# Patient Record
Sex: MALE | Race: BLACK OR AFRICAN AMERICAN | NOT HISPANIC OR LATINO | Employment: OTHER | URBAN - METROPOLITAN AREA
[De-identification: names, ages, dates, MRNs, and addresses within clinical notes are randomized per-mention and may not be internally consistent; named-entity substitution may affect disease eponyms.]

---

## 2017-01-01 ENCOUNTER — COMMUNICATION - HEALTHEAST (OUTPATIENT)
Dept: PULMONOLOGY | Facility: OTHER | Age: 67
End: 2017-01-01

## 2017-01-09 ENCOUNTER — OFFICE VISIT - HEALTHEAST (OUTPATIENT)
Dept: INTERNAL MEDICINE | Facility: CLINIC | Age: 67
End: 2017-01-09

## 2017-01-09 DIAGNOSIS — I10 ESSENTIAL HYPERTENSION, BENIGN: ICD-10-CM

## 2017-01-09 DIAGNOSIS — F33.0 MAJOR DEPRESSIVE DISORDER, RECURRENT EPISODE, MILD (H): ICD-10-CM

## 2017-01-09 DIAGNOSIS — F41.9 ANXIETY: ICD-10-CM

## 2017-01-09 DIAGNOSIS — D86.0 SARCOIDOSIS OF LUNG (H): ICD-10-CM

## 2017-01-09 ASSESSMENT — MIFFLIN-ST. JEOR: SCORE: 1377.68

## 2017-01-23 ENCOUNTER — COMMUNICATION - HEALTHEAST (OUTPATIENT)
Dept: INTERNAL MEDICINE | Facility: CLINIC | Age: 67
End: 2017-01-23

## 2017-01-23 DIAGNOSIS — F41.9 ANXIETY: ICD-10-CM

## 2017-01-23 DIAGNOSIS — G47.00 INSOMNIA, UNSPECIFIED: ICD-10-CM

## 2017-01-25 ENCOUNTER — OFFICE VISIT - HEALTHEAST (OUTPATIENT)
Dept: INTERNAL MEDICINE | Facility: CLINIC | Age: 67
End: 2017-01-25

## 2017-01-25 DIAGNOSIS — D86.0 SARCOIDOSIS OF LUNG (H): ICD-10-CM

## 2017-01-25 DIAGNOSIS — R05.9 COUGH: ICD-10-CM

## 2017-01-25 DIAGNOSIS — J06.9 URI (UPPER RESPIRATORY INFECTION): ICD-10-CM

## 2017-01-25 DIAGNOSIS — F41.9 ANXIETY: ICD-10-CM

## 2017-01-25 ASSESSMENT — MIFFLIN-ST. JEOR: SCORE: 1336.86

## 2017-01-30 ENCOUNTER — COMMUNICATION - HEALTHEAST (OUTPATIENT)
Dept: INTERNAL MEDICINE | Facility: CLINIC | Age: 67
End: 2017-01-30

## 2017-02-03 ENCOUNTER — COMMUNICATION - HEALTHEAST (OUTPATIENT)
Dept: INTERNAL MEDICINE | Facility: CLINIC | Age: 67
End: 2017-02-03

## 2017-02-03 DIAGNOSIS — F41.9 ANXIETY: ICD-10-CM

## 2017-02-06 ENCOUNTER — OFFICE VISIT - HEALTHEAST (OUTPATIENT)
Dept: INTERNAL MEDICINE | Facility: CLINIC | Age: 67
End: 2017-02-06

## 2017-02-06 DIAGNOSIS — D86.0 SARCOIDOSIS OF LUNG (H): ICD-10-CM

## 2017-02-06 DIAGNOSIS — I10 ESSENTIAL HYPERTENSION, BENIGN: ICD-10-CM

## 2017-02-06 DIAGNOSIS — R05.9 COUGH: ICD-10-CM

## 2017-02-06 DIAGNOSIS — F41.9 ANXIETY: ICD-10-CM

## 2017-02-06 ASSESSMENT — MIFFLIN-ST. JEOR: SCORE: 1345.93

## 2017-02-15 ENCOUNTER — COMMUNICATION - HEALTHEAST (OUTPATIENT)
Dept: INTERNAL MEDICINE | Facility: CLINIC | Age: 67
End: 2017-02-15

## 2017-02-15 DIAGNOSIS — F41.9 ANXIETY: ICD-10-CM

## 2017-02-21 ENCOUNTER — COMMUNICATION - HEALTHEAST (OUTPATIENT)
Dept: INTERNAL MEDICINE | Facility: CLINIC | Age: 67
End: 2017-02-21

## 2017-02-21 DIAGNOSIS — G47.00 INSOMNIA, UNSPECIFIED: ICD-10-CM

## 2017-02-23 ENCOUNTER — COMMUNICATION - HEALTHEAST (OUTPATIENT)
Dept: INTERNAL MEDICINE | Facility: CLINIC | Age: 67
End: 2017-02-23

## 2017-02-23 DIAGNOSIS — R05.9 COUGH: ICD-10-CM

## 2017-03-13 ENCOUNTER — COMMUNICATION - HEALTHEAST (OUTPATIENT)
Dept: INTERNAL MEDICINE | Facility: CLINIC | Age: 67
End: 2017-03-13

## 2017-03-13 DIAGNOSIS — G47.00 INSOMNIA, UNSPECIFIED: ICD-10-CM

## 2017-03-13 DIAGNOSIS — F41.9 ANXIETY: ICD-10-CM

## 2017-03-14 ENCOUNTER — COMMUNICATION - HEALTHEAST (OUTPATIENT)
Dept: SCHEDULING | Facility: CLINIC | Age: 67
End: 2017-03-14

## 2017-03-15 ENCOUNTER — OFFICE VISIT - HEALTHEAST (OUTPATIENT)
Dept: INTERNAL MEDICINE | Facility: CLINIC | Age: 67
End: 2017-03-15

## 2017-03-15 DIAGNOSIS — J06.9 URI (UPPER RESPIRATORY INFECTION): ICD-10-CM

## 2017-03-15 DIAGNOSIS — D86.0 SARCOIDOSIS OF LUNG (H): ICD-10-CM

## 2017-03-15 RX ORDER — GABAPENTIN 300 MG/1
300 CAPSULE ORAL DAILY
Refills: 5 | Status: SHIPPED | COMMUNITY
Start: 2017-03-05 | End: 2022-03-15

## 2017-03-15 ASSESSMENT — MIFFLIN-ST. JEOR: SCORE: 1373.15

## 2017-03-22 ENCOUNTER — COMMUNICATION - HEALTHEAST (OUTPATIENT)
Dept: SCHEDULING | Facility: CLINIC | Age: 67
End: 2017-03-22

## 2017-03-22 ENCOUNTER — COMMUNICATION - HEALTHEAST (OUTPATIENT)
Dept: INTERNAL MEDICINE | Facility: CLINIC | Age: 67
End: 2017-03-22

## 2017-04-01 ENCOUNTER — COMMUNICATION - HEALTHEAST (OUTPATIENT)
Dept: INTERNAL MEDICINE | Facility: CLINIC | Age: 67
End: 2017-04-01

## 2017-04-01 DIAGNOSIS — I10 ESSENTIAL HYPERTENSION, BENIGN: ICD-10-CM

## 2017-04-06 ENCOUNTER — COMMUNICATION - HEALTHEAST (OUTPATIENT)
Dept: INTERNAL MEDICINE | Facility: CLINIC | Age: 67
End: 2017-04-06

## 2017-04-10 ENCOUNTER — COMMUNICATION - HEALTHEAST (OUTPATIENT)
Dept: INTERNAL MEDICINE | Facility: CLINIC | Age: 67
End: 2017-04-10

## 2017-04-10 DIAGNOSIS — G43.909 MIGRAINE HEADACHE: ICD-10-CM

## 2017-04-20 ENCOUNTER — COMMUNICATION - HEALTHEAST (OUTPATIENT)
Dept: INTERNAL MEDICINE | Facility: CLINIC | Age: 67
End: 2017-04-20

## 2017-04-20 DIAGNOSIS — F41.9 ANXIETY: ICD-10-CM

## 2017-04-20 DIAGNOSIS — G47.00 INSOMNIA, UNSPECIFIED: ICD-10-CM

## 2017-04-21 ENCOUNTER — COMMUNICATION - HEALTHEAST (OUTPATIENT)
Dept: INTERNAL MEDICINE | Facility: CLINIC | Age: 67
End: 2017-04-21

## 2017-04-21 DIAGNOSIS — R05.9 COUGH: ICD-10-CM

## 2017-05-23 ENCOUNTER — COMMUNICATION - HEALTHEAST (OUTPATIENT)
Dept: INTERNAL MEDICINE | Facility: CLINIC | Age: 67
End: 2017-05-23

## 2017-05-23 DIAGNOSIS — F41.9 ANXIETY: ICD-10-CM

## 2017-05-23 DIAGNOSIS — G47.00 INSOMNIA, UNSPECIFIED: ICD-10-CM

## 2017-05-27 ENCOUNTER — COMMUNICATION - HEALTHEAST (OUTPATIENT)
Dept: INTERNAL MEDICINE | Facility: CLINIC | Age: 67
End: 2017-05-27

## 2017-05-27 DIAGNOSIS — I10 HYPERTENSION: ICD-10-CM

## 2017-06-08 ENCOUNTER — COMMUNICATION - HEALTHEAST (OUTPATIENT)
Dept: INTERNAL MEDICINE | Facility: CLINIC | Age: 67
End: 2017-06-08

## 2017-06-08 ENCOUNTER — OFFICE VISIT - HEALTHEAST (OUTPATIENT)
Dept: INTERNAL MEDICINE | Facility: CLINIC | Age: 67
End: 2017-06-08

## 2017-06-08 DIAGNOSIS — E78.5 HYPERLIPIDEMIA: ICD-10-CM

## 2017-06-08 DIAGNOSIS — D86.0 SARCOIDOSIS OF LUNG (H): ICD-10-CM

## 2017-06-08 DIAGNOSIS — I10 ESSENTIAL HYPERTENSION, BENIGN: ICD-10-CM

## 2017-06-08 DIAGNOSIS — H20.9 UVEITIS: ICD-10-CM

## 2017-06-08 DIAGNOSIS — F33.0 MAJOR DEPRESSIVE DISORDER, RECURRENT EPISODE, MILD (H): ICD-10-CM

## 2017-06-08 DIAGNOSIS — F41.9 ANXIETY: ICD-10-CM

## 2017-06-08 DIAGNOSIS — G47.00 INSOMNIA, UNSPECIFIED: ICD-10-CM

## 2017-06-08 DIAGNOSIS — G43.909 MIGRAINE HEADACHE: ICD-10-CM

## 2017-06-19 ENCOUNTER — COMMUNICATION - HEALTHEAST (OUTPATIENT)
Dept: INTERNAL MEDICINE | Facility: CLINIC | Age: 67
End: 2017-06-19

## 2017-06-19 DIAGNOSIS — F41.9 ANXIETY: ICD-10-CM

## 2017-06-19 DIAGNOSIS — G47.00 INSOMNIA, UNSPECIFIED: ICD-10-CM

## 2017-07-20 ENCOUNTER — COMMUNICATION - HEALTHEAST (OUTPATIENT)
Dept: INTERNAL MEDICINE | Facility: CLINIC | Age: 67
End: 2017-07-20

## 2017-07-20 DIAGNOSIS — G47.00 INSOMNIA, UNSPECIFIED: ICD-10-CM

## 2017-07-20 DIAGNOSIS — F41.9 ANXIETY: ICD-10-CM

## 2017-07-20 DIAGNOSIS — G43.909 MIGRAINE HEADACHE: ICD-10-CM

## 2017-08-07 ENCOUNTER — COMMUNICATION - HEALTHEAST (OUTPATIENT)
Dept: INTERNAL MEDICINE | Facility: CLINIC | Age: 67
End: 2017-08-07

## 2017-08-07 DIAGNOSIS — F41.9 ANXIETY: ICD-10-CM

## 2017-08-07 DIAGNOSIS — G47.00 INSOMNIA, UNSPECIFIED: ICD-10-CM

## 2017-08-17 ENCOUNTER — COMMUNICATION - HEALTHEAST (OUTPATIENT)
Dept: INTERNAL MEDICINE | Facility: CLINIC | Age: 67
End: 2017-08-17

## 2017-08-17 DIAGNOSIS — I10 ESSENTIAL HYPERTENSION, BENIGN: ICD-10-CM

## 2017-08-18 ENCOUNTER — COMMUNICATION - HEALTHEAST (OUTPATIENT)
Dept: INTERNAL MEDICINE | Facility: CLINIC | Age: 67
End: 2017-08-18

## 2017-08-18 DIAGNOSIS — F41.9 ANXIETY: ICD-10-CM

## 2017-08-23 ENCOUNTER — COMMUNICATION - HEALTHEAST (OUTPATIENT)
Dept: INTERNAL MEDICINE | Facility: CLINIC | Age: 67
End: 2017-08-23

## 2017-08-23 ENCOUNTER — OFFICE VISIT - HEALTHEAST (OUTPATIENT)
Dept: INTERNAL MEDICINE | Facility: CLINIC | Age: 67
End: 2017-08-23

## 2017-08-23 DIAGNOSIS — R51.9 HEADACHE: ICD-10-CM

## 2017-08-23 DIAGNOSIS — R53.83 FATIGUE: ICD-10-CM

## 2017-08-23 DIAGNOSIS — F41.9 ANXIETY: ICD-10-CM

## 2017-08-23 DIAGNOSIS — I10 ESSENTIAL HYPERTENSION, BENIGN: ICD-10-CM

## 2017-08-23 DIAGNOSIS — F33.0 MAJOR DEPRESSIVE DISORDER, RECURRENT EPISODE, MILD (H): ICD-10-CM

## 2017-08-23 ASSESSMENT — MIFFLIN-ST. JEOR: SCORE: 1395.83

## 2017-08-24 ENCOUNTER — COMMUNICATION - HEALTHEAST (OUTPATIENT)
Dept: INTERNAL MEDICINE | Facility: CLINIC | Age: 67
End: 2017-08-24

## 2017-08-29 ENCOUNTER — OFFICE VISIT - HEALTHEAST (OUTPATIENT)
Dept: INTERNAL MEDICINE | Facility: CLINIC | Age: 67
End: 2017-08-29

## 2017-08-29 DIAGNOSIS — F33.0 MAJOR DEPRESSIVE DISORDER, RECURRENT EPISODE, MILD (H): ICD-10-CM

## 2017-08-29 DIAGNOSIS — F41.9 ANXIETY: ICD-10-CM

## 2017-08-29 DIAGNOSIS — G56.03 BILATERAL CARPAL TUNNEL SYNDROME: ICD-10-CM

## 2017-08-29 DIAGNOSIS — M25.512 LEFT SHOULDER PAIN: ICD-10-CM

## 2017-09-10 ENCOUNTER — COMMUNICATION - HEALTHEAST (OUTPATIENT)
Dept: INTERNAL MEDICINE | Facility: CLINIC | Age: 67
End: 2017-09-10

## 2017-09-10 DIAGNOSIS — G43.909 MIGRAINE HEADACHE: ICD-10-CM

## 2017-09-19 ENCOUNTER — COMMUNICATION - HEALTHEAST (OUTPATIENT)
Dept: INTERNAL MEDICINE | Facility: CLINIC | Age: 67
End: 2017-09-19

## 2017-09-19 DIAGNOSIS — F41.9 ANXIETY: ICD-10-CM

## 2017-09-19 DIAGNOSIS — G47.00 INSOMNIA, UNSPECIFIED: ICD-10-CM

## 2017-09-21 ENCOUNTER — RECORDS - HEALTHEAST (OUTPATIENT)
Dept: ADMINISTRATIVE | Facility: OTHER | Age: 67
End: 2017-09-21

## 2017-09-21 ENCOUNTER — AMBULATORY - HEALTHEAST (OUTPATIENT)
Dept: INTERNAL MEDICINE | Facility: CLINIC | Age: 67
End: 2017-09-21

## 2017-09-21 ENCOUNTER — COMMUNICATION - HEALTHEAST (OUTPATIENT)
Dept: INTERNAL MEDICINE | Facility: CLINIC | Age: 67
End: 2017-09-21

## 2017-09-22 ENCOUNTER — HOSPITAL ENCOUNTER (OUTPATIENT)
Dept: PHYSICAL MEDICINE AND REHAB | Facility: CLINIC | Age: 67
Discharge: HOME | End: 2017-09-22
Attending: INTERNAL MEDICINE

## 2017-09-22 DIAGNOSIS — G56.03 BILATERAL CARPAL TUNNEL SYNDROME: ICD-10-CM

## 2017-09-29 ENCOUNTER — RECORDS - HEALTHEAST (OUTPATIENT)
Dept: ADMINISTRATIVE | Facility: OTHER | Age: 67
End: 2017-09-29

## 2017-10-11 ENCOUNTER — OFFICE VISIT - HEALTHEAST (OUTPATIENT)
Dept: INTERNAL MEDICINE | Facility: CLINIC | Age: 67
End: 2017-10-11

## 2017-10-11 DIAGNOSIS — F41.9 ANXIETY: ICD-10-CM

## 2017-10-11 DIAGNOSIS — I10 ESSENTIAL HYPERTENSION, BENIGN: ICD-10-CM

## 2017-10-11 DIAGNOSIS — D86.0 SARCOIDOSIS OF LUNG (H): ICD-10-CM

## 2017-10-11 DIAGNOSIS — F33.0 MAJOR DEPRESSIVE DISORDER, RECURRENT EPISODE, MILD (H): ICD-10-CM

## 2017-10-11 DIAGNOSIS — M25.512 LEFT SHOULDER PAIN: ICD-10-CM

## 2017-10-11 DIAGNOSIS — Z23 NEED FOR VACCINATION FOR STREP PNEUMONIAE: ICD-10-CM

## 2017-10-11 ASSESSMENT — MIFFLIN-ST. JEOR: SCORE: 1377.68

## 2017-10-16 ENCOUNTER — HOSPITAL ENCOUNTER (OUTPATIENT)
Dept: RESPIRATORY THERAPY | Facility: HOSPITAL | Age: 67
Discharge: HOME | End: 2017-10-16

## 2017-10-16 ENCOUNTER — COMMUNICATION - HEALTHEAST (OUTPATIENT)
Dept: INTERNAL MEDICINE | Facility: CLINIC | Age: 67
End: 2017-10-16

## 2017-10-16 DIAGNOSIS — F41.9 ANXIETY: ICD-10-CM

## 2017-10-16 DIAGNOSIS — D86.0 PULMONARY SARCOIDOSIS (H): ICD-10-CM

## 2017-10-16 DIAGNOSIS — G47.00 INSOMNIA: ICD-10-CM

## 2017-10-17 ENCOUNTER — OFFICE VISIT - HEALTHEAST (OUTPATIENT)
Dept: PULMONOLOGY | Facility: OTHER | Age: 67
End: 2017-10-17

## 2017-10-17 DIAGNOSIS — D86.0 PULMONARY SARCOIDOSIS (H): ICD-10-CM

## 2017-10-20 ENCOUNTER — HOSPITAL ENCOUNTER (OUTPATIENT)
Dept: CARDIOLOGY | Facility: CLINIC | Age: 67
Discharge: HOME | End: 2017-10-20
Attending: INTERNAL MEDICINE

## 2017-10-20 ENCOUNTER — HOSPITAL ENCOUNTER (OUTPATIENT)
Dept: CT IMAGING | Facility: CLINIC | Age: 67
Discharge: HOME | End: 2017-10-20
Attending: INTERNAL MEDICINE

## 2017-10-20 DIAGNOSIS — D86.0 PULMONARY SARCOIDOSIS (H): ICD-10-CM

## 2017-10-20 LAB
AORTIC ROOT: 3.5 CM
BSA FOR ECHO PROCEDURE: 1.8 M2
CV BLOOD PRESSURE: NORMAL MMHG
CV ECHO HEIGHT: 64 IN
CV ECHO WEIGHT: 159 LBS
DOP CALC LVOT AREA: 3.14 CM2
DOP CALC LVOT DIAMETER: 2 CM
DOP CALC LVOT PEAK VEL: 69.9 CM/S
DOP CALC LVOT STROKE VOLUME: 34.2 CM3
DOP CALCLVOT PEAK VEL VTI: 10.9 CM
EJECTION FRACTION: 69 % (ref 55–75)
FRACTIONAL SHORTENING: 29.6 % (ref 28–44)
INTERVENTRICULAR SEPTUM IN END DIASTOLE: 0.9 CM (ref 0.6–1)
IVS/PW RATIO: 1
LA AREA 1: 11.1 CM2
LA AREA 2: 12.7 CM2
LEFT ATRIUM LENGTH: 3.65 CM
LEFT ATRIUM SIZE: 4.3 CM
LEFT ATRIUM TO AORTIC ROOT RATIO: 1.23 NO UNITS
LEFT ATRIUM VOLUME INDEX: 18.2 ML/M2
LEFT ATRIUM VOLUME: 32.8 CM3
LEFT VENTRICLE CARDIAC INDEX: 1.5 L/MIN/M2
LEFT VENTRICLE CARDIAC OUTPUT: 2.7 L/MIN
LEFT VENTRICLE DIASTOLIC VOLUME INDEX: 43.3 CM3/M2 (ref 34–74)
LEFT VENTRICLE DIASTOLIC VOLUME: 78 CM3 (ref 62–150)
LEFT VENTRICLE HEART RATE: 80 BPM
LEFT VENTRICLE MASS INDEX: 100.1 G/M2
LEFT VENTRICLE SYSTOLIC VOLUME INDEX: 13.3 CM3/M2 (ref 11–31)
LEFT VENTRICLE SYSTOLIC VOLUME: 24 CM3 (ref 21–61)
LEFT VENTRICULAR INTERNAL DIMENSION IN DIASTOLE: 5.4 CM (ref 4.2–5.8)
LEFT VENTRICULAR INTERNAL DIMENSION IN SYSTOLE: 3.8 CM (ref 2.5–4)
LEFT VENTRICULAR MASS: 180.1 G
LEFT VENTRICULAR OUTFLOW TRACT MEAN GRADIENT: 1 MMHG
LEFT VENTRICULAR OUTFLOW TRACT MEAN VELOCITY: 54.1 CM/S
LEFT VENTRICULAR OUTFLOW TRACT PEAK GRADIENT: 2 MMHG
LEFT VENTRICULAR POSTERIOR WALL IN END DIASTOLE: 0.9 CM (ref 0.6–1)
LV STROKE VOLUME INDEX: 19 ML/M2
MITRAL REGURGITANT VELOCITY TIME INTEGRAL: 153 CM
MITRAL VALVE DECELERATION SLOPE: 3330 MM/S2
MITRAL VALVE E/A RATIO: 1.1
MITRAL VALVE PRESSURE HALF-TIME: 78 MS
MR FLOW: 38 CM3
MR MEAN GRADIENT: 96 MMHG
MR MEAN VELOCITY: 459 CM/S
MR PEAK GRADIENT: 139.2 MMHG
MR PISA EROA: 0.2 CM2
MR PISA RADIUS: 1 CM
MR PISA VN NYQUIST: 23.1 CM/S
MV AVERAGE E/E' RATIO: 9.5 CM/S
MV DECELERATION TIME: 236 MS
MV E'TISSUE VEL-LAT: 8.19 CM/S
MV E'TISSUE VEL-MED: 7.6 CM/S
MV LATERAL E/E' RATIO: 9.2
MV MEDIAL E/E' RATIO: 9.9
MV PEAK A VELOCITY: 69.6 CM/S
MV PEAK E VELOCITY: 75 CM/S
MV REGURGITANT VOLUME: 37.6 CC
MV VALVE AREA PRESSURE 1/2 METHOD: 2.8 CM2
NUC REST DIASTOLIC VOLUME INDEX: 2544 LBS
NUC REST SYSTOLIC VOLUME INDEX: 64 IN
PISA MR PEAK VEL: 590 CM/S
TRICUSPID VALVE ANULAR PLANE SYSTOLIC EXCURSION: 2.2 CM

## 2017-10-20 ASSESSMENT — MIFFLIN-ST. JEOR: SCORE: 1382.22

## 2017-10-21 ENCOUNTER — COMMUNICATION - HEALTHEAST (OUTPATIENT)
Dept: INTERNAL MEDICINE | Facility: CLINIC | Age: 67
End: 2017-10-21

## 2017-10-21 DIAGNOSIS — I10 ESSENTIAL HYPERTENSION, BENIGN: ICD-10-CM

## 2017-10-25 ENCOUNTER — COMMUNICATION - HEALTHEAST (OUTPATIENT)
Dept: PULMONOLOGY | Facility: OTHER | Age: 67
End: 2017-10-25

## 2017-11-05 ENCOUNTER — AMBULATORY - HEALTHEAST (OUTPATIENT)
Dept: PULMONOLOGY | Facility: OTHER | Age: 67
End: 2017-11-05

## 2017-11-05 ENCOUNTER — COMMUNICATION - HEALTHEAST (OUTPATIENT)
Dept: PULMONOLOGY | Facility: OTHER | Age: 67
End: 2017-11-05

## 2017-11-05 DIAGNOSIS — D86.0 PULMONARY SARCOIDOSIS (H): ICD-10-CM

## 2017-11-12 ENCOUNTER — COMMUNICATION - HEALTHEAST (OUTPATIENT)
Dept: INTERNAL MEDICINE | Facility: CLINIC | Age: 67
End: 2017-11-12

## 2017-11-12 DIAGNOSIS — G43.909 MIGRAINE HEADACHE: ICD-10-CM

## 2017-11-15 ENCOUNTER — COMMUNICATION - HEALTHEAST (OUTPATIENT)
Dept: INTERNAL MEDICINE | Facility: CLINIC | Age: 67
End: 2017-11-15

## 2017-11-15 DIAGNOSIS — G47.00 INSOMNIA: ICD-10-CM

## 2017-11-15 DIAGNOSIS — F41.9 ANXIETY: ICD-10-CM

## 2017-12-15 ENCOUNTER — COMMUNICATION - HEALTHEAST (OUTPATIENT)
Dept: INTERNAL MEDICINE | Facility: CLINIC | Age: 67
End: 2017-12-15

## 2017-12-15 DIAGNOSIS — G47.00 INSOMNIA: ICD-10-CM

## 2017-12-15 DIAGNOSIS — F41.9 ANXIETY: ICD-10-CM

## 2017-12-26 ENCOUNTER — COMMUNICATION - HEALTHEAST (OUTPATIENT)
Dept: SCHEDULING | Facility: CLINIC | Age: 67
End: 2017-12-26

## 2017-12-30 ENCOUNTER — COMMUNICATION - HEALTHEAST (OUTPATIENT)
Dept: INTERNAL MEDICINE | Facility: CLINIC | Age: 67
End: 2017-12-30

## 2017-12-30 ENCOUNTER — COMMUNICATION - HEALTHEAST (OUTPATIENT)
Dept: SCHEDULING | Facility: CLINIC | Age: 67
End: 2017-12-30

## 2017-12-30 DIAGNOSIS — R05.9 COUGH: ICD-10-CM

## 2018-01-11 ENCOUNTER — COMMUNICATION - HEALTHEAST (OUTPATIENT)
Dept: INTERNAL MEDICINE | Facility: CLINIC | Age: 68
End: 2018-01-11

## 2018-01-11 DIAGNOSIS — I10 ESSENTIAL HYPERTENSION, BENIGN: ICD-10-CM

## 2018-01-15 ENCOUNTER — OFFICE VISIT - HEALTHEAST (OUTPATIENT)
Dept: INTERNAL MEDICINE | Facility: CLINIC | Age: 68
End: 2018-01-15

## 2018-01-15 DIAGNOSIS — K59.00 CONSTIPATION: ICD-10-CM

## 2018-01-15 DIAGNOSIS — F41.9 ANXIETY: ICD-10-CM

## 2018-01-15 DIAGNOSIS — G47.00 INSOMNIA: ICD-10-CM

## 2018-01-15 DIAGNOSIS — H20.9 UVEITIS: ICD-10-CM

## 2018-01-15 ASSESSMENT — MIFFLIN-ST. JEOR: SCORE: 1359.54

## 2018-01-30 ENCOUNTER — RECORDS - HEALTHEAST (OUTPATIENT)
Dept: ADMINISTRATIVE | Facility: OTHER | Age: 68
End: 2018-01-30

## 2018-02-07 ENCOUNTER — COMMUNICATION - HEALTHEAST (OUTPATIENT)
Dept: INTERNAL MEDICINE | Facility: CLINIC | Age: 68
End: 2018-02-07

## 2018-02-07 DIAGNOSIS — G47.00 INSOMNIA: ICD-10-CM

## 2018-02-07 DIAGNOSIS — F41.9 ANXIETY: ICD-10-CM

## 2018-02-13 ENCOUNTER — COMMUNICATION - HEALTHEAST (OUTPATIENT)
Dept: INTERNAL MEDICINE | Facility: CLINIC | Age: 68
End: 2018-02-13

## 2018-02-13 DIAGNOSIS — F41.9 ANXIETY: ICD-10-CM

## 2018-02-28 ENCOUNTER — COMMUNICATION - HEALTHEAST (OUTPATIENT)
Dept: INTERNAL MEDICINE | Facility: CLINIC | Age: 68
End: 2018-02-28

## 2018-03-14 ENCOUNTER — COMMUNICATION - HEALTHEAST (OUTPATIENT)
Dept: INTERNAL MEDICINE | Facility: CLINIC | Age: 68
End: 2018-03-14

## 2018-03-14 DIAGNOSIS — I10 HYPERTENSION: ICD-10-CM

## 2018-03-15 ENCOUNTER — COMMUNICATION - HEALTHEAST (OUTPATIENT)
Dept: INTERNAL MEDICINE | Facility: CLINIC | Age: 68
End: 2018-03-15

## 2018-03-15 DIAGNOSIS — G47.00 INSOMNIA: ICD-10-CM

## 2018-03-15 DIAGNOSIS — F41.9 ANXIETY: ICD-10-CM

## 2018-03-29 ENCOUNTER — COMMUNICATION - HEALTHEAST (OUTPATIENT)
Dept: INTERNAL MEDICINE | Facility: CLINIC | Age: 68
End: 2018-03-29

## 2018-04-10 ENCOUNTER — RECORDS - HEALTHEAST (OUTPATIENT)
Dept: ADMINISTRATIVE | Facility: OTHER | Age: 68
End: 2018-04-10

## 2018-04-17 ENCOUNTER — COMMUNICATION - HEALTHEAST (OUTPATIENT)
Dept: INTERNAL MEDICINE | Facility: CLINIC | Age: 68
End: 2018-04-17

## 2018-04-17 DIAGNOSIS — F41.9 ANXIETY: ICD-10-CM

## 2018-04-17 DIAGNOSIS — G47.00 INSOMNIA: ICD-10-CM

## 2018-04-19 ENCOUNTER — COMMUNICATION - HEALTHEAST (OUTPATIENT)
Dept: INTERNAL MEDICINE | Facility: CLINIC | Age: 68
End: 2018-04-19

## 2018-04-19 DIAGNOSIS — H20.9 UVEITIS: ICD-10-CM

## 2018-05-16 ENCOUNTER — COMMUNICATION - HEALTHEAST (OUTPATIENT)
Dept: INTERNAL MEDICINE | Facility: CLINIC | Age: 68
End: 2018-05-16

## 2018-05-16 DIAGNOSIS — F41.9 ANXIETY: ICD-10-CM

## 2018-05-16 DIAGNOSIS — G47.00 INSOMNIA: ICD-10-CM

## 2018-06-14 ENCOUNTER — COMMUNICATION - HEALTHEAST (OUTPATIENT)
Dept: INTERNAL MEDICINE | Facility: CLINIC | Age: 68
End: 2018-06-14

## 2018-06-14 DIAGNOSIS — F41.9 ANXIETY: ICD-10-CM

## 2018-06-14 DIAGNOSIS — G47.00 INSOMNIA: ICD-10-CM

## 2018-06-16 ENCOUNTER — COMMUNICATION - HEALTHEAST (OUTPATIENT)
Dept: SCHEDULING | Facility: CLINIC | Age: 68
End: 2018-06-16

## 2018-06-16 DIAGNOSIS — G47.00 INSOMNIA: ICD-10-CM

## 2018-06-16 DIAGNOSIS — F41.9 ANXIETY: ICD-10-CM

## 2018-07-13 ENCOUNTER — COMMUNICATION - HEALTHEAST (OUTPATIENT)
Dept: INTERNAL MEDICINE | Facility: CLINIC | Age: 68
End: 2018-07-13

## 2018-07-13 DIAGNOSIS — G47.00 INSOMNIA: ICD-10-CM

## 2018-07-13 DIAGNOSIS — F41.9 ANXIETY: ICD-10-CM

## 2018-07-21 ENCOUNTER — COMMUNICATION - HEALTHEAST (OUTPATIENT)
Dept: SCHEDULING | Facility: CLINIC | Age: 68
End: 2018-07-21

## 2018-07-21 DIAGNOSIS — R05.9 COUGH: ICD-10-CM

## 2018-07-25 ENCOUNTER — COMMUNICATION - HEALTHEAST (OUTPATIENT)
Dept: INTERNAL MEDICINE | Facility: CLINIC | Age: 68
End: 2018-07-25

## 2018-07-25 DIAGNOSIS — F41.9 ANXIETY: ICD-10-CM

## 2018-07-25 DIAGNOSIS — G47.00 INSOMNIA: ICD-10-CM

## 2018-08-11 ENCOUNTER — COMMUNICATION - HEALTHEAST (OUTPATIENT)
Dept: INTERNAL MEDICINE | Facility: CLINIC | Age: 68
End: 2018-08-11

## 2018-08-11 DIAGNOSIS — F41.9 ANXIETY: ICD-10-CM

## 2018-08-15 ENCOUNTER — OFFICE VISIT - HEALTHEAST (OUTPATIENT)
Dept: INTERNAL MEDICINE | Facility: CLINIC | Age: 68
End: 2018-08-15

## 2018-08-15 DIAGNOSIS — R42 POSTURAL DIZZINESS: ICD-10-CM

## 2018-08-15 ASSESSMENT — MIFFLIN-ST. JEOR: SCORE: 1350.47

## 2018-09-08 ENCOUNTER — COMMUNICATION - HEALTHEAST (OUTPATIENT)
Dept: INTERNAL MEDICINE | Facility: CLINIC | Age: 68
End: 2018-09-08

## 2018-09-08 DIAGNOSIS — I10 HYPERTENSION: ICD-10-CM

## 2018-09-11 ENCOUNTER — COMMUNICATION - HEALTHEAST (OUTPATIENT)
Dept: INTERNAL MEDICINE | Facility: CLINIC | Age: 68
End: 2018-09-11

## 2018-09-13 ENCOUNTER — OFFICE VISIT - HEALTHEAST (OUTPATIENT)
Dept: INTERNAL MEDICINE | Facility: CLINIC | Age: 68
End: 2018-09-13

## 2018-09-13 DIAGNOSIS — Z23 NEED FOR IMMUNIZATION AGAINST INFLUENZA: ICD-10-CM

## 2018-09-13 DIAGNOSIS — D86.0 SARCOIDOSIS OF LUNG (H): ICD-10-CM

## 2018-09-13 DIAGNOSIS — G47.00 INSOMNIA: ICD-10-CM

## 2018-09-13 DIAGNOSIS — F41.9 ANXIETY: ICD-10-CM

## 2018-09-13 DIAGNOSIS — I10 ESSENTIAL HYPERTENSION, BENIGN: ICD-10-CM

## 2018-09-13 ASSESSMENT — MIFFLIN-ST. JEOR: SCORE: 1359.54

## 2018-09-14 ENCOUNTER — COMMUNICATION - HEALTHEAST (OUTPATIENT)
Dept: INTERNAL MEDICINE | Facility: CLINIC | Age: 68
End: 2018-09-14

## 2018-09-14 DIAGNOSIS — G47.00 INSOMNIA: ICD-10-CM

## 2018-09-24 ENCOUNTER — OFFICE VISIT - HEALTHEAST (OUTPATIENT)
Dept: SLEEP MEDICINE | Facility: CLINIC | Age: 68
End: 2018-09-24

## 2018-09-24 DIAGNOSIS — G47.8 OTHER SLEEP DISORDERS: ICD-10-CM

## 2018-09-24 DIAGNOSIS — R06.83 SNORING: ICD-10-CM

## 2018-09-24 DIAGNOSIS — Z91.89 AT RISK FOR SLEEP APNEA: ICD-10-CM

## 2018-09-24 DIAGNOSIS — G47.50 PARASOMNIA: ICD-10-CM

## 2018-09-24 ASSESSMENT — MIFFLIN-ST. JEOR: SCORE: 1355

## 2018-10-01 ENCOUNTER — COMMUNICATION - HEALTHEAST (OUTPATIENT)
Dept: INTERNAL MEDICINE | Facility: CLINIC | Age: 68
End: 2018-10-01

## 2018-10-01 DIAGNOSIS — J34.89 SINUS PRESSURE: ICD-10-CM

## 2018-10-11 ENCOUNTER — RECORDS - HEALTHEAST (OUTPATIENT)
Dept: SLEEP MEDICINE | Facility: CLINIC | Age: 68
End: 2018-10-11

## 2018-10-11 DIAGNOSIS — Z91.89 OTHER SPECIFIED PERSONAL RISK FACTORS, NOT ELSEWHERE CLASSIFIED: ICD-10-CM

## 2018-10-11 DIAGNOSIS — G47.50 PARASOMNIA, UNSPECIFIED: ICD-10-CM

## 2018-10-11 DIAGNOSIS — R06.83 SNORING: ICD-10-CM

## 2018-10-15 ENCOUNTER — COMMUNICATION - HEALTHEAST (OUTPATIENT)
Dept: SLEEP MEDICINE | Facility: CLINIC | Age: 68
End: 2018-10-15

## 2018-10-16 ENCOUNTER — COMMUNICATION - HEALTHEAST (OUTPATIENT)
Dept: INTERNAL MEDICINE | Facility: CLINIC | Age: 68
End: 2018-10-16

## 2018-10-16 DIAGNOSIS — G47.00 INSOMNIA: ICD-10-CM

## 2018-10-23 ENCOUNTER — COMMUNICATION - HEALTHEAST (OUTPATIENT)
Dept: INTERNAL MEDICINE | Facility: CLINIC | Age: 68
End: 2018-10-23

## 2018-10-23 DIAGNOSIS — F41.9 ANXIETY: ICD-10-CM

## 2018-10-24 ENCOUNTER — COMMUNICATION - HEALTHEAST (OUTPATIENT)
Dept: SLEEP MEDICINE | Facility: CLINIC | Age: 68
End: 2018-10-24

## 2018-11-07 ENCOUNTER — COMMUNICATION - HEALTHEAST (OUTPATIENT)
Dept: INTERNAL MEDICINE | Facility: CLINIC | Age: 68
End: 2018-11-07

## 2018-11-09 ENCOUNTER — COMMUNICATION - HEALTHEAST (OUTPATIENT)
Dept: INTERNAL MEDICINE | Facility: CLINIC | Age: 68
End: 2018-11-09

## 2018-11-09 DIAGNOSIS — I10 ESSENTIAL HYPERTENSION, BENIGN: ICD-10-CM

## 2018-11-14 ENCOUNTER — COMMUNICATION - HEALTHEAST (OUTPATIENT)
Dept: SCHEDULING | Facility: CLINIC | Age: 68
End: 2018-11-14

## 2018-11-15 ENCOUNTER — OFFICE VISIT - HEALTHEAST (OUTPATIENT)
Dept: INTERNAL MEDICINE | Facility: CLINIC | Age: 68
End: 2018-11-15

## 2018-11-15 ENCOUNTER — RECORDS - HEALTHEAST (OUTPATIENT)
Dept: GENERAL RADIOLOGY | Facility: CLINIC | Age: 68
End: 2018-11-15

## 2018-11-15 DIAGNOSIS — D86.9 SARCOIDOSIS: ICD-10-CM

## 2018-11-15 DIAGNOSIS — D86.9 SARCOIDOSIS, UNSPECIFIED: ICD-10-CM

## 2018-11-15 LAB
ALBUMIN SERPL-MCNC: 3.2 G/DL (ref 3.5–5)
ALP SERPL-CCNC: 72 U/L (ref 45–120)
ALT SERPL W P-5'-P-CCNC: <9 U/L (ref 0–45)
ANION GAP SERPL CALCULATED.3IONS-SCNC: 13 MMOL/L (ref 5–18)
AST SERPL W P-5'-P-CCNC: 10 U/L (ref 0–40)
BILIRUB SERPL-MCNC: 0.4 MG/DL (ref 0–1)
BUN SERPL-MCNC: 17 MG/DL (ref 8–22)
CALCIUM SERPL-MCNC: 10 MG/DL (ref 8.5–10.5)
CHLORIDE BLD-SCNC: 100 MMOL/L (ref 98–107)
CO2 SERPL-SCNC: 25 MMOL/L (ref 22–31)
CREAT SERPL-MCNC: 1.25 MG/DL (ref 0.7–1.3)
ERYTHROCYTE [DISTWIDTH] IN BLOOD BY AUTOMATED COUNT: 11.9 % (ref 11–14.5)
ERYTHROCYTE [SEDIMENTATION RATE] IN BLOOD BY WESTERGREN METHOD: 54 MM/HR (ref 0–15)
GFR SERPL CREATININE-BSD FRML MDRD: 57 ML/MIN/1.73M2
GLUCOSE BLD-MCNC: 88 MG/DL (ref 70–125)
HCT VFR BLD AUTO: 43.1 % (ref 40–54)
HGB BLD-MCNC: 14.2 G/DL (ref 14–18)
MCH RBC QN AUTO: 29.3 PG (ref 27–34)
MCHC RBC AUTO-ENTMCNC: 32.9 G/DL (ref 32–36)
MCV RBC AUTO: 89 FL (ref 80–100)
PLATELET # BLD AUTO: 487 THOU/UL (ref 140–440)
PMV BLD AUTO: 7.2 FL (ref 7–10)
POTASSIUM BLD-SCNC: 4.6 MMOL/L (ref 3.5–5)
PROT SERPL-MCNC: 7.6 G/DL (ref 6–8)
RBC # BLD AUTO: 4.83 MILL/UL (ref 4.4–6.2)
SODIUM SERPL-SCNC: 138 MMOL/L (ref 136–145)
WBC: 13.1 THOU/UL (ref 4–11)

## 2018-11-15 ASSESSMENT — MIFFLIN-ST. JEOR: SCORE: 1336.86

## 2018-11-17 ENCOUNTER — COMMUNICATION - HEALTHEAST (OUTPATIENT)
Dept: INTERNAL MEDICINE | Facility: CLINIC | Age: 68
End: 2018-11-17

## 2018-11-21 ENCOUNTER — OFFICE VISIT - HEALTHEAST (OUTPATIENT)
Dept: SLEEP MEDICINE | Facility: CLINIC | Age: 68
End: 2018-11-21

## 2018-11-21 ENCOUNTER — COMMUNICATION - HEALTHEAST (OUTPATIENT)
Dept: INTERNAL MEDICINE | Facility: CLINIC | Age: 68
End: 2018-11-21

## 2018-11-21 DIAGNOSIS — G47.00 PERSISTENT INSOMNIA: ICD-10-CM

## 2018-11-21 DIAGNOSIS — G47.33 OSA (OBSTRUCTIVE SLEEP APNEA): ICD-10-CM

## 2018-11-21 DIAGNOSIS — F41.9 ANXIETY: ICD-10-CM

## 2018-11-23 ENCOUNTER — AMBULATORY - HEALTHEAST (OUTPATIENT)
Dept: SLEEP MEDICINE | Facility: CLINIC | Age: 68
End: 2018-11-23

## 2018-11-26 ENCOUNTER — COMMUNICATION - HEALTHEAST (OUTPATIENT)
Dept: INTERNAL MEDICINE | Facility: CLINIC | Age: 68
End: 2018-11-26

## 2018-11-26 DIAGNOSIS — G43.909 MIGRAINE HEADACHE: ICD-10-CM

## 2018-11-27 ENCOUNTER — OFFICE VISIT - HEALTHEAST (OUTPATIENT)
Dept: PULMONOLOGY | Facility: OTHER | Age: 68
End: 2018-11-27

## 2018-11-27 DIAGNOSIS — E83.52 HYPERCALCEMIA: ICD-10-CM

## 2018-11-27 DIAGNOSIS — D86.0 PULMONARY SARCOIDOSIS (H): ICD-10-CM

## 2018-11-30 ENCOUNTER — COMMUNICATION - HEALTHEAST (OUTPATIENT)
Dept: INTERNAL MEDICINE | Facility: CLINIC | Age: 68
End: 2018-11-30

## 2018-12-04 ENCOUNTER — COMMUNICATION - HEALTHEAST (OUTPATIENT)
Dept: INTERNAL MEDICINE | Facility: CLINIC | Age: 68
End: 2018-12-04

## 2018-12-04 ENCOUNTER — AMBULATORY - HEALTHEAST (OUTPATIENT)
Dept: LAB | Facility: CLINIC | Age: 68
End: 2018-12-04

## 2018-12-04 DIAGNOSIS — G47.00 INSOMNIA: ICD-10-CM

## 2018-12-04 DIAGNOSIS — D86.0 PULMONARY SARCOIDOSIS (H): ICD-10-CM

## 2018-12-04 DIAGNOSIS — E83.52 HYPERCALCEMIA: ICD-10-CM

## 2018-12-04 LAB — TSH SERPL DL<=0.005 MIU/L-ACNC: 2.32 UIU/ML (ref 0.3–5)

## 2018-12-05 ENCOUNTER — HOSPITAL ENCOUNTER (OUTPATIENT)
Dept: CT IMAGING | Facility: CLINIC | Age: 68
Discharge: HOME | End: 2018-12-05
Attending: INTERNAL MEDICINE

## 2018-12-05 ENCOUNTER — COMMUNICATION - HEALTHEAST (OUTPATIENT)
Dept: INTERNAL MEDICINE | Facility: CLINIC | Age: 68
End: 2018-12-05

## 2018-12-05 ENCOUNTER — HOSPITAL ENCOUNTER (OUTPATIENT)
Dept: RESPIRATORY THERAPY | Facility: CLINIC | Age: 68
Discharge: HOME | End: 2018-12-05
Attending: INTERNAL MEDICINE

## 2018-12-05 DIAGNOSIS — D86.0 PULMONARY SARCOIDOSIS (H): ICD-10-CM

## 2018-12-05 LAB — 25(OH)D3 SERPL-MCNC: 12 NG/ML (ref 30–80)

## 2018-12-09 ENCOUNTER — COMMUNICATION - HEALTHEAST (OUTPATIENT)
Dept: PULMONOLOGY | Facility: OTHER | Age: 68
End: 2018-12-09

## 2018-12-10 ENCOUNTER — COMMUNICATION - HEALTHEAST (OUTPATIENT)
Dept: PULMONOLOGY | Facility: OTHER | Age: 68
End: 2018-12-10

## 2018-12-12 ENCOUNTER — COMMUNICATION - HEALTHEAST (OUTPATIENT)
Dept: PULMONOLOGY | Facility: OTHER | Age: 68
End: 2018-12-12

## 2018-12-12 DIAGNOSIS — D86.0 PULMONARY SARCOIDOSIS (H): ICD-10-CM

## 2018-12-20 ENCOUNTER — OFFICE VISIT - HEALTHEAST (OUTPATIENT)
Dept: INTERNAL MEDICINE | Facility: CLINIC | Age: 68
End: 2018-12-20

## 2018-12-20 DIAGNOSIS — G47.33 OSA (OBSTRUCTIVE SLEEP APNEA): ICD-10-CM

## 2018-12-20 DIAGNOSIS — F41.9 ANXIETY: ICD-10-CM

## 2018-12-20 DIAGNOSIS — E78.2 MIXED HYPERLIPIDEMIA: ICD-10-CM

## 2018-12-20 DIAGNOSIS — D86.0 PULMONARY SARCOIDOSIS (H): ICD-10-CM

## 2018-12-20 DIAGNOSIS — I10 ESSENTIAL HYPERTENSION, BENIGN: ICD-10-CM

## 2018-12-20 DIAGNOSIS — F33.0 MAJOR DEPRESSIVE DISORDER, RECURRENT EPISODE, MILD (H): ICD-10-CM

## 2018-12-20 ASSESSMENT — MIFFLIN-ST. JEOR: SCORE: 1332.32

## 2018-12-31 ENCOUNTER — COMMUNICATION - HEALTHEAST (OUTPATIENT)
Dept: INTERNAL MEDICINE | Facility: CLINIC | Age: 68
End: 2018-12-31

## 2019-01-02 ENCOUNTER — COMMUNICATION - HEALTHEAST (OUTPATIENT)
Dept: INTERNAL MEDICINE | Facility: CLINIC | Age: 69
End: 2019-01-02

## 2019-01-03 ENCOUNTER — COMMUNICATION - HEALTHEAST (OUTPATIENT)
Dept: INTERNAL MEDICINE | Facility: CLINIC | Age: 69
End: 2019-01-03

## 2019-01-08 ENCOUNTER — COMMUNICATION - HEALTHEAST (OUTPATIENT)
Dept: INTERNAL MEDICINE | Facility: CLINIC | Age: 69
End: 2019-01-08

## 2019-01-08 DIAGNOSIS — I10 ESSENTIAL HYPERTENSION, BENIGN: ICD-10-CM

## 2019-01-12 ENCOUNTER — COMMUNICATION - HEALTHEAST (OUTPATIENT)
Dept: SCHEDULING | Facility: CLINIC | Age: 69
End: 2019-01-12

## 2019-01-12 DIAGNOSIS — R05.9 COUGH: ICD-10-CM

## 2019-01-15 ENCOUNTER — HOSPITAL ENCOUNTER (OUTPATIENT)
Dept: CT IMAGING | Facility: HOSPITAL | Age: 69
Discharge: HOME | End: 2019-01-15
Attending: INTERNAL MEDICINE

## 2019-01-15 DIAGNOSIS — D86.0 PULMONARY SARCOIDOSIS (H): ICD-10-CM

## 2019-01-17 ENCOUNTER — OFFICE VISIT - HEALTHEAST (OUTPATIENT)
Dept: INTERNAL MEDICINE | Facility: CLINIC | Age: 69
End: 2019-01-17

## 2019-01-17 ENCOUNTER — COMMUNICATION - HEALTHEAST (OUTPATIENT)
Dept: PULMONOLOGY | Facility: OTHER | Age: 69
End: 2019-01-17

## 2019-01-17 DIAGNOSIS — H20.9 UVEITIS: ICD-10-CM

## 2019-01-17 DIAGNOSIS — E78.2 MIXED HYPERLIPIDEMIA: ICD-10-CM

## 2019-01-17 DIAGNOSIS — D86.0 SARCOIDOSIS OF LUNG (H): ICD-10-CM

## 2019-01-17 DIAGNOSIS — E55.9 VITAMIN D DEFICIENCY: ICD-10-CM

## 2019-01-17 DIAGNOSIS — I10 ESSENTIAL HYPERTENSION, BENIGN: ICD-10-CM

## 2019-01-17 DIAGNOSIS — F33.0 MAJOR DEPRESSIVE DISORDER, RECURRENT EPISODE, MILD (H): ICD-10-CM

## 2019-01-17 DIAGNOSIS — F51.02 ADJUSTMENT INSOMNIA: ICD-10-CM

## 2019-01-17 ASSESSMENT — MIFFLIN-ST. JEOR: SCORE: 1341.4

## 2019-01-18 ENCOUNTER — RECORDS - HEALTHEAST (OUTPATIENT)
Dept: ADMINISTRATIVE | Facility: OTHER | Age: 69
End: 2019-01-18

## 2019-01-24 ENCOUNTER — COMMUNICATION - HEALTHEAST (OUTPATIENT)
Dept: INTERNAL MEDICINE | Facility: CLINIC | Age: 69
End: 2019-01-24

## 2019-01-24 DIAGNOSIS — F41.9 ANXIETY: ICD-10-CM

## 2019-01-25 ENCOUNTER — OFFICE VISIT - HEALTHEAST (OUTPATIENT)
Dept: PULMONOLOGY | Facility: OTHER | Age: 69
End: 2019-01-25

## 2019-01-25 DIAGNOSIS — D86.0 PULMONARY SARCOIDOSIS (H): ICD-10-CM

## 2019-01-25 ASSESSMENT — MIFFLIN-ST. JEOR: SCORE: 1350.47

## 2019-02-04 ENCOUNTER — COMMUNICATION - HEALTHEAST (OUTPATIENT)
Dept: INTERNAL MEDICINE | Facility: CLINIC | Age: 69
End: 2019-02-04

## 2019-02-04 DIAGNOSIS — E55.9 VITAMIN D DEFICIENCY: ICD-10-CM

## 2019-02-13 ENCOUNTER — HOSPITAL ENCOUNTER (OUTPATIENT)
Dept: RESPIRATORY THERAPY | Facility: HOSPITAL | Age: 69
Discharge: HOME | End: 2019-02-13
Attending: INTERNAL MEDICINE

## 2019-02-13 DIAGNOSIS — D86.0 PULMONARY SARCOIDOSIS (H): ICD-10-CM

## 2019-02-13 LAB — HGB BLD-MCNC: 14.5 G/DL (ref 14–18)

## 2019-02-18 ENCOUNTER — COMMUNICATION - HEALTHEAST (OUTPATIENT)
Dept: PULMONOLOGY | Facility: OTHER | Age: 69
End: 2019-02-18

## 2019-02-19 ENCOUNTER — COMMUNICATION - HEALTHEAST (OUTPATIENT)
Dept: PULMONOLOGY | Facility: OTHER | Age: 69
End: 2019-02-19

## 2019-02-22 ENCOUNTER — COMMUNICATION - HEALTHEAST (OUTPATIENT)
Dept: PULMONOLOGY | Facility: OTHER | Age: 69
End: 2019-02-22

## 2019-02-26 ENCOUNTER — COMMUNICATION - HEALTHEAST (OUTPATIENT)
Dept: PULMONOLOGY | Facility: OTHER | Age: 69
End: 2019-02-26

## 2019-02-26 DIAGNOSIS — D86.0 PULMONARY SARCOIDOSIS (H): ICD-10-CM

## 2019-02-28 ENCOUNTER — COMMUNICATION - HEALTHEAST (OUTPATIENT)
Dept: INTERNAL MEDICINE | Facility: CLINIC | Age: 69
End: 2019-02-28

## 2019-02-28 DIAGNOSIS — F41.9 ANXIETY: ICD-10-CM

## 2019-03-01 ENCOUNTER — COMMUNICATION - HEALTHEAST (OUTPATIENT)
Dept: INTERNAL MEDICINE | Facility: CLINIC | Age: 69
End: 2019-03-01

## 2019-03-01 DIAGNOSIS — F41.9 ANXIETY: ICD-10-CM

## 2019-03-08 ENCOUNTER — COMMUNICATION - HEALTHEAST (OUTPATIENT)
Dept: INTERNAL MEDICINE | Facility: CLINIC | Age: 69
End: 2019-03-08

## 2019-03-08 DIAGNOSIS — F41.9 ANXIETY: ICD-10-CM

## 2019-04-03 ENCOUNTER — COMMUNICATION - HEALTHEAST (OUTPATIENT)
Dept: INTERNAL MEDICINE | Facility: CLINIC | Age: 69
End: 2019-04-03

## 2019-04-03 ENCOUNTER — COMMUNICATION - HEALTHEAST (OUTPATIENT)
Dept: PULMONOLOGY | Facility: OTHER | Age: 69
End: 2019-04-03

## 2019-04-03 DIAGNOSIS — D86.0 PULMONARY SARCOIDOSIS (H): ICD-10-CM

## 2019-04-03 DIAGNOSIS — F41.9 ANXIETY: ICD-10-CM

## 2019-04-08 ENCOUNTER — COMMUNICATION - HEALTHEAST (OUTPATIENT)
Dept: INTERNAL MEDICINE | Facility: CLINIC | Age: 69
End: 2019-04-08

## 2019-04-10 ENCOUNTER — OFFICE VISIT - HEALTHEAST (OUTPATIENT)
Dept: INTERNAL MEDICINE | Facility: CLINIC | Age: 69
End: 2019-04-10

## 2019-04-10 DIAGNOSIS — K21.9 GASTROESOPHAGEAL REFLUX DISEASE WITHOUT ESOPHAGITIS: ICD-10-CM

## 2019-04-10 DIAGNOSIS — D86.0 SARCOIDOSIS OF LUNG (H): ICD-10-CM

## 2019-04-10 DIAGNOSIS — I10 ESSENTIAL HYPERTENSION, BENIGN: ICD-10-CM

## 2019-04-10 ASSESSMENT — MIFFLIN-ST. JEOR: SCORE: 1373.15

## 2019-04-25 ENCOUNTER — OFFICE VISIT - HEALTHEAST (OUTPATIENT)
Dept: PULMONOLOGY | Facility: OTHER | Age: 69
End: 2019-04-25

## 2019-04-25 DIAGNOSIS — D86.0 PULMONARY SARCOIDOSIS (H): ICD-10-CM

## 2019-04-25 ASSESSMENT — MIFFLIN-ST. JEOR: SCORE: 1386.76

## 2019-05-01 ENCOUNTER — HOSPITAL ENCOUNTER (OUTPATIENT)
Dept: RADIOLOGY | Facility: HOSPITAL | Age: 69
Discharge: HOME | End: 2019-05-01
Attending: INTERNAL MEDICINE

## 2019-05-01 ENCOUNTER — AMBULATORY - HEALTHEAST (OUTPATIENT)
Dept: LAB | Facility: CLINIC | Age: 69
End: 2019-05-01

## 2019-05-01 ENCOUNTER — COMMUNICATION - HEALTHEAST (OUTPATIENT)
Dept: PULMONOLOGY | Facility: OTHER | Age: 69
End: 2019-05-01

## 2019-05-01 DIAGNOSIS — D86.0 PULMONARY SARCOIDOSIS (H): ICD-10-CM

## 2019-05-01 LAB
ALBUMIN SERPL-MCNC: 3.9 G/DL (ref 3.5–5)
ALP SERPL-CCNC: 40 U/L (ref 45–120)
ALT SERPL W P-5'-P-CCNC: 16 U/L (ref 0–45)
AST SERPL W P-5'-P-CCNC: 11 U/L (ref 0–40)
BASOPHILS # BLD AUTO: 0.1 THOU/UL (ref 0–0.2)
BASOPHILS NFR BLD AUTO: 0 % (ref 0–2)
BILIRUB DIRECT SERPL-MCNC: 0.2 MG/DL
BILIRUB SERPL-MCNC: 0.7 MG/DL (ref 0–1)
CALCIUM SERPL-MCNC: 9.9 MG/DL (ref 8.5–10.5)
CREAT SERPL-MCNC: 1.48 MG/DL (ref 0.7–1.3)
EOSINOPHIL # BLD AUTO: 0 THOU/UL (ref 0–0.4)
EOSINOPHIL NFR BLD AUTO: 0 % (ref 0–6)
ERYTHROCYTE [DISTWIDTH] IN BLOOD BY AUTOMATED COUNT: 13.8 % (ref 11–14.5)
GFR SERPL CREATININE-BSD FRML MDRD: 47 ML/MIN/1.73M2
HCT VFR BLD AUTO: 49.1 % (ref 40–54)
HGB BLD-MCNC: 15.2 G/DL (ref 14–18)
LYMPHOCYTES # BLD AUTO: 2.2 THOU/UL (ref 0.8–4.4)
LYMPHOCYTES NFR BLD AUTO: 19 % (ref 20–40)
MCH RBC QN AUTO: 29.3 PG (ref 27–34)
MCHC RBC AUTO-ENTMCNC: 31 G/DL (ref 32–36)
MCV RBC AUTO: 95 FL (ref 80–100)
MONOCYTES # BLD AUTO: 0.9 THOU/UL (ref 0–0.9)
MONOCYTES NFR BLD AUTO: 8 % (ref 2–10)
NEUTROPHILS # BLD AUTO: 7.9 THOU/UL (ref 2–7.7)
NEUTROPHILS NFR BLD AUTO: 73 % (ref 50–70)
PLATELET # BLD AUTO: 285 THOU/UL (ref 140–440)
PMV BLD AUTO: 10.4 FL (ref 8.5–12.5)
PROT SERPL-MCNC: 7 G/DL (ref 6–8)
RBC # BLD AUTO: 5.19 MILL/UL (ref 4.4–6.2)
WBC: 11.3 THOU/UL (ref 4–11)

## 2019-05-02 ENCOUNTER — COMMUNICATION - HEALTHEAST (OUTPATIENT)
Dept: PULMONOLOGY | Facility: OTHER | Age: 69
End: 2019-05-02

## 2019-05-02 ENCOUNTER — COMMUNICATION - HEALTHEAST (OUTPATIENT)
Dept: INTERNAL MEDICINE | Facility: CLINIC | Age: 69
End: 2019-05-02

## 2019-05-02 DIAGNOSIS — F41.9 ANXIETY: ICD-10-CM

## 2019-05-03 ENCOUNTER — COMMUNICATION - HEALTHEAST (OUTPATIENT)
Dept: PULMONOLOGY | Facility: OTHER | Age: 69
End: 2019-05-03

## 2019-05-03 DIAGNOSIS — D86.0 PULMONARY SARCOIDOSIS (H): ICD-10-CM

## 2019-05-03 DIAGNOSIS — N17.9 ACUTE KIDNEY INJURY (H): ICD-10-CM

## 2019-05-20 ENCOUNTER — COMMUNICATION - HEALTHEAST (OUTPATIENT)
Dept: SCHEDULING | Facility: CLINIC | Age: 69
End: 2019-05-20

## 2019-05-20 DIAGNOSIS — R05.9 COUGH: ICD-10-CM

## 2019-05-29 ENCOUNTER — COMMUNICATION - HEALTHEAST (OUTPATIENT)
Dept: INTERNAL MEDICINE | Facility: CLINIC | Age: 69
End: 2019-05-29

## 2019-05-29 DIAGNOSIS — F41.9 ANXIETY: ICD-10-CM

## 2019-06-12 ENCOUNTER — HOSPITAL ENCOUNTER (OUTPATIENT)
Dept: ULTRASOUND IMAGING | Facility: CLINIC | Age: 69
Discharge: HOME | End: 2019-06-12
Attending: INTERNAL MEDICINE

## 2019-06-12 ENCOUNTER — OFFICE VISIT - HEALTHEAST (OUTPATIENT)
Dept: INTERNAL MEDICINE | Facility: CLINIC | Age: 69
End: 2019-06-12

## 2019-06-12 DIAGNOSIS — M79.661 PAIN OF RIGHT LOWER LEG: ICD-10-CM

## 2019-06-12 DIAGNOSIS — D86.0 SARCOIDOSIS OF LUNG (H): ICD-10-CM

## 2019-06-12 DIAGNOSIS — I10 ESSENTIAL HYPERTENSION, BENIGN: ICD-10-CM

## 2019-06-12 ASSESSMENT — MIFFLIN-ST. JEOR: SCORE: 1391.29

## 2019-06-19 ENCOUNTER — RECORDS - HEALTHEAST (OUTPATIENT)
Dept: ADMINISTRATIVE | Facility: OTHER | Age: 69
End: 2019-06-19

## 2019-06-19 ENCOUNTER — RECORDS - HEALTHEAST (OUTPATIENT)
Dept: PULMONOLOGY | Facility: OTHER | Age: 69
End: 2019-06-19

## 2019-06-19 DIAGNOSIS — D86.0 SARCOIDOSIS OF LUNG (H): ICD-10-CM

## 2019-06-21 ENCOUNTER — COMMUNICATION - HEALTHEAST (OUTPATIENT)
Dept: PULMONOLOGY | Facility: OTHER | Age: 69
End: 2019-06-21

## 2019-06-23 ENCOUNTER — COMMUNICATION - HEALTHEAST (OUTPATIENT)
Dept: PULMONOLOGY | Facility: OTHER | Age: 69
End: 2019-06-23

## 2019-06-23 DIAGNOSIS — D86.0 PULMONARY SARCOIDOSIS (H): ICD-10-CM

## 2019-07-05 ENCOUNTER — COMMUNICATION - HEALTHEAST (OUTPATIENT)
Dept: INTERNAL MEDICINE | Facility: CLINIC | Age: 69
End: 2019-07-05

## 2019-07-05 DIAGNOSIS — F41.9 ANXIETY: ICD-10-CM

## 2019-07-09 ENCOUNTER — COMMUNICATION - HEALTHEAST (OUTPATIENT)
Dept: INTERNAL MEDICINE | Facility: CLINIC | Age: 69
End: 2019-07-09

## 2019-07-09 DIAGNOSIS — F41.9 ANXIETY: ICD-10-CM

## 2019-07-26 ENCOUNTER — COMMUNICATION - HEALTHEAST (OUTPATIENT)
Dept: PULMONOLOGY | Facility: OTHER | Age: 69
End: 2019-07-26

## 2019-07-26 ENCOUNTER — AMBULATORY - HEALTHEAST (OUTPATIENT)
Dept: LAB | Facility: CLINIC | Age: 69
End: 2019-07-26

## 2019-07-26 DIAGNOSIS — D86.0 PULMONARY SARCOIDOSIS (H): ICD-10-CM

## 2019-07-26 LAB
ANION GAP SERPL CALCULATED.3IONS-SCNC: 8 MMOL/L (ref 5–18)
BUN SERPL-MCNC: 11 MG/DL (ref 8–28)
CALCIUM SERPL-MCNC: 9.8 MG/DL (ref 8.5–10.5)
CHLORIDE BLD-SCNC: 107 MMOL/L (ref 98–107)
CO2 SERPL-SCNC: 25 MMOL/L (ref 22–31)
CREAT SERPL-MCNC: 1.35 MG/DL (ref 0.7–1.3)
GFR SERPL CREATININE-BSD FRML MDRD: 52 ML/MIN/1.73M2
GLUCOSE BLD-MCNC: 105 MG/DL (ref 70–125)
POTASSIUM BLD-SCNC: 4 MMOL/L (ref 3.5–5)
SODIUM SERPL-SCNC: 140 MMOL/L (ref 136–145)

## 2019-07-31 ENCOUNTER — COMMUNICATION - HEALTHEAST (OUTPATIENT)
Dept: PULMONOLOGY | Facility: OTHER | Age: 69
End: 2019-07-31

## 2019-07-31 ENCOUNTER — COMMUNICATION - HEALTHEAST (OUTPATIENT)
Dept: INTERNAL MEDICINE | Facility: CLINIC | Age: 69
End: 2019-07-31

## 2019-07-31 DIAGNOSIS — F41.9 ANXIETY: ICD-10-CM

## 2019-08-03 ENCOUNTER — COMMUNICATION - HEALTHEAST (OUTPATIENT)
Dept: INTERNAL MEDICINE | Facility: CLINIC | Age: 69
End: 2019-08-03

## 2019-08-03 DIAGNOSIS — F41.9 ANXIETY: ICD-10-CM

## 2019-08-07 ENCOUNTER — COMMUNICATION - HEALTHEAST (OUTPATIENT)
Dept: INTERNAL MEDICINE | Facility: CLINIC | Age: 69
End: 2019-08-07

## 2019-08-07 DIAGNOSIS — G47.00 INSOMNIA: ICD-10-CM

## 2019-08-07 DIAGNOSIS — F41.9 ANXIETY: ICD-10-CM

## 2019-08-20 ENCOUNTER — COMMUNICATION - HEALTHEAST (OUTPATIENT)
Dept: INTERNAL MEDICINE | Facility: CLINIC | Age: 69
End: 2019-08-20

## 2019-08-20 DIAGNOSIS — I10 HYPERTENSION: ICD-10-CM

## 2019-09-05 ENCOUNTER — COMMUNICATION - HEALTHEAST (OUTPATIENT)
Dept: INTERNAL MEDICINE | Facility: CLINIC | Age: 69
End: 2019-09-05

## 2019-09-09 ENCOUNTER — COMMUNICATION - HEALTHEAST (OUTPATIENT)
Dept: INTERNAL MEDICINE | Facility: CLINIC | Age: 69
End: 2019-09-09

## 2019-09-09 DIAGNOSIS — F41.9 ANXIETY: ICD-10-CM

## 2019-09-18 ENCOUNTER — COMMUNICATION - HEALTHEAST (OUTPATIENT)
Dept: INTERNAL MEDICINE | Facility: CLINIC | Age: 69
End: 2019-09-18

## 2019-09-18 ENCOUNTER — COMMUNICATION - HEALTHEAST (OUTPATIENT)
Dept: SCHEDULING | Facility: CLINIC | Age: 69
End: 2019-09-18

## 2019-09-18 DIAGNOSIS — J01.90 ACUTE NON-RECURRENT SINUSITIS, UNSPECIFIED LOCATION: ICD-10-CM

## 2019-09-30 ENCOUNTER — OFFICE VISIT - HEALTHEAST (OUTPATIENT)
Dept: INTERNAL MEDICINE | Facility: CLINIC | Age: 69
End: 2019-09-30

## 2019-09-30 DIAGNOSIS — I10 ESSENTIAL HYPERTENSION, BENIGN: ICD-10-CM

## 2019-09-30 DIAGNOSIS — E78.2 MIXED HYPERLIPIDEMIA: ICD-10-CM

## 2019-09-30 DIAGNOSIS — F41.9 ANXIETY: ICD-10-CM

## 2019-09-30 DIAGNOSIS — R05.9 COUGH: ICD-10-CM

## 2019-09-30 DIAGNOSIS — F51.02 ADJUSTMENT INSOMNIA: ICD-10-CM

## 2019-09-30 DIAGNOSIS — H20.9 UVEITIS: ICD-10-CM

## 2019-09-30 DIAGNOSIS — Z23 NEED FOR IMMUNIZATION AGAINST INFLUENZA: ICD-10-CM

## 2019-09-30 DIAGNOSIS — F33.0 MAJOR DEPRESSIVE DISORDER, RECURRENT EPISODE, MILD (H): ICD-10-CM

## 2019-09-30 ASSESSMENT — ANXIETY QUESTIONNAIRES
4. TROUBLE RELAXING: NOT AT ALL
6. BECOMING EASILY ANNOYED OR IRRITABLE: NOT AT ALL
GAD7 TOTAL SCORE: 3
IF YOU CHECKED OFF ANY PROBLEMS ON THIS QUESTIONNAIRE, HOW DIFFICULT HAVE THESE PROBLEMS MADE IT FOR YOU TO DO YOUR WORK, TAKE CARE OF THINGS AT HOME, OR GET ALONG WITH OTHER PEOPLE: NOT DIFFICULT AT ALL
2. NOT BEING ABLE TO STOP OR CONTROL WORRYING: SEVERAL DAYS
7. FEELING AFRAID AS IF SOMETHING AWFUL MIGHT HAPPEN: NOT AT ALL
1. FEELING NERVOUS, ANXIOUS, OR ON EDGE: SEVERAL DAYS
3. WORRYING TOO MUCH ABOUT DIFFERENT THINGS: SEVERAL DAYS
5. BEING SO RESTLESS THAT IT IS HARD TO SIT STILL: NOT AT ALL

## 2019-09-30 ASSESSMENT — MIFFLIN-ST. JEOR: SCORE: 1382.22

## 2019-09-30 ASSESSMENT — PATIENT HEALTH QUESTIONNAIRE - PHQ9: SUM OF ALL RESPONSES TO PHQ QUESTIONS 1-9: 2

## 2019-10-01 ENCOUNTER — COMMUNICATION - HEALTHEAST (OUTPATIENT)
Dept: SCHEDULING | Facility: CLINIC | Age: 69
End: 2019-10-01

## 2019-10-01 ENCOUNTER — COMMUNICATION - HEALTHEAST (OUTPATIENT)
Dept: INTERNAL MEDICINE | Facility: CLINIC | Age: 69
End: 2019-10-01

## 2019-10-01 DIAGNOSIS — R05.9 COUGH: ICD-10-CM

## 2019-10-02 ENCOUNTER — COMMUNICATION - HEALTHEAST (OUTPATIENT)
Dept: INTERNAL MEDICINE | Facility: CLINIC | Age: 69
End: 2019-10-02

## 2019-10-02 DIAGNOSIS — I10 ESSENTIAL HYPERTENSION, BENIGN: ICD-10-CM

## 2019-10-02 RX ORDER — PROPRANOLOL HYDROCHLORIDE 20 MG/1
20 TABLET ORAL 2 TIMES DAILY
Qty: 180 TABLET | Refills: 3 | Status: SHIPPED | OUTPATIENT
Start: 2019-10-02 | End: 2022-07-27

## 2019-10-09 ENCOUNTER — COMMUNICATION - HEALTHEAST (OUTPATIENT)
Dept: SCHEDULING | Facility: CLINIC | Age: 69
End: 2019-10-09

## 2019-10-17 ENCOUNTER — COMMUNICATION - HEALTHEAST (OUTPATIENT)
Dept: INTERNAL MEDICINE | Facility: CLINIC | Age: 69
End: 2019-10-17

## 2019-10-17 DIAGNOSIS — F41.9 ANXIETY: ICD-10-CM

## 2019-11-22 ENCOUNTER — COMMUNICATION - HEALTHEAST (OUTPATIENT)
Dept: INTERNAL MEDICINE | Facility: CLINIC | Age: 69
End: 2019-11-22

## 2019-11-22 DIAGNOSIS — F41.9 ANXIETY: ICD-10-CM

## 2019-12-11 ENCOUNTER — COMMUNICATION - HEALTHEAST (OUTPATIENT)
Dept: SCHEDULING | Facility: CLINIC | Age: 69
End: 2019-12-11

## 2019-12-11 ENCOUNTER — OFFICE VISIT - HEALTHEAST (OUTPATIENT)
Dept: INTERNAL MEDICINE | Facility: CLINIC | Age: 69
End: 2019-12-11

## 2019-12-11 DIAGNOSIS — J00 HEAD COLD: ICD-10-CM

## 2019-12-15 ENCOUNTER — COMMUNICATION - HEALTHEAST (OUTPATIENT)
Dept: INTERNAL MEDICINE | Facility: CLINIC | Age: 69
End: 2019-12-15

## 2019-12-15 DIAGNOSIS — G47.00 INSOMNIA: ICD-10-CM

## 2019-12-16 ENCOUNTER — COMMUNICATION - HEALTHEAST (OUTPATIENT)
Dept: INTERNAL MEDICINE | Facility: CLINIC | Age: 69
End: 2019-12-16

## 2019-12-16 DIAGNOSIS — G43.909 MIGRAINE HEADACHE: ICD-10-CM

## 2019-12-26 ENCOUNTER — COMMUNICATION - HEALTHEAST (OUTPATIENT)
Dept: INTERNAL MEDICINE | Facility: CLINIC | Age: 69
End: 2019-12-26

## 2019-12-26 DIAGNOSIS — F41.9 ANXIETY: ICD-10-CM

## 2019-12-27 ENCOUNTER — COMMUNICATION - HEALTHEAST (OUTPATIENT)
Dept: INTERNAL MEDICINE | Facility: CLINIC | Age: 69
End: 2019-12-27

## 2019-12-27 DIAGNOSIS — I10 ESSENTIAL HYPERTENSION, BENIGN: ICD-10-CM

## 2020-01-04 ENCOUNTER — COMMUNICATION - HEALTHEAST (OUTPATIENT)
Dept: SCHEDULING | Facility: CLINIC | Age: 70
End: 2020-01-04

## 2020-01-27 ENCOUNTER — COMMUNICATION - HEALTHEAST (OUTPATIENT)
Dept: INTERNAL MEDICINE | Facility: CLINIC | Age: 70
End: 2020-01-27

## 2020-01-27 DIAGNOSIS — F41.9 ANXIETY: ICD-10-CM

## 2020-01-29 ENCOUNTER — OFFICE VISIT - HEALTHEAST (OUTPATIENT)
Dept: INTERNAL MEDICINE | Facility: CLINIC | Age: 70
End: 2020-01-29

## 2020-01-29 DIAGNOSIS — I10 ESSENTIAL HYPERTENSION, BENIGN: ICD-10-CM

## 2020-01-29 DIAGNOSIS — R31.9 URINARY TRACT INFECTION WITH HEMATURIA, SITE UNSPECIFIED: ICD-10-CM

## 2020-01-29 DIAGNOSIS — R82.90 BAD ODOR OF URINE: ICD-10-CM
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
## 2020-01-29 DIAGNOSIS — N39.0 URINARY TRACT INFECTION WITH HEMATURIA, SITE UNSPECIFIED: ICD-10-CM

## 2020-01-29 DIAGNOSIS — H20.9 UVEITIS: ICD-10-CM

## 2020-01-29 LAB
ALBUMIN UR-MCNC: ABNORMAL MG/DL
APPEARANCE UR: ABNORMAL
BACTERIA #/AREA URNS HPF: ABNORMAL HPF
BILIRUB UR QL STRIP: ABNORMAL
COLOR UR AUTO: YELLOW
GLUCOSE UR STRIP-MCNC: NEGATIVE MG/DL
HGB UR QL STRIP: ABNORMAL
KETONES UR STRIP-MCNC: NEGATIVE MG/DL
LEUKOCYTE ESTERASE UR QL STRIP: ABNORMAL
NITRATE UR QL: POSITIVE
PH UR STRIP: 5.5 [PH] (ref 5–8)
RBC #/AREA URNS AUTO: ABNORMAL HPF
SP GR UR STRIP: >=1.03 (ref 1–1.03)
SQUAMOUS #/AREA URNS AUTO: ABNORMAL LPF
UROBILINOGEN UR STRIP-ACNC: ABNORMAL
WBC #/AREA URNS AUTO: ABNORMAL HPF

## 2020-01-29 ASSESSMENT — MIFFLIN-ST. JEOR: SCORE: 1359.54

## 2020-02-01 LAB — BACTERIA SPEC CULT: ABNORMAL

## 2020-02-05 ENCOUNTER — COMMUNICATION - HEALTHEAST (OUTPATIENT)
Dept: INTERNAL MEDICINE | Facility: CLINIC | Age: 70
End: 2020-02-05

## 2020-02-05 DIAGNOSIS — N39.0 URINARY TRACT INFECTION IN MALE: ICD-10-CM

## 2020-02-06 ENCOUNTER — AMBULATORY - HEALTHEAST (OUTPATIENT)
Dept: LAB | Facility: CLINIC | Age: 70
End: 2020-02-06

## 2020-02-06 DIAGNOSIS — N39.0 URINARY TRACT INFECTION IN MALE: ICD-10-CM

## 2020-02-06 LAB
ALBUMIN UR-MCNC: NEGATIVE MG/DL
APPEARANCE UR: CLEAR
BACTERIA #/AREA URNS HPF: ABNORMAL HPF
BILIRUB UR QL STRIP: NEGATIVE
COLOR UR AUTO: YELLOW
GLUCOSE UR STRIP-MCNC: NEGATIVE MG/DL
HGB UR QL STRIP: NEGATIVE
KETONES UR STRIP-MCNC: NEGATIVE MG/DL
LEUKOCYTE ESTERASE UR QL STRIP: ABNORMAL
NITRATE UR QL: NEGATIVE
PH UR STRIP: 5.5 [PH] (ref 5–8)
RBC #/AREA URNS AUTO: ABNORMAL HPF
SP GR UR STRIP: 1.02 (ref 1–1.03)
SQUAMOUS #/AREA URNS AUTO: ABNORMAL LPF
UROBILINOGEN UR STRIP-ACNC: ABNORMAL
WBC #/AREA URNS AUTO: ABNORMAL HPF

## 2020-02-07 LAB — BACTERIA SPEC CULT: NO GROWTH

## 2020-02-10 ENCOUNTER — COMMUNICATION - HEALTHEAST (OUTPATIENT)
Dept: INTERNAL MEDICINE | Facility: CLINIC | Age: 70
End: 2020-02-10

## 2020-02-11 ENCOUNTER — OFFICE VISIT - HEALTHEAST (OUTPATIENT)
Dept: INTERNAL MEDICINE | Facility: CLINIC | Age: 70
End: 2020-02-11

## 2020-02-11 DIAGNOSIS — D86.0 SARCOIDOSIS OF LUNG (H): ICD-10-CM

## 2020-02-11 DIAGNOSIS — I10 ESSENTIAL HYPERTENSION, BENIGN: ICD-10-CM

## 2020-02-11 DIAGNOSIS — N39.0 URINARY TRACT INFECTION WITHOUT HEMATURIA, SITE UNSPECIFIED: ICD-10-CM

## 2020-02-11 ASSESSMENT — MIFFLIN-ST. JEOR: SCORE: 1359.54

## 2020-02-14 ENCOUNTER — COMMUNICATION - HEALTHEAST (OUTPATIENT)
Dept: INTERNAL MEDICINE | Facility: CLINIC | Age: 70
End: 2020-02-14

## 2020-02-14 DIAGNOSIS — F41.9 ANXIETY: ICD-10-CM

## 2020-02-14 DIAGNOSIS — I10 HYPERTENSION: ICD-10-CM

## 2020-02-14 DIAGNOSIS — E55.9 VITAMIN D DEFICIENCY: ICD-10-CM

## 2020-02-19 ENCOUNTER — COMMUNICATION - HEALTHEAST (OUTPATIENT)
Dept: INTERNAL MEDICINE | Facility: CLINIC | Age: 70
End: 2020-02-19

## 2020-02-25 ENCOUNTER — COMMUNICATION - HEALTHEAST (OUTPATIENT)
Dept: INTERNAL MEDICINE | Facility: CLINIC | Age: 70
End: 2020-02-25

## 2020-02-25 DIAGNOSIS — F41.9 ANXIETY: ICD-10-CM

## 2020-03-04 ENCOUNTER — COMMUNICATION - HEALTHEAST (OUTPATIENT)
Dept: INTERNAL MEDICINE | Facility: CLINIC | Age: 70
End: 2020-03-04

## 2020-03-04 DIAGNOSIS — F41.9 ANXIETY: ICD-10-CM

## 2020-03-18 ENCOUNTER — COMMUNICATION - HEALTHEAST (OUTPATIENT)
Dept: INTERNAL MEDICINE | Facility: CLINIC | Age: 70
End: 2020-03-18

## 2020-03-18 DIAGNOSIS — F41.9 ANXIETY: ICD-10-CM

## 2020-03-18 DIAGNOSIS — G47.00 INSOMNIA: ICD-10-CM

## 2020-03-20 ENCOUNTER — COMMUNICATION - HEALTHEAST (OUTPATIENT)
Dept: INTERNAL MEDICINE | Facility: CLINIC | Age: 70
End: 2020-03-20

## 2020-03-20 DIAGNOSIS — K21.9 GASTROESOPHAGEAL REFLUX DISEASE WITHOUT ESOPHAGITIS: ICD-10-CM

## 2020-04-06 ENCOUNTER — COMMUNICATION - HEALTHEAST (OUTPATIENT)
Dept: INTERNAL MEDICINE | Facility: CLINIC | Age: 70
End: 2020-04-06

## 2020-04-06 DIAGNOSIS — F41.9 ANXIETY: ICD-10-CM

## 2020-04-20 ENCOUNTER — COMMUNICATION - HEALTHEAST (OUTPATIENT)
Dept: SCHEDULING | Facility: CLINIC | Age: 70
End: 2020-04-20

## 2020-04-20 ENCOUNTER — OFFICE VISIT - HEALTHEAST (OUTPATIENT)
Dept: INTERNAL MEDICINE | Facility: CLINIC | Age: 70
End: 2020-04-20

## 2020-04-20 DIAGNOSIS — J01.10 ACUTE NON-RECURRENT FRONTAL SINUSITIS: ICD-10-CM

## 2020-04-20 DIAGNOSIS — I10 ESSENTIAL HYPERTENSION, BENIGN: ICD-10-CM

## 2020-04-20 ASSESSMENT — PATIENT HEALTH QUESTIONNAIRE - PHQ9: SUM OF ALL RESPONSES TO PHQ QUESTIONS 1-9: 9

## 2020-04-24 ENCOUNTER — COMMUNICATION - HEALTHEAST (OUTPATIENT)
Dept: SCHEDULING | Facility: CLINIC | Age: 70
End: 2020-04-24

## 2020-04-28 ENCOUNTER — COMMUNICATION - HEALTHEAST (OUTPATIENT)
Dept: INTERNAL MEDICINE | Facility: CLINIC | Age: 70
End: 2020-04-28

## 2020-05-04 ENCOUNTER — COMMUNICATION - HEALTHEAST (OUTPATIENT)
Dept: INTERNAL MEDICINE | Facility: CLINIC | Age: 70
End: 2020-05-04

## 2020-05-04 DIAGNOSIS — F41.9 ANXIETY: ICD-10-CM

## 2020-05-04 RX ORDER — HYDROXYZINE HYDROCHLORIDE 10 MG/1
10 TABLET, FILM COATED ORAL 3 TIMES DAILY PRN
Qty: 90 TABLET | Refills: 3 | Status: SHIPPED | OUTPATIENT
Start: 2020-05-04 | End: 2022-03-15

## 2020-05-07 ENCOUNTER — COMMUNICATION - HEALTHEAST (OUTPATIENT)
Dept: SCHEDULING | Facility: CLINIC | Age: 70
End: 2020-05-07

## 2020-05-07 DIAGNOSIS — F41.9 ANXIETY: ICD-10-CM

## 2020-05-08 ENCOUNTER — COMMUNICATION - HEALTHEAST (OUTPATIENT)
Dept: INTERNAL MEDICINE | Facility: CLINIC | Age: 70
End: 2020-05-08

## 2020-05-13 ENCOUNTER — RECORDS - HEALTHEAST (OUTPATIENT)
Dept: ADMINISTRATIVE | Facility: OTHER | Age: 70
End: 2020-05-13

## 2020-06-08 ENCOUNTER — COMMUNICATION - HEALTHEAST (OUTPATIENT)
Dept: INTERNAL MEDICINE | Facility: CLINIC | Age: 70
End: 2020-06-08

## 2020-06-08 DIAGNOSIS — F41.9 ANXIETY: ICD-10-CM

## 2020-06-12 ENCOUNTER — COMMUNICATION - HEALTHEAST (OUTPATIENT)
Dept: INTERNAL MEDICINE | Facility: CLINIC | Age: 70
End: 2020-06-12

## 2020-06-12 DIAGNOSIS — R05.9 COUGH: ICD-10-CM

## 2020-07-07 ENCOUNTER — COMMUNICATION - HEALTHEAST (OUTPATIENT)
Dept: INTERNAL MEDICINE | Facility: CLINIC | Age: 70
End: 2020-07-07

## 2020-07-07 DIAGNOSIS — F41.9 ANXIETY: ICD-10-CM

## 2020-07-13 ENCOUNTER — COMMUNICATION - HEALTHEAST (OUTPATIENT)
Dept: PULMONOLOGY | Facility: OTHER | Age: 70
End: 2020-07-13

## 2020-07-13 DIAGNOSIS — D86.0 PULMONARY SARCOIDOSIS (H): ICD-10-CM

## 2020-07-14 ENCOUNTER — COMMUNICATION - HEALTHEAST (OUTPATIENT)
Dept: PULMONOLOGY | Facility: OTHER | Age: 70
End: 2020-07-14

## 2020-07-20 ENCOUNTER — HOSPITAL ENCOUNTER (OUTPATIENT)
Dept: RADIOLOGY | Facility: CLINIC | Age: 70
Discharge: HOME | End: 2020-07-20
Attending: INTERNAL MEDICINE

## 2020-07-20 DIAGNOSIS — D86.0 PULMONARY SARCOIDOSIS (H): ICD-10-CM

## 2020-07-22 ENCOUNTER — COMMUNICATION - HEALTHEAST (OUTPATIENT)
Dept: PULMONOLOGY | Facility: OTHER | Age: 70
End: 2020-07-22

## 2020-07-22 DIAGNOSIS — D86.9 SARCOIDOSIS: ICD-10-CM

## 2020-07-22 RX ORDER — PREDNISONE 1 MG/1
TABLET ORAL
Qty: 120 TABLET | Refills: 11 | Status: SHIPPED | OUTPATIENT
Start: 2020-07-22 | End: 2021-11-29

## 2020-08-06 ENCOUNTER — COMMUNICATION - HEALTHEAST (OUTPATIENT)
Dept: INTERNAL MEDICINE | Facility: CLINIC | Age: 70
End: 2020-08-06

## 2020-08-06 DIAGNOSIS — F41.9 ANXIETY: ICD-10-CM

## 2020-08-12 ENCOUNTER — COMMUNICATION - HEALTHEAST (OUTPATIENT)
Dept: INTERNAL MEDICINE | Facility: CLINIC | Age: 70
End: 2020-08-12

## 2020-08-12 ENCOUNTER — COMMUNICATION - HEALTHEAST (OUTPATIENT)
Dept: PULMONOLOGY | Facility: OTHER | Age: 70
End: 2020-08-12

## 2020-08-31 ENCOUNTER — COMMUNICATION - HEALTHEAST (OUTPATIENT)
Dept: INTERNAL MEDICINE | Facility: CLINIC | Age: 70
End: 2020-08-31

## 2020-08-31 DIAGNOSIS — I10 ESSENTIAL HYPERTENSION, BENIGN: ICD-10-CM

## 2020-09-02 RX ORDER — LISINOPRIL 20 MG/1
TABLET ORAL
Qty: 90 TABLET | Refills: 1 | Status: SHIPPED | OUTPATIENT
Start: 2020-09-02 | End: 2021-12-13

## 2020-09-08 ENCOUNTER — COMMUNICATION - HEALTHEAST (OUTPATIENT)
Dept: INTERNAL MEDICINE | Facility: CLINIC | Age: 70
End: 2020-09-08

## 2020-09-08 DIAGNOSIS — F41.9 ANXIETY: ICD-10-CM

## 2020-09-11 ENCOUNTER — RECORDS - HEALTHEAST (OUTPATIENT)
Dept: PULMONOLOGY | Facility: OTHER | Age: 70
End: 2020-09-11

## 2020-09-11 ENCOUNTER — RECORDS - HEALTHEAST (OUTPATIENT)
Dept: ADMINISTRATIVE | Facility: OTHER | Age: 70
End: 2020-09-11

## 2020-09-11 DIAGNOSIS — D86.9 SARCOIDOSIS, UNSPECIFIED: ICD-10-CM

## 2020-09-11 LAB — HGB BLD-MCNC: 13 G/DL

## 2020-09-15 ENCOUNTER — OFFICE VISIT - HEALTHEAST (OUTPATIENT)
Dept: PULMONOLOGY | Facility: OTHER | Age: 70
End: 2020-09-15

## 2020-09-15 DIAGNOSIS — D86.0 PULMONARY SARCOIDOSIS (H): ICD-10-CM

## 2020-09-18 ENCOUNTER — AMBULATORY - HEALTHEAST (OUTPATIENT)
Dept: PULMONOLOGY | Facility: OTHER | Age: 70
End: 2020-09-18

## 2020-09-18 ENCOUNTER — COMMUNICATION - HEALTHEAST (OUTPATIENT)
Dept: INTERNAL MEDICINE | Facility: CLINIC | Age: 70
End: 2020-09-18

## 2020-09-18 DIAGNOSIS — R05.9 COUGH: ICD-10-CM

## 2020-09-18 DIAGNOSIS — G43.909 MIGRAINE HEADACHE: ICD-10-CM

## 2020-09-18 RX ORDER — BENZONATATE 100 MG/1
CAPSULE ORAL
Qty: 20 CAPSULE | Refills: 0 | Status: SHIPPED | OUTPATIENT
Start: 2020-09-18 | End: 2022-03-23

## 2020-09-20 RX ORDER — SUMATRIPTAN 50 MG/1
TABLET, FILM COATED ORAL
Qty: 12 TABLET | Refills: 2 | Status: SHIPPED | OUTPATIENT
Start: 2020-09-20 | End: 2021-12-10

## 2020-10-09 ENCOUNTER — COMMUNICATION - HEALTHEAST (OUTPATIENT)
Dept: INTERNAL MEDICINE | Facility: CLINIC | Age: 70
End: 2020-10-09

## 2020-10-09 DIAGNOSIS — F41.9 ANXIETY: ICD-10-CM

## 2020-10-15 ENCOUNTER — COMMUNICATION - HEALTHEAST (OUTPATIENT)
Dept: INTERNAL MEDICINE | Facility: CLINIC | Age: 70
End: 2020-10-15

## 2020-10-15 DIAGNOSIS — K21.9 GASTROESOPHAGEAL REFLUX DISEASE WITHOUT ESOPHAGITIS: ICD-10-CM

## 2020-10-15 DIAGNOSIS — F41.9 ANXIETY: ICD-10-CM

## 2020-10-18 RX ORDER — CITALOPRAM HYDROBROMIDE 40 MG/1
TABLET ORAL
Qty: 90 TABLET | Refills: 2 | Status: SHIPPED | OUTPATIENT
Start: 2020-10-18 | End: 2021-07-26

## 2020-10-29 ENCOUNTER — COMMUNICATION - HEALTHEAST (OUTPATIENT)
Dept: SCHEDULING | Facility: CLINIC | Age: 70
End: 2020-10-29

## 2020-11-02 ENCOUNTER — COMMUNICATION - HEALTHEAST (OUTPATIENT)
Dept: PULMONOLOGY | Facility: OTHER | Age: 70
End: 2020-11-02

## 2020-11-10 ENCOUNTER — COMMUNICATION - HEALTHEAST (OUTPATIENT)
Dept: INTERNAL MEDICINE | Facility: CLINIC | Age: 70
End: 2020-11-10

## 2020-11-10 DIAGNOSIS — G47.00 INSOMNIA: ICD-10-CM

## 2020-11-10 DIAGNOSIS — F41.9 ANXIETY: ICD-10-CM

## 2020-11-19 ENCOUNTER — OFFICE VISIT - HEALTHEAST (OUTPATIENT)
Dept: INTERNAL MEDICINE | Facility: CLINIC | Age: 70
End: 2020-11-19

## 2020-11-19 DIAGNOSIS — E78.2 MIXED HYPERLIPIDEMIA: ICD-10-CM

## 2020-11-19 DIAGNOSIS — F33.0 MAJOR DEPRESSIVE DISORDER, RECURRENT EPISODE, MILD (H): ICD-10-CM

## 2020-11-19 DIAGNOSIS — I10 ESSENTIAL HYPERTENSION, BENIGN: ICD-10-CM

## 2020-11-19 DIAGNOSIS — D86.0 SARCOIDOSIS OF LUNG (H): ICD-10-CM

## 2020-11-19 DIAGNOSIS — M54.50 CHRONIC RIGHT-SIDED LOW BACK PAIN WITHOUT SCIATICA: ICD-10-CM

## 2020-11-19 DIAGNOSIS — F41.9 ANXIETY: ICD-10-CM

## 2020-11-19 DIAGNOSIS — G89.29 CHRONIC RIGHT-SIDED LOW BACK PAIN WITHOUT SCIATICA: ICD-10-CM

## 2020-11-19 ASSESSMENT — PATIENT HEALTH QUESTIONNAIRE - PHQ9: SUM OF ALL RESPONSES TO PHQ QUESTIONS 1-9: 6

## 2020-11-19 ASSESSMENT — MIFFLIN-ST. JEOR: SCORE: 1296.04

## 2020-12-11 ENCOUNTER — COMMUNICATION - HEALTHEAST (OUTPATIENT)
Dept: SCHEDULING | Facility: CLINIC | Age: 70
End: 2020-12-11

## 2020-12-11 ENCOUNTER — COMMUNICATION - HEALTHEAST (OUTPATIENT)
Dept: INTERNAL MEDICINE | Facility: CLINIC | Age: 70
End: 2020-12-11

## 2020-12-11 DIAGNOSIS — F41.9 ANXIETY: ICD-10-CM

## 2020-12-22 ENCOUNTER — COMMUNICATION - HEALTHEAST (OUTPATIENT)
Dept: ADMINISTRATIVE | Facility: CLINIC | Age: 70
End: 2020-12-22

## 2020-12-22 DIAGNOSIS — F41.9 ANXIETY: ICD-10-CM

## 2021-01-05 ENCOUNTER — OFFICE VISIT - HEALTHEAST (OUTPATIENT)
Dept: INTERNAL MEDICINE | Facility: CLINIC | Age: 71
End: 2021-01-05

## 2021-01-05 DIAGNOSIS — E78.2 MIXED HYPERLIPIDEMIA: ICD-10-CM

## 2021-01-05 DIAGNOSIS — I10 ESSENTIAL HYPERTENSION, BENIGN: ICD-10-CM

## 2021-01-05 DIAGNOSIS — F51.02 ADJUSTMENT INSOMNIA: ICD-10-CM

## 2021-01-05 DIAGNOSIS — D86.0 SARCOIDOSIS OF LUNG (H): ICD-10-CM

## 2021-01-05 DIAGNOSIS — M19.012 PRIMARY OSTEOARTHRITIS OF BOTH SHOULDERS: ICD-10-CM

## 2021-01-05 DIAGNOSIS — M19.011 PRIMARY OSTEOARTHRITIS OF BOTH SHOULDERS: ICD-10-CM

## 2021-01-05 ASSESSMENT — MIFFLIN-ST. JEOR: SCORE: 1300.57

## 2021-01-12 ENCOUNTER — COMMUNICATION - HEALTHEAST (OUTPATIENT)
Dept: INTERNAL MEDICINE | Facility: CLINIC | Age: 71
End: 2021-01-12

## 2021-01-12 ENCOUNTER — COMMUNICATION - HEALTHEAST (OUTPATIENT)
Dept: ADMINISTRATIVE | Facility: CLINIC | Age: 71
End: 2021-01-12

## 2021-01-12 DIAGNOSIS — I10 HYPERTENSION: ICD-10-CM

## 2021-01-12 DIAGNOSIS — F41.9 ANXIETY: ICD-10-CM

## 2021-01-12 RX ORDER — AMLODIPINE BESYLATE 10 MG/1
TABLET ORAL
Qty: 90 TABLET | Refills: 3 | Status: SHIPPED | OUTPATIENT
Start: 2021-01-12 | End: 2022-01-19

## 2021-01-13 ENCOUNTER — AMBULATORY - HEALTHEAST (OUTPATIENT)
Dept: LAB | Facility: CLINIC | Age: 71
End: 2021-01-13

## 2021-01-13 DIAGNOSIS — E78.2 MIXED HYPERLIPIDEMIA: ICD-10-CM

## 2021-01-13 DIAGNOSIS — I10 ESSENTIAL HYPERTENSION, BENIGN: ICD-10-CM

## 2021-01-13 LAB
ALBUMIN SERPL-MCNC: 4.2 G/DL (ref 3.5–5)
ALP SERPL-CCNC: 52 U/L (ref 45–120)
ALT SERPL W P-5'-P-CCNC: <9 U/L (ref 0–45)
ANION GAP SERPL CALCULATED.3IONS-SCNC: 8 MMOL/L (ref 5–18)
AST SERPL W P-5'-P-CCNC: 11 U/L (ref 0–40)
BILIRUB DIRECT SERPL-MCNC: 0.3 MG/DL
BILIRUB SERPL-MCNC: 0.9 MG/DL (ref 0–1)
BUN SERPL-MCNC: 16 MG/DL (ref 8–28)
CALCIUM SERPL-MCNC: 9.9 MG/DL (ref 8.5–10.5)
CHLORIDE BLD-SCNC: 104 MMOL/L (ref 98–107)
CHOLEST SERPL-MCNC: 214 MG/DL
CO2 SERPL-SCNC: 28 MMOL/L (ref 22–31)
CREAT SERPL-MCNC: 1.18 MG/DL (ref 0.7–1.3)
ERYTHROCYTE [DISTWIDTH] IN BLOOD BY AUTOMATED COUNT: 12 % (ref 11–14.5)
FASTING STATUS PATIENT QL REPORTED: YES
GFR SERPL CREATININE-BSD FRML MDRD: >60 ML/MIN/1.73M2
GLUCOSE BLD-MCNC: 86 MG/DL (ref 70–125)
HCT VFR BLD AUTO: 44.1 % (ref 40–54)
HDLC SERPL-MCNC: 58 MG/DL
HGB BLD-MCNC: 14.6 G/DL (ref 14–18)
LDLC SERPL CALC-MCNC: 136 MG/DL
MCH RBC QN AUTO: 30.2 PG (ref 27–34)
MCHC RBC AUTO-ENTMCNC: 33.2 G/DL (ref 32–36)
MCV RBC AUTO: 91 FL (ref 80–100)
PLATELET # BLD AUTO: 264 THOU/UL (ref 140–440)
PMV BLD AUTO: 7.8 FL (ref 7–10)
POTASSIUM BLD-SCNC: 5.1 MMOL/L (ref 3.5–5)
PROT SERPL-MCNC: 7.2 G/DL (ref 6–8)
RBC # BLD AUTO: 4.84 MILL/UL (ref 4.4–6.2)
SODIUM SERPL-SCNC: 140 MMOL/L (ref 136–145)
TRIGL SERPL-MCNC: 98 MG/DL
WBC: 7.6 THOU/UL (ref 4–11)

## 2021-01-14 ENCOUNTER — COMMUNICATION - HEALTHEAST (OUTPATIENT)
Dept: INTERNAL MEDICINE | Facility: CLINIC | Age: 71
End: 2021-01-14

## 2021-02-16 ENCOUNTER — OFFICE VISIT - HEALTHEAST (OUTPATIENT)
Dept: INTERNAL MEDICINE | Facility: CLINIC | Age: 71
End: 2021-02-16

## 2021-02-16 DIAGNOSIS — M79.621 PAIN OF RIGHT UPPER ARM: ICD-10-CM

## 2021-02-16 DIAGNOSIS — F33.0 MAJOR DEPRESSIVE DISORDER, RECURRENT EPISODE, MILD (H): ICD-10-CM

## 2021-02-16 DIAGNOSIS — I10 ESSENTIAL HYPERTENSION, BENIGN: ICD-10-CM

## 2021-02-16 DIAGNOSIS — D86.0 SARCOIDOSIS OF LUNG (H): ICD-10-CM

## 2021-02-16 DIAGNOSIS — F52.8 PSYCHOSEXUAL DYSFUNCTION WITH INHIBITED SEXUAL EXCITEMENT: ICD-10-CM

## 2021-02-16 RX ORDER — CELECOXIB 200 MG/1
200 CAPSULE ORAL DAILY
Qty: 30 CAPSULE | Refills: 2 | Status: SHIPPED | OUTPATIENT
Start: 2021-02-16 | End: 2021-09-08

## 2021-02-16 ASSESSMENT — MIFFLIN-ST. JEOR: SCORE: 1300.57

## 2021-02-17 ASSESSMENT — ANXIETY QUESTIONNAIRES
IF YOU CHECKED OFF ANY PROBLEMS ON THIS QUESTIONNAIRE, HOW DIFFICULT HAVE THESE PROBLEMS MADE IT FOR YOU TO DO YOUR WORK, TAKE CARE OF THINGS AT HOME, OR GET ALONG WITH OTHER PEOPLE: SOMEWHAT DIFFICULT
6. BECOMING EASILY ANNOYED OR IRRITABLE: NOT AT ALL
7. FEELING AFRAID AS IF SOMETHING AWFUL MIGHT HAPPEN: NOT AT ALL
3. WORRYING TOO MUCH ABOUT DIFFERENT THINGS: MORE THAN HALF THE DAYS
5. BEING SO RESTLESS THAT IT IS HARD TO SIT STILL: NOT AT ALL
GAD7 TOTAL SCORE: 8
1. FEELING NERVOUS, ANXIOUS, OR ON EDGE: MORE THAN HALF THE DAYS
2. NOT BEING ABLE TO STOP OR CONTROL WORRYING: MORE THAN HALF THE DAYS
4. TROUBLE RELAXING: MORE THAN HALF THE DAYS

## 2021-02-24 ENCOUNTER — COMMUNICATION - HEALTHEAST (OUTPATIENT)
Dept: ADMINISTRATIVE | Facility: CLINIC | Age: 71
End: 2021-02-24

## 2021-02-24 DIAGNOSIS — F41.9 ANXIETY: ICD-10-CM

## 2021-03-05 ENCOUNTER — COMMUNICATION - HEALTHEAST (OUTPATIENT)
Dept: PULMONOLOGY | Facility: OTHER | Age: 71
End: 2021-03-05

## 2021-03-05 ENCOUNTER — AMBULATORY - HEALTHEAST (OUTPATIENT)
Dept: PULMONOLOGY | Facility: OTHER | Age: 71
End: 2021-03-05

## 2021-04-05 ENCOUNTER — COMMUNICATION - HEALTHEAST (OUTPATIENT)
Dept: INTERNAL MEDICINE | Facility: CLINIC | Age: 71
End: 2021-04-05

## 2021-04-05 DIAGNOSIS — F41.9 ANXIETY: ICD-10-CM

## 2021-04-10 ENCOUNTER — COMMUNICATION - HEALTHEAST (OUTPATIENT)
Dept: SCHEDULING | Facility: CLINIC | Age: 71
End: 2021-04-10

## 2021-05-10 ENCOUNTER — COMMUNICATION - HEALTHEAST (OUTPATIENT)
Dept: INTERNAL MEDICINE | Facility: CLINIC | Age: 71
End: 2021-05-10

## 2021-05-10 DIAGNOSIS — G47.00 INSOMNIA: ICD-10-CM

## 2021-05-10 RX ORDER — TRAZODONE HYDROCHLORIDE 50 MG/1
TABLET, FILM COATED ORAL
Qty: 90 TABLET | Refills: 2 | Status: SHIPPED | OUTPATIENT
Start: 2021-05-10 | End: 2022-02-02

## 2021-05-12 ENCOUNTER — COMMUNICATION - HEALTHEAST (OUTPATIENT)
Dept: INTERNAL MEDICINE | Facility: CLINIC | Age: 71
End: 2021-05-12

## 2021-05-12 DIAGNOSIS — E55.9 VITAMIN D DEFICIENCY: ICD-10-CM

## 2021-05-17 ENCOUNTER — COMMUNICATION - HEALTHEAST (OUTPATIENT)
Dept: INTERNAL MEDICINE | Facility: CLINIC | Age: 71
End: 2021-05-17

## 2021-05-17 DIAGNOSIS — F41.9 ANXIETY: ICD-10-CM

## 2021-05-18 RX ORDER — ALPRAZOLAM 1 MG
TABLET ORAL
Qty: 90 TABLET | Refills: 0 | Status: SHIPPED | OUTPATIENT
Start: 2021-05-18 | End: 2021-09-08

## 2021-05-20 ENCOUNTER — RECORDS - HEALTHEAST (OUTPATIENT)
Dept: ADMINISTRATIVE | Facility: OTHER | Age: 71
End: 2021-05-20

## 2021-05-20 ENCOUNTER — OFFICE VISIT - HEALTHEAST (OUTPATIENT)
Dept: INTERNAL MEDICINE | Facility: CLINIC | Age: 71
End: 2021-05-20

## 2021-05-20 DIAGNOSIS — E78.2 MIXED HYPERLIPIDEMIA: ICD-10-CM

## 2021-05-20 DIAGNOSIS — D86.0 SARCOIDOSIS OF LUNG (H): ICD-10-CM

## 2021-05-20 DIAGNOSIS — Z00.00 ROUTINE GENERAL MEDICAL EXAMINATION AT A HEALTH CARE FACILITY: ICD-10-CM

## 2021-05-20 DIAGNOSIS — F33.0 MAJOR DEPRESSIVE DISORDER, RECURRENT EPISODE, MILD (H): ICD-10-CM

## 2021-05-20 DIAGNOSIS — G43.909 MIGRAINE WITHOUT STATUS MIGRAINOSUS, NOT INTRACTABLE, UNSPECIFIED MIGRAINE TYPE: ICD-10-CM

## 2021-05-20 DIAGNOSIS — I10 ESSENTIAL HYPERTENSION, BENIGN: ICD-10-CM

## 2021-05-20 DIAGNOSIS — F41.9 ANXIETY: ICD-10-CM

## 2021-05-20 ASSESSMENT — MIFFLIN-ST. JEOR: SCORE: 1300.57

## 2021-05-20 ASSESSMENT — PATIENT HEALTH QUESTIONNAIRE - PHQ9: SUM OF ALL RESPONSES TO PHQ QUESTIONS 1-9: 2

## 2021-05-21 ENCOUNTER — COMMUNICATION - HEALTHEAST (OUTPATIENT)
Dept: INTERNAL MEDICINE | Facility: CLINIC | Age: 71
End: 2021-05-21

## 2021-05-21 DIAGNOSIS — D86.0 SARCOIDOSIS OF LUNG (H): ICD-10-CM

## 2021-05-26 ASSESSMENT — PATIENT HEALTH QUESTIONNAIRE - PHQ9: SUM OF ALL RESPONSES TO PHQ QUESTIONS 1-9: 2

## 2021-05-27 VITALS — BODY MASS INDEX: 24.2 KG/M2 | HEIGHT: 64 IN | WEIGHT: 141 LBS

## 2021-05-27 VITALS — HEART RATE: 67 BPM | DIASTOLIC BLOOD PRESSURE: 58 MMHG | SYSTOLIC BLOOD PRESSURE: 120 MMHG | OXYGEN SATURATION: 97 %

## 2021-05-27 NOTE — PROGRESS NOTES
Office Visit - Follow Up   Denis Moreno   69 y.o. male    Date of Visit: 4/10/2019    Chief Complaint   Patient presents with     Heartburn     after eating buring sensation in throat down into stomach, then starts coughing, and vomited x1 last night        Assessment and Plan   1. Gastroesophageal reflux disease without esophagitis  Suspect he has GERD precipitated and aggravated by his use of prednisone for his sarcoidosis.  Will prescribe omeprazole 20 mg daily while he is on prednisone.  - omeprazole (PRILOSEC) 20 MG capsule; Take 1 capsule (20 mg total) by mouth daily.  Dispense: 90 capsule; Refill: 3    2. Sarcoidosis of lung (H)  Has sarcoidosis of the lungs.  Followed by pulmonary, Dr. Duron.  On prednisone initially at high dose and now on medium dose which will be continued for the next 3 more months.  Scheduled to see Dr. Duron in 2 weeks.    3. Benign Essential Hypertension  Controlled despite steroid use.  Continue amlodipine, lisinopril and propranolol.      Follow up in 2 months as previously scheduled.     History of Present Illness   This 69 y.o. old male who walked in to our clinic complaining of midepigastric distress accompanied by episode of vomiting last night.  Was resting after eating and felt he needs to regurgitate something.  Vomited twice of his undigested salad.  Did not have any blood in the vomitus.  Had some midepigastric distress prior to his vomiting episodes.  Has been taking prednisone for his lung sarcoidosis.  Was taking hefty dose of prednisone which is currently being tapered by his pulmonary, Dr. Duron.  Has hypertension still controlled despite steroid use.  Overall, stable.    Review of Systems   A 12 point comprehensive review of systems was negative except as noted..     Medications, Allergies and Problem List   Reviewed and updated             Chief Complaint   Heartburn (after eating buring sensation in throat down into stomach, then starts coughing, and vomited x1  last night)       Patient Profile   Social History     Social History Narrative     Not on file        Past Medical History   Patient Active Problem List   Diagnosis     Mixed hyperlipidemia     Mild Recurrent Major Depression     Male Erectile Disorder     Benign Essential Hypertension     Insomnia     Anxiety     Deviated nasal septum     Hilar adenopathy     Uveitis     Sarcoidosis of lung (H)       Past Surgical History  He has a past surgical history that includes Bronchoscopy (12/20/2016).       Medications and Allergies   Current Outpatient Medications   Medication Sig     ALPRAZolam (XANAX) 1 MG tablet TAKE 1-2 TABLET AT BEDTIME AS NEEDED     ALPRAZolam (XANAX) 1 MG tablet TAKE 1-2 TABLET AT BEDTIME AS NEEDED     amLODIPine (NORVASC) 10 MG tablet Take 1 tablet (10 mg total) by mouth daily.     benzonatate (TESSALON) 100 MG capsule TAKE ONE CAPSULE BY MOUTH EVERY 6 HOURS AS NEEDED FOR COUGH     cholecalciferol, vitamin D3, 5,000 unit capsule Take 1 capsule (5,000 Units total) by mouth daily.     citalopram (CELEXA) 40 MG tablet TAKE 1 TABLET DAILY.     gabapentin (NEURONTIN) 300 MG capsule TAKE 2 CAPSULES BY MOUTH 3 TIMES A DAY INCREASE AS INSTRUCTED     hydrOXYzine pamoate (VISTARIL) 25 MG capsule TAKE 1 CAPSULE BY MOUTH 3 TIMES A DAY AS NEEDED FOR ITCHING     lisinopril (PRINIVIL,ZESTRIL) 20 MG tablet TAKE 1 TABLET (20 MG TOTAL) BY MOUTH DAILY.     prednisoLONE acetate (PRED-FORTE) 1 % ophthalmic suspension Administer 1 drop to both eyes 2 (two) times a day.     predniSONE (DELTASONE) 10 mg tablet Starting March 13, take 15 mg (1.5 tablets) daily for 14 days, then 10 mg daily until clinic follow-up..     predniSONE (DELTASONE) 20 MG tablet Take 40 mg by mouth daily.     propranolol (INDERAL) 20 MG tablet Take 1 tablet (20 mg total) by mouth 2 (two) times a day.     sulfamethoxazole-trimethoprim (BACTRIM) 400-80 mg per tablet Take 1 tablet by mouth daily.     SUMAtriptan (IMITREX) 50 MG tablet TAKE 1 TABLET  "(50 MG TOTAL) BY MOUTH ONCE AS NEEDED FOR MIGRAINE.     traZODone (DESYREL) 50 MG tablet TAKE 1 TABLET BY MOUTH EVERYDAY AT BEDTIME     viscous lidocaine HC (LIDOCAINE) 2 % Soln viscous solution Take 5 mL by mouth 3 (three) times a day. Swish and spit 5 mL three to four times daily as needed     omeprazole (PRILOSEC) 20 MG capsule Take 1 capsule (20 mg total) by mouth daily.     No Known Allergies     Family and Social History   No family history on file.     Social History     Tobacco Use     Smoking status: Never Smoker     Smokeless tobacco: Never Used   Substance Use Topics     Alcohol use: No     Drug use: No        Physical Exam       Physical Exam  /70   Pulse 61   Ht 5' 4\" (1.626 m)   Wt 157 lb (71.2 kg)   SpO2 96%   BMI 26.95 kg/m    General appearance: alert, appears stated age, cooperative and no distress  Throat: lips, mucosa, and tongue normal; teeth and gums normal  Neck: no adenopathy, no carotid bruit, no JVD, supple, symmetrical, trachea midline and thyroid not enlarged, symmetric, no tenderness/mass/nodules  Lungs: clear to auscultation bilaterally  Heart: regular rate and rhythm, S1, S2 normal, no murmur, click, rub or gallop  Abdomen: soft, non-tender; bowel sounds normal; no masses,  no organomegaly  Extremities: extremities normal, atraumatic, no cyanosis or edema  Skin: Skin color, texture, turgor normal. No rashes or lesions     Additional Information        Beka Ashley MD  Internal Medicine  Contact me at [phone]     Additional Information   Current Outpatient Medications   Medication Sig     ALPRAZolam (XANAX) 1 MG tablet TAKE 1-2 TABLET AT BEDTIME AS NEEDED     ALPRAZolam (XANAX) 1 MG tablet TAKE 1-2 TABLET AT BEDTIME AS NEEDED     amLODIPine (NORVASC) 10 MG tablet Take 1 tablet (10 mg total) by mouth daily.     benzonatate (TESSALON) 100 MG capsule TAKE ONE CAPSULE BY MOUTH EVERY 6 HOURS AS NEEDED FOR COUGH     cholecalciferol, vitamin D3, 5,000 unit capsule Take 1 " capsule (5,000 Units total) by mouth daily.     citalopram (CELEXA) 40 MG tablet TAKE 1 TABLET DAILY.     gabapentin (NEURONTIN) 300 MG capsule TAKE 2 CAPSULES BY MOUTH 3 TIMES A DAY INCREASE AS INSTRUCTED     hydrOXYzine pamoate (VISTARIL) 25 MG capsule TAKE 1 CAPSULE BY MOUTH 3 TIMES A DAY AS NEEDED FOR ITCHING     lisinopril (PRINIVIL,ZESTRIL) 20 MG tablet TAKE 1 TABLET (20 MG TOTAL) BY MOUTH DAILY.     prednisoLONE acetate (PRED-FORTE) 1 % ophthalmic suspension Administer 1 drop to both eyes 2 (two) times a day.     predniSONE (DELTASONE) 10 mg tablet Starting March 13, take 15 mg (1.5 tablets) daily for 14 days, then 10 mg daily until clinic follow-up..     predniSONE (DELTASONE) 20 MG tablet Take 40 mg by mouth daily.     propranolol (INDERAL) 20 MG tablet Take 1 tablet (20 mg total) by mouth 2 (two) times a day.     sulfamethoxazole-trimethoprim (BACTRIM) 400-80 mg per tablet Take 1 tablet by mouth daily.     SUMAtriptan (IMITREX) 50 MG tablet TAKE 1 TABLET (50 MG TOTAL) BY MOUTH ONCE AS NEEDED FOR MIGRAINE.     traZODone (DESYREL) 50 MG tablet TAKE 1 TABLET BY MOUTH EVERYDAY AT BEDTIME     viscous lidocaine HC (LIDOCAINE) 2 % Soln viscous solution Take 5 mL by mouth 3 (three) times a day. Swish and spit 5 mL three to four times daily as needed     omeprazole (PRILOSEC) 20 MG capsule Take 1 capsule (20 mg total) by mouth daily.     No Known Allergies  Social History     Tobacco Use     Smoking status: Never Smoker     Smokeless tobacco: Never Used   Substance Use Topics     Alcohol use: No     Drug use: No         Time: total time spent with the patient was 25 minutes of which >50% was spent in counseling and coordination of care

## 2021-05-27 NOTE — TELEPHONE ENCOUNTER
Controlled Substance Refill Request  Medication Name:   Requested Prescriptions     Pending Prescriptions Disp Refills     ALPRAZolam (XANAX) 1 MG tablet 60 tablet 0     Sig: TAKE 1-2 TABLET AT BEDTIME AS NEEDED     Date Last Fill: 3/4/19  Pharmacy: [facility] [address]        Submit electronically to pharmacy  Controlled Substance Agreement Date Scanned:   Encounter-Level CSA Scan Date:    There are no encounter-level csa scan date.       Last office visit with prescriber/PCP: 1/17/2019 Beka Ashley MD OR same dept: 1/17/2019 Beka Ashley MD OR same specialty: 1/17/2019 Beka Ashley MD  Last physical: Visit date not found Last MTM visit: Visit date not found

## 2021-05-27 NOTE — TELEPHONE ENCOUNTER
Describe your symptoms:  Burning in chest when eating Possible Heart Burn  Any pain: yes  8/10 pain  New/Ongoing: New  How long have you been having symptoms: 2  week(s)  Have you been seen for this:  No.  Appointment offered? Offered Triage but wait was too long,   Triage offered?: patient declined  Home remedies tried:  Nothing has worked  [facility]  Okay to leave a detailed message? Yes  [phone]

## 2021-05-27 NOTE — TELEPHONE ENCOUNTER
Prescription Monitoring Program activity reviewed with no discrepancies noted.      Last fill per : 3/4/19  Quantity/days supply: #60, 30 day supply    Controlled Substance Agreement on file: No  Date:     Last office visit with provider:  1/17/2019 Beka Ashley MD    Please advise.    Leonor Moreno LPN

## 2021-05-28 ASSESSMENT — ANXIETY QUESTIONNAIRES
GAD7 TOTAL SCORE: 8
GAD7 TOTAL SCORE: 3

## 2021-05-28 NOTE — TELEPHONE ENCOUNTER
Prescription Monitoring Program activity reviewed with no discrepancies noted.      Last fill per : 2/18/2019  Quantity/days supply: 60    Controlled Substance Agreement on file: No  Date:     Last office visit with provider:  4/10/2019 Beka Ashley MD    Please advise.

## 2021-05-28 NOTE — TELEPHONE ENCOUNTER
Controlled Substance Refill Request  Medication Name:   Requested Prescriptions     Pending Prescriptions Disp Refills     ALPRAZolam (XANAX) 1 MG tablet 60 tablet 0     Date Last Fill: 3/4/19  [facility]      Submit electronically to pharmacy  Controlled Substance Agreement Date Scanned:   Encounter-Level CSA Scan Date:    There are no encounter-level csa scan date.       Last office visit with prescriber/PCP: 4/10/2019 Beka Ashley MD OR same dept: 4/10/2019 Beka Ashley MD OR same specialty: 4/10/2019 Beka Ashley MD  Last physical: Visit date not found Last MTM visit: Visit date not found

## 2021-05-28 NOTE — PROGRESS NOTES
Pulmonary Clinic Follow-up Visit    Assessment and Plan:  69 year old male never smoker with a history of dyslipidemia, low-grade chronic bilateral uveitis, mild SAPNA, and pulmonary sarcoidosis, presenting for follow-up.      Sarcoidosis: Pulmonary and ocular involvement, with chronic bilateral uveitis, and mediastinal/hilar lymphadenopathy with pulmonary nodules but previously normal functional status and normal PFTs, with improvement radiographically on chest CT from October 2017 compared to November 2016. Developed worsening fatigue, dyspnea, and cough, with significant peripheral interstitial changes and worsening pulmonary function tests. Normal renal and hepatic function. Had leukocytosis on CBC in November 2018, not anemic. Prior ECG with possible LVH, otherwise unremarkable. Started prednisone 40 mg daily and PJP prophylaxis with SMX-TMP on 12/12/18. Since then, he has noted that his fatigue, dyspnea, and cough completely resolved, and his chest CT from January 15 shows complete resolution of interstitial changes, and he has titrated down to 15 mg prednisone daily. Now with occasional dyspnea on exertion and fatigue but overall much improved. Occasional nighttime cough. Down to 15 mg of prednisone daily. Can likely titrate down further, but will first obtain blood work, spirometry/DLCO, and CXR.    Plan:  - spirometry/DLCO  - PA/lateral CXR  - Ca, Cr, LFTs, CBC with diff  - I will contact him with the results, and may consider decreasing prednisone to 10 mg daily  - will also discuss discontinuing SMX-TMP when I contact him given his lower prednisone dose  - may require long-term low-dose prednisone given the severity of his prior clinical flare, though may be able to titrate off  - if refractory or worsens, could consider steroid-sparing therapy such as MTX  - encouraged him to call any time with questions or concerning symptoms     I appreciate the opportunity to participate in the care of Mr. Moreno.   Please feel free to contact me at any time.     CCx: pulmonary and ocular sarcoidosis    HPI: 69 year old male never smoker with a history of dyslipidemia, low-grade chronic bilateral uveitis, mild SAPNA, and pulmonary sarcoidosis, presenting for follow-up. Pulmonary and ocular involvement, with chronic bilateral uveitis, and mediastinal/hilar lymphadenopathy with pulmonary nodules but previously normal functional status and normal PFTs, with improvement radiographically on chest CT from October 2017 compared to November 2016. Developed worsening fatigue, dyspnea, and cough, with significant peripheral interstitial changes and worsening pulmonary function tests. Normal renal and hepatic function. Had leukocytosis on CBC in November 2018, not anemic. Prior ECG with possible LVH, otherwise unremarkable. Started prednisone 40 mg daily and PJP prophylaxis with SMX-TMP on 12/12/18. Since then, he has noted that his fatigue, dyspnea, and cough completely resolved, and his chest CT from January 15 shows complete resolution of interstitial changes, and he has titrated down to 15 mg prednisone daily. Now with occasional dyspnea on exertion and fatigue but overall much improved. Occasional nighttime cough. Down to 15 mg of prednisone daily. Can likely titrate down further, but will first obtain blood work, spirometry/DLCO, and CXR.    ROS:  A 12-system review was obtained and was negative with the exception of the symptoms endorsed in the history of present illness.    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    Social Hx:  Social History     Socioeconomic History     Marital status: Single     Spouse name:  Not on file     Number of children: Not on file     Years of education: Not on file     Highest education level: Not on file   Occupational History     Not on file   Social Needs     Financial resource strain: Not on file     Food insecurity:     Worry: Not on file     Inability: Not on file     Transportation needs:     Medical: Not on file     Non-medical: Not on file   Tobacco Use     Smoking status: Never Smoker     Smokeless tobacco: Never Used   Substance and Sexual Activity     Alcohol use: No     Drug use: No     Sexual activity: Not on file   Lifestyle     Physical activity:     Days per week: Not on file     Minutes per session: Not on file     Stress: Not on file   Relationships     Social connections:     Talks on phone: Not on file     Gets together: Not on file     Attends Scientologist service: Not on file     Active member of club or organization: Not on file     Attends meetings of clubs or organizations: Not on file     Relationship status: Not on file     Intimate partner violence:     Fear of current or ex partner: Not on file     Emotionally abused: Not on file     Physically abused: Not on file     Forced sexual activity: Not on file   Other Topics Concern     Not on file   Social History Narrative     Not on file       Current Meds:  Current Outpatient Medications   Medication Sig Dispense Refill     ALPRAZolam (XANAX) 1 MG tablet TAKE 1-2 TABLET AT BEDTIME AS NEEDED 60 tablet 0     amLODIPine (NORVASC) 10 MG tablet Take 1 tablet (10 mg total) by mouth daily. 90 tablet 3     benzonatate (TESSALON) 100 MG capsule TAKE ONE CAPSULE BY MOUTH EVERY 6 HOURS AS NEEDED FOR COUGH 20 capsule 0     cholecalciferol, vitamin D3, 5,000 unit capsule Take 1 capsule (5,000 Units total) by mouth daily. 90 capsule 3     citalopram (CELEXA) 40 MG tablet TAKE 1 TABLET DAILY. 90 tablet 1     gabapentin (NEURONTIN) 300 MG capsule TAKE 2 CAPSULES BY MOUTH 3 TIMES A DAY INCREASE AS INSTRUCTED  5     hydrOXYzine pamoate  "(VISTARIL) 25 MG capsule TAKE 1 CAPSULE BY MOUTH 3 TIMES A DAY AS NEEDED FOR ITCHING 270 capsule 1     lisinopril (PRINIVIL,ZESTRIL) 20 MG tablet TAKE 1 TABLET (20 MG TOTAL) BY MOUTH DAILY. 90 tablet 3     omeprazole (PRILOSEC) 20 MG capsule Take 1 capsule (20 mg total) by mouth daily. 90 capsule 3     prednisoLONE acetate (PRED-FORTE) 1 % ophthalmic suspension Administer 1 drop to both eyes 2 (two) times a day. 5 mL 0     predniSONE (DELTASONE) 10 mg tablet Starting March 13, take 15 mg (1.5 tablets) daily for 14 days, then 10 mg daily until clinic follow-up.. 45 tablet 3     predniSONE (DELTASONE) 20 MG tablet Take 40 mg by mouth daily. 60 tablet 3     propranolol (INDERAL) 20 MG tablet Take 1 tablet (20 mg total) by mouth 2 (two) times a day. 180 tablet 3     sulfamethoxazole-trimethoprim (BACTRIM) 400-80 mg per tablet Take 1 tablet by mouth daily. 30 tablet 0     SUMAtriptan (IMITREX) 50 MG tablet TAKE 1 TABLET (50 MG TOTAL) BY MOUTH ONCE AS NEEDED FOR MIGRAINE. 30 tablet 5     traZODone (DESYREL) 50 MG tablet TAKE 1 TABLET BY MOUTH EVERYDAY AT BEDTIME 30 tablet 11     viscous lidocaine HC (LIDOCAINE) 2 % Soln viscous solution Take 5 mL by mouth 3 (three) times a day. Swish and spit 5 mL three to four times daily as needed 120 mL 0     No current facility-administered medications for this visit.        Physical Exam:  /60   Pulse 60   Ht 5' 4\" (1.626 m)   Wt 160 lb (72.6 kg)   SpO2 95%   BMI 27.46 kg/m    Gen: alert, oriented, no distress  HEENT: nasal turbinates are unremarkable, no oropharyngeal lesions, no cervical or supraclavicular lymphadenopathy  CV: RRR, no M/G/R  Resp: CTAB, no focal crackles or wheezes  Abd: soft, nontender, no palpable organomegaly  Skin: no apparent rashes  Ext: no cyanosis, clubbing or edema  Neuro: alert, nonfocal    Labs:  Normal BMP and LFTs  WBC 13.1 with no differential  Normal Hgb  ESR 54  1,25-dihydroxyvitamin D level 83 in November 2016     Imaging studies:  CT " "chest (11/25/16)  - personally reviewed  - mediastinal and bilateral hilar lymphadenopathy with partial calcification  - multiple bilateral fluffy nodules (\"cotton ball\")  - peribronchovascular opacity sue. LLL     CT chest high-res (10/20/17):  - images directly reviewed, formal interpretation follows:  FINDINGS:  LUNGS AND PLEURA: Airways are patent in inspiration and expiration without tracheobronchomalacia or air trapping. There is no significant bronchial wall thickening. No significant bronchiectasis. There is scarring in the right middle lobe. Multiple   pulmonary nodules throughout the lungs are less conspicuous than on the comparison study. Example, a small area of linear scarring adjacent to the pleura in the right upper lobe was previously a 9 mm x 5 mm nodule (series 3 image 34). There a few small   nodules adjacent to the left major fissure and right major fissure. A few groundglass MR solid nodules are noted in the upper lobes. No diffuse subpleural reticular opacities. No honeycombing. No significant diffuse groundglass.     MEDIASTINUM: Normal size heart. There is atherosclerotic disease including coronary artery calcification. There are numerous calcified mediastinal and hilar lymph nodes. The largest discrete right hilar lymph node is 15 mm (previously 17 mm). A 19 mm   subcarinal lymph node previously measured 22 mm. Small hiatal hernia.     LIMITED UPPER ABDOMEN: Negative.     MUSCULOSKELETAL: Negative.     IMPRESSION:   CONCLUSION:  1.  Pulmonary parenchymal disease and mediastinal and hilar lymphadenopathy are consistent with history of sarcoidosis. When compared with the prior study, pulmonary nodules have significantly decreased in size. Mediastinal and hilar lymph nodes have   slightly decreased in size. There is minimal fibrosis in the lungs, which is new.   2.  Coronary atherosclerotic disease.  3.  Small hiatal hernia.     CT chest (12/5/18):  - images directly reviewed, formal " interpretation follows:  FINDINGS:  LUNGS AND PLEURA: There is new significant pulmonary disease seen diffusely mostly characterized by fine linear reticular interstitial prominence in the lung periphery involving upper and lower lobes but with a slight basal predominance. These are not   typical findings of sarcoid and the rapid progression is also atypical. Its possible this relates to a pneumonia superimposed upon the patient's pre-existing sarcoid. Other interstitial fibrotic processes could also be superimposed upon sarcoid but again   the progression appears fairly rapid.     No new zones of consolidation, there remains chronic atelectasis involving medial segment right middle lobe. There is a single 3 mm nodule within right middle lobe which has not changed significantly. No pleural effusion.     MEDIASTINUM: Partially calcified hilar and mediastinal lymphadenopathy is unchanged and again would be consistent with sarcoid. Coronary artery calcifications.     LIMITED UPPER ABDOMEN: Negative.     MUSCULOSKELETAL: Negative.     IMPRESSION:   CONCLUSION:  1.  Fairly pronounced worsening of diffuse interstitial disease could relate to progression of known sarcoid but a superimposed atypical pneumonia or additional interstitial fibrosing process may also be present.     CT chest (1/15/19), 5 weeks after starting prednisone 40 mg daily:  - images directly reviewed, formal interpretation follows:  FINDINGS:   LUNGS AND PLEURA: Previously seen interstitial infiltrates throughout both lungs have resolved. There are a few tiny pulmonary nodules which are unchanged. There is chronic atelectasis in the inferior right middle lobe. The lungs are otherwise clear. No   pleural effusion.     MEDIASTINUM: Partially calcified mediastinal and bilateral hilar lymph nodes are unchanged. Small hiatal hernia. Moderate coronary artery calcification.     LIMITED UPPER ABDOMEN: Negative.     MUSCULOSKELETAL: Negative.     IMPRESSION:    CONCLUSION:   1.  Previously seen interstitial infiltrates have resolved.  2.  Partially calcified mediastinal and bilateral hilar lymph nodes are unchanged and consistent with known sarcoidosis.         Brain MRI (July 2016)  - mild atrophy  - minimal to mild SVID      PFT's  November 2016:  FEV1/FVC is 72% and is normal.  FEV1 is 2.49L or 113% predicted and is normal.  FVC is 3.47L or 124% predicted and normal.  There was no improvement in spirometry after a single inhaled dose of bronchodilator.  TLC is 5.13L or 107% predicted and is normal.  RV is 1.54L or 77% predicted and is reduced.  DLCO is 17.61 ml/min/mmHg or 88% predicted and is normal when it   is corrected for hemoglobin.     10/16/17:  FEV1/FVC is 68 and is normal (less than 0.7 but greater than the demographically adjusted lower limit of normal).  FEV1 is 122% predicted and is normal.  FVC is 140% predicted and normal.     DLCO is 80% predicted and is normal when it   is corrected for hemoglobin.     12/5/18:  FEV1/FVC is 74% and is normal.  FEV1 is 1.72 L or 76% predicted and is reduced.  FVC is 2.33 L or 80% predicted and is normal.  There was no improvement in spirometry after a single inhaled dose of bronchodilator.  DLCO is 9.48 mL/min/mm Hg or 40% predicted and is reduced when it   is corrected for hemoglobin.    2/13/19  FEV1/FVC is 68% and is reduced.  FEV1 is 2.39L (108%) predicted and is normal.  FVC is 3.52L (118%) predicted and normal.  There was no improvement in spirometry after a single inhaled dose of bronchodilator.  DLCO is 13.14ml/min/hg (55%) predicted and is reduced when it is corrected for hemoglobin.  Flow volume loops indicate severe scooping of the expiratory limb.     Impression:  Full Pulmonary Function Test is abnormal.  PFTs are consistent with mild obstructive disease.  Spirometry is not consistent with reversibility.  Diffusion capacity when corrected for hemoglobin is moderately reduced.    Vipin Duron,  MD  Pulmonary and Critical Care Medicine  [facility]  Cell [phone]  Office [phone]  Pager [phone]

## 2021-05-28 NOTE — TELEPHONE ENCOUNTER
Pulmonary Telephone Note    CXR completely clear. LFTs and calcium normal. Cr is up at 1.48; could be effect of SMX-TMP, especially as he is also on ACEI. Will stop the SMX-TMP (no longer indicated with his current prednisone dose anyway) and repeat BMP and Mg in two weeks. Will decrease his prednisone from 15 mg daily to 10 mg daily. Has spirometry/DLCO scheduled for June 5. I will call him with above results. Called Mr. Moreno and discussed the above. He is feeling well and is in agreement. Encouraged him to call any time with questions or concerning symptoms.    Vipin Duron MD  Pulmonary and Critical Care Medicine  [facility]  Cell [phone]  Office [phone]  Pager [phone]

## 2021-05-28 NOTE — TELEPHONE ENCOUNTER
Pulmonary Telephone Note    CXR completely clear. LFTs and calcium normal. Cr is up at 1.48, and should definitely stop the SMX-TMP and repeat BMP and Mg in two weeks. Reached his voicemail and left a brief message with the clinic phone number and a message that I would try him again at another time.    Vipin Duron MD  Pulmonary and Critical Care Medicine  [facility]  Cell [phone]  Office [phone]  Pager [phone]

## 2021-05-28 NOTE — PATIENT INSTRUCTIONS - HE
It was good to see you in clinic today. This is what we discussed:    1. Continue prednisone 10-mg tablet, 1.5 tablets daily.  2. We will get blood tests, basic lung function tests, and a chest X-ray.  3. I will call you with results.  4. We can tentatively plan on 6-month follow-up, and adjust this if needed.  5. Call any time with questions or concerning symptoms.    Vipin Duron MD  Pulmonary and Critical Care Medicine  [facility]  Office [phone]

## 2021-05-29 NOTE — TELEPHONE ENCOUNTER
Pulmonary Telephone Note    Reviewed Mr. Moreno's spirometry and DLCO. Compared to February 2019, much improved. Normal spirometry, with improved FEV1/FVC ratio from 0.68 to 0.75, and his flow-volume loop morphology is much improved. Corrected DLCO up from 55% to 72%. Called him to discuss. He is breathing well. Recent tenderness right achilles tendon up to right posterior shin. US negative for DVT; no mention of Baker cyst. He thought the ankle was swollen, but no pitting and a family member told him it did not look swollen. He had to walk a lot at the airport recently due to a gate change and thinks this may have led to some strain. He is on amlodipine but does not think the ankle is swollen now. Also asking about checking kidney function; creatinine was elevated in early May. Says his breathing feels great.    Plan:  - decrease prednisone from 10 mg daily to 7.5 mg daily for 14 days, then 5 mg daily until follow-up  - follow up in 3 months with spirometry/DLCO  - check BMP; I will call him with the result  - advised heating pad to tender area of right leg two times a day to see if this helps and has ongoing follow-up with Dr. Ashley as needed  - encouraged him to call any time with questions or concerning symptoms    Vipin Duron MD  Pulmonary and Critical Care Medicine  [facility]  Cell [phone]  Office [phone]  Pager [phone]

## 2021-05-29 NOTE — TELEPHONE ENCOUNTER
Controlled Substance Refill Request  Medication:   Requested Prescriptions     Pending Prescriptions Disp Refills     ALPRAZolam (XANAX) 1 MG tablet [Pharmacy Med Name: ALPRAZOLAM 1 MG TABLET] 60 tablet 0     Sig: TAKE 1 TABLET (1 MG TOTAL) BY MOUTH 3 (THREE) TIMES A DAY AS NEEDED FOR SLEEP.     Date Last Fill: 5/2/19  [id number]   Submit electronically to pharmacy  Controlled Substance Agreement on File:   Encounter-Level CSA Scan Date:    There are no encounter-level csa scan date.       Last office visit: Last office visit pertaining to requested medication was 4/10/19.

## 2021-05-29 NOTE — TELEPHONE ENCOUNTER
RN cannot approve Refill Request    RN can NOT refill this medication med is not covered by policy/route to provider     . Last office visit: 4/10/2019 Beka Ashley MD Last Physical: Visit date not found Last MTM visit: Visit date not found Last visit same specialty: Visit date not found.  Next visit within 3 mo: Visit date not found  Next physical within 3 mo: Visit date not found      Marilyn Thompson, [facility] Triage/Med Refill 5/21/2019    Requested Prescriptions   Pending Prescriptions Disp Refills     benzonatate (TESSALON) 100 MG capsule [Pharmacy Med Name: BENZONATATE 100 MG CAPSULE] 20 capsule 0     Sig: TAKE ONE CAPSULE BY MOUTH EVERY 6 HOURS AS NEEDED FOR COUGH       There is no refill protocol information for this order

## 2021-05-29 NOTE — PROGRESS NOTES
Office Visit - Follow Up   Denis Moreno   69 y.o. male    Date of Visit: 6/12/2019    Chief Complaint   Patient presents with     Leg Pain     R calf pain after flying and walking a long distance in airport, swollen and pain radiates down         Assessment and Plan   1. Pain of right lower leg  Complains of right leg pain localized to mid calf.  Started about a week ago.  Had a plane ride from [place] and about the same time right leg pain started.  Attributes his right leg pains from walking a long distance at the airport after flying.  Exam showed mild swelling of the right mid calf compared to the left  mid calf but there is no tenderness and redness.  Because of his plane ride and right leg pains will get a venous ultrasound of the right leg to rule out DVT.   this will be  hold and call for this exam  - US Venous Leg Right; Future    2. Sarcoidosis of lung (H)  Takes prednisone 20 mg daily for his sarcoidosis of the lungs.  Followed by pulmonary.  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  His pulmonary doctor is considering to taper his prednisone to a lower dose because he is doing well.    3. Benign Essential Hypertension  Controlled.  But shows mild increase of his systolic blood pressure today because of water retention  due to prednisone.  Continue amlodipine, lisinopril and propranolol.  Propranolol also controls his headaches.      Follow up as previously scheduled in 2 months.     History of Present Illness   This 69 y.o. old male complains of right leg pains.  Started about a week ago after flying to [place] and walking long distance at the airport.  Right leg is mildly swollen today but not red and tender.  Has sarcoidosis of the lungs.  Takes prednisone 20 mg daily which is controlling his sarcoidosis well.  But no developed side effect of prednisone increased weight, moon facie and mild increase of his blood pressure.  Has hypertension initially controlled by  his usual meds  but showing mild increase of his systolic pressure due to prednisone.  Overall, feels well.  No other complaints.    Review of Systems   A 12 point comprehensive review of systems was negative except as noted..     Medications, Allergies and Problem List   Reviewed and updated             Chief Complaint   Leg Pain (R calf pain after flying and walking a long distance in airport, swollen and pain radiates down )       Patient Profile   Social History     Social History Narrative     Not on file        Past Medical History   Patient Active Problem List   Diagnosis     Mixed hyperlipidemia     Mild Recurrent Major Depression     Male Erectile Disorder     Benign Essential Hypertension     Insomnia     Anxiety     Deviated nasal septum     Hilar adenopathy     Uveitis     Sarcoidosis of lung (H)       Past Surgical History  He has a past surgical history that includes Bronchoscopy (12/20/2016).       Medications and Allergies   Current Outpatient Medications   Medication Sig     ALPRAZolam (XANAX) 1 MG tablet TAKE 1 TABLET (1 MG TOTAL) BY MOUTH 3 (THREE) TIMES A DAY AS NEEDED FOR SLEEP.     amLODIPine (NORVASC) 10 MG tablet Take 1 tablet (10 mg total) by mouth daily.     benzonatate (TESSALON) 100 MG capsule TAKE ONE CAPSULE BY MOUTH EVERY 6 HOURS AS NEEDED FOR COUGH     cholecalciferol, vitamin D3, 5,000 unit capsule Take 1 capsule (5,000 Units total) by mouth daily.     citalopram (CELEXA) 40 MG tablet TAKE 1 TABLET DAILY.     gabapentin (NEURONTIN) 300 MG capsule TAKE 2 CAPSULES BY MOUTH 3 TIMES A DAY INCREASE AS INSTRUCTED     hydrOXYzine pamoate (VISTARIL) 25 MG capsule TAKE 1 CAPSULE BY MOUTH 3 TIMES A DAY AS NEEDED FOR ITCHING     lisinopril (PRINIVIL,ZESTRIL) 20 MG tablet TAKE 1 TABLET (20 MG TOTAL) BY MOUTH DAILY.     omeprazole (PRILOSEC) 20 MG capsule Take 1 capsule (20 mg total) by mouth daily.     prednisoLONE acetate (PRED-FORTE) 1 % ophthalmic suspension Administer 1 drop to both eyes 2 (two) times a  "day.     predniSONE (DELTASONE) 10 mg tablet Take 10 mg by mouth daily.     propranolol (INDERAL) 20 MG tablet Take 1 tablet (20 mg total) by mouth 2 (two) times a day.     SUMAtriptan (IMITREX) 50 MG tablet TAKE 1 TABLET (50 MG TOTAL) BY MOUTH ONCE AS NEEDED FOR MIGRAINE.     traZODone (DESYREL) 50 MG tablet TAKE 1 TABLET BY MOUTH EVERYDAY AT BEDTIME     viscous lidocaine HC (LIDOCAINE) 2 % Soln viscous solution Take 5 mL by mouth 3 (three) times a day. Swish and spit 5 mL three to four times daily as needed     No Known Allergies     Family and Social History   No family history on file.     Social History     Tobacco Use     Smoking status: Never Smoker     Smokeless tobacco: Never Used   Substance Use Topics     Alcohol use: No     Drug use: No        Physical Exam       Physical Exam  /70   Pulse 65   Ht 5' 4\" (1.626 m)   Wt 161 lb (73 kg)   SpO2 95%   BMI 27.64 kg/m    General appearance: alert, appears stated age, cooperative and no distress  Head: Normocephalic, without obvious abnormality, atraumatic  Throat: lips, mucosa, and tongue normal; teeth and gums normal  Neck: no adenopathy, no carotid bruit, no JVD, supple, symmetrical, trachea midline and thyroid not enlarged, symmetric, no tenderness/mass/nodules  Lungs: clear to auscultation bilaterally  Heart: regular rate and rhythm, S1, S2 normal, no murmur, click, rub or gallop  Abdomen: soft, non-tender; bowel sounds normal; no masses,  no organomegaly  Extremities: Right leg mid calf is slightly swollen than the left mid calf but not red or tender.  Skin: Skin color, texture, turgor normal. No rashes or lesions  Neurologic: Grossly normal     Additional Information        Beka Ashley MD  Internal Medicine  Contact me at [phone]     Additional Information   Current Outpatient Medications   Medication Sig     ALPRAZolam (XANAX) 1 MG tablet TAKE 1 TABLET (1 MG TOTAL) BY MOUTH 3 (THREE) TIMES A DAY AS NEEDED FOR SLEEP.     amLODIPine " (NORVASC) 10 MG tablet Take 1 tablet (10 mg total) by mouth daily.     benzonatate (TESSALON) 100 MG capsule TAKE ONE CAPSULE BY MOUTH EVERY 6 HOURS AS NEEDED FOR COUGH     cholecalciferol, vitamin D3, 5,000 unit capsule Take 1 capsule (5,000 Units total) by mouth daily.     citalopram (CELEXA) 40 MG tablet TAKE 1 TABLET DAILY.     gabapentin (NEURONTIN) 300 MG capsule TAKE 2 CAPSULES BY MOUTH 3 TIMES A DAY INCREASE AS INSTRUCTED     hydrOXYzine pamoate (VISTARIL) 25 MG capsule TAKE 1 CAPSULE BY MOUTH 3 TIMES A DAY AS NEEDED FOR ITCHING     lisinopril (PRINIVIL,ZESTRIL) 20 MG tablet TAKE 1 TABLET (20 MG TOTAL) BY MOUTH DAILY.     omeprazole (PRILOSEC) 20 MG capsule Take 1 capsule (20 mg total) by mouth daily.     prednisoLONE acetate (PRED-FORTE) 1 % ophthalmic suspension Administer 1 drop to both eyes 2 (two) times a day.     predniSONE (DELTASONE) 10 mg tablet Take 10 mg by mouth daily.     propranolol (INDERAL) 20 MG tablet Take 1 tablet (20 mg total) by mouth 2 (two) times a day.     SUMAtriptan (IMITREX) 50 MG tablet TAKE 1 TABLET (50 MG TOTAL) BY MOUTH ONCE AS NEEDED FOR MIGRAINE.     traZODone (DESYREL) 50 MG tablet TAKE 1 TABLET BY MOUTH EVERYDAY AT BEDTIME     viscous lidocaine HC (LIDOCAINE) 2 % Soln viscous solution Take 5 mL by mouth 3 (three) times a day. Swish and spit 5 mL three to four times daily as needed     No Known Allergies  Social History     Tobacco Use     Smoking status: Never Smoker     Smokeless tobacco: Never Used   Substance Use Topics     Alcohol use: No     Drug use: No         Time: total time spent with the patient was 25 minutes of which >50% was spent in counseling and coordination of care

## 2021-05-29 NOTE — TELEPHONE ENCOUNTER
Prescription Monitoring Program activity reviewed with no discrepancies noted.      Last fill per : 5/2/19    Quantity/days supply: 60 tablets for 30 days    Controlled Substance Agreement on file: No  Date: N/A    Last office visit with provider:  4/10/2019 Beka Ashley MD    Please advise.

## 2021-05-29 NOTE — TELEPHONE ENCOUNTER
Pulmonary Telephone Note    Reviewed Mr. Moreno's spirometry and DLCO. Compared to February 2019, much improved. Normal spirometry, with improved FEV1/FVC ratio from 0.68 to 0.75, and his flow-volume loop morphology is much improved. Corrected DLCO up from 55% to 72%. Should consider further prednisone taper, from 10 mg daily to 7.5 mg daily x 14 days, then 5 mg daily, then follow up in 3 months with repeat spirometry and DLCO. Called Mr. Moreno and reached voicemail. Left a brief message with the clinic phone number and a message that I would try him again at another time.    Vipin Duron MD  Pulmonary and Critical Care Medicine  [facility]  Cell [phone]  Office [phone]  Pager [phone]

## 2021-05-30 VITALS — WEIGHT: 149 LBS | HEIGHT: 64 IN | BODY MASS INDEX: 25.44 KG/M2

## 2021-05-30 VITALS — HEIGHT: 64 IN | BODY MASS INDEX: 25.78 KG/M2 | WEIGHT: 151 LBS

## 2021-05-30 VITALS — WEIGHT: 157 LBS | BODY MASS INDEX: 26.8 KG/M2 | HEIGHT: 64 IN

## 2021-05-30 VITALS — BODY MASS INDEX: 26.98 KG/M2 | HEIGHT: 64 IN | WEIGHT: 158 LBS

## 2021-05-30 NOTE — TELEPHONE ENCOUNTER
Pulmonary Telephone Note    BMP shows some elevation in Cr but stable from prior, normal GFR. Can likely continue to monitor intermittently with Dr. Ashley. Mr. Moreno also called the clinic earlier today asking to speak with me; I am working in the ICU and was unable to call until now. Called him and reached voicemail. Left a brief message that I would try him again at another time.    Vipin Duron MD  Pulmonary and Critical Care Medicine  [facility]  Cell [phone]  Office [phone]  Pager [phone]

## 2021-05-30 NOTE — TELEPHONE ENCOUNTER
Controlled Substance Refill Request  Medication:   Requested Prescriptions     Pending Prescriptions Disp Refills     ALPRAZolam (XANAX) 1 MG tablet [Pharmacy Med Name: ALPRAZOLAM 1 MG TABLET] 60 tablet 0     Sig: TAKE 1 TABLET BY MOUTH 3 (THREE) TIMES A DAY AS NEEDED FOR SLEEP.OK TO FILL 6/2/19 PER MD     Date Last Fill: 5/30/19  [id number]   Submit electronically to pharmacy  Controlled Substance Agreement on File:   Encounter-Level CSA Scan Date:    There are no encounter-level csa scan date.       Last office visit: Last office visit pertaining to requested medication was 6/12/19.

## 2021-05-31 VITALS — WEIGHT: 159 LBS | BODY MASS INDEX: 27.14 KG/M2 | HEIGHT: 64 IN

## 2021-05-31 VITALS — BODY MASS INDEX: 27.66 KG/M2 | HEIGHT: 64 IN | WEIGHT: 162 LBS

## 2021-05-31 VITALS — BODY MASS INDEX: 27.29 KG/M2 | WEIGHT: 159 LBS

## 2021-05-31 VITALS — WEIGHT: 166.25 LBS | BODY MASS INDEX: 28.54 KG/M2

## 2021-05-31 VITALS — HEIGHT: 64 IN | BODY MASS INDEX: 26.98 KG/M2 | WEIGHT: 158 LBS

## 2021-05-31 VITALS — BODY MASS INDEX: 27.42 KG/M2 | WEIGHT: 159.75 LBS

## 2021-05-31 VITALS — WEIGHT: 154 LBS | HEIGHT: 64 IN | BODY MASS INDEX: 26.29 KG/M2

## 2021-05-31 NOTE — TELEPHONE ENCOUNTER
Refill Approved    Rx renewed per Medication Renewal Policy. Medication was last renewed on 1/15/18, last OV 6/12/19.    Khalida Gann, Care [facility] Triage/Med Refill 8/4/2019     Requested Prescriptions   Pending Prescriptions Disp Refills     hydrOXYzine pamoate (VISTARIL) 25 MG capsule [Pharmacy Med Name: HYDROXYZINE BRENNEN 25 MG CAP] 270 capsule 1     Sig: TAKE 1 CAPSULE BY MOUTH 3 TIMES A DAY AS NEEDED FOR ITCHING       Antihistamine Refill Protocol Passed - 8/3/2019  6:38 PM        Passed - Patient has had office visit/physical in last year     Last office visit with prescriber/PCP: 6/12/2019 Beka Ashley MD OR same dept: 6/12/2019 Beka Ashley MD OR same specialty: 6/12/2019 Beka Ashley MD  Last physical: Visit date not found Last MTM visit: Visit date not found   Next visit within 3 mo: Visit date not found  Next physical within 3 mo: Visit date not found  Prescriber OR PCP: Beka Ashley MD  Last diagnosis associated with med order: 1. Anxiety  - hydrOXYzine pamoate (VISTARIL) 25 MG capsule [Pharmacy Med Name: HYDROXYZINE BRENNEN 25 MG CAP]; TAKE 1 CAPSULE BY MOUTH 3 TIMES A DAY AS NEEDED FOR ITCHING  Dispense: 270 capsule; Refill: 1    If protocol passes may refill for 12 months if within 3 months of last provider visit (or a total of 15 months).

## 2021-05-31 NOTE — TELEPHONE ENCOUNTER
Pulmonary Telephone Note    Spoke with Mr. Moreno about his BMP, with normal GFR, which we can monitor intermittently. He will be dropping off some paperwork for me to complete regarding employment and , which I will complete as expeditiously as possible. Encouraged him to call any time with questions or concerning symptoms.    Vipin Duron MD  Pulmonary and Critical Care Medicine  [facility]  Cell [phone]  Office [phone]  Pager [phone]

## 2021-05-31 NOTE — TELEPHONE ENCOUNTER
Refill Given    Refill given per Policy, patient informed they are overdue for Labs and Physical     Louise Ziegler, Care [facility] Triage/Med Refill 8/21/2019    Requested Prescriptions   Pending Prescriptions Disp Refills     amLODIPine (NORVASC) 10 MG tablet [Pharmacy Med Name: AMLODIPINE BESYLATE 10 MG TAB] 90 tablet 3     Sig: TAKE 1 TABLET BY MOUTH EVERY DAY       Calcium-Channel Blockers Protocol Passed - 8/20/2019  1:07 AM        Passed - PCP or prescribing provider visit in past 12 months or next 3 months     Last office visit with prescriber/PCP: 6/12/2019 Beka Ashley MD OR same dept: 6/12/2019 Beka Ashley MD OR same specialty: 6/12/2019 Beka Ashley MD  Last physical: Visit date not found Last MTM visit: Visit date not found   Next visit within 3 mo: Visit date not found  Next physical within 3 mo: Visit date not found  Prescriber OR PCP: Beka Ashley MD  Last diagnosis associated with med order: 1. Hypertension  - amLODIPine (NORVASC) 10 MG tablet [Pharmacy Med Name: AMLODIPINE BESYLATE 10 MG TAB]; TAKE 1 TABLET BY MOUTH EVERY DAY  Dispense: 90 tablet; Refill: 3    If protocol passes may refill for 12 months if within 3 months of last provider visit (or a total of 15 months).             Passed - Blood pressure filed in past 12 months     BP Readings from Last 1 Encounters:   06/12/19 140/70

## 2021-05-31 NOTE — TELEPHONE ENCOUNTER
Controlled Substance Refill Request  Medication Name:   Requested Prescriptions     Pending Prescriptions Disp Refills     ALPRAZolam (XANAX) 1 MG tablet 60 tablet 0     Date Last Fill: 7/10/19  Pharmacy: [id number]  Submit electronically to pharmacy  Controlled Substance Agreement Date Scanned:   Encounter-Level CSA Scan Date:    There are no encounter-level csa scan date.       Last office visit with prescriber/PCP: 6/12/2019 Beka Ashley MD OR same dept: 6/12/2019 Beka Ashley MD OR same specialty: 6/12/2019 Beka Ashley MD  Last physical: Visit date not found Last MTM visit: Visit date not found

## 2021-06-01 VITALS — WEIGHT: 152 LBS | HEIGHT: 64 IN | BODY MASS INDEX: 25.95 KG/M2

## 2021-06-01 NOTE — TELEPHONE ENCOUNTER
Refill Approved    Rx renewed per Medication Renewal Policy. Medication was last renewed on 11/10/18.    Marilyn Thompson, Care Connection Triage/Med Refill 10/2/2019     Requested Prescriptions   Pending Prescriptions Disp Refills     propranolol (INDERAL) 20 MG tablet [Pharmacy Med Name: PROPRANOLOL 20 MG TABLET] 180 tablet 3     Sig: TAKE 1 TABLET (20 MG TOTAL) BY MOUTH 2 (TWO) TIMES A DAY.       Beta-Blockers Refill Protocol Passed - 10/2/2019  1:21 AM        Passed - PCP or prescribing provider visit in past 12 months or next 3 months     Last office visit with prescriber/PCP: 9/30/2019 Beka Ashley MD OR same dept: 9/30/2019 Beka Ashley MD OR same specialty: 9/30/2019 Beka Ashley MD  Last physical: Visit date not found Last MTM visit: Visit date not found   Next visit within 3 mo: Visit date not found  Next physical within 3 mo: Visit date not found  Prescriber OR PCP: Beka Ashley MD  Last diagnosis associated with med order: 1. Benign Essential Hypertension  - propranolol (INDERAL) 20 MG tablet [Pharmacy Med Name: PROPRANOLOL 20 MG TABLET]; Take 1 tablet (20 mg total) by mouth 2 (two) times a day.  Dispense: 180 tablet; Refill: 3    If protocol passes may refill for 12 months if within 3 months of last provider visit (or a total of 15 months).             Passed - Blood pressure filed in past 12 months     BP Readings from Last 1 Encounters:   09/30/19 108/78

## 2021-06-01 NOTE — TELEPHONE ENCOUNTER
Unable to leave message for the patient relaying message below from Dr. Ashley.  Please relay message below if the patient calls back.    Prescription has been set up in a separate encounter for Dr. Ashley to review.  Roxana MENDIETA, COCO/CMT....................2:34 PM

## 2021-06-01 NOTE — TELEPHONE ENCOUNTER
Attempted to reach patient and leave a voicemail, stating providers message below and voicemail is full.     Leonor Moreno LPN

## 2021-06-01 NOTE — TELEPHONE ENCOUNTER
Triage note:    69 year old male, with sarcoidosis, called with a question about a medication for his cold symptoms.     He saw PCP yesterday and they discussed his cold symptoms (stuffy/runny nose and occasional cough) and he thought provider indicated he would send something to the pharmacy specifically to help fight the cold symptoms.  He did receive the Tessalon Perles but thought there would be something else, but he was unsure what that would be.  Patient states he may have misunderstood and he is open to whatever PCP suggests.  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      Please advise!  Was there something else patient should have on hand for his cold symptoms?.     Pharmacy;   [facility]      *Ok to leave a detailed message upon call back      Reason for Disposition    Nursing judgment    Protocols used: NO PROTOCOL AVAILABLE - SICK ADULT-A-[place]

## 2021-06-01 NOTE — TELEPHONE ENCOUNTER
Controlled Substance Refill Request  Medication Name:   Requested Prescriptions     Pending Prescriptions Disp Refills     ALPRAZolam (XANAX) 1 MG tablet 60 tablet 0     Sig: TAKE 1 TABLET BY MOUTH 3 (THREE) TIMES A DAY AS NEEDED FOR SLEEP.     Date Last Fill: 8/7/19  Pharmacy: [id number] - [place], [place] - [address] AT [facility]      Submit electronically to pharmacy  Controlled Substance Agreement Date Scanned:   Encounter-Level CSA Scan Date:    There are no encounter-level csa scan date.       Last office visit with prescriber/PCP: 6/12/2019 Beka Ashley MD OR same dept: 6/12/2019 Beka Ashley MD OR same specialty: 6/12/2019 Beka Ashley MD  Last physical: Visit date not found Last MTM visit: Visit date not found

## 2021-06-01 NOTE — TELEPHONE ENCOUNTER
Triage call:     Patient is calling with about one week of Sinus infection symptoms.     Symptoms- headache and light sensitivity, eye pain. Nasal drainage- not producing a lot of mucous- clear mucous.  Patient reports that he initially thought it was a migraine. Pain rating 6- 7/10 , no fever. Sore throat intermittently but not currently. Denies additional symptoms.      Patient is requesting a zpack to be sent to his pharmacy- PCP please advise.     *Ok to leave a detailed message upon call back    Pharmacy and allergies verified.     Lacy Velazquez RN [facility] Care Connection Triage/Med Refill 9/18/2019 12:50 PM    Reason for Disposition    Sinus congestion as part of a cold, present < 10 days    Protocols used: SINUS PAIN AND CONGESTION-A-[place]

## 2021-06-01 NOTE — TELEPHONE ENCOUNTER
Who is calling:  Patient   Reason for Call: Insurance company requires a letter of necessity for a 2nd pair of eye glasses, patient needs new glasses ( he has trouble seeing at night), they will pay for glasses when they receive letter.  Please mail to patien home an advise when complete.   Date of last appointment with primary care:  6/12/19   Has the patient been recently seen:  Yes  Okay to leave a detailed message: Yes

## 2021-06-01 NOTE — TELEPHONE ENCOUNTER
Left detailed message for the patient relaying message below from Dr. Ashley.  Asked the patient to call back with any further questions.    Prescription has been set up for Dr. Ashley to review per message below.  Roxana MENDIETA CMA/CMT....................4:14 PM

## 2021-06-01 NOTE — TELEPHONE ENCOUNTER
Left detailed message on voicemail, stating providers message below and voicemail is full.   Anuja Law CSS     No

## 2021-06-01 NOTE — TELEPHONE ENCOUNTER
Dr. Ashley,   Spoke with the patient and relayed the message below.  Patient states that he just started a new job and has to be to work today at 3 pm and is unable to come to clinic today.  He states that he might be able to come and see another provider tomorrow if he is able to get the day off tomorrow, but he is unsure.    Please advise.  Thank you.  Roxana MENDIETA, COCO/CMT....................1:45 PM

## 2021-06-01 NOTE — PROGRESS NOTES
Office Visit - Follow Up   Denis Moreno   69 y.o. male    Date of Visit: 9/30/2019    Chief Complaint   Patient presents with     Referral     insurance requires referral for eye md, and dentist -tooth fell out      Flu Vaccine        Assessment and Plan   1. Mixed hyperlipidemia  Controlled without need for medication last lipids were good.  Due for fasting lipids.  Will do it next visit since not fasting today.    2. Benign Essential Hypertension  Controlled.  Continue amlodipine, lisinopril and propranolol.  Propranolol also helps control his recurring headaches.  Does not experience headaches anymore since starting propranolol.    3. 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  Due for recheck of his uveitis.  Will refer to ophthalmology.  His lung sarcoidosis is stable.  Followed by pulmonary. Does not need treatment like oral prednisone.    - Ambulatory referral to Ophthalmology    4. Mild Recurrent Major Depression  In remission.  Continue citalopram.  PHQ 9 score is only 2.    5. Anxiety  Controlled.  Continue gabapentin and hydroxyzine.  CINDI 7 score is only 3.    6. Cough  Has intermittent cough.  Response to benzonatate.  Wants to get a standby prescription for this.  - benzonatate (TESSALON) 100 MG capsule; TAKE ONE CAPSULE BY MOUTH EVERY 6 HOURS AS NEEDED FOR COUGH  Dispense: 20 capsule; Refill: 0    7. Adjustment insomnia  Now  responding well with trazodone at bedtime.    8. Need for immunization against influenza  Flu shot was given.  - Influenza High Dose, Seasonal 65+ yrs      Follow up in 6 months.     History of Present Illness   This 69 y.o. old male is here for follow-up.  Has hypertension controlled by combination of amlodipine, lisinopril and propranolol. Propranolol also helps control his headaches. Does not have recurring headaches anymore.  Has lung sarcoidosis.  Followed by pulmonary.  Does not need oral steroid for this.  Has uveitis from his sarcoidosis.  Uses steroid  eyedrops.  Followed by ophthalmology.  Due for eye exam.  Has major depression and anxiety now controlled by his medication.  Has hyperlipidemia controlled by diet alone.  Last lipids were good.  Does not need statin.  Has intermittent cough.  Wants to get benzonatate to control his cough.  Overall, doing and feeling well.    Review of Systems   A 12 point comprehensive review of systems was negative except as noted..     Medications, Allergies and Problem List   Reviewed and updated             Chief Complaint   Referral (insurance requires referral for eye md, and dentist -tooth fell out ) and Flu Vaccine       Patient Profile   Social History     Patient does not qualify to have social determinant information on file (likely too young).   Social History Narrative     Not on file        Past Medical History   Patient Active Problem List   Diagnosis     Mixed hyperlipidemia     Mild Recurrent Major Depression     Male Erectile Disorder     Benign Essential Hypertension     Insomnia     Anxiety     Deviated nasal septum     Hilar adenopathy     Uveitis     Sarcoidosis of lung (H)       Past Surgical History  He has a past surgical history that includes Bronchoscopy (12/20/2016).       Medications and Allergies   Current Outpatient Medications   Medication Sig     ALPRAZolam (XANAX) 1 MG tablet TAKE 1 TABLET BY MOUTH 3 (THREE) TIMES A DAY AS NEEDED FOR SLEEP.     amLODIPine (NORVASC) 10 MG tablet TAKE 1 TABLET BY MOUTH EVERY DAY     benzonatate (TESSALON) 100 MG capsule TAKE ONE CAPSULE BY MOUTH EVERY 6 HOURS AS NEEDED FOR COUGH     cholecalciferol, vitamin D3, 5,000 unit capsule Take 1 capsule (5,000 Units total) by mouth daily.     citalopram (CELEXA) 40 MG tablet TAKE 1 TABLET DAILY.     gabapentin (NEURONTIN) 300 MG capsule TAKE 2 CAPSULES BY MOUTH 3 TIMES A DAY INCREASE AS INSTRUCTED     hydrOXYzine pamoate (VISTARIL) 25 MG capsule TAKE 1 CAPSULE BY MOUTH 3 TIMES A DAY AS NEEDED FOR ITCHING     lisinopril  "(PRINIVIL,ZESTRIL) 20 MG tablet TAKE 1 TABLET (20 MG TOTAL) BY MOUTH DAILY.     omeprazole (PRILOSEC) 20 MG capsule Take 1 capsule (20 mg total) by mouth daily.     prednisoLONE acetate (PRED-FORTE) 1 % ophthalmic suspension Administer 1 drop to both eyes 2 (two) times a day.     predniSONE (DELTASONE) 5 MG tablet Take 7.5 mg (1.5 tablets) daily for 14 days, then take 5 mg (1 tablet) daily.     propranolol (INDERAL) 20 MG tablet Take 1 tablet (20 mg total) by mouth 2 (two) times a day.     SUMAtriptan (IMITREX) 50 MG tablet TAKE 1 TABLET (50 MG TOTAL) BY MOUTH ONCE AS NEEDED FOR MIGRAINE.     traZODone (DESYREL) 50 MG tablet TAKE 1 TABLET BY MOUTH EVERYDAY AT BEDTIME     viscous lidocaine HC (LIDOCAINE) 2 % Soln viscous solution Take 5 mL by mouth 3 (three) times a day. Swish and spit 5 mL three to four times daily as needed     No Known Allergies     Family and Social History   No family history on file.     Social History     Tobacco Use     Smoking status: Never Smoker     Smokeless tobacco: Never Used   Substance Use Topics     Alcohol use: No     Drug use: No        Physical Exam       Physical Exam  /78   Pulse 78   Ht 5' 4\" (1.626 m)   Wt 159 lb (72.1 kg)   SpO2 94%   BMI 27.29 kg/m    General appearance: alert, appears stated age, cooperative and no distress  Head: Normocephalic, without obvious abnormality, atraumatic  Throat: lips, mucosa, and tongue normal; teeth and gums normal  Neck: no adenopathy, no carotid bruit, no JVD, supple, symmetrical, trachea midline and thyroid not enlarged, symmetric, no tenderness/mass/nodules  Lungs: clear to auscultation bilaterally  Heart: regular rate and rhythm, S1, S2 normal, no murmur, click, rub or gallop  Abdomen: soft, non-tender; bowel sounds normal; no masses,  no organomegaly  Extremities: extremities normal, atraumatic, no cyanosis or edema  Skin: Skin color, texture, turgor normal. No rashes or lesions     Additional Information        Beka RAM" MD [facility]  Contact me at [phone]     Additional Information   Current Outpatient Medications   Medication Sig     ALPRAZolam (XANAX) 1 MG tablet TAKE 1 TABLET BY MOUTH 3 (THREE) TIMES A DAY AS NEEDED FOR SLEEP.     amLODIPine (NORVASC) 10 MG tablet TAKE 1 TABLET BY MOUTH EVERY DAY     benzonatate (TESSALON) 100 MG capsule TAKE ONE CAPSULE BY MOUTH EVERY 6 HOURS AS NEEDED FOR COUGH     cholecalciferol, vitamin D3, 5,000 unit capsule Take 1 capsule (5,000 Units total) by mouth daily.     citalopram (CELEXA) 40 MG tablet TAKE 1 TABLET DAILY.     gabapentin (NEURONTIN) 300 MG capsule TAKE 2 CAPSULES BY MOUTH 3 TIMES A DAY INCREASE AS INSTRUCTED     hydrOXYzine pamoate (VISTARIL) 25 MG capsule TAKE 1 CAPSULE BY MOUTH 3 TIMES A DAY AS NEEDED FOR ITCHING     lisinopril (PRINIVIL,ZESTRIL) 20 MG tablet TAKE 1 TABLET (20 MG TOTAL) BY MOUTH DAILY.     omeprazole (PRILOSEC) 20 MG capsule Take 1 capsule (20 mg total) by mouth daily.     prednisoLONE acetate (PRED-FORTE) 1 % ophthalmic suspension Administer 1 drop to both eyes 2 (two) times a day.     predniSONE (DELTASONE) 5 MG tablet Take 7.5 mg (1.5 tablets) daily for 14 days, then take 5 mg (1 tablet) daily.     propranolol (INDERAL) 20 MG tablet Take 1 tablet (20 mg total) by mouth 2 (two) times a day.     SUMAtriptan (IMITREX) 50 MG tablet TAKE 1 TABLET (50 MG TOTAL) BY MOUTH ONCE AS NEEDED FOR MIGRAINE.     traZODone (DESYREL) 50 MG tablet TAKE 1 TABLET BY MOUTH EVERYDAY AT BEDTIME     viscous lidocaine HC (LIDOCAINE) 2 % Soln viscous solution Take 5 mL by mouth 3 (three) times a day. Swish and spit 5 mL three to four times daily as needed     No Known Allergies  Social History     Tobacco Use     Smoking status: Never Smoker     Smokeless tobacco: Never Used   Substance Use Topics     Alcohol use: No     Drug use: No         Time: total time spent with the patient was 25 minutes of which >50% was spent in counseling and coordination of care

## 2021-06-02 VITALS — WEIGHT: 148.1 LBS | BODY MASS INDEX: 25.42 KG/M2

## 2021-06-02 VITALS — HEIGHT: 64 IN | WEIGHT: 149 LBS | BODY MASS INDEX: 25.44 KG/M2

## 2021-06-02 VITALS — BODY MASS INDEX: 25.27 KG/M2 | WEIGHT: 148 LBS | HEIGHT: 64 IN

## 2021-06-02 VITALS — HEIGHT: 64 IN | WEIGHT: 153 LBS | BODY MASS INDEX: 26.12 KG/M2

## 2021-06-02 VITALS — HEIGHT: 64 IN | WEIGHT: 154 LBS | BODY MASS INDEX: 26.29 KG/M2

## 2021-06-02 VITALS — HEIGHT: 64 IN | BODY MASS INDEX: 25.95 KG/M2 | WEIGHT: 152 LBS

## 2021-06-02 VITALS — WEIGHT: 150 LBS | HEIGHT: 64 IN | BODY MASS INDEX: 25.61 KG/M2

## 2021-06-02 VITALS — BODY MASS INDEX: 26.8 KG/M2 | HEIGHT: 64 IN | WEIGHT: 157 LBS

## 2021-06-02 VITALS — BODY MASS INDEX: 25.99 KG/M2 | WEIGHT: 151.4 LBS

## 2021-06-02 NOTE — TELEPHONE ENCOUNTER
Prescription Monitoring Program activity reviewed with no discrepancies noted.      Last fill per : 9/9/2019  Quantity/days supply: 60 tablets for 20 days    Controlled Substance Agreement on file: No  Date: NA    Last office visit with provider:  9/30/2019 Beka Ashley MD    Please advise.

## 2021-06-02 NOTE — TELEPHONE ENCOUNTER
Patient is out of medication, please expedite    Controlled Substance Refill Request  Medication Name:   Requested Prescriptions     Pending Prescriptions Disp Refills     ALPRAZolam (XANAX) 1 MG tablet 60 tablet 0     Sig: TAKE 1 TABLET BY MOUTH 3 (THREE) TIMES A DAY AS NEEDED FOR SLEEP.     Date Last Fill: 9.9.2019  Pharmacy: [id number]      Submit electronically to pharmacy  Controlled Substance Agreement Date Scanned:   Encounter-Level CSA Scan Date:    There are no encounter-level csa scan date.       Last office visit with prescriber/PCP: 9/30/2019 Beka Ashley MD OR same dept: 9/30/2019 Beka Ashley MD OR same specialty: 9/30/2019 Beka Ashley MD  Last physical: Visit date not found Last MTM visit: Visit date not found

## 2021-06-03 ENCOUNTER — OFFICE VISIT - HEALTHEAST (OUTPATIENT)
Dept: INTERNAL MEDICINE | Facility: CLINIC | Age: 71
End: 2021-06-03

## 2021-06-03 VITALS — BODY MASS INDEX: 27.31 KG/M2 | WEIGHT: 160 LBS | HEIGHT: 64 IN

## 2021-06-03 VITALS
HEART RATE: 78 BPM | SYSTOLIC BLOOD PRESSURE: 108 MMHG | BODY MASS INDEX: 27.14 KG/M2 | WEIGHT: 159 LBS | DIASTOLIC BLOOD PRESSURE: 78 MMHG | OXYGEN SATURATION: 94 % | HEIGHT: 64 IN

## 2021-06-03 VITALS — HEIGHT: 64 IN | WEIGHT: 161 LBS | BODY MASS INDEX: 27.49 KG/M2

## 2021-06-03 DIAGNOSIS — J01.90 ACUTE SINUSITIS WITH SYMPTOMS > 10 DAYS: ICD-10-CM

## 2021-06-03 NOTE — TELEPHONE ENCOUNTER
Controlled Substance Refill Request  Medication Name:   Requested Prescriptions     Pending Prescriptions Disp Refills     ALPRAZolam (XANAX) 1 MG tablet 60 tablet 0     Sig: TAKE 1 TABLET BY MOUTH 3 (THREE) TIMES A DAY AS NEEDED FOR SLEEP.     Date Last Fill: 10/17/2019  Pharmacy: [facility] [address]      Submit electronically to pharmacy  Controlled Substance Agreement Date Scanned:   Encounter-Level CSA Scan Date:    There are no encounter-level csa scan date.       Last office visit with prescriber/PCP: 9/30/2019 Beka Ashley MD OR same dept: 9/30/2019 Beka Ashley MD OR same specialty: 9/30/2019 Beka Ashley MD  Last physical: Visit date not found Last MTM visit: Visit date not found

## 2021-06-03 NOTE — TELEPHONE ENCOUNTER
Prescription Monitoring Program activity reviewed with no discrepancies noted.  Last fill per : 10/17/19    Controlled Substance Agreement on file: No  Date: NA    Last office visit with provider:  9/30/2019 Beka Ashley MD    Please advise.

## 2021-06-04 VITALS
WEIGHT: 157.12 LBS | OXYGEN SATURATION: 97 % | BODY MASS INDEX: 26.97 KG/M2 | SYSTOLIC BLOOD PRESSURE: 122 MMHG | HEART RATE: 89 BPM | DIASTOLIC BLOOD PRESSURE: 66 MMHG

## 2021-06-04 NOTE — PROGRESS NOTES
[facility] Clinic Note  Denis Moreno   69 y.o. male    Date of Visit: 12/11/2019  Chief Complaint   Patient presents with     Cough     X3 days - productive - no SOB, no chest pain       Assessment/Plan  1. Head cold  Likely viral in nature.  Has received influenza vaccine for the season.  No signs or symptoms suggestive of pneumonia or flare of sarcoidosis.  Provided counseling regarding supportive management, including mucolytic Mucinex 600 mg twice daily or 3 times daily for 3 to 5 days.  We will also consider trial of hot liquids and maintain good hydration. Will let us know if symptoms worsen or if he develops any f/s/c.    Much or all of the text in this note was generated through the use of Dragon Dictate voice-to-text software. Errors in spelling or words which seem out of context are unintentional. Sound alike errors, in particular, may have escaped editing    Brennen Sanabria MD    Return if symptoms worsen or fail to improve.    Subjective  This 69 y.o. old male here with c/o wheezing and cpughing for the last 2 days. States he has exposed himself to the cold over the last couple of days. Niece and nephew lives with him, but they are not sick.  He did travel to [place] roughly a month ago.  Received a flu shot this season. No f/s, but does have chills.  Thinks his symptoms are getting worse. Sometimes dry and sometimes with yellow sputum. Can feel wheezing. Is on prednisone 5 mg for sarcoidosis. Little sore throat.  No chest pain, SOB.  Has not tried anything for his symptoms.  Reports one episode of loose stools this morning.    ROS A comprehensive review of systems was performed and was otherwise negative    Medications, allergies, and problem list were reviewed and updated    Exam  General appearance: Pleasant, nontoxic-appearing, no acute distress, alert and oriented x4  Vitals:    12/11/19 1550   BP: 122/66   Pulse: 89   SpO2: 97%   EYES: Eyelids, conjunctiva, and sclera  were normal.  HEAD, EARS, NOSE, MOUTH, AND THROAT: Head and face were normal. Hearing was normal to voice.  No frontal or maxillary sinus discomfort with percussion.  Nose appearance was normal and there was no discharge. Oropharynx was normal.  NECK: Neck appearance was normal. There were no neck masses   RESPIRATORY: Bilaterally with no crackles, wheezing or rhonchi  CARDIOVASCULAR: Regular S1 and S2.  Radial pulses intact.  No edema.  SKIN: No erythema nodosum in the lower extremities.    Additional Information   Current Outpatient Medications   Medication Sig Dispense Refill     ALPRAZolam (XANAX) 1 MG tablet TAKE 1 TABLET BY MOUTH 3 (THREE) TIMES A DAY AS NEEDED FOR SLEEP. 60 tablet 0     amLODIPine (NORVASC) 10 MG tablet TAKE 1 TABLET BY MOUTH EVERY DAY 90 tablet 1     benzonatate (TESSALON) 100 MG capsule TAKE ONE CAPSULE BY MOUTH EVERY 6 HOURS AS NEEDED FOR COUGH 20 capsule 0     cholecalciferol, vitamin D3, 5,000 unit capsule Take 1 capsule (5,000 Units total) by mouth daily. 90 capsule 3     citalopram (CELEXA) 40 MG tablet TAKE 1 TABLET DAILY. 90 tablet 1     gabapentin (NEURONTIN) 300 MG capsule TAKE 2 CAPSULES BY MOUTH 3 TIMES A DAY INCREASE AS INSTRUCTED  5     hydrOXYzine pamoate (VISTARIL) 25 MG capsule TAKE 1 CAPSULE BY MOUTH 3 TIMES A DAY AS NEEDED FOR ITCHING 270 capsule 1     lisinopril (PRINIVIL,ZESTRIL) 20 MG tablet TAKE 1 TABLET (20 MG TOTAL) BY MOUTH DAILY. 90 tablet 3     omeprazole (PRILOSEC) 20 MG capsule Take 1 capsule (20 mg total) by mouth daily. 90 capsule 3     prednisoLONE acetate (PRED-FORTE) 1 % ophthalmic suspension Administer 1 drop to both eyes 2 (two) times a day. 5 mL 0     predniSONE (DELTASONE) 5 MG tablet Take 7.5 mg (1.5 tablets) daily for 14 days, then take 5 mg (1 tablet) daily. 45 tablet 5     propranolol (INDERAL) 20 MG tablet TAKE 1 TABLET (20 MG TOTAL) BY MOUTH 2 (TWO) TIMES A DAY. 180 tablet 3     SUMAtriptan (IMITREX) 50 MG tablet TAKE 1 TABLET (50 MG TOTAL) BY  MOUTH ONCE AS NEEDED FOR MIGRAINE. 30 tablet 5     traZODone (DESYREL) 50 MG tablet TAKE 1 TABLET BY MOUTH EVERYDAY AT BEDTIME 30 tablet 11     viscous lidocaine HC (LIDOCAINE) 2 % Soln viscous solution Take 5 mL by mouth 3 (three) times a day. Swish and spit 5 mL three to four times daily as needed 120 mL 0     No current facility-administered medications for this visit.      No Known Allergies  Social History     Social History Narrative     Not on file     Social History     Tobacco Use     Smoking status: Never Smoker     Smokeless tobacco: Never Used   Substance Use Topics     Alcohol use: No     Drug use: No     No family history on file.

## 2021-06-04 NOTE — PATIENT INSTRUCTIONS - HE
You do not have a pneumonia  This is most likely a viral head cold and will resolve with time  Mucinex AKA guaifenesin, you can take 600 mg two to three times a day for 3 to 5 days. It is over the counter medication  You can drink tea with honey, hot cider/chocolate, and consider trying lozenges

## 2021-06-04 NOTE — TELEPHONE ENCOUNTER
Refill Approved    Rx renewed per Medication Renewal Policy. Medication was last renewed on 12/6/18.    Marilyn Thompson, [facility] Connection Triage/Med Refill 12/16/2019     Requested Prescriptions   Pending Prescriptions Disp Refills     traZODone (DESYREL) 50 MG tablet [Pharmacy Med Name: TRAZODONE 50 MG TABLET] 30 tablet 11     Sig: TAKE 1 TABLET BY MOUTH EVERYDAY AT BEDTIME       Tricyclics/Misc Antidepressant/Antianxiety Meds Refill Protocol Passed - 12/15/2019  9:46 AM        Passed - PCP or prescribing provider visit in last year     Last office visit with prescriber/PCP: 9/30/2019 Beka Ashley MD OR same dept: 12/11/2019 Brennen Sanabria MD OR same specialty: 12/11/2019 Brennen Sanabria MD  Last physical: Visit date not found Last MTM visit: Visit date not found   Next visit within 3 mo: Visit date not found  Next physical within 3 mo: Visit date not found  Prescriber OR PCP: Beka Ashley MD  Last diagnosis associated with med order: 1. Insomnia  - traZODone (DESYREL) 50 MG tablet [Pharmacy Med Name: TRAZODONE 50 MG TABLET]; TAKE 1 TABLET BY MOUTH EVERYDAY AT BEDTIME  Dispense: 30 tablet; Refill: 11    If protocol passes may refill for 12 months if within 3 months of last provider visit (or a total of 15 months).

## 2021-06-04 NOTE — TELEPHONE ENCOUNTER
Called pt.  He actually doesn't need a refill on the Trazodone but does need one on his Alprazolam.  Will enter and send to Dr Vivas to authorize.  Paty Kapadia CMA

## 2021-06-04 NOTE — TELEPHONE ENCOUNTER
Medication Question or Clarification    Who is calling: Patient     What medication are you calling about? (include dose and sig)   traZODone (DESYREL) 50 MG tablet 90 tablet 3 12/16/2019     Sig: TAKE 1 TABLET BY MOUTH EVERYDAY AT BEDTIME        Who prescribed the medication?: PCP    What is your question/concern?: Requesting refill.  Informed patient Rx was just filled on 12/16/19 #90, he states PCP knows he is taking more then once daily and is requesting Rx change to current dosing of 2 daily.     Pharmacy: [id number] - [place] - [address] AT [facility] to leave a detailed message?: Yes  Site CMT - Please call the pharmacy to obtain any additional needed information.    Please advise patient of dosing instructions and notify pharmacy of direction change.

## 2021-06-04 NOTE — TELEPHONE ENCOUNTER
RN Assessment/Reason for Call:   Okay to leave Detailed Message  Denis calling in, left eye pain; given z pack.  No headache  RN Action/Disposition:  Protocol recommends see Dr in 4 hrs.  Offered WIC.  Call back if worse symptoms  Discussed home care measures.  Agrees to plan.     Meaghan Daniel, RN    Care Connection Triage/med refill  1/4/2020  4:24 PM        Reason for Disposition    [1] Redness or swelling on the cheek, forehead or around the eye AND [2] no fever    Protocols used: SINUS PAIN OR CONGESTION-A-AH      
Spouse

## 2021-06-04 NOTE — TELEPHONE ENCOUNTER
Refill Approved    Rx renewed per Medication Renewal Policy. Medication was last renewed on 11/29*/18.    Marilyn Thompson, Care Connection Triage/Med Refill 12/17/2019     Requested Prescriptions   Pending Prescriptions Disp Refills     SUMAtriptan (IMITREX) 50 MG tablet [Pharmacy Med Name: SUMATRIPTAN SUCC 50 MG TABLET] 12 tablet 14     Sig: TAKE 1 TABLET (50 MG TOTAL) BY MOUTH ONCE AS NEEDED FOR MIGRAINE.       Triptans Refill Protocol Passed - 12/16/2019  1:40 AM        Passed - PCP or prescribing provider visit in past 12 months       Last office visit with prescriber/PCP: 9/30/2019 Beka Ashley MD OR same dept: 12/11/2019 Brennen Sanabria MD OR same specialty: 12/11/2019 Brennen Sanabria MD  Last physical: Visit date not found Last MTM visit: Visit date not found   Next visit within 3 mo: Visit date not found  Next physical within 3 mo: Visit date not found  Prescriber OR PCP: Beka Ashley MD  Last diagnosis associated with med order: 1. Migraine headache  - SUMAtriptan (IMITREX) 50 MG tablet [Pharmacy Med Name: SUMATRIPTAN SUCC 50 MG TABLET]; TAKE 1 TABLET (50 MG TOTAL) BY MOUTH ONCE AS NEEDED FOR MIGRAINE.  Dispense: 12 tablet; Refill: 14    If protocol passes may refill for 12 months if within 3 months of last provider visit (or a total of 15 months).

## 2021-06-04 NOTE — TELEPHONE ENCOUNTER
Refill Approved    Rx renewed per Medication Renewal Policy. Medication was last renewed on 1/9/19.    Marilyn Thompson, [facility] Connection Triage/Med Refill 12/28/2019     Requested Prescriptions   Pending Prescriptions Disp Refills     lisinopril (PRINIVIL,ZESTRIL) 20 MG tablet [Pharmacy Med Name: LISINOPRIL 20 MG TABLET] 90 tablet 3     Sig: TAKE 1 TABLET (20 MG TOTAL) BY MOUTH DAILY.       Ace Inhibitors Refill Protocol Passed - 12/27/2019  1:08 AM        Passed - PCP or prescribing provider visit in past 12 months       Last office visit with prescriber/PCP: 9/30/2019 Beka Ashley MD OR same dept: 12/11/2019 Brennen Sanabria MD OR same specialty: 12/11/2019 Brennen Sanabria MD  Last physical: Visit date not found Last MTM visit: Visit date not found   Next visit within 3 mo: Visit date not found  Next physical within 3 mo: Visit date not found  Prescriber OR PCP: Beka Ashley MD  Last diagnosis associated with med order: 1. Benign Essential Hypertension  - lisinopril (PRINIVIL,ZESTRIL) 20 MG tablet [Pharmacy Med Name: LISINOPRIL 20 MG TABLET]; TAKE 1 TABLET (20 MG TOTAL) BY MOUTH DAILY.  Dispense: 90 tablet; Refill: 3    If protocol passes may refill for 12 months if within 3 months of last provider visit (or a total of 15 months).             Passed - Serum Potassium in past 12 months     Lab Results   Component Value Date    Potassium 4.0 07/26/2019             Passed - Blood pressure filed in past 12 months     BP Readings from Last 1 Encounters:   12/11/19 122/66             Passed - Serum Creatinine in past 12 months     Creatinine   Date Value Ref Range Status   07/26/2019 1.35 (H) 0.70 - 1.30 mg/dL Final

## 2021-06-04 NOTE — TELEPHONE ENCOUNTER
Controlled Substance Refill Request  Medication Name:   Requested Prescriptions     Pending Prescriptions Disp Refills     ALPRAZolam (XANAX) 1 MG tablet 60 tablet 0     Sig: TAKE 1 TABLET BY MOUTH 3 (THREE) TIMES A DAY AS NEEDED FOR SLEEP.     Date Last Fill: 11/22/19  Pharmacy: [id number]      Submit electronically to pharmacy  Controlled Substance Agreement Date Scanned:   Encounter-Level CSA Scan Date:    There are no encounter-level csa scan date.       Last office visit with prescriber/PCP: 9/30/2019 Beka Ashley MD OR same dept: 12/11/2019 Brennen Sanabria MD OR same specialty: 12/11/2019 Brennen Sanabria MD  Last physical: Visit date not found Last MTM visit: Visit date not found      FYI:  Patient stated that he has 3 days worth of medication.

## 2021-06-05 NOTE — TELEPHONE ENCOUNTER
Called and spoke with patient. Advised to repeat UA/UC after completion of antibiotic treatment and see urology. Patient verbalized understanding and agrees with plan.    Leonor Moreno LPN

## 2021-06-05 NOTE — TELEPHONE ENCOUNTER
Who is calling:  Patient  Reason for Call:  Patient called to reschedule their appt with Dr. Ashley. He wanted to let Dr. Ashley know that he won't be able to make it to todays appt because he locked his keys inside of his car. He stated that his appt today was really important and he's wondering what Dr. Ashley wants him to do until he sees him again. Dr. Ashley's next available appt is on 02/11 which is when we schedule the pt.   Date of last appointment with primary care: 01/29  Okay to leave a detailed message: Yes

## 2021-06-05 NOTE — TELEPHONE ENCOUNTER
Ask him to repeat his urinalysis after he finishes his antibiotic because I want to make sure his urinary tract infection is resolved.  I like him to see urology also while waiting to see me in February.  Please refer to urology for UTI in a male.

## 2021-06-05 NOTE — TELEPHONE ENCOUNTER
Controlled Substance Refill Request  Medication Name:   Requested Prescriptions     Pending Prescriptions Disp Refills     ALPRAZolam (XANAX) 1 MG tablet 60 tablet 0     Sig: TAKE 1 TABLET BY MOUTH 3 (THREE) TIMES A DAY AS NEEDED FOR ANXIETY     Date Last Fill: 12/26/2019  Requested Pharmacy: [facility]  Submit electronically to pharmacy  Controlled Substance Agreement on file:   Encounter-Level CSA Scan Date:    There are no encounter-level csa scan date.        Last office visit:  12/11/2019

## 2021-06-05 NOTE — PROGRESS NOTES
Office Visit - Follow Up   Denis Moreno   69 y.o. male    Date of Visit: 1/29/2020    Chief Complaint   Patient presents with     odor in urine     odor in urine, denies other symptoms x2 months      Fall     broke top L tooth, and bruised tailbone         Assessment and Plan   1. Bad odor of urine  Complains of smelly urine for the last 2 weeks or so.  Denies frequency, urgency and dysuria.  Did his urinalysis and showed trace blood positive nitrite small leukocytes and 25-50 white blood cell.  Hence he has urinary tract infection.  - Urinalysis-UC if Indicated  - Culture, Urine    2. Urinary tract infection with hematuria, site unspecified  Advised patient he has urinary infection and will treat this with ciprofloxacin.  Will send urine for culture.  Advised to see me in 1 week for follow-up.  - ciprofloxacin HCl (CIPRO) 500 MG tablet; Take 1 tablet (500 mg total) by mouth 2 (two) times a day for 7 days.  Dispense: 14 tablet; Refill: 0    3. Benign Essential Hypertension  Controlled.  Continue amlodipine, lisinopril and propranolol.  Propanolol also helps  control his benign tremor.    4. Fall, initial encounter  Had a fall 2 weeks ago and hit his front teeth on the surface and broke his 2 teeth.  Also had some tailbone pains but these are now resolve.  He just wants this reported for me to know.    5. 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  Followed by ophthalmology.  Uses steroid eyedrops.  Advised to see his eye doctor since he ran out of of his eyedrops.    Follow up in 1 week.     History of Present Illness   This 69 y.o. old male is here  primarily to complain of bad odor of his urine.  Has been going for the last 2 weeks.  Denies dysuria, frequency and urgency.  Wants me to evaluate his smelly urine.  Reports he fell 2 weeks ago and broke his  2 teeth when  he hit his face on the surface.  Also had some lower back pain on the tailbone but this is now resolved.  Has hypertension controlled by  lisinopril, amlodipine and propranolol.  Propanolol also controls his benign tremors. Has  uveitis due to his sarcoidosis.  Uses steroid eyedrops and followed by ophthalmology.  Overall, feels well.    Review of Systems   A 12 point comprehensive review of systems was negative except as noted..     Medications, Allergies and Problem List   Reviewed and updated             Chief Complaint   odor in urine (odor in urine, denies other symptoms x2 months ) and Fall (broke top L tooth, and bruised tailbone )       Patient Profile   Social History     Social History Narrative     Not on file        Past Medical History   Patient Active Problem List   Diagnosis     Mixed hyperlipidemia     Mild Recurrent Major Depression     Male Erectile Disorder     Benign Essential Hypertension     Insomnia     Anxiety     Deviated nasal septum     Hilar adenopathy     Uveitis     Sarcoidosis of lung (H)       Past Surgical History  He has a past surgical history that includes Bronchoscopy (12/20/2016).       Medications and Allergies   Current Outpatient Medications   Medication Sig     ALPRAZolam (XANAX) 1 MG tablet TAKE 1 TABLET BY MOUTH 3 (THREE) TIMES A DAY AS NEEDED FOR ANXIETY     amLODIPine (NORVASC) 10 MG tablet TAKE 1 TABLET BY MOUTH EVERY DAY     benzonatate (TESSALON) 100 MG capsule TAKE ONE CAPSULE BY MOUTH EVERY 6 HOURS AS NEEDED FOR COUGH     cholecalciferol, vitamin D3, 5,000 unit capsule Take 1 capsule (5,000 Units total) by mouth daily.     citalopram (CELEXA) 40 MG tablet TAKE 1 TABLET DAILY.     gabapentin (NEURONTIN) 300 MG capsule TAKE 2 CAPSULES BY MOUTH 3 TIMES A DAY INCREASE AS INSTRUCTED     hydrOXYzine pamoate (VISTARIL) 25 MG capsule TAKE 1 CAPSULE BY MOUTH 3 TIMES A DAY AS NEEDED FOR ITCHING     lisinopril (PRINIVIL,ZESTRIL) 20 MG tablet TAKE 1 TABLET (20 MG TOTAL) BY MOUTH DAILY.     omeprazole (PRILOSEC) 20 MG capsule Take 1 capsule (20 mg total) by mouth daily.     prednisoLONE acetate (PRED-FORTE) 1 %  "ophthalmic suspension Administer 1 drop to both eyes 2 (two) times a day.     predniSONE (DELTASONE) 5 MG tablet Take 7.5 mg (1.5 tablets) daily for 14 days, then take 5 mg (1 tablet) daily.     propranolol (INDERAL) 20 MG tablet TAKE 1 TABLET (20 MG TOTAL) BY MOUTH 2 (TWO) TIMES A DAY.     SUMAtriptan (IMITREX) 50 MG tablet TAKE 1 TABLET (50 MG TOTAL) BY MOUTH ONCE AS NEEDED FOR MIGRAINE.     traZODone (DESYREL) 50 MG tablet TAKE 1 TABLET BY MOUTH EVERYDAY AT BEDTIME     viscous lidocaine HC (LIDOCAINE) 2 % Soln viscous solution Take 5 mL by mouth 3 (three) times a day. Swish and spit 5 mL three to four times daily as needed     ciprofloxacin HCl (CIPRO) 500 MG tablet Take 1 tablet (500 mg total) by mouth 2 (two) times a day for 7 days.     No Known Allergies     Family and Social History   No family history on file.     Social History     Tobacco Use     Smoking status: Never Smoker     Smokeless tobacco: Never Used   Substance Use Topics     Alcohol use: No     Drug use: No        Physical Exam       Physical Exam  /60   Pulse 68   Temp 97.8  F (36.6  C) (Oral)   Ht 5' 4\" (1.626 m)   Wt 154 lb (69.9 kg)   SpO2 97%   BMI 26.43 kg/m    General appearance: alert, appears stated age, cooperative and no distress  Head: Normocephalic, without obvious abnormality, atraumatic  Throat: lips, mucosa, and tongue normal; teeth and gums normal  Neck: no adenopathy, no carotid bruit, no JVD, supple, symmetrical, trachea midline and thyroid not enlarged, symmetric, no tenderness/mass/nodules  Lungs: clear to auscultation bilaterally  Heart: regular rate and rhythm, S1, S2 normal, no murmur, click, rub or gallop  Abdomen: soft, non-tender; bowel sounds normal; no masses,  no organomegaly  Extremities: extremities normal, atraumatic, no cyanosis or edema  Skin: Skin color, texture, turgor normal. No rashes or lesions     Additional Information   Post Discharge Medication Reconciliation Status: discharge medications " reconciled and changed, per note/orders (see AVS)      Beka Ashley MD  Internal Medicine  Contact me at [phone]     Additional Information   Current Outpatient Medications   Medication Sig     ALPRAZolam (XANAX) 1 MG tablet TAKE 1 TABLET BY MOUTH 3 (THREE) TIMES A DAY AS NEEDED FOR ANXIETY     amLODIPine (NORVASC) 10 MG tablet TAKE 1 TABLET BY MOUTH EVERY DAY     benzonatate (TESSALON) 100 MG capsule TAKE ONE CAPSULE BY MOUTH EVERY 6 HOURS AS NEEDED FOR COUGH     cholecalciferol, vitamin D3, 5,000 unit capsule Take 1 capsule (5,000 Units total) by mouth daily.     citalopram (CELEXA) 40 MG tablet TAKE 1 TABLET DAILY.     gabapentin (NEURONTIN) 300 MG capsule TAKE 2 CAPSULES BY MOUTH 3 TIMES A DAY INCREASE AS INSTRUCTED     hydrOXYzine pamoate (VISTARIL) 25 MG capsule TAKE 1 CAPSULE BY MOUTH 3 TIMES A DAY AS NEEDED FOR ITCHING     lisinopril (PRINIVIL,ZESTRIL) 20 MG tablet TAKE 1 TABLET (20 MG TOTAL) BY MOUTH DAILY.     omeprazole (PRILOSEC) 20 MG capsule Take 1 capsule (20 mg total) by mouth daily.     prednisoLONE acetate (PRED-FORTE) 1 % ophthalmic suspension Administer 1 drop to both eyes 2 (two) times a day.     predniSONE (DELTASONE) 5 MG tablet Take 7.5 mg (1.5 tablets) daily for 14 days, then take 5 mg (1 tablet) daily.     propranolol (INDERAL) 20 MG tablet TAKE 1 TABLET (20 MG TOTAL) BY MOUTH 2 (TWO) TIMES A DAY.     SUMAtriptan (IMITREX) 50 MG tablet TAKE 1 TABLET (50 MG TOTAL) BY MOUTH ONCE AS NEEDED FOR MIGRAINE.     traZODone (DESYREL) 50 MG tablet TAKE 1 TABLET BY MOUTH EVERYDAY AT BEDTIME     viscous lidocaine HC (LIDOCAINE) 2 % Soln viscous solution Take 5 mL by mouth 3 (three) times a day. Swish and spit 5 mL three to four times daily as needed     ciprofloxacin HCl (CIPRO) 500 MG tablet Take 1 tablet (500 mg total) by mouth 2 (two) times a day for 7 days.     No Known Allergies  Social History     Tobacco Use     Smoking status: Never Smoker     Smokeless tobacco: Never Used   Substance  Use Topics     Alcohol use: No     Drug use: No         Time: total time spent with the patient was 25 minutes of which >50% was spent in counseling and coordination of care

## 2021-06-06 NOTE — TELEPHONE ENCOUNTER
Called patient to inform him of information below per covering MD. Patient reports that he still does using the alprazolam as this helps him with his anxiety and sleep. Patient reports that he would rather wean or go off the gabapentin. Patient has his AWV scheduled for 3/30/30 with PCP and will discuss further at that visit on med management.     Patient is running low on supply on alprazolam and is wondering if covering MD will send a Rx until at least when Dr. Ashley returns (3/4/2020)  in clinic.

## 2021-06-06 NOTE — TELEPHONE ENCOUNTER
Controlled Substance Refill Request  Medication Name:   Requested Prescriptions     Pending Prescriptions Disp Refills     ALPRAZolam (XANAX) 1 MG tablet 60 tablet 0     Sig: TAKE 1 TABLET BY MOUTH 3 (THREE) TIMES A DAY AS NEEDED FOR ANXIETY     Date Last Fill: 1/27/20  Is patient out of medication?: No, 3 days left  Patient notified refills processed within 3 business days:  Yes  [facility]ed [facility]: [facility]  Submit electronically to pharmacy  Controlled Substance Agreement on file:   Encounter-Level CSA Scan Date:    There are no encounter-level csa scan date.        Last office visit:  1/29/20

## 2021-06-06 NOTE — TELEPHONE ENCOUNTER
Prescription Monitoring Program activity reviewed with no discrepancies noted.      Last fill per : 1/27  Quantity/days supply: 60/20    Controlled Substance Agreement on file: No  Date: none on file    Last office visit with provider:  2/11/2020 Beka Ashley MD    Please advise.  Madelyn Francis CMA ([facility])

## 2021-06-06 NOTE — TELEPHONE ENCOUNTER
RN cannot approve Refill Request    RN can NOT refill this medication med is not covered by policy/route to provider.       Marilyn Thompson, [facility] Triage/Med Refill 2/19/2020    Requested Prescriptions   Pending Prescriptions Disp Refills     cholecalciferol, vitamin D3, 125 mcg (5,000 unit) capsule 90 capsule 3     Sig: Take 1 capsule (5,000 Units total) by mouth daily.       There is no refill protocol information for this order      Signed Prescriptions Disp Refills    hydrOXYzine pamoate (VISTARIL) 25 MG capsule 270 capsule 3     Sig: TAKE 1 CAPSULE BY MOUTH 3 TIMES A DAY AS NEEDED FOR ITCHING       Antihistamine Refill Protocol Passed - 2/19/2020 10:17 AM        Passed - Patient has had office visit/physical in last year     Last office visit with prescriber/PCP: 2/11/2020 Beka Ashley MD OR same dept: 2/11/2020 Beka Ashley MD OR same specialty: 2/11/2020 Beka Ashley MD  Last physical: Visit date not found Last MTM visit: Visit date not found   Next visit within 3 mo: Visit date not found  Next physical within 3 mo: Visit date not found  Prescriber OR PCP: Beka Ashley MD  Last diagnosis associated with med order: 1. Anxiety  - hydrOXYzine pamoate (VISTARIL) 25 MG capsule; TAKE 1 CAPSULE BY MOUTH 3 TIMES A DAY AS NEEDED FOR ITCHING  Dispense: 270 capsule; Refill: 3    2. Hypertension  - amLODIPine (NORVASC) 10 MG tablet; TAKE 1 TABLET BY MOUTH EVERY DAY  Dispense: 90 tablet; Refill: 3    3. Vitamin D deficiency  - cholecalciferol, vitamin D3, 125 mcg (5,000 unit) capsule; Take 1 capsule (5,000 Units total) by mouth daily.  Dispense: 90 capsule; Refill: 3    If protocol passes may refill for 12 months if within 3 months of last provider visit (or a total of 15 months).            amLODIPine (NORVASC) 10 MG tablet 90 tablet 3     Sig: TAKE 1 TABLET BY MOUTH EVERY DAY       Calcium-Channel Blockers Protocol Passed - 2/19/2020 10:17 AM        Passed - PCP or prescribing provider  visit in past 12 months or next 3 months     Last office visit with prescriber/PCP: 2/11/2020 Beka Ashley MD OR same dept: 2/11/2020 Beka Ashley MD OR same specialty: 2/11/2020 Beka Ashley MD  Last physical: Visit date not found Last MTM visit: Visit date not found   Next visit within 3 mo: Visit date not found  Next physical within 3 mo: Visit date not found  Prescriber OR PCP: Beka Ashley MD  Last diagnosis associated with med order: 1. Anxiety  - hydrOXYzine pamoate (VISTARIL) 25 MG capsule; TAKE 1 CAPSULE BY MOUTH 3 TIMES A DAY AS NEEDED FOR ITCHING  Dispense: 270 capsule; Refill: 3    2. Hypertension  - amLODIPine (NORVASC) 10 MG tablet; TAKE 1 TABLET BY MOUTH EVERY DAY  Dispense: 90 tablet; Refill: 3    3. Vitamin D deficiency  - cholecalciferol, vitamin D3, 125 mcg (5,000 unit) capsule; Take 1 capsule (5,000 Units total) by mouth daily.  Dispense: 90 capsule; Refill: 3    If protocol passes may refill for 12 months if within 3 months of last provider visit (or a total of 15 months).             Passed - Blood pressure filed in past 12 months     BP Readings from Last 1 Encounters:   02/11/20 138/60

## 2021-06-06 NOTE — TELEPHONE ENCOUNTER
According to notes, patient is no longer taking this medication.  He is using citalopram, hydroxyzine, and gabapentin.  If he is still taking this medication then he needs to come in and discuss further with Dr. Ashley and complete a controlled substance agreement.

## 2021-06-06 NOTE — PROGRESS NOTES
Office Visit - Follow Up   Denis Moreno   69 y.o. male    Date of Visit: 2/11/2020    Chief Complaint   Patient presents with     Follow-up     re did urine on 2/6        Assessment and Plan   1. Urinary tract infection without hematuria, site unspecified  Was seen a week ago for UTI.  Urinalysis  shows changes consistent with UTI and urine culture grew Citrobacter sensitive to ciprofloxacin.  Treated with ciprofloxacin for 1 week.  UTI symptoms resolved.  Repeat urinalysis and urine culture are now negative.    2. Sarcoidosis of lung (H)  Has lung sarcoidosis which I think makes him immunocompromised a bit.  But does not have decompensation from this.     3. Benign Essential Hypertension  Controlled.  Continue amlodipine, lisinopril and propranolol.      Follow up in in March for his annual wellness.  Will fast next visit.     History of Present Illness   This 69 y.o. old male is here for follow-up of his UTI.  Was seen a week ago for UTI symptoms.  Urinalysis showed changes consistent with UTI and urine culture grew Citrobacter sensitive to ciprofloxacin.  Was treated with ciprofloxacin and his UTI symptoms are now resolved.  Repeat urinalysis and urine culture are negative.  Has lung sarcoidosis but he is compensated.  Has hypertension controlled by his medications.  He is now doing and feeling well.  No complaints.    Review of Systems   A 12 point comprehensive review of systems was negative except as noted..     Medications, Allergies and Problem List   Reviewed and updated             Chief Complaint   Follow-up (re did urine on 2/6)       Patient Profile   Social History     Social History Narrative     Not on file        Past Medical History   Patient Active Problem List   Diagnosis     Mixed hyperlipidemia     Mild Recurrent Major Depression     Male Erectile Disorder     Benign Essential Hypertension     Insomnia     Anxiety     Deviated nasal septum     Hilar adenopathy     Uveitis     Sarcoidosis of  lung (H)       Past Surgical History  He has a past surgical history that includes Bronchoscopy (12/20/2016).       Medications and Allergies   Current Outpatient Medications   Medication Sig     ALPRAZolam (XANAX) 1 MG tablet TAKE 1 TABLET BY MOUTH 3 (THREE) TIMES A DAY AS NEEDED FOR ANXIETY     amLODIPine (NORVASC) 10 MG tablet TAKE 1 TABLET BY MOUTH EVERY DAY     benzonatate (TESSALON) 100 MG capsule TAKE ONE CAPSULE BY MOUTH EVERY 6 HOURS AS NEEDED FOR COUGH     cholecalciferol, vitamin D3, 5,000 unit capsule Take 1 capsule (5,000 Units total) by mouth daily.     citalopram (CELEXA) 40 MG tablet TAKE 1 TABLET DAILY.     gabapentin (NEURONTIN) 300 MG capsule TAKE 2 CAPSULES BY MOUTH 3 TIMES A DAY INCREASE AS INSTRUCTED     hydrOXYzine pamoate (VISTARIL) 25 MG capsule TAKE 1 CAPSULE BY MOUTH 3 TIMES A DAY AS NEEDED FOR ITCHING     lisinopril (PRINIVIL,ZESTRIL) 20 MG tablet TAKE 1 TABLET (20 MG TOTAL) BY MOUTH DAILY.     omeprazole (PRILOSEC) 20 MG capsule Take 1 capsule (20 mg total) by mouth daily.     prednisoLONE acetate (PRED-FORTE) 1 % ophthalmic suspension Administer 1 drop to both eyes 2 (two) times a day.     predniSONE (DELTASONE) 5 MG tablet Take 7.5 mg (1.5 tablets) daily for 14 days, then take 5 mg (1 tablet) daily.     propranolol (INDERAL) 20 MG tablet TAKE 1 TABLET (20 MG TOTAL) BY MOUTH 2 (TWO) TIMES A DAY.     SUMAtriptan (IMITREX) 50 MG tablet TAKE 1 TABLET (50 MG TOTAL) BY MOUTH ONCE AS NEEDED FOR MIGRAINE.     traZODone (DESYREL) 50 MG tablet TAKE 1 TABLET BY MOUTH EVERYDAY AT BEDTIME     viscous lidocaine HC (LIDOCAINE) 2 % Soln viscous solution Take 5 mL by mouth 3 (three) times a day. Swish and spit 5 mL three to four times daily as needed     No Known Allergies     Family and Social History   No family history on file.     Social History     Tobacco Use     Smoking status: Never Smoker     Smokeless tobacco: Never Used   Substance Use Topics     Alcohol use: No     Drug use: No     "    Physical Exam       Physical Exam  /60 (Patient Site: Left Arm, Patient Position: Sitting, Cuff Size: Adult Large)   Pulse 89   Ht 5' 4\" (1.626 m)   Wt 154 lb (69.9 kg)   SpO2 92%   BMI 26.43 kg/m    General appearance: alert, appears stated age, cooperative and no distress  Head: Normocephalic, without obvious abnormality, atraumatic  Throat: lips, mucosa, and tongue normal; teeth and gums normal  Neck: no adenopathy, no carotid bruit, no JVD, supple, symmetrical, trachea midline and thyroid not enlarged, symmetric, no tenderness/mass/nodules  Lungs: clear to auscultation bilaterally  Heart: regular rate and rhythm, S1, S2 normal, no murmur, click, rub or gallop  Abdomen: soft, non-tender; bowel sounds normal; no masses,  no organomegaly  Extremities: extremities normal, atraumatic, no cyanosis or edema  Skin: Skin color, texture, turgor normal. No rashes or lesions     Additional Information   Post Discharge Medication Reconciliation Status: discharge medications reconciled, continue medications without change      Beka Ashley MD  Internal Medicine  Contact me at [phone]     Additional Information   Current Outpatient Medications   Medication Sig     ALPRAZolam (XANAX) 1 MG tablet TAKE 1 TABLET BY MOUTH 3 (THREE) TIMES A DAY AS NEEDED FOR ANXIETY     amLODIPine (NORVASC) 10 MG tablet TAKE 1 TABLET BY MOUTH EVERY DAY     benzonatate (TESSALON) 100 MG capsule TAKE ONE CAPSULE BY MOUTH EVERY 6 HOURS AS NEEDED FOR COUGH     cholecalciferol, vitamin D3, 5,000 unit capsule Take 1 capsule (5,000 Units total) by mouth daily.     citalopram (CELEXA) 40 MG tablet TAKE 1 TABLET DAILY.     gabapentin (NEURONTIN) 300 MG capsule TAKE 2 CAPSULES BY MOUTH 3 TIMES A DAY INCREASE AS INSTRUCTED     hydrOXYzine pamoate (VISTARIL) 25 MG capsule TAKE 1 CAPSULE BY MOUTH 3 TIMES A DAY AS NEEDED FOR ITCHING     lisinopril (PRINIVIL,ZESTRIL) 20 MG tablet TAKE 1 TABLET (20 MG TOTAL) BY MOUTH DAILY.     omeprazole " (PRILOSEC) 20 MG capsule Take 1 capsule (20 mg total) by mouth daily.     prednisoLONE acetate (PRED-FORTE) 1 % ophthalmic suspension Administer 1 drop to both eyes 2 (two) times a day.     predniSONE (DELTASONE) 5 MG tablet Take 7.5 mg (1.5 tablets) daily for 14 days, then take 5 mg (1 tablet) daily.     propranolol (INDERAL) 20 MG tablet TAKE 1 TABLET (20 MG TOTAL) BY MOUTH 2 (TWO) TIMES A DAY.     SUMAtriptan (IMITREX) 50 MG tablet TAKE 1 TABLET (50 MG TOTAL) BY MOUTH ONCE AS NEEDED FOR MIGRAINE.     traZODone (DESYREL) 50 MG tablet TAKE 1 TABLET BY MOUTH EVERYDAY AT BEDTIME     viscous lidocaine HC (LIDOCAINE) 2 % Soln viscous solution Take 5 mL by mouth 3 (three) times a day. Swish and spit 5 mL three to four times daily as needed     No Known Allergies  Social History     Tobacco Use     Smoking status: Never Smoker     Smokeless tobacco: Never Used   Substance Use Topics     Alcohol use: No     Drug use: No         Time: total time spent with the patient was 25 minutes of which >50% was spent in counseling and coordination of care

## 2021-06-06 NOTE — TELEPHONE ENCOUNTER
Controlled Substance Refill Request  Medication Name:   Requested Prescriptions     Pending Prescriptions Disp Refills     ALPRAZolam (XANAX) 1 MG tablet 60 tablet 0     Sig: TAKE 1 TABLET BY MOUTH TWO TIMES A DAY AS NEEDED FOR ANXIETY     Date Last Fill: 03/04/20  [facility]  Submit electronically to pharmacy  Controlled Substance Agreement on file:   Encounter-Level CSA Scan Date:    There are no encounter-level csa scan date.        Last office visit:

## 2021-06-06 NOTE — TELEPHONE ENCOUNTER
Prescription Monitoring Program activity reviewed with no discrepancies noted.      Last fill per : 3/4  Quantity/days supply: 60/30    Controlled Substance Agreement on file: No  Date:     Last office visit with provider:  2/11/2020 Beka Ashley MD    Please advise.    This is 2 weeks early    Madelyn Francis CMA ([facility])

## 2021-06-06 NOTE — TELEPHONE ENCOUNTER
Refill Approved    Rx renewed per Medication Renewal Policy. Medication was last renewed on 8/21/19.    Marilyn Thompson, [facility] Connection Triage/Med Refill 2/19/2020     Requested Prescriptions   Pending Prescriptions Disp Refills     hydrOXYzine pamoate (VISTARIL) 25 MG capsule [Pharmacy Med Name: HYDROXYZINE BRENNEN 25 MG CAP] 270 capsule 1     Sig: TAKE 1 CAPSULE BY MOUTH 3 TIMES A DAY AS NEEDED FOR ITCHING       Antihistamine Refill Protocol Passed - 2/19/2020 10:17 AM        Passed - Patient has had office visit/physical in last year     Last office visit with prescriber/PCP: 2/11/2020 Beka Ashley MD OR same dept: 2/11/2020 Beka Ashley MD OR same specialty: 2/11/2020 Beka Ashley MD  Last physical: Visit date not found Last MTM visit: Visit date not found   Next visit within 3 mo: Visit date not found  Next physical within 3 mo: Visit date not found  Prescriber OR PCP: Beka Ashley MD  Last diagnosis associated with med order: 1. Anxiety  - hydrOXYzine pamoate (VISTARIL) 25 MG capsule [Pharmacy Med Name: HYDROXYZINE BRENNEN 25 MG CAP]; TAKE 1 CAPSULE BY MOUTH 3 TIMES A DAY AS NEEDED FOR ITCHING  Dispense: 270 capsule; Refill: 1    2. Hypertension  - amLODIPine (NORVASC) 10 MG tablet [Pharmacy Med Name: AMLODIPINE BESYLATE 10 MG TAB]; TAKE 1 TABLET BY MOUTH EVERY DAY  Dispense: 90 tablet; Refill: 1    3. Vitamin D deficiency  - cholecalciferol, vitamin D3, 125 mcg (5,000 unit) capsule; Take 1 capsule (5,000 Units total) by mouth daily.  Dispense: 90 capsule; Refill: 3    If protocol passes may refill for 12 months if within 3 months of last provider visit (or a total of 15 months).             amLODIPine (NORVASC) 10 MG tablet [Pharmacy Med Name: AMLODIPINE BESYLATE 10 MG TAB] 90 tablet 1     Sig: TAKE 1 TABLET BY MOUTH EVERY DAY       Calcium-Channel Blockers Protocol Passed - 2/19/2020 10:17 AM        Passed - PCP or prescribing provider visit in past 12 months or next 3 months     Last  office visit with prescriber/PCP: 2/11/2020 Beka Ashley MD OR same dept: 2/11/2020 Beka Ashley MD OR same specialty: 2/11/2020 Beka Ashley MD  Last physical: Visit date not found Last MTM visit: Visit date not found   Next visit within 3 mo: Visit date not found  Next physical within 3 mo: Visit date not found  Prescriber OR PCP: Beka Ashley MD  Last diagnosis associated with med order: 1. Anxiety  - hydrOXYzine pamoate (VISTARIL) 25 MG capsule [Pharmacy Med Name: HYDROXYZINE BRENNEN 25 MG CAP]; TAKE 1 CAPSULE BY MOUTH 3 TIMES A DAY AS NEEDED FOR ITCHING  Dispense: 270 capsule; Refill: 1    2. Hypertension  - amLODIPine (NORVASC) 10 MG tablet [Pharmacy Med Name: AMLODIPINE BESYLATE 10 MG TAB]; TAKE 1 TABLET BY MOUTH EVERY DAY  Dispense: 90 tablet; Refill: 1    3. Vitamin D deficiency  - cholecalciferol, vitamin D3, 125 mcg (5,000 unit) capsule; Take 1 capsule (5,000 Units total) by mouth daily.  Dispense: 90 capsule; Refill: 3    If protocol passes may refill for 12 months if within 3 months of last provider visit (or a total of 15 months).             Passed - Blood pressure filed in past 12 months     BP Readings from Last 1 Encounters:   02/11/20 138/60             cholecalciferol, vitamin D3, 125 mcg (5,000 unit) capsule 90 capsule 3     Sig: Take 1 capsule (5,000 Units total) by mouth daily.       There is no refill protocol information for this order

## 2021-06-07 NOTE — TELEPHONE ENCOUNTER
Z-pack for sinus infection - Started 4/20/2020    Normally resolution seen within 24 hours of starting Z-pack    Pain Lt Eye, nose    I also have migraines - have been taking migraine medication at this time.     It is possible that it is his migraine     Care advice as noted.     Lissette Barahona RN    Reason for Disposition    [1] Taking antibiotic < 72 hours (3 days) AND [2] sinus pain not improved    Protocols used: SINUS INFECTION ON ANTIBIOTIC FOLLOW-UP CALL-A-

## 2021-06-07 NOTE — PROGRESS NOTES
"Denis Moreno is a 70 y.o. male who is being evaluated via a billable telephone visit.      The patient has been notified of following:     \"This telephone visit will be conducted via a call between you and your physician/provider. We have found that certain health care needs can be provided without the need for a physical exam.  This service lets us provide the care you need with a short phone conversation.  If a prescription is necessary we can send it directly to your pharmacy.  If lab work is needed we can place an order for that and you can then stop by our lab to have the test done at a later time.    Telephone visits are billed at different rates depending on your insurance coverage. During this emergency period, for some insurers they may be billed the same as an in-person visit.  Please reach out to your insurance provider with any questions.    If during the course of the call the physician/provider feels a telephone visit is not appropriate, you will not be charged for this service.\"    Patient has given verbal consent to a Telephone visit? Yes    Additional provider notes: Phone visit with Denis.  Complains of recurrence of his sinusitis.  Has headaches, frontal sinus pain and pressure.  In nasal drainage for almost a week now.  Denies fever.  Does have cough and shortness of breath.  Had same complaints in the past which responded well to Z-Slick.  Has hypertension controlled by amlodipine, lisinopril and propranolol.  Overall, stable.    Assessment/Plan:  1. Acute non-recurrent frontal sinusitis  Had the same complaint in the past which responded well to Z-Slick and wants to get same medication.  Will prescribe Z-Slick.  - azithromycin (ZITHROMAX) 250 MG tablet; Take 500mg (2 tablets) day one, and then 250mg (1 tablet) days 2-5  Dispense: 6 tablet; Refill: 0    2. Benign Essential Hypertension  Controlled.  Continue amlodipine, lisinopril and propranolol.        Phone call duration:  11 minutes    Leonor MOBLEY" ARNAV Moreno

## 2021-06-07 NOTE — TELEPHONE ENCOUNTER
PEGGY Lynch    Patient is scheduled for a phone visit with you this afternoon at 2:30 pm.    Roxana MENDIETA CMA/JUDSON....................9:40 AM

## 2021-06-07 NOTE — TELEPHONE ENCOUNTER
Refill Approved    Rx renewed per Medication Renewal Policy. Medication was last renewed on 4/10/19.    Marilyn Thompson, [facility] Connection Triage/Med Refill 3/20/2020     Requested Prescriptions   Pending Prescriptions Disp Refills     omeprazole (PRILOSEC) 20 MG capsule [Pharmacy Med Name: OMEPRAZOLE DR 20 MG CAPSULE] 90 capsule 3     Sig: TAKE 1 CAPSULE BY MOUTH EVERY DAY       GI Medications Refill Protocol Passed - 3/20/2020 11:35 AM        Passed - PCP or prescribing provider visit in last 12 or next 3 months.     Last office visit with prescriber/PCP: 2/11/2020 Beka Ashley MD OR same dept: 2/11/2020 Beka Ashley MD OR same specialty: 2/11/2020 Beka Ashley MD  Last physical: Visit date not found Last MTM visit: Visit date not found   Next visit within 3 mo: Visit date not found  Next physical within 3 mo: Visit date not found  Prescriber OR PCP: Beka Ashley MD  Last diagnosis associated with med order: 1. Gastroesophageal reflux disease without esophagitis  - omeprazole (PRILOSEC) 20 MG capsule [Pharmacy Med Name: OMEPRAZOLE DR 20 MG CAPSULE]; TAKE 1 CAPSULE BY MOUTH EVERY DAY  Dispense: 90 capsule; Refill: 3    If protocol passes may refill for 12 months if within 3 months of last provider visit (or a total of 15 months).

## 2021-06-07 NOTE — TELEPHONE ENCOUNTER
Fax received from pharmacy stating, Hydroxyzine BRENNEN 25mg Cap no longer covered by insurance.     Cover alternative is Hydroxyzine HCL 10mg tablet.     Ok to change and with what directions?    Leonor Moreno LPN

## 2021-06-07 NOTE — TELEPHONE ENCOUNTER
Controlled Substance Refill Request  Medication Name:   Requested Prescriptions     Pending Prescriptions Disp Refills     ALPRAZolam (XANAX) 1 MG tablet 60 tablet 0     Sig: TAKE 1 TABLET BY MOUTH TWO TIMES A DAY AS NEEDED FOR ANXIETY     Date Last Fill: 3/4/2020  Requested Pharmacy: [id number]  Submit electronically to pharmacy  Controlled Substance Agreement on file:   Encounter-Level CSA Scan Date:    There are no encounter-level csa scan date.        Last office visit:  2/11/2020

## 2021-06-07 NOTE — TELEPHONE ENCOUNTER
Denis feels that he has a sinus infection.  He usually gets three or four a year.  Today Denis has a headache and sinus pressure over left eye.   Denies fever, cough and shortness of breath.  Triaged and disposition is to be seen within 3 days and Denis is requesting a telephone visit.      COVID 19 Nurse Triage Plan/Patient Instructions    Please be aware that novel coronavirus (COVID-19) may be circulating in the community. If you develop symptoms such as fever, cough, or SOB or if you have concerns about the presence of another infection including coronavirus (COVID-19), please contact your health care provider or visit [email or web address].     Disposition/Instructions    Patient to have scheduled Telephone Visit with a provider. Follow System Ambulatory Workflow for COVID 19.     The clinic staff will assist you to schedule an appointment to complete the Telephone Visit with a provider during normal clinic hours.       Call Back If: Your symptoms worsen before you are able to complete your Telephone Visit with a provider.        Thank you for limiting contact with others, wearing a simple mask to cover your cough, practice good hand hygiene habits and accessing our virtual services where possible to limit the spread of this virus.    For more information about COVID19 and options for caring for yourself at home, please visit the CDC website at https://www.cdc.gov/coronavirus/2019-ncov/about/steps-when-sick.html  For more options for care at [facility], please visit our website at [email or web address]    For more information, please use the [facility] ([facility]) COVID-19 Hotlines (Interpreters available):     Health questions: Phone Number: [phone] or [phone] and Hours: 7 a.m. to 7 p.m.    Schools and  questions: Phone Number: [phone] or [phone] and Hours 7 a.m. to 7 p.m.                      Reason for Disposition    [1] Sinus congestion  (pressure, fullness) AND [2] present > 10 days    Protocols used: SINUS PAIN OR CONGESTION-A-AH

## 2021-06-08 NOTE — TELEPHONE ENCOUNTER
Controlled Substance Refill Request  Medication Name:   Requested Prescriptions     Pending Prescriptions Disp Refills     ALPRAZolam (XANAX) 1 MG tablet 60 tablet 0     Sig: TAKE 1 TABLET BY MOUTH TWO TIMES A DAY AS NEEDED FOR ANXIETY     Date Last Fill: 4/7/20  [facility]ed [facility]: [id number]  Submit electronically to pharmacy  Controlled Substance Agreement on file:   Encounter-Level CSA Scan Date:    There are no encounter-level csa scan date.        Last office visit:  4/20/20 virtual visit.

## 2021-06-08 NOTE — TELEPHONE ENCOUNTER
Prior Authorization Request  Who s requesting:  Pharmacy  Pharmacy Name and Location: [id number]  Medication Name: ALPRAZolam (XANAX) 1 MG tablet  Insurance Plan: [employer]  Insurance Member ID Number:  #[id number]  CoverMyMeds Key: [id number]  Informed patient that prior authorizations can take up to 10 business days for response:   NA  Okay to leave a detailed message: Yes

## 2021-06-08 NOTE — TELEPHONE ENCOUNTER
"Controlled Substance Refill Request  Medication Name:   Requested Prescriptions     Pending Prescriptions Disp Refills     ALPRAZolam (XANAX) 1 MG tablet 60 tablet 0     Sig: TAKE 1 TABLET BY MOUTH TWO TIMES A DAY AS NEEDED FOR ANXIETY     Date Last Fill: 5/7/2020  [facility]ed [facility]: [id number]  Submit electronically to pharmacy  Controlled Substance Agreement on file:   Encounter-Level CSA Scan Date:    There are no encounter-level csa scan date.        Last office visit:  4/20/2020    Patient has 2 tablets left of above medication.    Patient says \"Hi and Thank you to Dr Ashley\"         "

## 2021-06-08 NOTE — TELEPHONE ENCOUNTER
Central PA team  [phone]  Pool: HE PA MED ([id number])          PA has been initiated.       PA form completed and faxed insurance via Cover My Meds     Key:  [id number] - PA Case ID: [id number] - Rx #: [id number]     Medication:  ALPRAZolam 1MG tablets    Insurance:  [employer]         Response will be received via fax and may take up to 5-10 business days depending on plan

## 2021-06-08 NOTE — PROGRESS NOTES
Office Visit - Follow Up   Denis Moreno   66 y.o. male    Date of Visit: 1/9/2017    Chief Complaint   Patient presents with     Follow-up     Has had multiple follow ups with specialists and overall feels like he has a good awareness. Does note increased anxiety due to this. Increased aprazolam has not bee helping as he thought        Assessment and Plan   1. Anxiety  Getting more pronounced and happening daily due to his concerns of his medical problems. Newly diagnosed with uveitis and lung sarcoid. Getting steroid eye drops given by his eye doctor. Following with pulmonary for his lung sarcoid and taking tapering dose of prednisone.   Takes alprazolam as needed. Will continue this medication but will give him vistaril 25 mg 3 times a day for his anxiety. Advise to take alprazolam in between vistaril as needed and at night if he cannot sleep.   - hydrOXYzine (VISTARIL) 25 MG capsule; Take 1 capsule (25 mg total) by mouth 3 (three) times a day as needed for itching.  Dispense: 90 capsule; Refill: 3    2. Mild Recurrent Major Depression  Has depression but now controlled by citalopram.     3. Sarcoidosis of lung  Found recently to have lung sarcoid. CT scan confirms his sarcoid but labs were negative. On tapering dose of prednisone and will see pulmonary, Dr. Duron in 2 weeks.     4. Benign Essential Hypertension  Controlled by lisinopril and alprazolam.     Follow up in 1 month.      History of Present Illness   This 66 y.o. old male is here for follow up. Found to have lung sarcoid after he was diagnose with uveitis by ophthalmology. Now getting steroid eye drops and taking tapering dose of prednisone. Does not have shortness of breath and cough. But his anxiety is getting worse. Takes alprazolam as needed. Also has depression but controlled by citalopram. Has hypertension controlled by lisinopril and amlodipine.     Review of Systems   A 12 point comprehensive review of systems was negative except as noted..      Medications, Allergies and Problem List   Reviewed and updated             Chief Complaint   Follow-up (Has had multiple follow ups with specialists and overall feels like he has a good awareness. Does note increased anxiety due to this. Increased aprazolam has not bee helping as he thought)       Patient Profile   Social History     Social History Narrative        Past Medical History   Patient Active Problem List   Diagnosis     Hyperlipidemia     Mild Recurrent Major Depression     Male Erectile Disorder     Benign Essential Hypertension     Insomnia     Anxiety     Deviated nasal septum     Hilar adenopathy     Uveitis     Sarcoidosis of lung       Past Surgical History  He has a past surgical history that includes Bronchoscopy (12/20/2016).       Medications and Allergies   Current Outpatient Prescriptions   Medication Sig     ALPRAZolam (XANAX) 1 MG tablet TAKE 1 TABLET BY MOUTH 3 TIMES DAILY AS NEEDED     amLODIPine (NORVASC) 10 MG tablet TAKE ONE TABLET BY MOUTH EVERY DAY     citalopram (CELEXA) 40 MG tablet TAKE 1 TABLET DAILY.     gabapentin (NEURONTIN) 300 MG capsule Take 2 capsules by mouth 3 (three) times a day.     lisinopril (PRINIVIL,ZESTRIL) 20 MG tablet TAKE 1 TABLET (20 MG TOTAL) BY MOUTH DAILY.     prednisoLONE acetate (PRED-FORTE) 1 % ophthalmic suspension 1 drop 2 (two) times a day.      predniSONE (DELTASONE) 20 MG tablet Take 20 mg by mouth daily.     propranolol (INDERAL) 20 MG tablet TAKE 1 TABLET (20 MG TOTAL) BY MOUTH 2 (TWO) TIMES A DAY.     SUMAtriptan (IMITREX) 50 MG tablet TAKE 1 TABLET (50 MG TOTAL) BY MOUTH ONCE AS NEEDED FOR MIGRAINE.     zolpidem (AMBIEN) 10 mg tablet TAKE 1 TABLET (10 MG TOTAL) BY MOUTH BEDTIME AS NEEDED.     hydrOXYzine (VISTARIL) 25 MG capsule Take 1 capsule (25 mg total) by mouth 3 (three) times a day as needed for itching.     No Known Allergies     Family and Social History   No family history on file.     Social History   Substance Use Topics      "Smoking status: Never Smoker     Smokeless tobacco: Never Used     Alcohol use No        Physical Exam       Physical Exam  Visit Vitals     /60     Pulse 70     Resp 18     Ht 5' 4\" (1.626 m)     Wt 158 lb (71.7 kg)     BMI 27.12 kg/m2     General appearance: alert, appears stated age, cooperative and no distress  Head: Normocephalic, without obvious abnormality, atraumatic  Throat: lips, mucosa, and tongue normal; teeth and gums normal  Neck: no adenopathy, no carotid bruit, no JVD, supple, symmetrical, trachea midline and thyroid not enlarged, symmetric, no tenderness/mass/nodules  Lungs: clear to auscultation bilaterally  Heart: regular rate and rhythm, S1, S2 normal, no murmur, click, rub or gallop  Abdomen: soft, non-tender; bowel sounds normal; no masses,  no organomegaly  Extremities: extremities normal, atraumatic, no cyanosis or edema  Skin: Skin color, texture, turgor normal. No rashes or lesions  Neurologic: Grossly normal  Psychiatric: normal affect and mood     Additional Information        Beka Ashley MD  Internal Medicine  Contact me at [phone]     Additional Information   Current Outpatient Prescriptions   Medication Sig     ALPRAZolam (XANAX) 1 MG tablet TAKE 1 TABLET BY MOUTH 3 TIMES DAILY AS NEEDED     amLODIPine (NORVASC) 10 MG tablet TAKE ONE TABLET BY MOUTH EVERY DAY     citalopram (CELEXA) 40 MG tablet TAKE 1 TABLET DAILY.     gabapentin (NEURONTIN) 300 MG capsule Take 2 capsules by mouth 3 (three) times a day.     lisinopril (PRINIVIL,ZESTRIL) 20 MG tablet TAKE 1 TABLET (20 MG TOTAL) BY MOUTH DAILY.     prednisoLONE acetate (PRED-FORTE) 1 % ophthalmic suspension 1 drop 2 (two) times a day.      predniSONE (DELTASONE) 20 MG tablet Take 20 mg by mouth daily.     propranolol (INDERAL) 20 MG tablet TAKE 1 TABLET (20 MG TOTAL) BY MOUTH 2 (TWO) TIMES A DAY.     SUMAtriptan (IMITREX) 50 MG tablet TAKE 1 TABLET (50 MG TOTAL) BY MOUTH ONCE AS NEEDED FOR MIGRAINE.     zolpidem " (AMBIEN) 10 mg tablet TAKE 1 TABLET (10 MG TOTAL) BY MOUTH BEDTIME AS NEEDED.     hydrOXYzine (VISTARIL) 25 MG capsule Take 1 capsule (25 mg total) by mouth 3 (three) times a day as needed for itching.     No Known Allergies  Social History   Substance Use Topics     Smoking status: Never Smoker     Smokeless tobacco: Never Used     Alcohol use No         Time: total time spent with the patient was 25 minutes of which >50% was spent in counseling and coordination of care

## 2021-06-08 NOTE — TELEPHONE ENCOUNTER
Refill Request  Did you contact pharmacy: No  Medication name:   Requested Prescriptions     Pending Prescriptions Disp Refills     benzonatate (TESSALON) 100 MG capsule 20 capsule 0     Sig: TAKE ONE CAPSULE BY MOUTH EVERY 6 HOURS AS NEEDED FOR COUGH     Who prescribed the medication: Beka Ashley MD   [facility]  Is patient out of medication: No.  1 days left  Patient notified refills processed in 3 business days:  yes  Okay to leave a detailed message: yes

## 2021-06-08 NOTE — PROGRESS NOTES
"  Office Visit - Follow Up   Denis Moreno   66 y.o. male    Date of Visit: 1/25/2017    Chief Complaint   Patient presents with     URI     Cough for the last 5 days or so. States it wasn't a typical cough and actually was able to bring a lot of \"junk\" up from his lungs last night. Is concerned because of his sarcoidosis. Has taken tessalon perles with some relief  but is out of this now        Assessment and Plan   1. Cough  Coughing for a week. Denies fever. But feels fatigue and run down. Does not have shortness of breath. Took his remaining benzonatate and it helps loosen his cough.   - benzonatate (TESSALON PERLES) 100 MG capsule; Take 1 capsule (100 mg total) by mouth every 6 (six) hours as needed for cough.  Dispense: 30 capsule; Refill: 1    2. URI (upper respiratory infection)  Has URI but has underlying lung sarcoidosis. Will treat with levofloxacin for 7 days.   - levoFLOXacin (LEVAQUIN) 500 MG tablet; Take 1 tablet (500 mg total) by mouth daily for 7 days.  Dispense: 7 tablet; Refill: 0    3. Sarcoidosis of lung  Followed by pulmonary. Has hilar and mediastinal lymphadenopathy and pulmonary nodules. But has normal pulmonary function test and normal functional status. Scheduled for follow up in May with Dr. Duron.     4. Anxiety  Comes and goes. Controlled with alprazolam as needed when he gets anxiety attack.     5. Counseled to take it easy, rest and push fluids in the next several days.     Follow up as scheduled. To see me next week if his URI symptoms persist.      History of Present Illness   This 66 y.o. old male complains of cough for one week. Has a 12 year old nephew who he thinks brought the bug in the house. Took his remaining benzonatate and it helps loosen his cough. Denies fever. Does not have sob. Feels rundown and tired. Concerned with his lung sarcoidosis but his lung function and functional status are normal. Has anxiety now controlled by alprazolam.      Review of Systems   A 12 point " "comprehensive review of systems was negative except as noted..     Medications, Allergies and Problem List   Reviewed and updated             Chief Complaint   URI (Cough for the last 5 days or so. States it wasn't a typical cough and actually was able to bring a lot of \"junk\" up from his lungs last night. Is concerned because of his sarcoidosis. Has taken tessalon perles with some relief  but is out of this now)       Patient Profile   Social History     Social History Narrative        Past Medical History   Patient Active Problem List   Diagnosis     Hyperlipidemia     Mild Recurrent Major Depression     Male Erectile Disorder     Benign Essential Hypertension     Insomnia     Anxiety     Deviated nasal septum     Hilar adenopathy     Uveitis     Sarcoidosis of lung       Past Surgical History  He has a past surgical history that includes Bronchoscopy (12/20/2016).       Medications and Allergies   Current Outpatient Prescriptions   Medication Sig     ALPRAZolam (XANAX) 1 MG tablet TAKE 1 TABLET BY MOUTH 3 TIMES DAILY AS NEEDED     amLODIPine (NORVASC) 10 MG tablet TAKE ONE TABLET BY MOUTH EVERY DAY     citalopram (CELEXA) 40 MG tablet TAKE 1 TABLET DAILY.     gabapentin (NEURONTIN) 300 MG capsule Take 2 capsules by mouth 3 (three) times a day.     lisinopril (PRINIVIL,ZESTRIL) 20 MG tablet TAKE 1 TABLET (20 MG TOTAL) BY MOUTH DAILY.     prednisoLONE acetate (PRED-FORTE) 1 % ophthalmic suspension 1 drop 2 (two) times a day.      propranolol (INDERAL) 20 MG tablet TAKE 1 TABLET (20 MG TOTAL) BY MOUTH 2 (TWO) TIMES A DAY.     SUMAtriptan (IMITREX) 50 MG tablet TAKE 1 TABLET (50 MG TOTAL) BY MOUTH ONCE AS NEEDED FOR MIGRAINE.     zolpidem (AMBIEN) 10 mg tablet TAKE 1 TABLET (10 MG TOTAL) BY MOUTH BEDTIME AS NEEDED.     benzonatate (TESSALON PERLES) 100 MG capsule Take 1 capsule (100 mg total) by mouth every 6 (six) hours as needed for cough.     levoFLOXacin (LEVAQUIN) 500 MG tablet Take 1 tablet (500 mg total) by " "mouth daily for 7 days.     No Known Allergies     Family and Social History   No family history on file.     Social History   Substance Use Topics     Smoking status: Never Smoker     Smokeless tobacco: Never Used     Alcohol use No        Physical Exam       Physical Exam  Visit Vitals     /78     Pulse 69     Temp 99  F (37.2  C) (Oral)     Resp 18     Ht 5' 4\" (1.626 m)     Wt 149 lb (67.6 kg)     SpO2 92%     BMI 25.58 kg/m2     General appearance: alert, appears stated age, cooperative and no distress  Head: Normocephalic, without obvious abnormality, atraumatic  Nose: Nares normal. Septum midline. Mucosa normal. No drainage or sinus tenderness.  Throat: lips, mucosa, and tongue normal; teeth and gums normal  Neck: no adenopathy, no carotid bruit, no JVD, supple, symmetrical, trachea midline and thyroid not enlarged, symmetric, no tenderness/mass/nodules  Lungs: clear to auscultation bilaterally  Heart: regular rate and rhythm, S1, S2 normal, no murmur, click, rub or gallop  Abdomen: soft, non-tender; bowel sounds normal; no masses,  no organomegaly  Extremities: extremities normal, atraumatic, no cyanosis or edema  Skin: Skin color, texture, turgor normal. No rashes or lesions     Additional Information        Beka Ashley MD  Internal Medicine  Contact me at [phone]     Additional Information   Current Outpatient Prescriptions   Medication Sig     ALPRAZolam (XANAX) 1 MG tablet TAKE 1 TABLET BY MOUTH 3 TIMES DAILY AS NEEDED     amLODIPine (NORVASC) 10 MG tablet TAKE ONE TABLET BY MOUTH EVERY DAY     citalopram (CELEXA) 40 MG tablet TAKE 1 TABLET DAILY.     gabapentin (NEURONTIN) 300 MG capsule Take 2 capsules by mouth 3 (three) times a day.     lisinopril (PRINIVIL,ZESTRIL) 20 MG tablet TAKE 1 TABLET (20 MG TOTAL) BY MOUTH DAILY.     prednisoLONE acetate (PRED-FORTE) 1 % ophthalmic suspension 1 drop 2 (two) times a day.      propranolol (INDERAL) 20 MG tablet TAKE 1 TABLET (20 MG TOTAL) BY " MOUTH 2 (TWO) TIMES A DAY.     SUMAtriptan (IMITREX) 50 MG tablet TAKE 1 TABLET (50 MG TOTAL) BY MOUTH ONCE AS NEEDED FOR MIGRAINE.     zolpidem (AMBIEN) 10 mg tablet TAKE 1 TABLET (10 MG TOTAL) BY MOUTH BEDTIME AS NEEDED.     benzonatate (TESSALON PERLES) 100 MG capsule Take 1 capsule (100 mg total) by mouth every 6 (six) hours as needed for cough.     levoFLOXacin (LEVAQUIN) 500 MG tablet Take 1 tablet (500 mg total) by mouth daily for 7 days.     No Known Allergies  Social History   Substance Use Topics     Smoking status: Never Smoker     Smokeless tobacco: Never Used     Alcohol use No         Time: total time spent with the patient was 25 minutes of which >50% was spent in counseling and coordination of care

## 2021-06-08 NOTE — PROGRESS NOTES
Office Visit - Follow Up   Denis Moreno   66 y.o. male    Date of Visit: 2/6/2017    Chief Complaint   Patient presents with     Follow-up     Non fasting. Did not tolerate the hydroxizine        Assessment and Plan   1. Cough  Resolved. Treated with zpak and tessalon.     2. Anxiety  Still anxious. Unable to tolerate hydroxyzine for anxiety  because of side effects described as dizziness, dry mouth. Continue alprazolam ad citalopram.   - citalopram (CELEXA) 40 MG tablet; TAKE 1 TABLET DAILY.  Dispense: 90 tablet; Refill: 1    3. Benign Essential Hypertension  Controlled. Continue amlodipine and lisinopril.     4. Sarcoidosis of lung  Stable. Followed by pulmonary. Does not need treatment.     Follow up in 4 months. Return fasting.      History of Present Illness   This 66 y.o. old male is here for his cough. Found to have URI, treated with zpak and tessalon. Responded to these meds. Cough is gone. Has significant anxiety. Tried on vistaril but cannot tolerate. Had dizziness and dry mouth. Takes citalopram daily and as needed alprazolam. Has hypertension controlled by lisinopril and amlodipine. Doing and feeling well.      Review of Systems   A 12 point comprehensive review of systems was negative except as noted..     Medications, Allergies and Problem List   Reviewed and updated             Chief Complaint   Follow-up (Non fasting. Did not tolerate the hydroxizine)       Patient Profile   Social History     Social History Narrative        Past Medical History   Patient Active Problem List   Diagnosis     Hyperlipidemia     Mild Recurrent Major Depression     Male Erectile Disorder     Benign Essential Hypertension     Insomnia     Anxiety     Deviated nasal septum     Hilar adenopathy     Uveitis     Sarcoidosis of lung       Past Surgical History  He has a past surgical history that includes Bronchoscopy (12/20/2016).       Medications and Allergies   Current Outpatient Prescriptions   Medication Sig      "ALPRAZolam (XANAX) 1 MG tablet TAKE 1 TABLET BY MOUTH 3 TIMES DAILY AS NEEDED     amLODIPine (NORVASC) 10 MG tablet TAKE ONE TABLET BY MOUTH EVERY DAY     benzonatate (TESSALON PERLES) 100 MG capsule Take 1 capsule (100 mg total) by mouth every 6 (six) hours as needed for cough.     citalopram (CELEXA) 40 MG tablet TAKE 1 TABLET DAILY.     lisinopril (PRINIVIL,ZESTRIL) 20 MG tablet TAKE 1 TABLET (20 MG TOTAL) BY MOUTH DAILY.     prednisoLONE acetate (PRED-FORTE) 1 % ophthalmic suspension 1 drop 2 (two) times a day.      propranolol (INDERAL) 20 MG tablet TAKE 1 TABLET (20 MG TOTAL) BY MOUTH 2 (TWO) TIMES A DAY.     SUMAtriptan (IMITREX) 50 MG tablet TAKE 1 TABLET (50 MG TOTAL) BY MOUTH ONCE AS NEEDED FOR MIGRAINE.     zolpidem (AMBIEN) 10 mg tablet TAKE 1 TABLET (10 MG TOTAL) BY MOUTH BEDTIME AS NEEDED.     No Known Allergies     Family and Social History   No family history on file.     Social History   Substance Use Topics     Smoking status: Never Smoker     Smokeless tobacco: Never Used     Alcohol use No        Physical Exam       Physical Exam  Visit Vitals     /68     Pulse 70     Resp 18     Ht 5' 4\" (1.626 m)     Wt 151 lb (68.5 kg)     BMI 25.92 kg/m2     General appearance: alert, appears stated age, cooperative and no distress  Head: Normocephalic, without obvious abnormality, atraumatic  Throat: lips, mucosa, and tongue normal; teeth and gums normal  Neck: no adenopathy, no carotid bruit, no JVD, supple, symmetrical, trachea midline and thyroid not enlarged, symmetric, no tenderness/mass/nodules  Lungs: clear to auscultation bilaterally  Heart: regular rate and rhythm, S1, S2 normal, no murmur, click, rub or gallop  Abdomen: soft, non-tender; bowel sounds normal; no masses,  no organomegaly  Extremities: extremities normal, atraumatic, no cyanosis or edema  Skin: Skin color, texture, turgor normal. No rashes or lesions     Additional Information      Beka Ashley MD  Internal " Medicine  Contact me at [phone]     Additional Information   Current Outpatient Prescriptions   Medication Sig     ALPRAZolam (XANAX) 1 MG tablet TAKE 1 TABLET BY MOUTH 3 TIMES DAILY AS NEEDED     amLODIPine (NORVASC) 10 MG tablet TAKE ONE TABLET BY MOUTH EVERY DAY     benzonatate (TESSALON PERLES) 100 MG capsule Take 1 capsule (100 mg total) by mouth every 6 (six) hours as needed for cough.     citalopram (CELEXA) 40 MG tablet TAKE 1 TABLET DAILY.     lisinopril (PRINIVIL,ZESTRIL) 20 MG tablet TAKE 1 TABLET (20 MG TOTAL) BY MOUTH DAILY.     prednisoLONE acetate (PRED-FORTE) 1 % ophthalmic suspension 1 drop 2 (two) times a day.      propranolol (INDERAL) 20 MG tablet TAKE 1 TABLET (20 MG TOTAL) BY MOUTH 2 (TWO) TIMES A DAY.     SUMAtriptan (IMITREX) 50 MG tablet TAKE 1 TABLET (50 MG TOTAL) BY MOUTH ONCE AS NEEDED FOR MIGRAINE.     zolpidem (AMBIEN) 10 mg tablet TAKE 1 TABLET (10 MG TOTAL) BY MOUTH BEDTIME AS NEEDED.     No Known Allergies  Social History   Substance Use Topics     Smoking status: Never Smoker     Smokeless tobacco: Never Used     Alcohol use No         Time: total time spent with the patient was 25 minutes of which >50% was spent in counseling and coordination of care

## 2021-06-08 NOTE — TELEPHONE ENCOUNTER
RN cannot approve Refill Request    RN can NOT refill this medication med is not covered by policy/route to provider       . Last office visit: 2/11/2020 Beka Ashley MD Last Physical: Visit date not found Last MTM visit: Visit date not found Last visit same specialty: 2/11/2020 Beka Ashley MD.  Next visit within 3 mo: Visit date not found  Next physical within 3 mo: Visit date not found      Marilyn Thompson, [facility] Triage/Med Refill 6/12/2020    Requested Prescriptions   Pending Prescriptions Disp Refills     benzonatate (TESSALON) 100 MG capsule 20 capsule 0     Sig: TAKE ONE CAPSULE BY MOUTH EVERY 6 HOURS AS NEEDED FOR COUGH       There is no refill protocol information for this order

## 2021-06-09 NOTE — TELEPHONE ENCOUNTER
Central PA team  [phone]  Pool: HE PA MED ([id number])          PA has been initiated.       PA form completed and faxed insurance via Cover My Meds     Key:  [id number]     Medication:  PREDNISONE    Insurance:  [employer]        Response will be received via fax and may take up to 5-10 business days depending on plan

## 2021-06-09 NOTE — TELEPHONE ENCOUNTER
Controlled Substance Refill Request  Medication Name:   Requested Prescriptions     Pending Prescriptions Disp Refills     ALPRAZolam (XANAX) 1 MG tablet 60 tablet 0     Sig: TAKE 1 TABLET BY MOUTH TWO TIMES A DAY AS NEEDED FOR ANXIETY     Date Last Fill: 06/08/20  Requested Pharmacy: [facility]  Submit electronically to pharmacy  Controlled Substance Agreement on file:   Encounter-Level CSA Scan Date:    There are no encounter-level csa scan date.        Last office visit:

## 2021-06-09 NOTE — PROGRESS NOTES
[facility] Follow Up Note    Denis Moreno   67 y.o. male    Date of Visit: 3/15/2017    Chief Complaint   Patient presents with     Cough     dx with sarcordosis in feb, white phlegm coughing up     Subjective  This is a very pleasant 67-year-old man who is a patient of Dr. Beka Ashley.  He was relatively recently diagnosed with sarcoid.  He has been dealing with recurrent respiratory infections over the past couple of months.  He was treated approximately 1 month ago with an antibiotic by Dr. Ashley and symptoms improved but now they have recurred.  He has a productive cough but no fever or chills.  Increased shortness of breath.  He is concerned about the persistence of the symptoms.  He and his family have been on the Internet reading about sarcoid and this is causing him some concern.  But he is not having any new symptoms relative to the sarcoid.  He is otherwise in his usual state of health.    ROS A comprehensive review of systems was performed and was otherwise negative    Medications, allergies, and problem list were reviewed and updated    Exam  General Appearance:   On examination his blood pressure is 122/62.  Weight is 157 pounds and height is 64 inches.  BMI is 26.95.    Heart is in a sinus rhythm with a rate of 58 and no ectopy.    Lungs are clear.    The patient is alert and oriented ×3.      Assessment/Plan  1. URI (upper respiratory infection)     2. Sarcoidosis of lung       Recurrent respiratory infection which is not unusual for what we have seen this winter.  I did discuss this with him and we also touched briefly on the sarcoid and that he is doing well with this.  Given the diagnosis, however, I think we will get him back on an antibiotic for now and have him follow-up with Dr. Ashley or myself if Dr. Ashley is out of town if there is any persistence of the respiratory symptoms.  Body Mass Index was not assessed due to The patient was in with an acute medical  issue..    Lance Gleason MD      Current Outpatient Prescriptions on File Prior to Visit   Medication Sig     ALPRAZolam (XANAX) 1 MG tablet TAKE 1 TABLET BY MOUTH 3 TIMES DAILY AS NEEDED     amLODIPine (NORVASC) 10 MG tablet TAKE ONE TABLET BY MOUTH EVERY DAY     benzonatate (TESSALON PERLES) 100 MG capsule Take 1 capsule (100 mg total) by mouth every 6 (six) hours as needed for cough.     citalopram (CELEXA) 40 MG tablet TAKE 1 TABLET DAILY.     lisinopril (PRINIVIL,ZESTRIL) 20 MG tablet TAKE 1 TABLET (20 MG TOTAL) BY MOUTH DAILY.     prednisoLONE acetate (PRED-FORTE) 1 % ophthalmic suspension 1 drop 2 (two) times a day.      propranolol (INDERAL) 20 MG tablet TAKE 1 TABLET (20 MG TOTAL) BY MOUTH 2 (TWO) TIMES A DAY.     [START ON 3/20/2017] zolpidem (AMBIEN) 10 mg tablet TAKE 1 TABLET (10 MG TOTAL) BY MOUTH BEDTIME AS NEEDED.     SUMAtriptan (IMITREX) 50 MG tablet TAKE 1 TABLET (50 MG TOTAL) BY MOUTH ONCE AS NEEDED FOR MIGRAINE.     No current facility-administered medications on file prior to visit.      No Known Allergies  Social History   Substance Use Topics     Smoking status: Never Smoker     Smokeless tobacco: Never Used     Alcohol use No

## 2021-06-09 NOTE — TELEPHONE ENCOUNTER
Prior Authorization Request  Who s requesting:  Pharmacy  Pharmacy Name and Location: [facility] pharmacy  Medication Name: prednisone 1mg tabs  Insurance Plan: [employer] medicare  Insurance Member ID Number:  [id number]  CoverMyMeds Key: N/A  Informed patient that prior authorizations can take up to 10 business days for response:   No  Okay to leave a detailed message: No        Patient currently on prednisone 5 mg daily.  Trying to wean patient off. Ordered 4mg x 10days, 3mg x 10days, 2mg x 10 days, 1mg x 10 days.

## 2021-06-09 NOTE — TELEPHONE ENCOUNTER
Pulmonary Telephone Note    Denis called me. He is gaining weight. Taking prednisone 5 mg daily. Now 160 lbs; usually around 140 lbs by his report, though when I last saw him in April 2019 was 160 lbs and January 2019 was 152 lbs. No leg swelling. No dyspnea or cough. I last saw him in April 2019 and last talked to him in July 2019. Had planned follow-up in late 2019 with spirometry/DLCO, but somehow that was not scheduled. He reports that he has regular ophthalmology follow-up, and that the steroid eye drops were discontinued; he reports that his ophthalmologist is aware of his sarcoidosis and uveitis diagnoses and is monitoring that.    Will obtain PA/lateral CXR and PFT (the latter at [facility] due to current pandemic restrictions at our PFT labs) and I will call him with results. Should be able to slowly titrate off prednisone. Encouraged him to call any time with questions or concerning symptoms.    Vipin Duron MD  Pulmonary and Critical Care Medicine  [facility]  Cell [phone]  Office [phone]  Pager [phone]

## 2021-06-09 NOTE — TELEPHONE ENCOUNTER
Pulmonary Telephone Note    Called Denis. His CXR looks quite clear; maybe small amount of atelectasis at the right base. He has no dyspnea. Has ongoing prednisone-induced weight gain that he is so far unable to control with diet modification. Will plan to taper him off prednisone (currently on 5 mg daily) as follows: 4 mg daily x 10 days, then 3 mg daily x 10 days, then 2 mg daily x 10 days, then 1 mg daily x 10 days. If his disease flares, can go back up on the prednisone dose and start methotrexate prior to any subsequent attempt at steroid taper. Will also refer him to an outpatient dietician to help address his prednisone-induced weight gain. I will see him in 6 weeks with complete PFTs completed beforehand at the [facility] (our PFT labs are still closed due to pandemic restrictions). He is in agreement. Encouraged him to call any time with questions or concerning symptoms.    Vipin Duron MD  Pulmonary and Critical Care Medicine  [facility]  Cell [phone]  Office [phone]  Pager [phone]

## 2021-06-10 NOTE — TELEPHONE ENCOUNTER
Left detailed message for the patient relaying message below from Dr. Ashley.  Asked that he call with any further questions.  Roxana MENDIETA CMA/JUDSON....................12:11 PM

## 2021-06-10 NOTE — TELEPHONE ENCOUNTER
----- Message from Vipin Duron MD sent at 7/22/2020 11:49 AM CDT -----  I have a few requests for this gentleman:    1. Would like to refer him to an outpatient dietician, if possible, for steroid-induced weight gain.  2. Would like to have him get PFTs at the [facility] in about 6 weeks.  3. Would like to have him see me in follow-up (virtual visit is fine) in about 6 weeks.    Thanks so much,  Peter

## 2021-06-10 NOTE — TELEPHONE ENCOUNTER
Controlled Substance Refill Request  Medication Name:   Requested Prescriptions     Pending Prescriptions Disp Refills     ALPRAZolam (XANAX) 1 MG tablet 60 tablet 0     Sig: TAKE 1 TABLET BY MOUTH TWO TIMES A DAY AS NEEDED FOR ANXIETY     Date Last Fill: 7/8/20  Is patient out of medication?: No, 4 days left  Patient notified refills processed within 3 business days:  Yes  [facility]  Submit electronically to pharmacy  Controlled Substance Agreement on file:   Encounter-Level CSA Scan Date:    There are no encounter-level csa scan date.        Last office visit:  4/20/20

## 2021-06-10 NOTE — TELEPHONE ENCOUNTER
Who is calling:  Patient  Reason for Call:    Patient is questioning if CBD oil would be helpful for sleep and anxiety.  Please reach out to patient and advise.  Date of last appointment with primary care: 4/20/2020  Okay to leave a detailed message: Yes

## 2021-06-10 NOTE — TELEPHONE ENCOUNTER
Prescription Monitoring Program activity reviewed with no discrepancies noted.      Last fill per : 7/8  Quantity/days supply: 60/30    Controlled Substance Agreement on file: Yes  Date: 3/4    Last office visit with provider:  2/11/2020 Beka Ashley MD    Please advise.  Madelyn Francis CMA ([facility])

## 2021-06-11 NOTE — PROGRESS NOTES
"Denis Moreno is a 70 y.o. male who is being evaluated via a billable video visit.      The patient has been notified of following:     \"This video visit will be conducted via a call between you and your physician/provider. We have found that certain health care needs can be provided without the need for an in-person physical exam.  This service lets us provide the care you need with a video conversation.  If a prescription is necessary we can send it directly to your pharmacy.  If lab work is needed we can place an order for that and you can then stop by our lab to have the test done at a later time.    Video visits are billed at different rates depending on your insurance coverage. Please reach out to your insurance provider with any questions.    If during the course of the call the physician/provider feels a video visit is not appropriate, you will not be charged for this service.\"    Patient has given verbal consent to a Video visit? Yes  How would you like to obtain your AVS? AVS Preference: MyChart.  If dropped by the video visit, the video invitation should be sent to: Text to cell phone: [phone]  Will anyone else be joining your video visit? No        Video Start Time: 1040    Video-Visit Details    Type of service:  Video Visit    Video End Time (time video stopped): 1058  Originating Location (pt. Location): Home    Distant Location (provider location):  [facility] [place]     Platform used for Video Visit: [facility]    Pulmonary Clinic Follow-up Visit    Assessment and Plan:  70 year old male never smoker with a history of dyslipidemia, low-grade chronic bilateral uveitis, mild SAPNA, and pulmonary sarcoidosis, presenting for follow-up.      Sarcoidosis: Notes good energy, breathing well. CXR from July continues to show radiographic improvement; current spirometry/DLCO look good. Has regular ophthalmology follow-up. Eyes were watering a couple of weeks ago; no eye pain. Weight is improving with " weaning off the prednisone. No new rash. No numbness or tingling. No leg swelling. Recall that Mr. Moreno has a history of pulmonary and ocular involvement, with chronic bilateral uveitis, and mediastinal/hilar lymphadenopathy with pulmonary nodules but previously normal functional status and normal PFTs. He developed worsening fatigue, dyspnea, and cough, with significant peripheral interstitial changes and worsening pulmonary function tests requiring initiation of prednisone. This led to improvement in symptoms, pulmonary function, and radiographic findings, and we have been able to slowly titrate off the prednisone. Normal renal and hepatic function. Had leukocytosis on CBC in November 2018, not anemic. Prior ECG with possible LVH, otherwise unremarkable.     Plan:  - weight gain on prednisone is now improving after tapering off by his report  - has regular ophthalmology follow-up  - annual high-dose influenza vaccination  - up to date on pneumococcal vaccination  - follow up in one year with spirometry/DLCO, or sooner if needed  - encouraged him to call any time with questions or concerning symptoms    Vipin Duron MD  Pulmonary and Critical Care Medicine  [facility]  Cell [phone]  Office [phone]  Pager [phone]    CCx: sarcoidosis    HPI: 70 year old male never smoker with a history of dyslipidemia, low-grade chronic bilateral uveitis, mild SAPNA, and pulmonary sarcoidosis, presenting for follow-up. Notes good energy, breathing well. CXR from July continues to show radiographic improvement; current spirometry/DLCO look good. Has regular ophthalmology follow-up. Eyes were watering a couple of weeks ago; no eye pain. Weight is improving with weaning off the prednisone. No new rash. No numbness or tingling. No leg swelling. Recall that Mr. Moreno has a history of pulmonary and ocular involvement, with chronic bilateral uveitis, and mediastinal/hilar lymphadenopathy with pulmonary  nodules but previously normal functional status and normal PFTs. He developed worsening fatigue, dyspnea, and cough, with significant peripheral interstitial changes and worsening pulmonary function tests requiring initiation of prednisone. This led to improvement in symptoms, pulmonary function, and radiographic findings, and we have been able to slowly titrate off the prednisone. Normal renal and hepatic function. Had leukocytosis on CBC in November 2018, not anemic. Prior ECG with possible LVH, otherwise unremarkable.    ROS:  A 12-system review was obtained and was negative with the exception of the symptoms endorsed in the history of present illness.    PMH:  Pulmonary and ocular sarcoidosis  Deviated septum  HLD  Depression  HTN  Insomnia  Chronic headaches  Mild SAPNA with AHI 10.4    PSH:  Past Surgical History:   Procedure Laterality Date     BRONCHOSCOPY  12/20/2016    ebus       Allergies:  No Known Allergies    Family HX:  No family history on file.    Social Hx:  Social History     Socioeconomic History     Marital status: Single     Spouse name: Not on file     Number of children: Not on file     Years of education: Not on file     Highest education level: Not on file   Occupational History     Not on file   Social Needs     Financial resource strain: Not on file     Food insecurity     Worry: Not on file     Inability: Not on file     Transportation needs     Medical: Not on file     Non-medical: Not on file   Tobacco Use     Smoking status: Never Smoker     Smokeless tobacco: Never Used   Substance and Sexual Activity     Alcohol use: No     Drug use: No     Sexual activity: Not on file   Lifestyle     Physical activity     Days per week: Not on file     Minutes per session: Not on file     Stress: Not on file   Relationships     Social connections     Talks on phone: Not on file     Gets together: Not on file     Attends [employer] service: Not on file     Active member of club or organization: Not on  file     Attends meetings of clubs or organizations: Not on file     Relationship status: Not on file     Intimate partner violence     Fear of current or ex partner: Not on file     Emotionally abused: Not on file     Physically abused: Not on file     Forced sexual activity: Not on file   Other Topics Concern     Not on file   Social History Narrative     Not on file       Current Meds:  Current Outpatient Medications   Medication Sig Dispense Refill     ALPRAZolam (XANAX) 1 MG tablet TAKE 1 TABLET BY MOUTH TWO TIMES A DAY AS NEEDED FOR ANXIETY 60 tablet 0     amLODIPine (NORVASC) 10 MG tablet TAKE 1 TABLET BY MOUTH EVERY DAY 90 tablet 3     benzonatate (TESSALON) 100 MG capsule TAKE ONE CAPSULE BY MOUTH EVERY 6 HOURS AS NEEDED FOR COUGH 20 capsule 0     cholecalciferol, vitamin D3, 125 mcg (5,000 unit) capsule Take 1 capsule (5,000 Units total) by mouth daily. 90 capsule 3     citalopram (CELEXA) 40 MG tablet TAKE 1 TABLET DAILY. 90 tablet 1     gabapentin (NEURONTIN) 300 MG capsule TAKE 2 CAPSULES BY MOUTH 3 TIMES A DAY INCREASE AS INSTRUCTED  5     hydrOXYzine HCL (ATARAX) 10 MG tablet Take 1 tablet (10 mg total) by mouth 3 (three) times a day as needed for itching. 90 tablet 3     lisinopriL (PRINIVIL,ZESTRIL) 20 MG tablet TAKE 1 TABLET BY MOUTH EVERY DAY 90 tablet 1     omeprazole (PRILOSEC) 20 MG capsule TAKE 1 CAPSULE BY MOUTH EVERY DAY 90 capsule 3     prednisoLONE acetate (PRED-FORTE) 1 % ophthalmic suspension Administer 1 drop to both eyes 2 (two) times a day. 5 mL 0     predniSONE (DELTASONE) 1 MG tablet Take 4 tabs (4 mg) daily x 10 days, then 3 tabs (3 mg) daily x 10 days, then 2 tabs (2 mg) daily x 10 days, then 1 tab (1 mg) daily x 10 days.. 120 tablet 11     propranolol (INDERAL) 20 MG tablet TAKE 1 TABLET (20 MG TOTAL) BY MOUTH 2 (TWO) TIMES A DAY. 180 tablet 3     SUMAtriptan (IMITREX) 50 MG tablet TAKE 1 TABLET (50 MG TOTAL) BY MOUTH ONCE AS NEEDED FOR MIGRAINE. 12 tablet 14     traZODone  "(DESYREL) 50 MG tablet Take 1 tablet (50 mg total) by mouth at bedtime. 90 tablet 3     azithromycin (ZITHROMAX) 250 MG tablet Take 500mg (2 tablets) day one, and then 250mg (1 tablet) days 2-5 (Patient taking differently: Take 500mg (2 tablets) day one, and then 250mg (1 tablet) days 2-5  AS NEEDED) 6 tablet 0     No current facility-administered medications for this visit.        Physical Exam:  no exam due to virtual visit    Labs:  Normal BMP and LFTs  WBC 13.1 with no differential  Normal Hgb  ESR 54  1,25-dihydroxyvitamin D level 83 in November 2016     Imaging studies:  CT chest (November 2016)  - personally reviewed  - mediastinal and bilateral hilar lymphadenopathy with partial calcification  - multiple bilateral fluffy nodules (\"cotton ball\")  - peribronchovascular opacity sue. LLL     CT chest high-res (October 2017):  - images directly reviewed, formal interpretation follows:  FINDINGS:  LUNGS AND PLEURA: Airways are patent in inspiration and expiration without tracheobronchomalacia or air trapping. There is no significant bronchial wall thickening. No significant bronchiectasis. There is scarring in the right middle lobe. Multiple   pulmonary nodules throughout the lungs are less conspicuous than on the comparison study. Example, a small area of linear scarring adjacent to the pleura in the right upper lobe was previously a 9 mm x 5 mm nodule (series 3 image 34). There a few small   nodules adjacent to the left major fissure and right major fissure. A few groundglass MR solid nodules are noted in the upper lobes. No diffuse subpleural reticular opacities. No honeycombing. No significant diffuse groundglass.     MEDIASTINUM: Normal size heart. There is atherosclerotic disease including coronary artery calcification. There are numerous calcified mediastinal and hilar lymph nodes. The largest discrete right hilar lymph node is 15 mm (previously 17 mm). A 19 mm   subcarinal lymph node previously measured 22 " mm. Small hiatal hernia.     LIMITED UPPER ABDOMEN: Negative.     MUSCULOSKELETAL: Negative.     IMPRESSION:   CONCLUSION:  1.  Pulmonary parenchymal disease and mediastinal and hilar lymphadenopathy are consistent with history of sarcoidosis. When compared with the prior study, pulmonary nodules have significantly decreased in size. Mediastinal and hilar lymph nodes have   slightly decreased in size. There is minimal fibrosis in the lungs, which is new.   2.  Coronary atherosclerotic disease.  3.  Small hiatal hernia.     CT chest (December 2018):  - images directly reviewed, formal interpretation follows:  FINDINGS:  LUNGS AND PLEURA: There is new significant pulmonary disease seen diffusely mostly characterized by fine linear reticular interstitial prominence in the lung periphery involving upper and lower lobes but with a slight basal predominance. These are not   typical findings of sarcoid and the rapid progression is also atypical. Its possible this relates to a pneumonia superimposed upon the patient's pre-existing sarcoid. Other interstitial fibrotic processes could also be superimposed upon sarcoid but again   the progression appears fairly rapid.     No new zones of consolidation, there remains chronic atelectasis involving medial segment right middle lobe. There is a single 3 mm nodule within right middle lobe which has not changed significantly. No pleural effusion.     MEDIASTINUM: Partially calcified hilar and mediastinal lymphadenopathy is unchanged and again would be consistent with sarcoid. Coronary artery calcifications.     LIMITED UPPER ABDOMEN: Negative.     MUSCULOSKELETAL: Negative.     IMPRESSION:   CONCLUSION:  1.  Fairly pronounced worsening of diffuse interstitial disease could relate to progression of known sarcoid but a superimposed atypical pneumonia or additional interstitial fibrosing process may also be present.     CT chest (January 2019), 5 weeks after starting prednisone 40 mg  daily:  - images directly reviewed, formal interpretation follows:  FINDINGS:   LUNGS AND PLEURA: Previously seen interstitial infiltrates throughout both lungs have resolved. There are a few tiny pulmonary nodules which are unchanged. There is chronic atelectasis in the inferior right middle lobe. The lungs are otherwise clear. No   pleural effusion.     MEDIASTINUM: Partially calcified mediastinal and bilateral hilar lymph nodes are unchanged. Small hiatal hernia. Moderate coronary artery calcification.     LIMITED UPPER ABDOMEN: Negative.     MUSCULOSKELETAL: Negative.     IMPRESSION:   CONCLUSION:   1.  Previously seen interstitial infiltrates have resolved.  2.  Partially calcified mediastinal and bilateral hilar lymph nodes are unchanged and consistent with known sarcoidosis.       CXR (July 2020):  - images directly reviewed, formal interpretation follows:  IMPRESSION:      Cardiac silhouette is normal in size. Unchanged bilateral hilar and subcarinal fullness reflecting enlarged lymph nodes some of which have internal dystrophic calcification consistent with reported diagnosis of sarcoidosis. Mild atheromatous aortic   calcification. No new mediastinal border abnormality.     The lungs are symmetrically inflated. There is a small linear band of atelectasis in the right middle lobe. There are no new nodular or airspace opacities. No new lung parenchymal volume loss.     Diaphragm curvature is preserved. Mild smooth elevation of the anterior one half of the right hemidiaphragm likely represents focal eventration, unchanged. No pleural fluid or thickening.     Minimal unchanged thoracic spine degenerative disc disease.    Brain MRI (July 2016)  - mild atrophy  - minimal to mild SVID      PFT's  November 2016:  FEV1/FVC is 72% and is normal.  FEV1 is 2.49L or 113% predicted and is normal.  FVC is 3.47L or 124% predicted and normal.  There was no improvement in spirometry after a single inhaled dose of  bronchodilator.  TLC is 5.13L or 107% predicted and is normal.  RV is 1.54L or 77% predicted and is reduced.  DLCO is 17.61 ml/min/mmHg or 88% predicted and is normal when it   is corrected for hemoglobin.     October 2017:  FEV1/FVC is 68 and is normal (less than 0.7 but greater than the demographically adjusted lower limit of normal).  FEV1 is 122% predicted and is normal.  FVC is 140% predicted and normal.     DLCO is 80% predicted and is normal when it   is corrected for hemoglobin.     December 2018:  FEV1/FVC is 74% and is normal.  FEV1 is 1.72 L or 76% predicted and is reduced.  FVC is 2.33 L or 80% predicted and is normal.  There was no improvement in spirometry after a single inhaled dose of bronchodilator.  DLCO is 9.48 mL/min/mm Hg or 40% predicted and is reduced when it   is corrected for hemoglobin.     February 2019:  FEV1/FVC is 68% and is reduced.  FEV1 is 2.39L (108%) predicted and is normal.  FVC is 3.52L (118%) predicted and normal.  There was no improvement in spirometry after a single inhaled dose of bronchodilator.  DLCO is 13.14ml/min/hg (55%) predicted and is reduced when it is corrected for hemoglobin.  Flow volume loops indicate severe scooping of the expiratory limb.     Impression:  Full Pulmonary Function Test is abnormal.  PFTs are consistent with mild obstructive disease.  Spirometry is not consistent with reversibility.  Diffusion capacity when corrected for hemoglobin is moderately reduced.    September 2020:  FEV1/FVC is 69 and is normal.  FEV1 is 106% predicted and is normal.  FVC is 119% predicted and is normal.  There was no improvement in spirometry after a single inhaled dose of bronchodilator.  DLCO is 78% predicted and is normal when it   is corrected for hemoglobin.  The flow volume loop is normal

## 2021-06-11 NOTE — PROGRESS NOTES
Office Visit - Follow Up   Denis Moreno   67 y.o. male    Date of Visit: 6/8/2017    Chief Complaint   Patient presents with     Follow-up        Assessment and Plan   1. Benign Essential Hypertension  Controlled.  Continue amlodipine, lisinopril and propranolol.  Propranolol is also given for his migraine headache and has good control of his migraines prophylactically with propranolol.    2. Hyperlipidemia  Controlled without medication.  Lipids on 5/26/2016 were normal.  Suspect he does not have hyperlipidemia anymore.    3. Sarcoidosis of lung  Has lung sarcoidosis.  Followed by pulmonary.  Does not have any respiratory complaints.    4. Uveitis  Has uvulitis secondary to his sarcoidosis.  [facility]  Uses prednisolone eyedrops.    5. Mild Recurrent Major Depression  Has mild recurring major depression.  But since he took citalopram his depression is controlled. Did his PHQ 9 and scored 0.    6. Anxiety  Has anxiety.  But this is also controlled now with good control of his depression.  Takes alprazolam as needed and hydroxyzine daily.    7. Insomnia  Continues to take zolpidem.  If he cannot take zolpidem he cannot sleep.      Follow up in 4 months.       History of Present Illness   This 67 y.o. old male is here for follow-up.  Has hypertension well controlled by combination of amlodipine and lisinopril propranolol.  Propranolol also works for his migraine prophylactically controlling it.  Has hyperlipemia but his last lipids in May 2016 which were good.  Does not need medication for this.  Has pulmonary sarcoid, followed by pulmonary.  Does not have respiratory complaints or symptoms.  Has uveitis from sarcoid and followed by ophthalmology.  Uses a steroid eyedrop.  Has depression anxiety which are controlled by his medications.  Has insomnia and has to take zolpidem nightly.  Overall doing and feeling well.  No complaints.    Review of Systems   A 12 point comprehensive  review of systems was negative except as noted..     Medications, Allergies and Problem List   Reviewed and updated             Chief Complaint   Follow-up       Patient Profile   Social History     Social History Narrative        Past Medical History   Patient Active Problem List   Diagnosis     Hyperlipidemia     Mild Recurrent Major Depression     Male Erectile Disorder     Benign Essential Hypertension     Insomnia     Anxiety     Deviated nasal septum     Hilar adenopathy     Uveitis     Sarcoidosis of lung       Past Surgical History  He has a past surgical history that includes Bronchoscopy (12/20/2016).       Medications and Allergies   Current Outpatient Prescriptions   Medication Sig     ALPRAZolam (XANAX) 1 MG tablet TAKE 1 TABLET BY MOUTH 3 TIMES DAILY AS NEEDED     amLODIPine (NORVASC) 10 MG tablet TAKE ONE TABLET BY MOUTH EVERY DAY     citalopram (CELEXA) 40 MG tablet TAKE 1 TABLET DAILY.     gabapentin (NEURONTIN) 300 MG capsule TAKE 2 CAPSULES BY MOUTH 3 TIMES A DAY INCREASE AS INSTRUCTED     hydrOXYzine (VISTARIL) 25 MG capsule TAKE 1 CAPSULE BY MOUTH 3 TIMES A DAY AS NEEDED FOR ITCHING     lisinopril (PRINIVIL,ZESTRIL) 20 MG tablet TAKE 1 TABLET (20 MG TOTAL) BY MOUTH DAILY.     prednisoLONE acetate (PRED-FORTE) 1 % ophthalmic suspension 1 drop 2 (two) times a day.      propranolol (INDERAL) 20 MG tablet TAKE 1 TABLET (20 MG TOTAL) BY MOUTH 2 (TWO) TIMES A DAY.     SUMAtriptan (IMITREX) 50 MG tablet TAKE 1 TABLET (50 MG TOTAL) BY MOUTH ONCE AS NEEDED FOR MIGRAINE.     zolpidem (AMBIEN) 10 mg tablet TAKE 1 TABLET (10 MG TOTAL) BY MOUTH BEDTIME AS NEEDED.     No Known Allergies     Family and Social History   No family history on file.     Social History   Substance Use Topics     Smoking status: Never Smoker     Smokeless tobacco: Never Used     Alcohol use No        Physical Exam       Physical Exam  /72  Pulse 68  Wt 159 lb 12 oz (72.5 kg)  BMI 27.42 kg/m2  General appearance: alert,  appears stated age, cooperative and no distress  Head: Normocephalic, without obvious abnormality, atraumatic  Throat: lips, mucosa, and tongue normal; teeth and gums normal  Neck: no adenopathy, no carotid bruit, no JVD, supple, symmetrical, trachea midline and thyroid not enlarged, symmetric, no tenderness/mass/nodules  Lungs: clear to auscultation bilaterally  Heart: regular rate and rhythm, S1, S2 normal, no murmur, click, rub or gallop  Abdomen: soft, non-tender; bowel sounds normal; no masses,  no organomegaly  Extremities: extremities normal, atraumatic, no cyanosis or edema  Skin: Skin color, texture, turgor normal. No rashes or lesions     Additional Information        Beka Ashley MD  Internal Medicine  Contact me at [phone]     Additional Information   Current Outpatient Prescriptions   Medication Sig     ALPRAZolam (XANAX) 1 MG tablet TAKE 1 TABLET BY MOUTH 3 TIMES DAILY AS NEEDED     amLODIPine (NORVASC) 10 MG tablet TAKE ONE TABLET BY MOUTH EVERY DAY     citalopram (CELEXA) 40 MG tablet TAKE 1 TABLET DAILY.     gabapentin (NEURONTIN) 300 MG capsule TAKE 2 CAPSULES BY MOUTH 3 TIMES A DAY INCREASE AS INSTRUCTED     hydrOXYzine (VISTARIL) 25 MG capsule TAKE 1 CAPSULE BY MOUTH 3 TIMES A DAY AS NEEDED FOR ITCHING     lisinopril (PRINIVIL,ZESTRIL) 20 MG tablet TAKE 1 TABLET (20 MG TOTAL) BY MOUTH DAILY.     prednisoLONE acetate (PRED-FORTE) 1 % ophthalmic suspension 1 drop 2 (two) times a day.      propranolol (INDERAL) 20 MG tablet TAKE 1 TABLET (20 MG TOTAL) BY MOUTH 2 (TWO) TIMES A DAY.     SUMAtriptan (IMITREX) 50 MG tablet TAKE 1 TABLET (50 MG TOTAL) BY MOUTH ONCE AS NEEDED FOR MIGRAINE.     zolpidem (AMBIEN) 10 mg tablet TAKE 1 TABLET (10 MG TOTAL) BY MOUTH BEDTIME AS NEEDED.     No Known Allergies  Social History   Substance Use Topics     Smoking status: Never Smoker     Smokeless tobacco: Never Used     Alcohol use No         Time: total time spent with the patient was 25 minutes of which  >50% was spent in counseling and coordination of care

## 2021-06-11 NOTE — TELEPHONE ENCOUNTER
Patient states he is completely  out of medication requesting to process as soon as possible .  Controlled Substance Refill Request  Medication Name:   Requested Prescriptions     Pending Prescriptions Disp Refills     ALPRAZolam (XANAX) 1 MG tablet 60 tablet 0     Sig: TAKE 1 TABLET BY MOUTH TWO TIMES A DAY AS NEEDED FOR ANXIETY   Date Last Fill: 08/07/20  Is patient out of medication?:  Yes  Patient notified refills processed within 3 business days:  Yes  Requested Pharmacy: [id number]  Submit electronically to pharmacy  Controlled Substance Agreement on file:   Encounter-Level CSA Scan Date:    There are no encounter-level csa scan date.        Last office visit:  04/20/20

## 2021-06-11 NOTE — TELEPHONE ENCOUNTER
RN cannot approve Refill Request    RN can NOT refill this medication PCP messaged that patient is overdue for Labs. Last office visit: 2/11/2020 Beka Ashley MD Last Physical: Visit date not found Last MTM visit: Visit date not found Last visit same specialty: 2/11/2020 Beka Ashley MD.  Next visit within 3 mo: Visit date not found  Next physical within 3 mo: Visit date not found      Neli Krishnan, [facility] Triage/Med Refill 9/1/2020    Requested Prescriptions   Pending Prescriptions Disp Refills     lisinopriL (PRINIVIL,ZESTRIL) 20 MG tablet [Pharmacy Med Name: LISINOPRIL 20 MG TABLET] 90 tablet 1     Sig: TAKE 1 TABLET BY MOUTH EVERY DAY       Ace Inhibitors Refill Protocol Failed - 8/31/2020 12:12 AM        Failed - Serum Potassium in past 12 months     No results found for: LN-POTASSIUM          Failed - Serum Creatinine in past 12 months     Creatinine   Date Value Ref Range Status   07/26/2019 1.35 (H) 0.70 - 1.30 mg/dL Final             Passed - PCP or prescribing provider visit in past 12 months       Last office visit with prescriber/PCP: 2/11/2020 Beka Ashley MD OR same dept: 2/11/2020 Beka Ashley MD OR same specialty: 2/11/2020 Beka Ashley MD  Last physical: Visit date not found Last MTM visit: Visit date not found   Next visit within 3 mo: Visit date not found  Next physical within 3 mo: Visit date not found  Prescriber OR PCP: Beka Ashley MD  Last diagnosis associated with med order: 1. Benign Essential Hypertension  - lisinopriL (PRINIVIL,ZESTRIL) 20 MG tablet [Pharmacy Med Name: LISINOPRIL 20 MG TABLET]; TAKE 1 TABLET BY MOUTH EVERY DAY  Dispense: 90 tablet; Refill: 1    If protocol passes may refill for 12 months if within 3 months of last provider visit (or a total of 15 months).             Passed - Blood pressure filed in past 12 months     BP Readings from Last 1 Encounters:   02/11/20 138/60

## 2021-06-11 NOTE — TELEPHONE ENCOUNTER
Refill Approved    Rx renewed per Medication Renewal Policy. Medication was last renewed on 12/17/19, last OV 4/20/20.    Khalida Gann, [facility] Triage/Med Refill 9/20/2020     Requested Prescriptions   Pending Prescriptions Disp Refills     SUMAtriptan (IMITREX) 50 MG tablet 12 tablet 14       Triptans Refill Protocol Passed - 9/18/2020 10:34 AM        Passed - PCP or prescribing provider visit in past 12 months       Last office visit with prescriber/PCP: 2/11/2020 Beka Ashley MD OR same dept: 2/11/2020 Beka Ashley MD OR same specialty: 2/11/2020 Beka Ashley MD  Last physical: Visit date not found Last MTM visit: Visit date not found   Next visit within 3 mo: Visit date not found  Next physical within 3 mo: Visit date not found  Prescriber OR PCP: Beka Ashley MD  Last diagnosis associated with med order: 1. Migraine headache  - SUMAtriptan (IMITREX) 50 MG tablet  Dispense: 12 tablet; Refill: 14    If protocol passes may refill for 12 months if within 3 months of last provider visit (or a total of 15 months).

## 2021-06-12 NOTE — TELEPHONE ENCOUNTER
I was not able to reach Denis in person by phone.  Left detailed VM message at the phone number he left for call back.     Per Dr. Coughlin: There is nothing in particular about the job that seems to be harmful, but if he wants to discuss further should schedule follow-up visit.     I left my phone number to call back if any questions as well as our scheduling phone number if he wishes to schedule a visit with Dr coughlin.

## 2021-06-12 NOTE — TELEPHONE ENCOUNTER
"Patient calling stating symptoms started 1 month ago with right ear and jaw pain. Reporting constant \"dull pain.\" Pain wakes from sleep. Denies any increase in pain with eating or speaking. Afebrile.     Per guidelines advised to be seen today in clinic.    Transferred to Central Scheduling.     Minerva Zamora RN  [facility]        COVID 19 Nurse Triage Plan/Patient Instructions    Please be aware that novel coronavirus (COVID-19) may be circulating in the community. If you develop symptoms such as fever, cough, or SOB or if you have concerns about the presence of another infection including coronavirus (COVID-19), please contact your health care provider or visit [email or web address].     Disposition/Instructions    In-Person Visit with provider recommended. Reference Visit Selection Guide.    Thank you for taking steps to prevent the spread of this virus.  o Limit your contact with others.  o Wear a simple mask to cover your cough.  o Wash your hands well and often.    Resources    [facility]: About COVID-19: [email or web address]    CDC: What to Do If You're Sick: www.cdc.gov/coronavirus/2019-ncov/about/steps-when-sick.html    CDC: Ending Home Isolation: www.cdc.gov/coronavirus/2019-ncov/hcp/disposition-in-home-patients.html     CDC: Caring for Someone: www.cdc.gov/coronavirus/2019-ncov/if-you-are-sick/care-for-someone.html     [facility]: Interim Guidance for Hospital Discharge to Home: www.health.[facility].mn.us/diseases/coronavirus/hcp/hospdischarge.pdf    [facility] clinical trials (COVID-19 research studies): [email or web address]     Below are the COVID-19 hotlines at the [facility] of Health ([facility]). Interpreters are available.   o For health questions: Call 121-667-1237 or 1-129.531.1847 (7 a.m. to 7 p.m.)  o For questions about schools and childcare: Call 220-300-8501 or 1-207.398.3237 (7 a.m. to 7 p.m.)     Reason for Disposition    " Diabetes mellitus or a weak immune system (e.g., HIV positive, cancer chemotherapy, transplant patient)    Additional Information    Negative: Sounds like a life-threatening emergency to the triager    Negative: Moving the earlobe or touching the ear clearly increases the pain    Negative: Pink or red swelling behind the ear    Negative: Stiff neck (can't touch chin to chest)    Negative: Patient sounds very sick or weak to the triager    Negative: Severe earache pain    Negative: Fever > 103 F (39.4 C)    Negative: Pointed object was inserted into the ear canal (e.g., a pencil, stick, or wire)    Negative: White, yellow, or green discharge    Protocols used: EARACHE-A-[place]

## 2021-06-12 NOTE — TELEPHONE ENCOUNTER
Refill Approved    Rx renewed per Medication Renewal Policy. Medication was last renewed on 2020, 2019.  Last office visit was 2020 with PCP.    Hien Myrick, [facility] Triage/Med Refill 10/18/2020     Requested Prescriptions   Pending Prescriptions Disp Refills     citalopram (CELEXA) 40 MG tablet 90 tablet 1     Si tablet (40 mg total) daily.       SSRI Refill Protocol  Passed - 10/15/2020 10:04 AM        Passed - PCP or prescribing provider visit in last year     Last office visit with prescriber/PCP: 2020 Beka Ashley MD OR same dept: 2020 Beka Ashley MD OR same specialty: 2020 Beka Ashley MD  Last physical: Visit date not found Last MTM visit: Visit date not found   Next visit within 3 mo: Visit date not found  Next physical within 3 mo: Visit date not found  Prescriber OR PCP: Beka Ashley MD  Last diagnosis associated with med order: 1. Anxiety  - citalopram (CELEXA) 40 MG tablet; 1 tablet (40 mg total) daily.  Dispense: 90 tablet; Refill: 1    2. Gastroesophageal reflux disease without esophagitis  - omeprazole (PRILOSEC) 20 MG capsule; Take by mouth.; Refill: 0    If protocol passes may refill for 12 months if within 3 months of last provider visit (or a total of 15 months).                omeprazole (PRILOSEC) 20 MG capsule  0     Sig: Take by mouth.       GI Medications Refill Protocol Passed - 10/15/2020 10:04 AM        Passed - PCP or prescribing provider visit in last 12 or next 3 months.     Last office visit with prescriber/PCP: 2020 Beka Ashley MD OR same dept: 2020 Beka Ashley MD OR same specialty: 2020 Beka Ashley MD  Last physical: Visit date not found Last MTM visit: Visit date not found   Next visit within 3 mo: Visit date not found  Next physical within 3 mo: Visit date not found  Prescriber OR PCP: Beka Ashley MD  Last diagnosis associated with med order: 1. Anxiety  -  citalopram (CELEXA) 40 MG tablet; 1 tablet (40 mg total) daily.  Dispense: 90 tablet; Refill: 1    2. Gastroesophageal reflux disease without esophagitis  - omeprazole (PRILOSEC) 20 MG capsule; Take by mouth.; Refill: 0    If protocol passes may refill for 12 months if within 3 months of last provider visit (or a total of 15 months).

## 2021-06-12 NOTE — PROGRESS NOTES
Office Visit - Follow Up   Denis Moreno   67 y.o. male    Date of Visit: 8/29/2017    Chief Complaint   Patient presents with     Pain     Left shoulder off and on for a few monthsand bilatera tingling        Assessment and Plan   1. Bilateral carpal tunnel syndrome  Complains of tingling of fingertips of both hands with some pains.  Suspects he has carpal tunnel syndrome.  Will do EMG and will refer him to Dr. Stout of orthopedics.  - EMG; Future  - Ambulatory referral to Orthopedic Surgery    2. Anxiety  Has anxiety.  Takes alprazolam which helps.  Needs extra alprazolam because she travels to [place] to visit his daughter.  Will give him 15 pills.  - ALPRAZolam (XANAX) 1 MG tablet; TAKE 1 TABLET BY MOUTH 3 TIMES DAILY AS NEEDED  Dispense: 15 tablet; Refill: 0    3. Left shoulder pain  Complains of left shoulder pains.  No injury or trauma to the left shoulder.  Left shoulder pain pains come and go.  No weakness of the left upper extremity.  Has normal range of motion of the left shoulder.  - Ambulatory referral to Orthopedic Surgery    4. Mild Recurrent Major Depression  In remission.  Takes citalopram.    Follow up in 3 months.     History of Present Illness   This 67 y.o. old male is here for follow-up.  Complains of tingling sensation in his fingertips of both hands for the last 2 months.  Started as tingling sensation of the left hand followed month later of tingling sensation of the right hand.  Also complains of left shoulder pains which started about 3 months ago.  Has normal range of motion.  But pains come and go.  Does not have any weakness.  Able to do all range of motion for the left shoulder.  Has anxiety.  Wants extra alprazolam because she will travel to [place] to visit his daughter.  Has major depression but tahir remission.  Overall feels well.    Review of Systems   A 12 point comprehensive review of systems was negative except as noted..     Medications, Allergies and Problem List   Reviewed  and updated             Chief Complaint   Pain (Left shoulder off and on for a few monthsand bilatera tingling)       Patient Profile   Social History     Social History Narrative        Past Medical History   Patient Active Problem List   Diagnosis     Hyperlipidemia     Mild Recurrent Major Depression     Male Erectile Disorder     Benign Essential Hypertension     Insomnia     Anxiety     Deviated nasal septum     Hilar adenopathy     Uveitis     Sarcoidosis of lung       Past Surgical History  He has a past surgical history that includes Bronchoscopy (12/20/2016).       Medications and Allergies   Current Outpatient Prescriptions   Medication Sig     ALPRAZolam (XANAX) 1 MG tablet TAKE 1 TABLET BY MOUTH 3 TIMES DAILY AS NEEDED     amLODIPine (NORVASC) 10 MG tablet TAKE ONE TABLET BY MOUTH EVERY DAY     citalopram (CELEXA) 40 MG tablet TAKE 1 TABLET DAILY.     gabapentin (NEURONTIN) 300 MG capsule TAKE 2 CAPSULES BY MOUTH 3 TIMES A DAY INCREASE AS INSTRUCTED     hydrOXYzine (VISTARIL) 25 MG capsule TAKE 1 CAPSULE BY MOUTH 3 TIMES A DAY AS NEEDED FOR ITCHING     lisinopril (PRINIVIL,ZESTRIL) 20 MG tablet TAKE 1 TABLET (20 MG TOTAL) BY MOUTH DAILY.     prednisoLONE acetate (PRED-FORTE) 1 % ophthalmic suspension 1 drop 2 (two) times a day.      propranolol (INDERAL) 20 MG tablet TAKE 1 TABLET (20 MG TOTAL) BY MOUTH 2 (TWO) TIMES A DAY.     SUMAtriptan (IMITREX) 50 MG tablet TAKE 1 TABLET (50 MG TOTAL) BY MOUTH ONCE AS NEEDED FOR MIGRAINE.     zolpidem (AMBIEN) 10 mg tablet TAKE 1 TABLET (10 MG TOTAL) BY MOUTH BEDTIME AS NEEDED.     citalopram (CELEXA) 40 MG tablet TAKE 1 TABLET DAILY.     No Known Allergies     Family and Social History   No family history on file.     Social History   Substance Use Topics     Smoking status: Never Smoker     Smokeless tobacco: Never Used     Alcohol use No        Physical Exam       Physical Exam  /64  Pulse 68  Wt 166 lb 4 oz (75.4 kg)  BMI 28.54 kg/m2  General  appearance: alert, appears stated age, cooperative and no distress  Head: Normocephalic, without obvious abnormality, atraumatic  Throat: lips, mucosa, and tongue normal; teeth and gums normal  Neck: no adenopathy, no carotid bruit, no JVD, supple, symmetrical, trachea midline and thyroid not enlarged, symmetric, no tenderness/mass/nodules  Lungs: clear to auscultation bilaterally  Heart: regular rate and rhythm, S1, S2 normal, no murmur, click, rub or gallop  Abdomen: soft, non-tender; bowel sounds normal; no masses,  no organomegaly  Extremities: extremities normal, atraumatic, no cyanosis or edema  Skin: Skin color, texture, turgor normal. No rashes or lesions  Neurologic: Grossly normal  Musculoskeletal:Tender left shoulder, normal bilateral exam of both hands     Additional Information        Beka Ashley MD  Internal Medicine  Contact me at [phone]     Additional Information   Current Outpatient Prescriptions   Medication Sig     ALPRAZolam (XANAX) 1 MG tablet TAKE 1 TABLET BY MOUTH 3 TIMES DAILY AS NEEDED     amLODIPine (NORVASC) 10 MG tablet TAKE ONE TABLET BY MOUTH EVERY DAY     citalopram (CELEXA) 40 MG tablet TAKE 1 TABLET DAILY.     gabapentin (NEURONTIN) 300 MG capsule TAKE 2 CAPSULES BY MOUTH 3 TIMES A DAY INCREASE AS INSTRUCTED     hydrOXYzine (VISTARIL) 25 MG capsule TAKE 1 CAPSULE BY MOUTH 3 TIMES A DAY AS NEEDED FOR ITCHING     lisinopril (PRINIVIL,ZESTRIL) 20 MG tablet TAKE 1 TABLET (20 MG TOTAL) BY MOUTH DAILY.     prednisoLONE acetate (PRED-FORTE) 1 % ophthalmic suspension 1 drop 2 (two) times a day.      propranolol (INDERAL) 20 MG tablet TAKE 1 TABLET (20 MG TOTAL) BY MOUTH 2 (TWO) TIMES A DAY.     SUMAtriptan (IMITREX) 50 MG tablet TAKE 1 TABLET (50 MG TOTAL) BY MOUTH ONCE AS NEEDED FOR MIGRAINE.     zolpidem (AMBIEN) 10 mg tablet TAKE 1 TABLET (10 MG TOTAL) BY MOUTH BEDTIME AS NEEDED.     citalopram (CELEXA) 40 MG tablet TAKE 1 TABLET DAILY.     No Known Allergies  Social History    Substance Use Topics     Smoking status: Never Smoker     Smokeless tobacco: Never Used     Alcohol use No         Time: total time spent with the patient was 25 minutes of which >50% was spent in counseling and coordination of care

## 2021-06-12 NOTE — TELEPHONE ENCOUNTER
Phone sanjay from Denis. 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  However, his family is thinking this job may not be a good idea for him with is sarcoidosis diagnosis?    Asking if Dr. Duron has any thoughts on if this may be good or bad?    If he should be looking for some other options??          Do you just want us to schedule an office visit/virtual to discuss??

## 2021-06-12 NOTE — PROGRESS NOTES
Office Visit - Follow Up   Denis Moreno   67 y.o. male    Date of Visit: 8/23/2017    Chief Complaint   Patient presents with     Follow-up     no energy, feeling ran down, chagend plans on jazmyne got [place] to see kids        Assessment and Plan   1. Fatigue  Complains of fatigue for about 2 weeks.  Does not have any other complaints.  Weight stable at 162.  Gained only about 3 pounds since last visit.  Will check the following.  - HM2(CBC w/o Differential)  - Basic Metabolic Panel  - Hepatic Profile  - Thyroid Stimulating Hormone (TSH)  - Urinalysis-UC if Indicated    2. Mild Recurrent Major Depression  Suspect fatigue may be due to his depression.  Takes citalopram already together with hydroxyzine.  Did his PHQ 9 and scored 11 from as low as 2 in June 2017.  Continue citalopram at the current dose.    3. Anxiety  Has anxiety.  Responds to combination alprazolam and hydroxyzine.    4. Benign Essential Hypertension  Has hypertension controlled by amlodipine and lisinopril lisinopril.     5. Headache  Has recurring headaches.  But now are relieved by gabapentin.  Was seen by neurology for his headaches.    Provided a letter of excuse for missing his flight to [place] today.    Follow up in 2 weeks.     History of Present Illness   This 67 y.o. old male is here complaining of fatigue and just not feeling well.  Apparently this started 1-1/2 weeks ago.  Unable to fly out to [place] to visit his daughter because of his fatigue and not feeling well.  Denies any other complaints with his fatigue.  Does not have fever.  Does not have URI symptoms.  Does not have urinary or bowel symptoms.  Has hypertension controlled by amlodipine and lisinopril combination.  Has major depression.  Already takes citalopram.  Seems his depression is flaring up.  Will check his PHQ 9.  Also has anxiety.  Controlled by alprazolam together with hydroxyzine.    Review of Systems   A 12 point comprehensive review of systems was negative except as  noted..     Medications, Allergies and Problem List   Reviewed and updated             Chief Complaint   Follow-up (no energy, feeling ran down, chagend plans on jazmy[place] got [place] to [place])       Patient Profile   Social History     Social History Narrative        Past Medical History   Patient Active Problem List   Diagnosis     Hyperlipidemia     Mild Recurrent Major Depression     Male Erectile Disorder     Benign Essential Hypertension     Insomnia     Anxiety     Deviated nasal septum     Hilar adenopathy     Uveitis     Sarcoidosis of lung       Past Surgical History  He has a past surgical history that includes Bronchoscopy (12/20/2016).       Medications and Allergies   Current Outpatient Prescriptions   Medication Sig     ALPRAZolam (XANAX) 1 MG tablet TAKE 1 TABLET BY MOUTH 3 TIMES DAILY AS NEEDED     amLODIPine (NORVASC) 10 MG tablet TAKE ONE TABLET BY MOUTH EVERY DAY     citalopram (CELEXA) 40 MG tablet TAKE 1 TABLET DAILY.     citalopram (CELEXA) 40 MG tablet TAKE 1 TABLET DAILY.     gabapentin (NEURONTIN) 300 MG capsule TAKE 2 CAPSULES BY MOUTH 3 TIMES A DAY INCREASE AS INSTRUCTED     hydrOXYzine (VISTARIL) 25 MG capsule TAKE 1 CAPSULE BY MOUTH 3 TIMES A DAY AS NEEDED FOR ITCHING     lisinopril (PRINIVIL,ZESTRIL) 20 MG tablet TAKE 1 TABLET (20 MG TOTAL) BY MOUTH DAILY.     prednisoLONE acetate (PRED-FORTE) 1 % ophthalmic suspension 1 drop 2 (two) times a day.      propranolol (INDERAL) 20 MG tablet TAKE 1 TABLET (20 MG TOTAL) BY MOUTH 2 (TWO) TIMES A DAY.     SUMAtriptan (IMITREX) 50 MG tablet TAKE 1 TABLET (50 MG TOTAL) BY MOUTH ONCE AS NEEDED FOR MIGRAINE.     zolpidem (AMBIEN) 10 mg tablet TAKE 1 TABLET (10 MG TOTAL) BY MOUTH BEDTIME AS NEEDED.     No Known Allergies     Family and Social History   No family history on file.     Social History   Substance Use Topics     Smoking status: Never Smoker     Smokeless tobacco: Never Used     Alcohol use No        Physical Exam       Physical Exam  /70  " Pulse 60  Ht 5' 4\" (1.626 m)  Wt 162 lb (73.5 kg)  SpO2 97%  BMI 27.81 kg/m2  General appearance: alert, appears stated age, cooperative and no distress  Throat: lips, mucosa, and tongue normal; teeth and gums normal  Neck: no adenopathy, no carotid bruit, no JVD, supple, symmetrical, trachea midline and thyroid not enlarged, symmetric, no tenderness/mass/nodules  Lungs: clear to auscultation bilaterally  Heart: regular rate and rhythm, S1, S2 normal, no murmur, click, rub or gallop  Abdomen: soft, non-tender; bowel sounds normal; no masses,  no organomegaly  Extremities: extremities normal, atraumatic, no cyanosis or edema  Skin: Skin color, texture, turgor normal. No rashes or lesions  Neurologic: Grossly normal     Additional Information        Beka Ashley MD  Internal Medicine  Contact me at [phone]     Additional Information   Current Outpatient Prescriptions   Medication Sig     ALPRAZolam (XANAX) 1 MG tablet TAKE 1 TABLET BY MOUTH 3 TIMES DAILY AS NEEDED     amLODIPine (NORVASC) 10 MG tablet TAKE ONE TABLET BY MOUTH EVERY DAY     citalopram (CELEXA) 40 MG tablet TAKE 1 TABLET DAILY.     citalopram (CELEXA) 40 MG tablet TAKE 1 TABLET DAILY.     gabapentin (NEURONTIN) 300 MG capsule TAKE 2 CAPSULES BY MOUTH 3 TIMES A DAY INCREASE AS INSTRUCTED     hydrOXYzine (VISTARIL) 25 MG capsule TAKE 1 CAPSULE BY MOUTH 3 TIMES A DAY AS NEEDED FOR ITCHING     lisinopril (PRINIVIL,ZESTRIL) 20 MG tablet TAKE 1 TABLET (20 MG TOTAL) BY MOUTH DAILY.     prednisoLONE acetate (PRED-FORTE) 1 % ophthalmic suspension 1 drop 2 (two) times a day.      propranolol (INDERAL) 20 MG tablet TAKE 1 TABLET (20 MG TOTAL) BY MOUTH 2 (TWO) TIMES A DAY.     SUMAtriptan (IMITREX) 50 MG tablet TAKE 1 TABLET (50 MG TOTAL) BY MOUTH ONCE AS NEEDED FOR MIGRAINE.     zolpidem (AMBIEN) 10 mg tablet TAKE 1 TABLET (10 MG TOTAL) BY MOUTH BEDTIME AS NEEDED.     No Known Allergies  Social History   Substance Use Topics     Smoking status: " Never Smoker     Smokeless tobacco: Never Used     Alcohol use No         Time: total time spent with the patient was 25 minutes of which >50% was spent in counseling and coordination of care

## 2021-06-12 NOTE — TELEPHONE ENCOUNTER
Controlled Substance Refill Request  Medication Name:   Requested Prescriptions     Pending Prescriptions Disp Refills     ALPRAZolam (XANAX) 1 MG tablet 60 tablet 0     Sig: TAKE 1 TABLET BY MOUTH TWO TIMES A DAY AS NEEDED FOR ANXIETY     Date Last Fill: 09/08/20  Requested Pharmacy: [facility]  Submit electronically to pharmacy  Controlled Substance Agreement on file:   Encounter-Level CSA Scan Date:    There are no encounter-level csa scan date.        Last office visit:  04/20/20

## 2021-06-13 NOTE — PROGRESS NOTES
Patient presents at the request of Dr. Ashley for bilateral upper extremity EMG.  His bilateral hand numbness and tingling all fingers.  Worse at night for at least the past 6 months.  Left is much worse than right and he is right-handed.    EMG/NCS  results: Please see scanned full report    Comment NCS: Normal study  1.  Normal nerve conduction studies bilateral upper extremities.    Comment EMG: Normal study  1.  Normal needle EMG bilateral upper extremities.    Interpretation: Normal study    1. There is no electrodiagnostic evidence of cervical radiculopathy, brachial plexopathy, or focal neuropathy in the bilateral upper extremities.      Note: Does have decreased range of motion left shoulder in external rotation with a difficult time positioning for ulnar nerve induction study testing secondary to shoulder pain.  Shoulder pain may contribute to his left arm symptoms.   Please correlate clinically.    The testing was completed in its entirety by the physician.       It was our pleasure caring for your patient today, if there any questions or concerns please do not hesitate to contact us.

## 2021-06-13 NOTE — TELEPHONE ENCOUNTER
Controlled Substance Refill Request  Medication Name:   Requested Prescriptions     Pending Prescriptions Disp Refills     ALPRAZolam (XANAX) 1 MG tablet 60 tablet 0     Sig: TAKE 1 TABLET BY MOUTH TWO TIMES A DAY AS NEEDED FOR ANXIETY     Date Last Fill: 10/09/2020  Is patient out of medication?: No, 2 days left  Patient notified refills processed within 3 business days:  Yes  Requested [facility]: [facility]  Submit electronically to pharmacy  Controlled Substance Agreement on file:   Encounter-Level CSA Scan Date:    There are no encounter-level csa scan date.        Last office visit:  04/20/2020

## 2021-06-13 NOTE — TELEPHONE ENCOUNTER
Refill Request  Did you contact pharmacy: Yes  Medication name:   Requested Prescriptions     Pending Prescriptions Disp Refills     traZODone (DESYREL) 50 MG tablet 90 tablet 3     Sig: Take 1 tablet (50 mg total) by mouth at bedtime.     Who prescribed the medication: Beka Ashley MD  Requested [facility]acy[facility]  Is patient out of medication: No.  5 days left  Patient notified refills processed in 3 business days:  yes  Okay to leave a detailed message: no

## 2021-06-13 NOTE — TELEPHONE ENCOUNTER
Controlled Substance Refill Request  Medication Name:   Requested Prescriptions     Pending Prescriptions Disp Refills     ALPRAZolam (XANAX) 1 MG tablet 60 tablet 0     Sig: TAKE 1 TABLET BY MOUTH TWO TIMES A DAY AS NEEDED FOR ANXIETY     Date Last Fill: 11/19/2020  Requested Pharmacy: [facility]  Submit electronically to pharmacy  Controlled Substance Agreement on file:   Encounter-Level CSA Scan Date:    There are no encounter-level csa scan date.        Last office visit:  11/19/2020

## 2021-06-13 NOTE — PROGRESS NOTES
Office Visit - Follow Up   Denis Moreno   67 y.o. male    Date of Visit: 10/11/2017    Chief Complaint   Patient presents with     Follow-up     See EMG results, and report from Ortho MD under [facility] recommended PT         Assessment and Plan   1. Sarcoidosis of lung  Has lung sarcoidosis.  Followed by pulmonary.  Was seen by Dr. Duron in December 2016.  Was advised to see him again in 5 months which was in May or June.  Has not return to see him.  Complains of being tired all the time.  But does not have any respiratory symptoms.  Will refer him back to pulmonary.  - Ambulatory referral to Pulmonology    2. Benign Essential Hypertension  Controlled.  Continue amlodipine, lisinopril and propranolol.  Propranolol is also given for his migraine headaches as prophylaxis.    3. Mild Recurrent Major Depression  Has mild recurrent depression.  Feels down because his neighbor friend committed suicide yesterday.  Apparently has continue metastatic colon cancer and was despondent.  Was 56 years old only.  Citalopram.    4. Anxiety  Next anxiety.  Bothered now by suicide of his neighbor friend.  Continue alprazolam as needed and hydroxyzine scheduled.  Has left shoulder pains which turned out to be    5. Left shoulder pain  Low-grade rotator cuff partial-thickness tear.  Seen and followed by orthopedics.  Was sent for physical therapy after getting subacromial cortisone injection.    6. Need for vaccination for Strep pneumoniae  Due for his pneumovac immunization.  Was given today.  - Pneumococcal polysaccharide vaccine 23-valent 3 yo or older, subq/IM    Follow up in 4 months.     History of Present Illness   This 67 y.o. old male is here for follow-up.  Complains of being fatigued.  Attributes this to his lung sarcoidosis.  Does not have any other pulmonary symptoms.  Followed by pulmonary.  Was supposed to see him in May or June but failed his appointment.  Has hypertension controlled by combination of amlodipine,  lisinopril and propranolol.  Propranolol is old given for his migraine headaches as prophylaxis.  Takes Imitrex as needed when he gets migraines.  Has major depression.  Now aggravated by suicide of his neighbor friend who got despondent due to his metastatic colon cancer.  Only happened yesterday.  Takes citalopram.  Also takes hydroxyzine daily and alprazolam as needed for his anxiety.  Unable to sleep due to insomnia now aggravated by 3 side of his friend.  Takes Ambien.  Has recurring left shoulder pains.  Was referred to orthopedics.  Found out he has low-grade partial-thickness tear of the rotator cuff.  Got subacromial steroid injection was sent to physical therapy.  Also had intermittent numbness of his hands.  EMG however was negative.  Overall feels fine except for his sad mood.    Review of Systems   A 12 point comprehensive review of systems was negative except as noted..     Medications, Allergies and Problem List   Reviewed and updated             Chief Complaint   Follow-up (See EMG results, and report from Ortho MD under [facility] recommended PT )       Patient Profile   Social History     Social History Narrative        Past Medical History   Patient Active Problem List   Diagnosis     Hyperlipidemia     Mild Recurrent Major Depression     Male Erectile Disorder     Benign Essential Hypertension     Insomnia     Anxiety     Deviated nasal septum     Hilar adenopathy     Uveitis     Sarcoidosis of lung       Past Surgical History  He has a past surgical history that includes Bronchoscopy (12/20/2016).       Medications and Allergies   Current Outpatient Prescriptions   Medication Sig     ALPRAZolam (XANAX) 1 MG tablet TAKE 1 TABLET BY MOUTH 3 TIMES DAILY AS NEEDED     amLODIPine (NORVASC) 10 MG tablet TAKE ONE TABLET BY MOUTH EVERY DAY     citalopram (CELEXA) 40 MG tablet TAKE 1 TABLET DAILY.     diazePAM (VALIUM) 5 mg/5 mL (1 mg/mL) solution      gabapentin (NEURONTIN) 300 MG capsule TAKE 2  "CAPSULES BY MOUTH 3 TIMES A DAY INCREASE AS INSTRUCTED     hydrOXYzine (VISTARIL) 25 MG capsule TAKE 1 CAPSULE BY MOUTH 3 TIMES A DAY AS NEEDED FOR ITCHING     lisinopril (PRINIVIL,ZESTRIL) 20 MG tablet TAKE 1 TABLET (20 MG TOTAL) BY MOUTH DAILY.     prednisoLONE acetate (PRED-FORTE) 1 % ophthalmic suspension 1 drop 2 (two) times a day.      propranolol (INDERAL) 20 MG tablet TAKE 1 TABLET (20 MG TOTAL) BY MOUTH 2 (TWO) TIMES A DAY.     SUMAtriptan (IMITREX) 50 MG tablet TAKE 1 TABLET (50 MG TOTAL) BY MOUTH ONCE AS NEEDED FOR MIGRAINE.     zolpidem (AMBIEN) 10 mg tablet TAKE 1 TABLET (10 MG TOTAL) BY MOUTH BEDTIME AS NEEDED.     No Known Allergies     Family and Social History   No family history on file.     Social History   Substance Use Topics     Smoking status: Never Smoker     Smokeless tobacco: Never Used     Alcohol use No        Physical Exam       Physical Exam  /70 (Patient Site: Left Arm, Patient Position: Sitting, Cuff Size: Adult Regular)  Pulse 66  Ht 5' 4\" (1.626 m)  Wt 158 lb (71.7 kg)  SpO2 97%  BMI 27.12 kg/m2  General appearance: alert, appears stated age, cooperative and no distress  Head: Normocephalic, without obvious abnormality, atraumatic  Throat: lips, mucosa, and tongue normal; teeth and gums normal  Neck: no adenopathy, no carotid bruit, no JVD, supple, symmetrical, trachea midline and thyroid not enlarged, symmetric, no tenderness/mass/nodules  Lungs: clear to auscultation bilaterally  Heart: regular rate and rhythm, S1, S2 normal, no murmur, click, rub or gallop  Abdomen: soft, non-tender; bowel sounds normal; no masses,  no organomegaly  Extremities: extremities normal, atraumatic, no cyanosis or edema  Skin: Skin color, texture, turgor normal. No rashes or lesions  Neurologic: Grossly normal  Psychiatric: Sad affect, mood     Additional Information        Beka Ashley MD  Internal Medicine  Contact me at [phone]     Additional Information   Current Outpatient " Prescriptions   Medication Sig     ALPRAZolam (XANAX) 1 MG tablet TAKE 1 TABLET BY MOUTH 3 TIMES DAILY AS NEEDED     amLODIPine (NORVASC) 10 MG tablet TAKE ONE TABLET BY MOUTH EVERY DAY     citalopram (CELEXA) 40 MG tablet TAKE 1 TABLET DAILY.     diazePAM (VALIUM) 5 mg/5 mL (1 mg/mL) solution      gabapentin (NEURONTIN) 300 MG capsule TAKE 2 CAPSULES BY MOUTH 3 TIMES A DAY INCREASE AS INSTRUCTED     hydrOXYzine (VISTARIL) 25 MG capsule TAKE 1 CAPSULE BY MOUTH 3 TIMES A DAY AS NEEDED FOR ITCHING     lisinopril (PRINIVIL,ZESTRIL) 20 MG tablet TAKE 1 TABLET (20 MG TOTAL) BY MOUTH DAILY.     prednisoLONE acetate (PRED-FORTE) 1 % ophthalmic suspension 1 drop 2 (two) times a day.      propranolol (INDERAL) 20 MG tablet TAKE 1 TABLET (20 MG TOTAL) BY MOUTH 2 (TWO) TIMES A DAY.     SUMAtriptan (IMITREX) 50 MG tablet TAKE 1 TABLET (50 MG TOTAL) BY MOUTH ONCE AS NEEDED FOR MIGRAINE.     zolpidem (AMBIEN) 10 mg tablet TAKE 1 TABLET (10 MG TOTAL) BY MOUTH BEDTIME AS NEEDED.     No Known Allergies  Social History   Substance Use Topics     Smoking status: Never Smoker     Smokeless tobacco: Never Used     Alcohol use No         Time: total time spent with the patient was 25 minutes of which >50% was spent in counseling and coordination of care

## 2021-06-13 NOTE — PROGRESS NOTES
Pulmonary Clinic Follow-up Visit    Assessment and Plan:  Denis Moreno is a 67 y.o. male never smoker with a history of HLD, chronic headaches, and recently diagnosed low-grade chronic bilateral uveitis, presenting for follow-up of mediastinal and hilar lymphadenopathy and bilateral pulmonary nodules/infiltrates after EBUS-FNA confirmed a diagnosis of pulmonary sarcoidosis.      Sarcoidosis: Pulmonary and ocular involvement, with chronic bilateral uveitis, and mediastinal/hilar lymphadenopathy with pulmonary nodules but normal functional status and normal PFTs.  Lab tests for screening have shown no concerning extrapulmonary findings aside from his chronic bilateral uveitis and 12-lead ECG unremarkable aside from possible mild LVH.  He remains quite fatigued, though PFTs are unremarkable.  I am reluctant to start him on systemic corticosteroids for fatigue alone given its lack of specificity, especially with preserved lung function, though it certainly could be due to his sarcoidosis.  DDx includes SAPNA, medication effect (eg gabapentin), other cause; Hgb is normal and 1,25-dihydroxyvitamin D is elevated.  Will start with repeat chest CT, TTE, and encourage regular exercise.  I will call him with the results.  Could also consider polysomnography, though he states that his nephew who stays with him has not noted snoring, though the patient does nap a couple of times per week.     Plan:  - repeat chest CT  - TTE  - I will contact him with the results  - could consider trial of prednisone for fatigue depending on results and refractoriness of his fatigue  - also consider polysomnography to rule out SAPNA  - encouraged him to call any time with questions or concerning symptoms     I appreciate the opportunity to participate in the care of Mr. Moreno.  Please feel free to contact me at any time.     CCx: pulmonary and ocular sarcoidosis    HPI: Denis Moreno is a 67 y.o. male never smoker with a history of HLD, chronic  headaches, and recently diagnosed low-grade chronic bilateral uveitis, presenting for follow-up of mediastinal and hilar lymphadenopathy and bilateral pulmonary nodules/infiltrates after EBUS-FNA confirmed a diagnosis of pulmonary sarcoidosis.  He has persistent daytime fatigue making it difficult to work at times.  Nephew who stays with him does not note snoring by his report.  Naps a couple of times per week.  With cold air ha mild wheezing and walking long distances leads to some dyspnea.  He is on gabapentin.    ROS:  A 12-system review was obtained and was negative with the exception of the symptoms endorsed in the history of present illness.    PMH:  Sarcoidosis - pulmonary and ocular as above  Deviated septum  HLD  Depression/anxiety  HTN  Insomnia  Chronic headaches    PSH:  Past Surgical History:   Procedure Laterality Date     BRONCHOSCOPY  12/20/2016    ebus       Allergies:  No Known Allergies    Family HX:  No family history on file.    Social Hx:  Social History     Social History     Marital status: Single     Spouse name: N/A     Number of children: N/A     Years of education: N/A     Occupational History     Not on file.     Social History Main Topics     Smoking status: Never Smoker     Smokeless tobacco: Never Used     Alcohol use No     Drug use: No     Sexual activity: Not on file     Other Topics Concern     Not on file     Social History Narrative       Current Meds:  Current Outpatient Prescriptions   Medication Sig Dispense Refill     ALPRAZolam (XANAX) 1 MG tablet TAKE 1 TABLET BY MOUTH 3 TIMES DAILY AS NEEDED 30 tablet 0     amLODIPine (NORVASC) 10 MG tablet TAKE ONE TABLET BY MOUTH EVERY DAY 90 tablet 2     citalopram (CELEXA) 40 MG tablet TAKE 1 TABLET DAILY. 90 tablet 1     diazePAM (VALIUM) 5 mg/5 mL (1 mg/mL) solution        gabapentin (NEURONTIN) 300 MG capsule TAKE 2 CAPSULES BY MOUTH 3 TIMES A DAY INCREASE AS INSTRUCTED  5     hydrOXYzine (VISTARIL) 25 MG capsule TAKE 1 CAPSULE BY  "MOUTH 3 TIMES A DAY AS NEEDED FOR ITCHING 270 capsule 1     lisinopril (PRINIVIL,ZESTRIL) 20 MG tablet TAKE 1 TABLET (20 MG TOTAL) BY MOUTH DAILY. 90 tablet 3     prednisoLONE acetate (PRED-FORTE) 1 % ophthalmic suspension 1 drop 2 (two) times a day.        propranolol (INDERAL) 20 MG tablet TAKE 1 TABLET (20 MG TOTAL) BY MOUTH 2 (TWO) TIMES A DAY. 60 tablet 3     SUMAtriptan (IMITREX) 50 MG tablet TAKE 1 TABLET (50 MG TOTAL) BY MOUTH ONCE AS NEEDED FOR MIGRAINE. 30 tablet 0     zolpidem (AMBIEN) 10 mg tablet TAKE 1 TABLET (10 MG TOTAL) BY MOUTH BEDTIME AS NEEDED. 30 tablet 0     No current facility-administered medications for this visit.        Physical Exam:  /74  Pulse 80  Resp 17  Wt 159 lb (72.1 kg)  SpO2 99% Comment: RA  BMI 27.29 kg/m2  Gen: alert, oriented, no distress  HEENT: nasal turbinates are unremarkable, no oropharyngeal lesions, no cervical or supraclavicular lymphadenopathy  CV: RRR, no M/G/R  Resp: CTAB, no focal crackles or wheezes  Abd: soft, nontender, no palpable organomegaly  Skin: no apparent rashes  Ext: no cyanosis, clubbing or edema  Neuro: alert, nonfocal    Imaging studies:  CT chest (11/25/16)  - personally reviewed  - mediastinal and bilateral hilar lymphadenopathy with partial calcification  - multiple bilateral fluffy nodules (\"cotton ball\")  - peribronchovascular opacity sue. LLL      Brain MRI (July 2016)  - mild atrophy  - minimal to mild SVID      PFT's  November 2016:  FEV1/FVC is 72% and is normal.  FEV1 is 2.49L or 113% predicted and is normal.  FVC is 3.47L or 124% predicted and normal.  There was no improvement in spirometry after a single inhaled dose of bronchodilator.  TLC is 5.13L or 107% predicted and is normal.  RV is 1.54L or 77% predicted and is reduced.  DLCO is 17.61 ml/min/mmHg or 88% predicted and is normal when it   is corrected for hemoglobin.    10/16/17:  FEV1/FVC is 68 and is normal (less than 0.7 but greater than the demographically adjusted " lower limit of normal).  FEV1 is 122% predicted and is normal.  FVC is 140% predicted and normal.     DLCO is 80% predicted and is normal when it   is corrected for hemoglobin.    Vipin Duron MD  Pulmonary and Critical Care Medicine  [facility]  Cell [phone]  Office [phone]  Pager [phone]

## 2021-06-13 NOTE — TELEPHONE ENCOUNTER
Refill Approved    Rx renewed per Medication Renewal Policy. Medication was last renewed on 3/19/20, last OV 4/2020 .    Khalida Gann, [facility] Triage/Med Refill 11/15/2020     Requested Prescriptions   Pending Prescriptions Disp Refills     traZODone (DESYREL) 50 MG tablet 90 tablet 3     Sig: Take 1 tablet (50 mg total) by mouth at bedtime.       Tricyclics/Misc Antidepressant/Antianxiety Meds Refill Protocol Passed - 11/10/2020  4:32 PM        Passed - PCP or prescribing provider visit in last year     Last office visit with prescriber/PCP: 2/11/2020 Beka Ashley MD OR same dept: 2/11/2020 Beka Ashley MD OR same specialty: 2/11/2020 Beka Ashley MD  Last physical: Visit date not found Last MTM visit: Visit date not found   Next visit within 3 mo: Visit date not found  Next physical within 3 mo: Visit date not found  Prescriber OR PCP: Beka Ashley MD  Last diagnosis associated with med order: 1. Insomnia  - traZODone (DESYREL) 50 MG tablet; Take 1 tablet (50 mg total) by mouth at bedtime.  Dispense: 90 tablet; Refill: 3    If protocol passes may refill for 12 months if within 3 months of last provider visit (or a total of 15 months).

## 2021-06-13 NOTE — PROGRESS NOTES
Office Visit - Follow Up   Denis Moreno   70 y.o. male    Date of Visit: 11/19/2020    Chief Complaint   Patient presents with     Follow-up     blood pressure, sciatica right side        Assessment and Plan   1. Chronic right-sided low back pain without sciatica  Complains of right sided low back pains without radiations to his right leg. Pains come and go. No weakness and numbness of his right leg. Responded to physical therapy for his right shoulder pains in the past. Will refer him to physical therapy.   - Ambulatory referral to Adult PT- Internal    2. Mixed hyperlipidemia  Suspect his lipids will be increased since he started keto diet for weight loss. Lipids have not been checked for awhile. Advised will check his fasting lipids next visit.     3. Sarcoidosis of lung (H)  Now in remission. Followed by pulmonary, Dr. Duron. Saw him in September. Was advised to continue with his low dose prednisone and to follow up in a year. Has yearly eye exam. Last eye exam was normal.     4. Benign Essential Hypertension  Controlled. Blood pressure is much better after he lost weight with keto diet. Continue alprazolam, lisinopril and propranolol (this med is also a prophylaxis for his migraines).     5. Mild Recurrent Major Depression  Now in remission. Today's PHQ 9 is only 6. Continue citalopram.     6. Anxiety  Gets anxious readily. Anxiety is coming more often due to taking care of his family and the pandemic. Takes alprazolam. Okay to get refill.   - ALPRAZolam (XANAX) 1 MG tablet; TAKE 1 TABLET BY MOUTH TWO TIMES A DAY AS NEEDED FOR ANXIETY  Dispense: 60 tablet; Refill: 0      Follow up in 6 weeks for his physical exam.      History of Present Illness   This 70 y.o. old male is here for follow up. Complains of recurring right sided low back pains which started 3 months ago. Pains are localized on the right lower back without radiation to his right leg. Denies leg weakness and numbness. Had some right shoulder  pains in the past which responded to physical therapy. Wants to try physical therapy for his back pains. Has lost weight from keto diet. Lost 14 lbs on our scale and 20 lbs on his scale at home. Has hyperlipidemia and he needs recheck of his lipids because of his keto diet. Has hypertension controlled by his meds and much better controlled with his weight loss. Has depression in remission with citalopram. Has recurring anxiety brought about by family matters and the pandemic. Takes alprazolam. Has lung sarcoidosis without flare with low dose prednisone. Followed by pulmonary. Was just seen in September and was told he was doing well.      Review of Systems   A 12 point comprehensive review of systems was negative except as noted..     Medications, Allergies and Problem List   Reviewed and updated             Chief Complaint   Follow-up (blood pressure, sciatica right side)       Patient Profile   Social History     Social History Narrative     Not on file        Past Medical History   Patient Active Problem List   Diagnosis     Mixed hyperlipidemia     Mild Recurrent Major Depression     Male Erectile Disorder     Benign Essential Hypertension     Insomnia     Anxiety     Deviated nasal septum     Hilar adenopathy     Uveitis     Sarcoidosis of lung (H)       Past Surgical History  He has a past surgical history that includes Bronchoscopy (12/20/2016).       Medications and Allergies   Current Outpatient Medications   Medication Sig     ALPRAZolam (XANAX) 1 MG tablet TAKE 1 TABLET BY MOUTH TWO TIMES A DAY AS NEEDED FOR ANXIETY     amLODIPine (NORVASC) 10 MG tablet TAKE 1 TABLET BY MOUTH EVERY DAY     benzonatate (TESSALON) 100 MG capsule TAKE ONE CAPSULE BY MOUTH EVERY 6 HOURS AS NEEDED FOR COUGH     cholecalciferol, vitamin D3, 125 mcg (5,000 unit) capsule Take 1 capsule (5,000 Units total) by mouth daily.     citalopram (CELEXA) 40 MG tablet TAKE 1 TABLET DAILY.     gabapentin (NEURONTIN) 300 MG capsule TAKE 2  "CAPSULES BY MOUTH 3 TIMES A DAY INCREASE AS INSTRUCTED     hydrOXYzine HCL (ATARAX) 10 MG tablet Take 1 tablet (10 mg total) by mouth 3 (three) times a day as needed for itching.     lisinopriL (PRINIVIL,ZESTRIL) 20 MG tablet TAKE 1 TABLET BY MOUTH EVERY DAY     omeprazole (PRILOSEC) 20 MG capsule Take 1 capsule (20 mg total) by mouth daily before breakfast.     predniSONE (DELTASONE) 1 MG tablet Take 4 tabs (4 mg) daily x 10 days, then 3 tabs (3 mg) daily x 10 days, then 2 tabs (2 mg) daily x 10 days, then 1 tab (1 mg) daily x 10 days.. (Patient taking differently: Take 0.5 mg by mouth daily Take 4 tabs (4 mg) daily x 10 days, then 3 tabs (3 mg) daily x 10 days, then 2 tabs (2 mg) daily x 10 days, then 1 tab (1 mg) daily x 10 days..)     propranolol (INDERAL) 20 MG tablet TAKE 1 TABLET (20 MG TOTAL) BY MOUTH 2 (TWO) TIMES A DAY.     SUMAtriptan (IMITREX) 50 MG tablet TAKE 1 TABLET (50 MG TOTAL) BY MOUTH ONCE AS NEEDED FOR MIGRAINE.     traZODone (DESYREL) 50 MG tablet Take 1 tablet (50 mg total) by mouth at bedtime.     prednisoLONE acetate (PRED-FORTE) 1 % ophthalmic suspension Administer 1 drop to both eyes 2 (two) times a day.     No Known Allergies     Family and Social History   No family history on file.     Social History     Tobacco Use     Smoking status: Never Smoker     Smokeless tobacco: Never Used   Substance Use Topics     Alcohol use: No     Drug use: No        Physical Exam       Physical Exam  /60 (Patient Site: Right Arm, Patient Position: Sitting, Cuff Size: Adult Regular)   Pulse 97   Ht 5' 4\" (1.626 m)   Wt 140 lb (63.5 kg)   SpO2 98%   BMI 24.03 kg/m    General appearance: alert, appears stated age, cooperative and no distress  Head: Normocephalic, without obvious abnormality, atraumatic  Eyes: conjunctivae/corneas clear. PERRL, EOM's intact. Fundi benign.  Throat: lips, mucosa, and tongue normal; teeth and gums normal  Neck: no adenopathy, no carotid bruit, no JVD, supple, " symmetrical, trachea midline and thyroid not enlarged, symmetric, no tenderness/mass/nodules  Lungs: clear to auscultation bilaterally  Heart: regular rate and rhythm, S1, S2 normal, no murmur, click, rub or gallop  Abdomen: soft, non-tender; bowel sounds normal; no masses,  no organomegaly  Extremities: extremities normal, atraumatic, no cyanosis or edema  Skin: Skin color, texture, turgor normal. No rashes or lesions     Additional Information   Post Discharge Medication Reconciliation Status: discharge medications reconciled, continue medications without change      Beka Ashley MD  Internal Medicine  Contact me at [phone]     Additional Information   Current Outpatient Medications   Medication Sig     ALPRAZolam (XANAX) 1 MG tablet TAKE 1 TABLET BY MOUTH TWO TIMES A DAY AS NEEDED FOR ANXIETY     amLODIPine (NORVASC) 10 MG tablet TAKE 1 TABLET BY MOUTH EVERY DAY     benzonatate (TESSALON) 100 MG capsule TAKE ONE CAPSULE BY MOUTH EVERY 6 HOURS AS NEEDED FOR COUGH     cholecalciferol, vitamin D3, 125 mcg (5,000 unit) capsule Take 1 capsule (5,000 Units total) by mouth daily.     citalopram (CELEXA) 40 MG tablet TAKE 1 TABLET DAILY.     gabapentin (NEURONTIN) 300 MG capsule TAKE 2 CAPSULES BY MOUTH 3 TIMES A DAY INCREASE AS INSTRUCTED     hydrOXYzine HCL (ATARAX) 10 MG tablet Take 1 tablet (10 mg total) by mouth 3 (three) times a day as needed for itching.     lisinopriL (PRINIVIL,ZESTRIL) 20 MG tablet TAKE 1 TABLET BY MOUTH EVERY DAY     omeprazole (PRILOSEC) 20 MG capsule Take 1 capsule (20 mg total) by mouth daily before breakfast.     predniSONE (DELTASONE) 1 MG tablet Take 4 tabs (4 mg) daily x 10 days, then 3 tabs (3 mg) daily x 10 days, then 2 tabs (2 mg) daily x 10 days, then 1 tab (1 mg) daily x 10 days.. (Patient taking differently: Take 0.5 mg by mouth daily Take 4 tabs (4 mg) daily x 10 days, then 3 tabs (3 mg) daily x 10 days, then 2 tabs (2 mg) daily x 10 days, then 1 tab (1 mg) daily x 10  days..)     propranolol (INDERAL) 20 MG tablet TAKE 1 TABLET (20 MG TOTAL) BY MOUTH 2 (TWO) TIMES A DAY.     SUMAtriptan (IMITREX) 50 MG tablet TAKE 1 TABLET (50 MG TOTAL) BY MOUTH ONCE AS NEEDED FOR MIGRAINE.     traZODone (DESYREL) 50 MG tablet Take 1 tablet (50 mg total) by mouth at bedtime.     prednisoLONE acetate (PRED-FORTE) 1 % ophthalmic suspension Administer 1 drop to both eyes 2 (two) times a day.     No Known Allergies  Social History     Tobacco Use     Smoking status: Never Smoker     Smokeless tobacco: Never Used   Substance Use Topics     Alcohol use: No     Drug use: No         Time: total time spent with the patient was 25 minutes of which >50% was spent in counseling and coordination of care

## 2021-06-13 NOTE — TELEPHONE ENCOUNTER
Refill Request  Did you contact pharmacy: No  Medication name:   Alprazolam (xanax) 1 mg tablet  Sig: take 1 tab by mouth two times a day as needed for anxiety    Who prescribed the medication: Dr. Ashley  [facility]ed [facility]  Is patient out of medication: no   Patient notified refills processed in 3 business days:  yes  Okay to leave a detailed message: yes

## 2021-06-13 NOTE — PROGRESS NOTES
Pt here for a pre spirometry and DLCO only.  HGB drawn and was 14.6.  Best efforts saved and test does meet ATS criteria.

## 2021-06-14 NOTE — TELEPHONE ENCOUNTER
Refill Approved    Rx renewed per Medication Renewal Policy. Medication was last renewed on 2/19/2020 for 90 tablets with 3 refills.     Last Office Visit: 1/5/2021 with PCP.     Macrina Stringer, [facility] [place] Connection Triage/Med Refill 1/12/2021     Requested Prescriptions   Pending Prescriptions Disp Refills     amLODIPine (NORVASC) 10 MG tablet [Pharmacy Med Name: AMLODIPINE BESYLATE 10 MG TAB] 90 tablet 2     Sig: TAKE 1 TABLET BY MOUTH EVERY DAY       Calcium-Channel Blockers Protocol Passed - 1/12/2021  2:57 PM        Passed - PCP or prescribing provider visit in past 12 months or next 3 months     Last office visit with prescriber/PCP: 1/5/2021 Beka Ashley MD OR same dept: 1/5/2021 Beka Ashley MD OR same specialty: 1/5/2021 Beka Ashley MD  Last physical: Visit date not found Last MTM visit: Visit date not found   Next visit within 3 mo: Visit date not found  Next physical within 3 mo: Visit date not found  Prescriber OR PCP: Beka Ashley MD  Last diagnosis associated with med order: 1. Hypertension  - amLODIPine (NORVASC) 10 MG tablet [Pharmacy Med Name: AMLODIPINE BESYLATE 10 MG TAB]; TAKE 1 TABLET BY MOUTH EVERY DAY  Dispense: 90 tablet; Refill: 2    If protocol passes may refill for 12 months if within 3 months of last provider visit (or a total of 15 months).             Passed - Blood pressure filed in past 12 months     BP Readings from Last 1 Encounters:   01/05/21 112/60

## 2021-06-14 NOTE — TELEPHONE ENCOUNTER
Reason for Call:  Medication    Do you use a [facility]?  Name of the pharmacy and phone number for the current request: [facility]/pharmacy #[id number] - [place], [place] - [address] AT [facility]  [phone]    Name of the medication requested: ALPRAZolam (XANAX) 1 MG tablet    Other request: Pt states Dr. Ashley increase his Alprazolam from 2 pills a day to 3 pills a day. However, he did change the quantity on the prescribe - it's still at 60 tablet. Pt is requesting Dr. Ashley to increase the quantity so he does not run out early.     Can we leave a detailed message on this number? Yes    Phone number patient can be reached at:   Cell number on file:    Telephone Information:   Mobile [phone]       Best Time: anytime    Call taken on 12/22/2020 at 1:25 PM by Ivett Nicolas

## 2021-06-14 NOTE — TELEPHONE ENCOUNTER
Reason for Call:  Medication or medication refill:    Do you use a [facility]?  Name of the pharmacy and phone number for the current request[facility] on [facility]    Name of the medication requested: Alprazolam 1 mg    Other request: Patient calling this mornign to report that pharmacy only filled 60 tablets at last .  Patient has now run out of meds and will need new Rx sent to pharmacy as he has run out completely at this time.    Can we leave a detailed message on this number? Yes    Phone number patient can be reached at: Home number on file [phone] (home)    Best Time: Any time    Call taken on 1/12/2021 at 8:33 AM by Francisco Duke

## 2021-06-14 NOTE — TELEPHONE ENCOUNTER
Prescription Monitoring Program activity reviewed with no discrepancies noted.      Last fill per : 12/9/21  Quantity/days supply: 60     Last office visit with provider:  1/5/2021 Beka Ashley MD    Please advise.

## 2021-06-14 NOTE — PROGRESS NOTES
Office Visit - Follow Up   Denis Moreno   70 y.o. male    Date of Visit: 1/5/2021    Chief Complaint   Patient presents with     Follow Up     patient states that Dr. Ashley wanted him to come in.         Assessment and Plan   1. Mixed hyperlipidemia  Not taking med yet. Last lipids were still okay. Due for fasting lipids. Will come back next week for labs only,   - Lipid Cascade; Future  - Hepatic Profile; Future    2. Benign Essential Hypertension  Controlled. Continue amlodipine, propranolol and lisinopril.   - HM2(CBC w/o Differential); Future  - Basic Metabolic Panel; Future    3. Primary osteoarthritis of both shoulders  Has aches and pains due to osteoarthritis. Has chronic back pains. Was supposed to get physical therapy but was not able to go.     4. Sarcoidosis of lung (H)  Stable. Followed by pulmonary. Had virtual visit with pulmonary in September. Takes low dose prednisone 5 mg daily. Advised follow up with Dr. Vipin Duron in a year.     5. Adjustment insomnia  Sleeping well now with trazodone.     Has some anxiety and takes alprazolam as needed. Has depression now in remission by citalopram.     Will come back for fasting labs next week.     Follow up in 4 months for his physical exam.      History of Present Illness   This 70 y.o. old male is here for followup. Has hypertension controlled by amlodipine, propranolol and lisinopril. Propranolol also helps control his headaches. Has anxiety and takes alprazolam as needed. Has depression now controlled by citalopram. Has hyperlipidemia but not taking a lipid lowering medication yet. Last lipids were good. Due for his fasting lipids. Has lung sarcoidosis which is in remission. Takes low dose prednisone. Followed by pulmonary, Dr. Vipin Duron yearly. Had back and shoulder pains due to osteoarthritis. Was referred to his physical therapy for his back and shoulder pains. But was not able to go. Overall, reports he feels well.      Review of Systems   A  12 point comprehensive review of systems was negative except as noted..     Medications, Allergies and Problem List   Reviewed and updated             Chief Complaint   Follow Up (patient states that Dr. Ashley wanted him to come in. )       Patient Profile   Social History     Social History Narrative     Not on file        Past Medical History   Patient Active Problem List   Diagnosis     Mixed hyperlipidemia     Mild Recurrent Major Depression     Male Erectile Disorder     Benign Essential Hypertension     Insomnia     Anxiety     Deviated nasal septum     Hilar adenopathy     Uveitis     Sarcoidosis of lung (H)       Past Surgical History  He has a past surgical history that includes Bronchoscopy (12/20/2016).       Medications and Allergies   Current Outpatient Medications   Medication Sig     ALPRAZolam (XANAX) 1 MG tablet TAKE 1 TABLET BY MOUTH THREE TIMES A DAY AS NEEDED FOR ANXIETY     amLODIPine (NORVASC) 10 MG tablet TAKE 1 TABLET BY MOUTH EVERY DAY     benzonatate (TESSALON) 100 MG capsule TAKE ONE CAPSULE BY MOUTH EVERY 6 HOURS AS NEEDED FOR COUGH     cholecalciferol, vitamin D3, 125 mcg (5,000 unit) capsule Take 1 capsule (5,000 Units total) by mouth daily.     citalopram (CELEXA) 40 MG tablet TAKE 1 TABLET DAILY.     gabapentin (NEURONTIN) 300 MG capsule TAKE 2 CAPSULES BY MOUTH 3 TIMES A DAY INCREASE AS INSTRUCTED     hydrOXYzine HCL (ATARAX) 10 MG tablet Take 1 tablet (10 mg total) by mouth 3 (three) times a day as needed for itching.     lisinopriL (PRINIVIL,ZESTRIL) 20 MG tablet TAKE 1 TABLET BY MOUTH EVERY DAY     omeprazole (PRILOSEC) 20 MG capsule Take 1 capsule (20 mg total) by mouth daily before breakfast.     predniSONE (DELTASONE) 1 MG tablet Take 4 tabs (4 mg) daily x 10 days, then 3 tabs (3 mg) daily x 10 days, then 2 tabs (2 mg) daily x 10 days, then 1 tab (1 mg) daily x 10 days.. (Patient taking differently: Take 0.5 mg by mouth daily Take 4 tabs (4 mg) daily x 10 days, then 3 tabs (3  "mg) daily x 10 days, then 2 tabs (2 mg) daily x 10 days, then 1 tab (1 mg) daily x 10 days..)     propranolol (INDERAL) 20 MG tablet TAKE 1 TABLET (20 MG TOTAL) BY MOUTH 2 (TWO) TIMES A DAY.     SUMAtriptan (IMITREX) 50 MG tablet TAKE 1 TABLET (50 MG TOTAL) BY MOUTH ONCE AS NEEDED FOR MIGRAINE.     traZODone (DESYREL) 50 MG tablet Take 1 tablet (50 mg total) by mouth at bedtime.     prednisoLONE acetate (PRED-FORTE) 1 % ophthalmic suspension Administer 1 drop to both eyes 2 (two) times a day.     No Known Allergies     Family and Social History   No family history on file.     Social History     Tobacco Use     Smoking status: Never Smoker     Smokeless tobacco: Never Used   Substance Use Topics     Alcohol use: No     Drug use: No        Physical Exam       Physical Exam  /60   Pulse 92   Ht 5' 4\" (1.626 m)   Wt 141 lb (64 kg)   SpO2 98%   BMI 24.20 kg/m    General appearance: alert, appears stated age, cooperative and no distress  Head: Normocephalic, without obvious abnormality, atraumatic  Neck: no adenopathy, no carotid bruit, no JVD, supple, symmetrical, trachea midline and thyroid not enlarged, symmetric, no tenderness/mass/nodules  Lungs: clear to auscultation bilaterally  Heart: regular rate and rhythm, S1, S2 normal, no murmur, click, rub or gallop  Abdomen: soft, non-tender; bowel sounds normal; no masses,  no organomegaly  Extremities: extremities normal, atraumatic, no cyanosis or edema  Skin: Skin color, texture, turgor normal. No rashes or lesions     Additional Information   Post Discharge Medication Reconciliation Status: discharge medications reconciled, continue medications without change      Beka Ashley MD  Internal Medicine  Contact me at [phone]     Additional Information   Current Outpatient Medications   Medication Sig     ALPRAZolam (XANAX) 1 MG tablet TAKE 1 TABLET BY MOUTH THREE TIMES A DAY AS NEEDED FOR ANXIETY     amLODIPine (NORVASC) 10 MG tablet TAKE 1 TABLET BY " MOUTH EVERY DAY     benzonatate (TESSALON) 100 MG capsule TAKE ONE CAPSULE BY MOUTH EVERY 6 HOURS AS NEEDED FOR COUGH     cholecalciferol, vitamin D3, 125 mcg (5,000 unit) capsule Take 1 capsule (5,000 Units total) by mouth daily.     citalopram (CELEXA) 40 MG tablet TAKE 1 TABLET DAILY.     gabapentin (NEURONTIN) 300 MG capsule TAKE 2 CAPSULES BY MOUTH 3 TIMES A DAY INCREASE AS INSTRUCTED     hydrOXYzine HCL (ATARAX) 10 MG tablet Take 1 tablet (10 mg total) by mouth 3 (three) times a day as needed for itching.     lisinopriL (PRINIVIL,ZESTRIL) 20 MG tablet TAKE 1 TABLET BY MOUTH EVERY DAY     omeprazole (PRILOSEC) 20 MG capsule Take 1 capsule (20 mg total) by mouth daily before breakfast.     predniSONE (DELTASONE) 1 MG tablet Take 4 tabs (4 mg) daily x 10 days, then 3 tabs (3 mg) daily x 10 days, then 2 tabs (2 mg) daily x 10 days, then 1 tab (1 mg) daily x 10 days.. (Patient taking differently: Take 0.5 mg by mouth daily Take 4 tabs (4 mg) daily x 10 days, then 3 tabs (3 mg) daily x 10 days, then 2 tabs (2 mg) daily x 10 days, then 1 tab (1 mg) daily x 10 days..)     propranolol (INDERAL) 20 MG tablet TAKE 1 TABLET (20 MG TOTAL) BY MOUTH 2 (TWO) TIMES A DAY.     SUMAtriptan (IMITREX) 50 MG tablet TAKE 1 TABLET (50 MG TOTAL) BY MOUTH ONCE AS NEEDED FOR MIGRAINE.     traZODone (DESYREL) 50 MG tablet Take 1 tablet (50 mg total) by mouth at bedtime.     prednisoLONE acetate (PRED-FORTE) 1 % ophthalmic suspension Administer 1 drop to both eyes 2 (two) times a day.     No Known Allergies  Social History     Tobacco Use     Smoking status: Never Smoker     Smokeless tobacco: Never Used   Substance Use Topics     Alcohol use: No     Drug use: No         Time: total time spent with the patient was 25 minutes of which >50% was spent in counseling and coordination of care

## 2021-06-15 PROBLEM — D86.0 SARCOIDOSIS OF LUNG (H): Status: ACTIVE | Noted: 2017-01-09

## 2021-06-15 NOTE — PROGRESS NOTES
Office Visit - Follow Up   Denis Moreno   67 y.o. male    Date of Visit: 1/15/2018    Chief Complaint   Patient presents with     bowel concerns     Complains of concerns with bowels, increased anxiety. Complains of constipation and diarrhea.         Assessment and Plan   1. Constipation  Complains of constipation for several weeks now.  Started after he change his diet  to lose weight.  He cut down on his sugar and carbohydrates.  Since then he has been constipated.  Takes about 2 days before he can move his bowels.  Was prescribed over the phone with a MiraLAX and this seems to have helped a little bit.  Had a good bowel movement this morning.  Has lost 5 pounds since last visit because of the change of his diet.  Wants to take something else than MiraLAX.  Will give him mineral oil liquid.  Will also refer him to our dietitian to help him avoid constipation while.  Changing his diet  - mineral oil liquid; Take 30 mL by mouth daily as needed for constipation.  Dispense: 180 mL; Refill: 3  - Ambulatory referral to Nutrition Services    2. Insomnia  Sleeping better now with zolpidem.  Does not have sleeping problems when he takes it.  Okay to get refill of zolpidem.  - zolpidem (AMBIEN) 10 mg tablet; TAKE 1 TABLET (10 MG TOTAL) BY MOUTH BEDTIME AS NEEDED.  Dispense: 30 tablet; Refill: 0    3. Anxiety   Anxiety is also improved with alprazolam and hydroxyzine as needed.  - ALPRAZolam (XANAX) 1 MG tablet; TAKE 1 TABLET BY MOUTH 3 TIMES DAILY AS NEEDED  Dispense: 30 tablet; Refill: 0  - hydrOXYzine pamoate (VISTARIL) 25 MG capsule; TAKE 1 CAPSULE BY MOUTH 3 TIMES A DAY AS NEEDED FOR ITCHING  Dispense: 270 capsule; Refill: 1    4. Uveitis  Has uvulitis due to his pulmonary sarcoid.  Followed by an ophthalmologist.  Was advised to continue prednisolone eyedrops.  Okay to get 1 refill prednisolone eyedrops from me but advised patient to get subsequent refills from his ophthalmologist.  Want to make sure whether he needs  to continue with his eyedrops or not.  - prednisoLONE acetate (PRED-FORTE) 1 % ophthalmic suspension; Administer 1 drop to both eyes 2 (two) times a day.  Dispense: 5 mL; Refill: 0    Follow up in 3 months.  Will fast next visit.     History of Present Illness   This 67 y.o. old male complains of constipation.  Started after his change his diet to lose weight.  Has been cutting down his sugar and carbs.  Takes about 2 days because before he can move his stools.  Took some over-the-counter stool softener without much improvement.  Advised over the phone to get MiraLAX.  And this seems to have helped a bit.  Has anxiety now well controlled by combination of alprazolam and hydroxyzine.  Has insomnia now improved by zolpidem.  Has lungs  sarcoid and followed by pulmonary.  Was seen by Dr. Duron  recently.  Was advised to have a repeat chest CT which showed same findings of pulmonary sarcoid in October.  Because of his pulmonary sarcoid he has urethritis and he uses steroid eyedrops.  Followed by ophthalmology.  Overall feels well.  Has lost 5 pounds since his last visit.    Review of Systems   A 12 point comprehensive review of systems was negative except as noted..     Medications, Allergies and Problem List   Reviewed and updated             Chief Complaint   bowel concerns (Complains of concerns with bowels, increased anxiety. Complains of constipation and diarrhea. )       Patient Profile   Social History     Social History Narrative        Past Medical History   Patient Active Problem List   Diagnosis     Hyperlipidemia     Mild Recurrent Major Depression     Male Erectile Disorder     Benign Essential Hypertension     Insomnia     Anxiety     Deviated nasal septum     Hilar adenopathy     Uveitis     Sarcoidosis of lung       Past Surgical History  He has a past surgical history that includes Bronchoscopy (12/20/2016).       Medications and Allergies   Current Outpatient Prescriptions   Medication Sig      "ALPRAZolam (XANAX) 1 MG tablet TAKE 1 TABLET BY MOUTH 3 TIMES DAILY AS NEEDED     amLODIPine (NORVASC) 10 MG tablet TAKE ONE TABLET BY MOUTH EVERY DAY     benzonatate (TESSALON PERLES) 100 MG capsule Take 1 capsule (100 mg total) by mouth every 6 (six) hours as needed for cough.     citalopram (CELEXA) 40 MG tablet TAKE 1 TABLET DAILY.     diazePAM (VALIUM) 5 mg/5 mL (1 mg/mL) solution      gabapentin (NEURONTIN) 300 MG capsule TAKE 2 CAPSULES BY MOUTH 3 TIMES A DAY INCREASE AS INSTRUCTED     hydrOXYzine pamoate (VISTARIL) 25 MG capsule TAKE 1 CAPSULE BY MOUTH 3 TIMES A DAY AS NEEDED FOR ITCHING     lisinopril (PRINIVIL,ZESTRIL) 20 MG tablet TAKE 1 TABLET (20 MG TOTAL) BY MOUTH DAILY.     prednisoLONE acetate (PRED-FORTE) 1 % ophthalmic suspension Administer 1 drop to both eyes 2 (two) times a day.     propranolol (INDERAL) 20 MG tablet TAKE 1 TABLET (20 MG TOTAL) BY MOUTH 2 (TWO) TIMES A DAY.     SUMAtriptan (IMITREX) 50 MG tablet TAKE 1 TABLET (50 MG TOTAL) BY MOUTH ONCE AS NEEDED FOR MIGRAINE.     zolpidem (AMBIEN) 10 mg tablet TAKE 1 TABLET (10 MG TOTAL) BY MOUTH BEDTIME AS NEEDED.     mineral oil liquid Take 30 mL by mouth daily as needed for constipation.     No Known Allergies     Family and Social History   No family history on file.     Social History   Substance Use Topics     Smoking status: Never Smoker     Smokeless tobacco: Never Used     Alcohol use No        Physical Exam       Physical Exam  /80  Pulse 81  Ht 5' 4\" (1.626 m)  Wt 154 lb (69.9 kg)  SpO2 93%  BMI 26.43 kg/m2  General appearance: alert, appears stated age, cooperative and no distress  Head: Normocephalic, without obvious abnormality, atraumatic  Throat: lips, mucosa, and tongue normal; teeth and gums normal  Neck: no adenopathy, no carotid bruit, no JVD, supple, symmetrical, trachea midline and thyroid not enlarged, symmetric, no tenderness/mass/nodules  Lungs: clear to auscultation bilaterally  Heart: regular rate and " rhythm, S1, S2 normal, no murmur, click, rub or gallop  Abdomen: soft, non-tender; bowel sounds normal; no masses,  no organomegaly  Extremities: extremities normal, atraumatic, no cyanosis or edema  Skin: Skin color, texture, turgor normal. No rashes or lesions     Additional Information        Beka Ashley MD  Internal Medicine  Contact me at [phone]     Additional Information   Current Outpatient Prescriptions   Medication Sig     ALPRAZolam (XANAX) 1 MG tablet TAKE 1 TABLET BY MOUTH 3 TIMES DAILY AS NEEDED     amLODIPine (NORVASC) 10 MG tablet TAKE ONE TABLET BY MOUTH EVERY DAY     benzonatate (TESSALON PERLES) 100 MG capsule Take 1 capsule (100 mg total) by mouth every 6 (six) hours as needed for cough.     citalopram (CELEXA) 40 MG tablet TAKE 1 TABLET DAILY.     diazePAM (VALIUM) 5 mg/5 mL (1 mg/mL) solution      gabapentin (NEURONTIN) 300 MG capsule TAKE 2 CAPSULES BY MOUTH 3 TIMES A DAY INCREASE AS INSTRUCTED     hydrOXYzine pamoate (VISTARIL) 25 MG capsule TAKE 1 CAPSULE BY MOUTH 3 TIMES A DAY AS NEEDED FOR ITCHING     lisinopril (PRINIVIL,ZESTRIL) 20 MG tablet TAKE 1 TABLET (20 MG TOTAL) BY MOUTH DAILY.     prednisoLONE acetate (PRED-FORTE) 1 % ophthalmic suspension Administer 1 drop to both eyes 2 (two) times a day.     propranolol (INDERAL) 20 MG tablet TAKE 1 TABLET (20 MG TOTAL) BY MOUTH 2 (TWO) TIMES A DAY.     SUMAtriptan (IMITREX) 50 MG tablet TAKE 1 TABLET (50 MG TOTAL) BY MOUTH ONCE AS NEEDED FOR MIGRAINE.     zolpidem (AMBIEN) 10 mg tablet TAKE 1 TABLET (10 MG TOTAL) BY MOUTH BEDTIME AS NEEDED.     mineral oil liquid Take 30 mL by mouth daily as needed for constipation.     No Known Allergies  Social History   Substance Use Topics     Smoking status: Never Smoker     Smokeless tobacco: Never Used     Alcohol use No         Time: total time spent with the patient was 25 minutes of which >50% was spent in counseling and coordination of care

## 2021-06-15 NOTE — TELEPHONE ENCOUNTER
Last Office Visit  2/16/2021 Dr. Ashley    Notes:  1. Pain of right upper arm  Complains of right shoulder pains going to the upper 3rd of his right arm. Pain comes and goes and when it occurs he scales it as moderate 7 out of 10. Exam showed tenderness of the right shoulder joint with normal range of motion. Advised this is caused by osteoarthritis versus shoulder impingement. Will try celecoxib daily. If it still progresses advised he may need steroid shoulder injection.   - celecoxib (CELEBREX) 200 MG capsule; Take 1 capsule (200 mg total) by mouth daily.  Dispense: 30 capsule; Refill: 2     2. Male Erectile Disorder  Saw urology for this. Was given treatment options. Decided to get penile injection with PGE1 or caverjet.      3. Sarcoidosis of lung (H)  Stable. Followed by pulmonary. Was recently seen and advised to follow up in 6 months.      4. Benign Essential Hypertension  Controlled. Continue amlodipine and lisinopril.      5. Mild Recurrent Major Depression  Now in remission. PHQ 9 score is 8.     Last Filled:  ALPRAZolam (XANAX) 1 MG tablet 90 tablet 0 1/12/2021  No   Sig: TAKE 1 TABLET BY MOUTH THREE TIMES A DAY AS NEEDED FOR ANXIETY   Sent to pharmacy as: ALPRAZolam 1 mg tablet (XANAX)   E-Prescribing Status: Receipt confirmed by pharmacy (1/12/2021 11:34 AM CST)         No results found for: AMPHET, HEBENZODIAZ, OPIATES, PCP, THC, BARBIT, COCAINEMETAB, METHADNE, OXYCODONE, CREAUR      Next OV:  5/5/2021      Encounter-Level CSA Scan Date:    3/6/2020          reviewed and results are as follows:    Not a delegate for PCP     Medication teed up for provider signature - please adjust as appropriate.

## 2021-06-15 NOTE — TELEPHONE ENCOUNTER
Refill Request  Did you contact pharmacy: No  Medication name:     ALPRAZOLAM    Who prescribed the medication: PCP  [facility]  Is patient out of medication: Yes  Patient notified refills processed in 3 business days:  yes  Okay to leave a detailed message: yes

## 2021-06-15 NOTE — PROGRESS NOTES
Office Visit - Follow Up   Denis Moreno   70 y.o. male    Date of Visit: 2/16/2021    Chief Complaint   Patient presents with     Follow-up     chronic right arm pain is getting worse, pain is traveling down arm        Assessment and Plan   1. Pain of right upper arm  Complains of right shoulder pains going to the upper 3rd of his right arm. Pain comes and goes and when it occurs he scales it as moderate 7 out of 10. Exam showed tenderness of the right shoulder joint with normal range of motion. Advised this is caused by osteoarthritis versus shoulder impingement. Will try celecoxib daily. If it still progresses advised he may need steroid shoulder injection.   - celecoxib (CELEBREX) 200 MG capsule; Take 1 capsule (200 mg total) by mouth daily.  Dispense: 30 capsule; Refill: 2    2. Male Erectile Disorder  Saw urology for this. Was given treatment options. Decided to get penile injection with PGE1 or caverjet.     3. Sarcoidosis of lung (H)  Stable. Followed by pulmonary. Was recently seen and advised to follow up in 6 months.     4. Benign Essential Hypertension  Controlled. Continue amlodipine and lisinopril.     5. Mild Recurrent Major Depression  Now in remission. PHQ 9 score is 8.       Follow up in 3 months.      History of Present Illness   This 70 y.o. old male complains of right shoulder and upper arm pains. Started a few weeks ago off and on and scales his pains as moderate. Denies injury or trauma. Has normal range of motion. Has hypertension controlled by his medications. Has major depression, now in remission. Has lung sarcoidosis which is stable. He is followed by pulmonary.      Review of Systems   A 12 point comprehensive review of systems was negative except as noted..     Medications, Allergies and Problem List   Reviewed and updated             Chief Complaint   Follow-up (chronic right arm pain is getting worse, pain is traveling down arm)       Patient Profile   Social History     Social  History Narrative     Not on file        Past Medical History   Patient Active Problem List   Diagnosis     Mixed hyperlipidemia     Mild Recurrent Major Depression     Male Erectile Disorder     Benign Essential Hypertension     Insomnia     Anxiety     Deviated nasal septum     Hilar adenopathy     Uveitis     Sarcoidosis of lung (H)       Past Surgical History  He has a past surgical history that includes Bronchoscopy (12/20/2016).       Medications and Allergies   Current Outpatient Medications   Medication Sig     ALPRAZolam (XANAX) 1 MG tablet TAKE 1 TABLET BY MOUTH THREE TIMES A DAY AS NEEDED FOR ANXIETY     amLODIPine (NORVASC) 10 MG tablet TAKE 1 TABLET BY MOUTH EVERY DAY     cholecalciferol, vitamin D3, 125 mcg (5,000 unit) capsule Take 1 capsule (5,000 Units total) by mouth daily.     citalopram (CELEXA) 40 MG tablet TAKE 1 TABLET DAILY.     gabapentin (NEURONTIN) 300 MG capsule TAKE 2 CAPSULES BY MOUTH 3 TIMES A DAY INCREASE AS INSTRUCTED     hydrOXYzine HCL (ATARAX) 10 MG tablet Take 1 tablet (10 mg total) by mouth 3 (three) times a day as needed for itching.     lisinopriL (PRINIVIL,ZESTRIL) 20 MG tablet TAKE 1 TABLET BY MOUTH EVERY DAY     omeprazole (PRILOSEC) 20 MG capsule Take 1 capsule (20 mg total) by mouth daily before breakfast.     predniSONE (DELTASONE) 1 MG tablet Take 4 tabs (4 mg) daily x 10 days, then 3 tabs (3 mg) daily x 10 days, then 2 tabs (2 mg) daily x 10 days, then 1 tab (1 mg) daily x 10 days.. (Patient taking differently: Take 0.5 mg by mouth daily Take 4 tabs (4 mg) daily x 10 days, then 3 tabs (3 mg) daily x 10 days, then 2 tabs (2 mg) daily x 10 days, then 1 tab (1 mg) daily x 10 days..)     propranolol (INDERAL) 20 MG tablet TAKE 1 TABLET (20 MG TOTAL) BY MOUTH 2 (TWO) TIMES A DAY.     SUMAtriptan (IMITREX) 50 MG tablet TAKE 1 TABLET (50 MG TOTAL) BY MOUTH ONCE AS NEEDED FOR MIGRAINE.     traZODone (DESYREL) 50 MG tablet Take 1 tablet (50 mg total) by mouth at bedtime.      "benzonatate (TESSALON) 100 MG capsule TAKE ONE CAPSULE BY MOUTH EVERY 6 HOURS AS NEEDED FOR COUGH     celecoxib (CELEBREX) 200 MG capsule Take 1 capsule (200 mg total) by mouth daily.     No Known Allergies     Family and Social History   No family history on file.     Social History     Tobacco Use     Smoking status: Never Smoker     Smokeless tobacco: Never Used   Substance Use Topics     Alcohol use: No     Drug use: No        Physical Exam       Physical Exam  /60   Pulse 61   Ht 5' 4\" (1.626 m)   Wt 141 lb (64 kg)   SpO2 97%   BMI 24.20 kg/m    General appearance: alert, appears stated age, cooperative and no distress  Throat: lips, mucosa, and tongue normal; teeth and gums normal  Neck: no adenopathy, no carotid bruit, no JVD, supple, symmetrical, trachea midline and thyroid not enlarged, symmetric, no tenderness/mass/nodules  Lungs: clear to auscultation bilaterally  Heart: regular rate and rhythm, S1, S2 normal, no murmur, click, rub or gallop  Abdomen: soft, non-tender; bowel sounds normal; no masses,  no organomegaly  Extremities: extremities normal, atraumatic, no cyanosis or edema  Skin: Skin color, texture, turgor normal. No rashes or lesions  Musculoskeletal: tender right shoulder joint specifically the AC joint, but has normal range of motion     Additional Information   Post Discharge Medication Reconciliation Status: discharge medications reconciled, continue medications without change      Beka Ashley MD  Internal Medicine  Contact me at [phone]     Additional Information   Current Outpatient Medications   Medication Sig     ALPRAZolam (XANAX) 1 MG tablet TAKE 1 TABLET BY MOUTH THREE TIMES A DAY AS NEEDED FOR ANXIETY     amLODIPine (NORVASC) 10 MG tablet TAKE 1 TABLET BY MOUTH EVERY DAY     cholecalciferol, vitamin D3, 125 mcg (5,000 unit) capsule Take 1 capsule (5,000 Units total) by mouth daily.     citalopram (CELEXA) 40 MG tablet TAKE 1 TABLET DAILY.     gabapentin " (NEURONTIN) 300 MG capsule TAKE 2 CAPSULES BY MOUTH 3 TIMES A DAY INCREASE AS INSTRUCTED     hydrOXYzine HCL (ATARAX) 10 MG tablet Take 1 tablet (10 mg total) by mouth 3 (three) times a day as needed for itching.     lisinopriL (PRINIVIL,ZESTRIL) 20 MG tablet TAKE 1 TABLET BY MOUTH EVERY DAY     omeprazole (PRILOSEC) 20 MG capsule Take 1 capsule (20 mg total) by mouth daily before breakfast.     predniSONE (DELTASONE) 1 MG tablet Take 4 tabs (4 mg) daily x 10 days, then 3 tabs (3 mg) daily x 10 days, then 2 tabs (2 mg) daily x 10 days, then 1 tab (1 mg) daily x 10 days.. (Patient taking differently: Take 0.5 mg by mouth daily Take 4 tabs (4 mg) daily x 10 days, then 3 tabs (3 mg) daily x 10 days, then 2 tabs (2 mg) daily x 10 days, then 1 tab (1 mg) daily x 10 days..)     propranolol (INDERAL) 20 MG tablet TAKE 1 TABLET (20 MG TOTAL) BY MOUTH 2 (TWO) TIMES A DAY.     SUMAtriptan (IMITREX) 50 MG tablet TAKE 1 TABLET (50 MG TOTAL) BY MOUTH ONCE AS NEEDED FOR MIGRAINE.     traZODone (DESYREL) 50 MG tablet Take 1 tablet (50 mg total) by mouth at bedtime.     benzonatate (TESSALON) 100 MG capsule TAKE ONE CAPSULE BY MOUTH EVERY 6 HOURS AS NEEDED FOR COUGH     celecoxib (CELEBREX) 200 MG capsule Take 1 capsule (200 mg total) by mouth daily.     No Known Allergies  Social History     Tobacco Use     Smoking status: Never Smoker     Smokeless tobacco: Never Used   Substance Use Topics     Alcohol use: No     Drug use: No

## 2021-06-16 NOTE — TELEPHONE ENCOUNTER
Refill Request  Did you contact pharmacy: No  Medication name:   Requested Prescriptions     Pending Prescriptions Disp Refills     ALPRAZolam (XANAX) 1 MG tablet 90 tablet 0     Sig: TAKE 1 TABLET BY MOUTH THREE TIMES A DAY AS NEEDED FOR ANXIETY     Who prescribed the medication: Dr. Ashley  [facility]  Is patient out of medication: Yes  Patient notified refills processed in 3 business days:  yes  Okay to leave a detailed message: yes

## 2021-06-16 NOTE — TELEPHONE ENCOUNTER
Caller thinks he has a sinus infection; Reporting constant headache and light  bothering eye; No relief with Imitrex;  No nasal  symptoms ; no fevers.   Caller reports his provider will order a Z archie for these symptoms and he inproves   Triage protcol reviewed   Advised  In home are and to be seen within 3 days   Caller is advised that  UC option and weekends and states he will be seen at [facility] tomorrow   Allison Cardoza RN  FNA        Reason for Disposition    [1] Sinus congestion (pressure, fullness) AND [2] present > 10 days    Additional Information    Negative: Severe difficulty breathing (e.g., struggling for each breath, speaks in single words)    Negative: Sounds like a life-threatening emergency to the triager    Negative: [1] Sinus infection AND [2] taking an antibiotic AND [3] symptoms continue    Negative: [1] Difficulty breathing AND [2] not from stuffy nose (e.g., not relieved by cleaning out the nose)    Negative: [1] SEVERE headache AND [2] fever    Negative: [1] Redness or swelling on the cheek, forehead or around the eye AND [2] fever    Negative: Fever > 104 F (40 C)    Negative: Patient sounds very sick or weak to the triager    Negative: [1] SEVERE pain AND [2] not improved 2 hours after pain medicine    Negative: [1] Redness or swelling on the cheek, forehead or around the eye AND [2] no fever    Negative: [1] Fever > 101 F (38.3 C) AND [2] age > 60    Negative: [1] Fever > 100.0 F (37.8 C) AND [2] bedridden (e.g., nursing home patient, CVA, chronic illness, recovering from surgery)    Negative: [1] Fever > 100.0 F (37.8 C) AND [2] diabetes mellitus or weak immune system (e.g., HIV positive, cancer chemo, splenectomy, organ transplant, chronic steroids)    Negative: Fever present > 3 days (72 hours)    Negative: [1] Fever returns after gone for over 24 hours AND [2] symptoms worse or not improved    Negative: [1] Sinus pain (not just congestion) AND [2] fever    Negative:  Earache    Protocols used: SINUS PAIN OR CONGESTION-A-AH

## 2021-06-17 NOTE — TELEPHONE ENCOUNTER
RN cannot approve Refill Request    RN can NOT refill this medication med is not covered by policy/route to provider. Last office visit: Visit date not found Last Physical: Visit date not found Last MTM visit: Visit date not found Last visit same specialty: 2/16/2021 Beka Ashley MD.  Next visit within 3 mo: Visit date not found  Next physical within 3 mo: Visit date not found      Macrina Stringer, [facility] Triage/Med Refill 5/12/2021    Requested Prescriptions   Pending Prescriptions Disp Refills     cholecalciferol, vitamin D3, 125 mcg (5,000 unit) capsule [[facility] Med Name: [facility] VITAMIN D[facility] 5,000 UNIT SFGL] 90 capsule 2     Sig: TAKE 1 CAPSULE (5,000 UNITS TOTAL) BY MOUTH DAILY.       There is no refill protocol information for this order

## 2021-06-17 NOTE — TELEPHONE ENCOUNTER
Last Office Visit  2/16/2021 Beka Ashley MD    Notes:  1. Pain of right upper arm  Complains of right shoulder pains going to the upper 3rd of his right arm. Pain comes and goes and when it occurs he scales it as moderate 7 out of 10. Exam showed tenderness of the right shoulder joint with normal range of motion. Advised this is caused by osteoarthritis versus shoulder impingement. Will try celecoxib daily. If it still progresses advised he may need steroid shoulder injection.   - celecoxib (CELEBREX) 200 MG capsule; Take 1 capsule (200 mg total) by mouth daily.  Dispense: 30 capsule; Refill: 2     2. Male Erectile Disorder  Saw urology for this. Was given treatment options. Decided to get penile injection with PGE1 or caverjet.      3. Sarcoidosis of lung (H)  Stable. Followed by pulmonary. Was recently seen and advised to follow up in 6 months.      4. Benign Essential Hypertension  Controlled. Continue amlodipine and lisinopril.      5. Mild Recurrent Major Depression  Now in remission. PHQ 9 score is 8.    Last Filled:  ALPRAZolam (XANAX) 1 MG tablet 90 tablet 0 4/5/2021  No   Sig: TAKE 1 TABLET BY MOUTH THREE TIMES A DAY AS NEEDED FOR ANXIETY   Sent to pharmacy as: ALPRAZolam 1 mg tablet (XANAX)   E-Prescribing Status: Receipt confirmed by pharmacy (4/5/2021  1:21 PM CDT)         No results found for: AMPHET, HEBENZODIAZ, OPIATES, PCP, THC, BARBIT, COCAINEMETAB, METHADNE, OXYCODONE, CREAUR      Next OV:  5/20/2021 Beka Ashley MD      Encounter-Level CSA Scan Date:    3/6/2020           Medication teed up for provider signature - please adjust as appropriate.

## 2021-06-17 NOTE — PROGRESS NOTES
Assessment and Plan:     Patient has been advised of split billing requirements and indicates understanding: Yes  1. Routine general medical examination at a health care facility  Advised his physical exam is normal.     2. Mixed hyperlipidemia  Controlled. Last lipids were good. Not taking statin yet.     3. Benign Essential Hypertension  Controlled. Continue amlodipine, lisinopril and propranolol.     4. Sarcoidosis of lung (H)  Takes low dose prednisone 0.5 mg daily. Followed by pulmonary. Informed his lung sarcoidosis is stable.     5. Mild Recurrent Major Depression  Has mild depression, now in remission. Still takes citalopram. PHQ 9 score is only 2.     6. Anxiety  Has anxiety which comes and goes. Takes alprazolam as needed. Sleeps well now with trazodone at night.     7. Migraine without status migrainosus, not intractable, unspecified migraine type  Takes propranolol for this also as prophylaxis. Gets occasional migraine headaches. Takes sumatriptan which does not work all the time. Takes additional ibuprofen which helps.      The patient's current medical problems were reviewed.    I have had an Advance Directives discussion with the patient.  He will be meeting his  to draw his advance directive. States he does not want extraordinary measures and in fact he will donate his corpse to the [facility] for science.   The following health maintenance schedule was reviewed with the patient and provided in printed form in the after visit summary:   Health Maintenance   Topic Date Due     HEPATITIS C SCREENING  Never done     ZOSTER VACCINES (1 of 2) Never done     TD 18+ HE  04/21/2035 (Originally 2/18/1968)     MEDICARE ANNUAL WELLNESS VISIT  05/20/2022     FALL RISK ASSESSMENT  05/20/2022     COLORECTAL CANCER SCREENING  03/01/2023     ADVANCE CARE PLANNING  08/15/2023     LIPID  01/13/2026     DEPRESSION ACTION PLAN  Completed     Pneumococcal Vaccine: Pediatrics (0 to 5 Years) and At-Risk Patients (6 to  64 Years)  Completed     Pneumococcal Vaccine: 65+ Years  Completed     INFLUENZA VACCINE RULE BASED  Completed     COVID-19 Vaccine  Completed     HEPATITIS B VACCINES  Aged Out       Subjective:   Chief Complaint: Denis Moreno is an 71 y.o. male here for an Annual Wellness visit.   HPI:  Annual wellness visit for Denis. Reports he is doing and feeling well. Has hyperlipidemia which is controlled without need for a medication. Has hypertension controlled by amlodipine, lisinopril and propranolol. Takes propranolol for his migraines but there are times he still gets breakthrough headaches. Takes sumatriptan when he experiences headaches. Has lung sarcoidosis which is stable. Followed by pulmonary and takes low dose prednisone. Has anxiety and depression which are now controlled by his medications. Reports he sees and hears well. Uses corrective lens for distance and reading. Denies chest pains and shortness of breath. Does not regularly exercise but able to keep his weight normal. Denies urinary and bowel symptoms. Sleeps well. Has a regular partner and he denies erectile dysfunction.     Review of Systems:  Aside from those mentioned in the HPI, the rest of the review of systems are negative for all systems.    Patient Care Team:  Beka Ashley MD as PCP - General  Vipin Duron MD as Assigned Pulmonology Provider  Beka Ashley MD as Assigned PCP     Patient Active Problem List   Diagnosis     Mixed hyperlipidemia     Mild Recurrent Major Depression     Male Erectile Disorder     Benign Essential Hypertension     Insomnia     Anxiety     Deviated nasal septum     Hilar adenopathy     Uveitis     Sarcoidosis of lung (H)     Past Medical History:   Diagnosis Date     Anxiety and depression      Hypertension      Migraines       Past Surgical History:   Procedure Laterality Date     BRONCHOSCOPY  12/20/2016    ebus      No family history on file.   Social History     Socioeconomic History     Marital  status: Single     Spouse name: Not on file     Number of children: Not on file     Years of education: Not on file     Highest education level: Not on file   Occupational History     Not on file   Social Needs     Financial resource strain: Not on file     Food insecurity     Worry: Not on file     Inability: Not on file     Transportation needs     Medical: Not on file     Non-medical: Not on file   Tobacco Use     Smoking status: Never Smoker     Smokeless tobacco: Never Used   Substance and Sexual Activity     Alcohol use: No     Drug use: No     Sexual activity: Not on file   Lifestyle     Physical activity     Days per week: Not on file     Minutes per session: Not on file     Stress: Not on file   Relationships     Social connections     Talks on phone: Not on file     Gets together: Not on file     Attends [employer] service: Not on file     Active member of club or organization: Not on file     Attends meetings of clubs or organizations: Not on file     Relationship status: Not on file     Intimate partner violence     Fear of current or ex partner: Not on file     Emotionally abused: Not on file     Physically abused: Not on file     Forced sexual activity: Not on file   Other Topics Concern     Not on file   Social History Narrative    , 2 grown daughters, 2 granddaughters. 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 Nonsmoker. Socially drinks alcohol.       Current Outpatient Medications   Medication Sig Dispense Refill     ALPRAZolam (XANAX) 1 MG tablet TAKE 1 TABLET BY MOUTH THREE TIMES A DAY AS NEEDED FOR ANXIETY 90 tablet 0     amLODIPine (NORVASC) 10 MG tablet TAKE 1 TABLET BY MOUTH EVERY DAY 90 tablet 3     benzonatate (TESSALON) 100 MG capsule TAKE ONE CAPSULE BY MOUTH EVERY 6 HOURS AS NEEDED FOR COUGH 20 capsule 0     celecoxib (CELEBREX) 200 MG capsule Take 1 capsule (200 mg total) by mouth daily. 30 capsule 2     cholecalciferol, vitamin D3, 125 mcg (5,000 unit) capsule TAKE 1 CAPSULE (5,000 UNITS TOTAL)  "BY MOUTH DAILY. 90 capsule 2     citalopram (CELEXA) 40 MG tablet TAKE 1 TABLET DAILY. 90 tablet 2     gabapentin (NEURONTIN) 300 MG capsule TAKE 2 CAPSULES BY MOUTH 3 TIMES A DAY INCREASE AS INSTRUCTED  5     hydrOXYzine HCL (ATARAX) 10 MG tablet Take 1 tablet (10 mg total) by mouth 3 (three) times a day as needed for itching. 90 tablet 3     lisinopriL (PRINIVIL,ZESTRIL) 20 MG tablet TAKE 1 TABLET BY MOUTH EVERY DAY 90 tablet 1     omeprazole (PRILOSEC) 20 MG capsule Take 1 capsule (20 mg total) by mouth daily before breakfast. 90 capsule 2     predniSONE (DELTASONE) 1 MG tablet Take 4 tabs (4 mg) daily x 10 days, then 3 tabs (3 mg) daily x 10 days, then 2 tabs (2 mg) daily x 10 days, then 1 tab (1 mg) daily x 10 days.. (Patient taking differently: Take 0.5 mg by mouth daily Take 4 tabs (4 mg) daily x 10 days, then 3 tabs (3 mg) daily x 10 days, then 2 tabs (2 mg) daily x 10 days, then 1 tab (1 mg) daily x 10 days..) 120 tablet 11     propranolol (INDERAL) 20 MG tablet TAKE 1 TABLET (20 MG TOTAL) BY MOUTH 2 (TWO) TIMES A DAY. 180 tablet 3     SUMAtriptan (IMITREX) 50 MG tablet TAKE 1 TABLET (50 MG TOTAL) BY MOUTH ONCE AS NEEDED FOR MIGRAINE. 12 tablet 2     traZODone (DESYREL) 50 MG tablet TAKE 1 TABLET BY MOUTH EVERYDAY AT BEDTIME 90 tablet 2     No current facility-administered medications for this visit.       Objective:   Vital Signs:   Visit Vitals  /58   Pulse 67   Ht 5' 4\" (1.626 m)   Wt 141 lb (64 kg)   SpO2 97%   BMI 24.20 kg/m           VisionScreening:  No exam data present     PHYSICAL EXAM  General appearance: alert, appears stated age, cooperative and no distress  Head: Normocephalic, without obvious abnormality, atraumatic  Eyes: conjunctivae/corneas clear. PERRL, EOM's intact. Fundi benign.  Ears: normal TM's and external ear canals both ears  Nose: Nares normal. Septum midline. Mucosa normal. No drainage or sinus tenderness.  Throat: lips, mucosa, and tongue normal; teeth and gums " normal  Neck: no adenopathy, no carotid bruit, no JVD, supple, symmetrical, trachea midline and thyroid not enlarged, symmetric, no tenderness/mass/nodules  Lungs: clear to auscultation bilaterally  Heart: regular rate and rhythm, S1, S2 normal, no murmur, click, rub or gallop  Abdomen: soft, non-tender; bowel sounds normal; no masses,  no organomegaly  Male genitalia: deferred  Rectal: deferred  Extremities: extremities normal, atraumatic, no cyanosis or edema  Skin: Skin color, texture, turgor normal. No rashes or lesions  Neurologic: Grossly normal    Assessment Results 5/20/2021   Activities of Daily Living No help needed   Instrumental Activities of Daily Living No help needed   Get Up and Go Score Less than 12 seconds   Mini Cog Total Score 3   Some recent data might be hidden     A Mini-Cog score of 0-2 suggests the possibility of dementia, score of 3-5 suggests no dementia    Identified Health Risks:     The patient was provided with suggestions to help him develop a healthy physical lifestyle.   He is at risk for lack of exercise and has been provided with information to increase physical activity for the benefit of his well-being.  The patient reports that he does not have all recommended working emergency equipment available. He was provided with information about emergency preparedness, including smoke detectors.  The patient was provided with suggestions to help him develop a healthy emotional lifestyle.   Information regarding advance directives (living jimenez), including where he can download the appropriate form, was provided to the patient via the AVS.

## 2021-06-17 NOTE — TELEPHONE ENCOUNTER
Refill Request  Did you contact pharmacy: No  Medication name: Alprzolam  Who prescribed the medication: PCP  [facility]  Is patient out of medication: No.  3 days left  Patient notified refills processed in 3 business days:  yes  Okay to leave a detailed message: yes    Last Office Visit with PCP = 02/16/2021  Next Office Visit with PCP = 05/20/2021

## 2021-06-17 NOTE — TELEPHONE ENCOUNTER
Telephone Encounter by Yeny Johnston at 7/22/2020  2:05 PM     Author: Yeny Johnston Service: -- Author Type: --    Filed: 7/22/2020  2:05 PM Encounter Date: 7/22/2020 Status: Signed    : Yeny Johnston APPROVED:    Approval start date: 6/22/2020  Approval end date:  7/22/2023    Pharmacy has been notified of approval and will contact patient when medication is ready for pickup.

## 2021-06-17 NOTE — TELEPHONE ENCOUNTER
Telephone Encounter by Cynthia Bermudez at 5/13/2020  8:18 AM     Author: Cynthia Bermudez Service: -- Author Type: --    Filed: 5/13/2020  8:19 AM Encounter Date: 5/8/2020 Status: Signed    : Cynthia Bermudez APPROVED:    Approval start date: 04/11/2020  Approval end date:  05/11/2021    Pharmacy has been notified of approval and will contact patient when medication is ready for pickup.

## 2021-06-17 NOTE — TELEPHONE ENCOUNTER
Refill Approved    Rx renewed per Medication Renewal Policy. Medication was last renewed on 11/15/2020.    Neli Krishnan, [facility] Triage/Med Refill 5/10/2021     Requested Prescriptions   Pending Prescriptions Disp Refills     traZODone (DESYREL) 50 MG tablet [Pharmacy Med Name: TRAZODONE 50 MG TABLET] 90 tablet 1     Sig: TAKE 1 TABLET BY MOUTH EVERYDAY AT BEDTIME       Tricyclics/Misc Antidepressant/Antianxiety Meds Refill Protocol Passed - 5/10/2021 12:09 AM        Passed - PCP or prescribing provider visit in last year     Last office visit with prescriber/PCP: 2/16/2021 Beka Ashley MD OR same dept: 11/19/2020 Beka Ashley MD OR same specialty: 2/16/2021 Beka Ashley MD  Last physical: Visit date not found Last MTM visit: Visit date not found   Next visit within 3 mo: Visit date not found  Next physical within 3 mo: Visit date not found  Prescriber OR PCP: Beka Ashley MD  Last diagnosis associated with med order: 1. Insomnia  - traZODone (DESYREL) 50 MG tablet [Pharmacy Med Name: TRAZODONE 50 MG TABLET]; TAKE 1 TABLET BY MOUTH EVERYDAY AT BEDTIME  Dispense: 90 tablet; Refill: 1    If protocol passes may refill for 12 months if within 3 months of last provider visit (or a total of 15 months).

## 2021-06-17 NOTE — TELEPHONE ENCOUNTER
Dr. Ashley    Patient is calling back.  He hasn't heard anything back from his call on Friday.  He is still having a sinus infection.  He is wondering if he can get a rx for a zpak.  Sinus pressure, headache.  He uses [facility] pharmacy on [address] in his chart.  He would like a call back today at .

## 2021-06-17 NOTE — TELEPHONE ENCOUNTER
Patient was seen yesterday by Dr. Ashley and today has headache, light sensitivity  and is requesting RX.  Pt thinks it is Zpak that PCP has requested in the past. They did talk about at his appointment.     [facility] on [facility] is the preferred pharmacy.

## 2021-06-17 NOTE — TELEPHONE ENCOUNTER
5/20/2021-Dr. Ashley   1. Routine general medical examination at a health care facility  Advised his physical exam is normal.      2. Mixed hyperlipidemia  Controlled. Last lipids were good. Not taking statin yet.      3. Benign Essential Hypertension  Controlled. Continue amlodipine, lisinopril and propranolol.      4. Sarcoidosis of lung (H)  Takes low dose prednisone 0.5 mg daily. Followed by pulmonary. Informed his lung sarcoidosis is stable.      5. Mild Recurrent Major Depression  Has mild depression, now in remission. Still takes citalopram. PHQ 9 score is only 2.      6. Anxiety  Has anxiety which comes and goes. Takes alprazolam as needed. Sleeps well now with trazodone at night.      7. Migraine without status migrainosus, not intractable, unspecified migraine type  Takes propranolol for this also as prophylaxis. Gets occasional migraine headaches. Takes sumatriptan which does not work all the time. Takes additional ibuprofen which helps.        Patient was seen yesterday by Dr. Ashley and today has headache, light sensitivity  and is requesting RX.  Pt thinks it is Zpak that PCP has requested in the past.    Willing to send in script

## 2021-06-18 NOTE — LETTER
Letter by Vipin Duron MD at      Author: Vipin Duron MD Service: -- Author Type: --    Filed:  Encounter Date: 1/25/2019 Status: (Other)       Beka Ashley MD  [address] [place] [address]                                  January 25, 2019    Patient: Denis Moreno   MR Number: [id number]   YOB: 1950   Date of Visit: 1/25/2019     Dear Dr. Dion MD:    I saw Denis Moreno today at the [facility]. Below are the relevant portions of my assessment and plan of care.    If you have questions, please do not hesitate to call me. I look forward to following Denis along with you.    Sincerely,        Vipni Duron MD          CC  No Recipients  Vipin Duron MD  1/25/2019  3:38 PM  Sign at close encounter  Pulmonary Clinic Follow-up Visit    Assessment and Plan:  68 year old male never smoker with a history of dyslipidemia, low-grade chronic bilateral uveitis,  mild SAPNA, and pulmonary sarcoidosis, presenting for follow-up.      Sarcoidosis: Pulmonary and ocular involvement, with chronic bilateral uveitis, and mediastinal/hilar lymphadenopathy with pulmonary nodules but previously normal functional status and normal PFTs, with improvement radiographically on chest CT from October 2017 compared to November 2016. Developed worsening fatigue, dyspnea, and cough, with significant peripheral interstitial changes and worsening pulmonary function tests. Normal renal and hepatic function. Had leukocytosis on CBC in November 2018, not anemic. Prior ECG with possible LVH, otherwise unremarkable. Started prednisone 40 mg daily and PJP prophylaxis with SMX-TMP on 12/12/18. Since then, he has noted that his fatigue, dyspnea, and cough completely resolved, and his chest CT from January 15 shows complete resolution of interstitial changes. We had planned spirometry and DLCO prior to this visit, but that was not completed.     Plan:  - repeat spirometry and DLCO now  -  decrease prednisone from 40 mg daily to 30 mg daily for 14 days, then 20 mg daily  - repeat spirometry and DLCO again in one month  - I will contact him with results; if his PFTs are normalizing now and remain stable in one month, will down-titrate the prednisone dose further with a goal of discontinuation over the next 6-8 weeks  - he has a [facility] appointment scheduled for February 20; if he shows no sign of recurrence during tapering of prednisone, that visit could potentially be canceled  - he had an ophthalmology visit on January 18 at [facility], with no sign of active intraocular inflammation  - he is being evaluated for a mandibular advancement device for mild SAPNA (AHI 10.4)  - follow-up with me in 3 months  - encouraged him to call any time with questions or concerning symptoms     I appreciate the opportunity to participate in the care of Mr. Moreno.  Please feel free to contact me at any time.     CCx: pulmonary and ocular sarcoidosis    HPI: 68 year old male never smoker with a history of dyslipidemia, low-grade chronic bilateral uveitis, and pulmonary sarcoidosis, presenting for follow-up. Pulmonary and ocular involvement, with chronic bilateral uveitis, and mediastinal/hilar lymphadenopathy with pulmonary nodules but previously normal functional status and normal PFTs, with improvement radiographically on chest CT from October 2017 compared to November 2016. Developed worsening fatigue, dyspnea, and cough, with significant peripheral interstitial changes and worsening pulmonary function tests. Normal renal and hepatic function. Had leukocytosis on CBC in November 2018, not anemic. Prior ECG with possible LVH, otherwise unremarkable. Started prednisone 40 mg daily and PJP prophylaxis with SMX-TMP on 12/12/18. Since then, he has noted that his fatigue, dyspnea, and cough completely resolved, and his chest CT from January 15 shows complete resolution of interstitial changes. We had planned  spirometry and DLCO prior to this visit, but that was not completed.    ROS:  A 12-system review was obtained and was negative with the exception of the symptoms endorsed in the history of present illness.    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 BRONCHOSCOPY  12/20/2016    ebus       Allergies:  No Known Allergies    Family HX:  No family history on file.    Social Hx:  Social History     Socioeconomic History   ? Marital status: Single     Spouse name: Not on file   ? Number of children: Not on file   ? Years of education: Not on file   ? Highest education level: Not on file   Social Needs   ? Financial resource strain: Not on file   ? Food insecurity - worry: Not on file   ? Food insecurity - inability: Not on file   ? Transportation needs - medical: Not on file   ? Transportation needs - non-medical: Not on file   Occupational History   ? Not on file   Tobacco Use   ? Smoking status: Never Smoker   ? Smokeless tobacco: Never Used   Substance and Sexual Activity   ? Alcohol use: No   ? Drug use: No   ? Sexual activity: Not on file   Other Topics Concern   ? Not on file   Social History Narrative   ? Not on file       Current Meds:  Current Outpatient Medications   Medication Sig Dispense Refill   ? ALPRAZolam (XANAX) 1 MG tablet TAKE 1-2 TABLET AT BEDTIME AS NEEDED 60 tablet 0   ? amLODIPine (NORVASC) 10 MG tablet Take 1 tablet (10 mg total) by mouth daily. 90 tablet 3   ? benzonatate (TESSALON) 100 MG capsule TAKE ONE CAPSULE BY MOUTH EVERY 6 HOURS AS NEEDED FOR COUGH 20 capsule 0   ? cholecalciferol, vitamin D3, 5,000 unit capsule Take 1 capsule (5,000 Units total) by mouth daily.  0   ? citalopram (CELEXA) 40 MG tablet TAKE 1 TABLET DAILY. 90 tablet 1   ? gabapentin (NEURONTIN) 300 MG capsule TAKE 2  "CAPSULES BY MOUTH 3 TIMES A DAY INCREASE AS INSTRUCTED  5   ? hydrOXYzine pamoate (VISTARIL) 25 MG capsule TAKE 1 CAPSULE BY MOUTH 3 TIMES A DAY AS NEEDED FOR ITCHING 270 capsule 1   ? lisinopril (PRINIVIL,ZESTRIL) 20 MG tablet TAKE 1 TABLET (20 MG TOTAL) BY MOUTH DAILY. 90 tablet 3   ? prednisoLONE acetate (PRED-FORTE) 1 % ophthalmic suspension Administer 1 drop to both eyes 2 (two) times a day. 5 mL 0   ? predniSONE (DELTASONE) 20 MG tablet Take 40 mg by mouth daily. 60 tablet 3   ? propranolol (INDERAL) 20 MG tablet Take 1 tablet (20 mg total) by mouth 2 (two) times a day. 180 tablet 3   ? sulfamethoxazole-trimethoprim (BACTRIM) 400-80 mg per tablet Take 1 tablet by mouth daily. 30 tablet 3   ? SUMAtriptan (IMITREX) 50 MG tablet TAKE 1 TABLET (50 MG TOTAL) BY MOUTH ONCE AS NEEDED FOR MIGRAINE. 30 tablet 5   ? traZODone (DESYREL) 50 MG tablet TAKE 1 TABLET BY MOUTH EVERYDAY AT BEDTIME 30 tablet 11   ? viscous lidocaine HC (LIDOCAINE) 2 % Soln viscous solution Take 5 mL by mouth 3 (three) times a day. Swish and spit 5 mL three to four times daily as needed 120 mL 0     No current facility-administered medications for this visit.        Physical Exam:  /60   Pulse 61   Resp 12   Ht 5' 4\" (1.626 m)   Wt 152 lb (68.9 kg)   SpO2 95%   BMI 26.09 kg/m     Gen: alert, oriented, no distress  HEENT: nasal turbinates are unremarkable, no oropharyngeal lesions, no cervical or supraclavicular lymphadenopathy  CV: RRR, no M/G/R  Resp: CTAB, no focal crackles or wheezes  Abd: soft, nontender, no palpable organomegaly  Skin: no apparent rashes  Ext: no cyanosis, clubbing or edema  Neuro: alert, nonfocal    Labs:  Normal BMP and LFTs  WBC 13.1 with no differential  Normal Hgb  ESR 54  1,25-dihydroxyvitamin D level 83 in November 2016     Imaging studies:  CT chest (11/25/16)  - personally reviewed  - mediastinal and bilateral hilar lymphadenopathy with partial calcification  - multiple bilateral fluffy nodules (\"cotton " "ball\")  - peribronchovascular opacity sue. LLL     CT chest high-res (10/20/17):  - images directly reviewed, formal interpretation follows:  FINDINGS:  LUNGS AND PLEURA: Airways are patent in inspiration and expiration without tracheobronchomalacia or air trapping. There is no significant bronchial wall thickening. No significant bronchiectasis. There is scarring in the right middle lobe. Multiple   pulmonary nodules throughout the lungs are less conspicuous than on the comparison study. Example, a small area of linear scarring adjacent to the pleura in the right upper lobe was previously a 9 mm x 5 mm nodule (series 3 image 34). There a few small   nodules adjacent to the left major fissure and right major fissure. A few groundglass MR solid nodules are noted in the upper lobes. No diffuse subpleural reticular opacities. No honeycombing. No significant diffuse groundglass.     MEDIASTINUM: Normal size heart. There is atherosclerotic disease including coronary artery calcification. There are numerous calcified mediastinal and hilar lymph nodes. The largest discrete right hilar lymph node is 15 mm (previously 17 mm). A 19 mm   subcarinal lymph node previously measured 22 mm. Small hiatal hernia.     LIMITED UPPER ABDOMEN: Negative.     MUSCULOSKELETAL: Negative.     IMPRESSION:   CONCLUSION:  1.  Pulmonary parenchymal disease and mediastinal and hilar lymphadenopathy are consistent with history of sarcoidosis. When compared with the prior study, pulmonary nodules have significantly decreased in size. Mediastinal and hilar lymph nodes have   slightly decreased in size. There is minimal fibrosis in the lungs, which is new.   2.  Coronary atherosclerotic disease.  3.  Small hiatal hernia.     CT chest (12/5/18):  - images directly reviewed, formal interpretation follows:  FINDINGS:  LUNGS AND PLEURA: There is new significant pulmonary disease seen diffusely mostly characterized by fine linear reticular interstitial " prominence in the lung periphery involving upper and lower lobes but with a slight basal predominance. These are not   typical findings of sarcoid and the rapid progression is also atypical. Its possible this relates to a pneumonia superimposed upon the patient's pre-existing sarcoid. Other interstitial fibrotic processes could also be superimposed upon sarcoid but again   the progression appears fairly rapid.     No new zones of consolidation, there remains chronic atelectasis involving medial segment right middle lobe. There is a single 3 mm nodule within right middle lobe which has not changed significantly. No pleural effusion.     MEDIASTINUM: Partially calcified hilar and mediastinal lymphadenopathy is unchanged and again would be consistent with sarcoid. Coronary artery calcifications.     LIMITED UPPER ABDOMEN: Negative.     MUSCULOSKELETAL: Negative.     IMPRESSION:   CONCLUSION:  1.  Fairly pronounced worsening of diffuse interstitial disease could relate to progression of known sarcoid but a superimposed atypical pneumonia or additional interstitial fibrosing process may also be present.    CT chest (1/15/19), 5 weeks after starting prednisone 40 mg daily:  - images directly reviewed, formal interpretation follows:  FINDINGS:   LUNGS AND PLEURA: Previously seen interstitial infiltrates throughout both lungs have resolved. There are a few tiny pulmonary nodules which are unchanged. There is chronic atelectasis in the inferior right middle lobe. The lungs are otherwise clear. No   pleural effusion.     MEDIASTINUM: Partially calcified mediastinal and bilateral hilar lymph nodes are unchanged. Small hiatal hernia. Moderate coronary artery calcification.     LIMITED UPPER ABDOMEN: Negative.     MUSCULOSKELETAL: Negative.     IMPRESSION:   CONCLUSION:   1.  Previously seen interstitial infiltrates have resolved.  2.  Partially calcified mediastinal and bilateral hilar lymph nodes are unchanged and consistent  with known sarcoidosis.         Brain MRI (July 2016)  - mild atrophy  - minimal to mild SVID      PFT's  November 2016:  FEV1/FVC is 72% and is normal.  FEV1 is 2.49L or 113% predicted and is normal.  FVC is 3.47L or 124% predicted and normal.  There was no improvement in spirometry after a single inhaled dose of bronchodilator.  TLC is 5.13L or 107% predicted and is normal.  RV is 1.54L or 77% predicted and is reduced.  DLCO is 17.61 ml/min/mmHg or 88% predicted and is normal when it   is corrected for hemoglobin.     10/16/17:  FEV1/FVC is 68 and is normal (less than 0.7 but greater than the demographically adjusted lower limit of normal).  FEV1 is 122% predicted and is normal.  FVC is 140% predicted and normal.     DLCO is 80% predicted and is normal when it   is corrected for hemoglobin.    12/5/18:  FEV1/FVC is 74% and is normal.  FEV1 is 1.72 L or 76% predicted and is reduced.  FVC is 2.33 L or 80% predicted and is normal.  There was no improvement in spirometry after a single inhaled dose of bronchodilator.  DLCO is 9.48 mL/min/mm Hg or 40% predicted and is reduced when it   is corrected for hemoglobin.    Vipin Duron MD  Pulmonary and Critical Care Medicine  [facility]  Cell [phone]  Office [phone]  Pager [phone]

## 2021-06-18 NOTE — LETTER
Letter by Vipin Duron MD at      Author: Vipin Duron MD Service: -- Author Type: --    Filed:  Encounter Date: 2019 Status: (Other)       2019    Dear Dr. Ashley,    See below for documentation of a telephone conversation that I had today with Denis Moreno ( 1950).  Please feel free to call me at any time if needed.    Pulmonary Telephone Note    I was able to reach Mr. Moreno to discuss normalization spirometry and improved DLCO with the prednisone (from FEV1 1.72 L (76%) and FVC 2.33 L (80%) in early 2018, to FEV1 2.39 L (108%) and FVC 3.52 L (118%) now. DLCO up from 40% to 55% predicted over the same period). Radiographically he had >90% improvement by CT. He cancelled his [place] pulmonary appointment due to symptomatic, physiologic and radiographic improvement.    He is currently taking prednisone 30 mg daily. He is recovering from a viral URI. I advised him to decrease the prednisone to 20 mg daily for 14 days starting tomorrow, then will decrease to 15 mg daily x 14 days, then 10 mg daily until I see him in follow-up with spirometry and DLCO in late April. He is in agreement. Encouraged him to call any time with questions or concerning symptoms.    Sincerely,    Vipin Duron MD  Pulmonary and Critical Care Medicine  [facility]  Cell [phone]  Office [phone]  Pager [phone]

## 2021-06-19 NOTE — LETTER
Letter by Vipin Duron MD at      Author: Vipin Duron MD Service: -- Author Type: --    Filed:  Encounter Date: 4/25/2019 Status: (Other)         Beka Ashley MD  [address] [place] [address]                                  April 26, 2019    Patient: Denis Moreno   MR Number: [id number]   YOB: 1950   Date of Visit: 4/25/2019     Dear Dr. Dion MD:    I saw Denis Moreno on the above date at the [facility]. Below are the relevant portions of my assessment and plan of care.    If you have questions, please do not hesitate to call me. I look forward to following Denis along with you.    Sincerely,        Vipin Duron MD          CC  No Recipients  Vipin Duron MD  4/26/2019  7:53 AM  Sign at close encounter  Pulmonary Clinic Follow-up Visit    Assessment and Plan:  69 year old male never smoker with a history of dyslipidemia, low-grade chronic bilateral uveitis, mild SAPNA, and pulmonary sarcoidosis, presenting for follow-up.      Sarcoidosis: Pulmonary and ocular involvement, with chronic bilateral uveitis, and mediastinal/hilar lymphadenopathy with pulmonary nodules but previously normal functional status and normal PFTs, with improvement radiographically on chest CT from October 2017 compared to November 2016. Developed worsening fatigue, dyspnea, and cough, with significant peripheral interstitial changes and worsening pulmonary function tests. Normal renal and hepatic function. Had leukocytosis on CBC in November 2018, not anemic. Prior ECG with possible LVH, otherwise unremarkable. Started prednisone 40 mg daily and PJP prophylaxis with SMX-TMP on 12/12/18. Since then, he has noted that his fatigue, dyspnea, and cough completely resolved, and his chest CT from January 15 shows complete resolution of interstitial changes, and he has titrated down to 15 mg prednisone daily. Now with occasional dyspnea on exertion and fatigue but overall much  improved. Occasional nighttime cough. Down to 15 mg of prednisone daily. Can likely titrate down further, but will first obtain blood work, spirometry/DLCO, and CXR.    Plan:  - spirometry/DLCO  - PA/lateral CXR  - Ca, Cr, LFTs, CBC with diff  - I will contact him with the results, and may consider decreasing prednisone to 10 mg daily  - will also discuss discontinuing SMX-TMP when I contact him given his lower prednisone dose  - may require long-term low-dose prednisone given the severity of his prior clinical flare, though may be able to titrate off  - if refractory or worsens, could consider steroid-sparing therapy such as MTX  - encouraged him to call any time with questions or concerning symptoms     I appreciate the opportunity to participate in the care of Mr. Moreno.  Please feel free to contact me at any time.     CCx: pulmonary and ocular sarcoidosis    HPI: 69 year old male never smoker with a history of dyslipidemia, low-grade chronic bilateral uveitis, mild SAPNA, and pulmonary sarcoidosis, presenting for follow-up. Pulmonary and ocular involvement, with chronic bilateral uveitis, and mediastinal/hilar lymphadenopathy with pulmonary nodules but previously normal functional status and normal PFTs, with improvement radiographically on chest CT from October 2017 compared to November 2016. Developed worsening fatigue, dyspnea, and cough, with significant peripheral interstitial changes and worsening pulmonary function tests. Normal renal and hepatic function. Had leukocytosis on CBC in November 2018, not anemic. Prior ECG with possible LVH, otherwise unremarkable. Started prednisone 40 mg daily and PJP prophylaxis with SMX-TMP on 12/12/18. Since then, he has noted that his fatigue, dyspnea, and cough completely resolved, and his chest CT from January 15 shows complete resolution of interstitial changes, and he has titrated down to 15 mg prednisone daily. Now with occasional dyspnea on exertion and fatigue but  overall much improved. Occasional nighttime cough. Down to 15 mg of prednisone daily. Can likely titrate down further, but will first obtain blood work, spirometry/DLCO, and CXR.    ROS:  A 12-system review was obtained and was negative with the exception of the symptoms endorsed in the history of present illness.    PMH:  Pulmonary and ocular sarcoidosis  Deviated septum  HLD  Depression  HTN  Insomnia  Chronic headaches  Mild SAPNA with AHI 10.4; he was being evaluated for mandibular advancement device    PSH:  Past Surgical History:   Procedure Laterality Date   ? BRONCHOSCOPY  12/20/2016    ebus       Allergies:  No Known Allergies    Family HX:  No family history on file.    Social Hx:  Social History     Socioeconomic History   ? Marital status: Single     Spouse name: Not on file   ? Number of children: Not on file   ? Years of education: Not on file   ? Highest education level: Not on file   Occupational History   ? Not on file   Social Needs   ? Financial resource strain: Not on file   ? Food insecurity:     Worry: Not on file     Inability: Not on file   ? Transportation needs:     Medical: Not on file     Non-medical: Not on file   Tobacco Use   ? Smoking status: Never Smoker   ? Smokeless tobacco: Never Used   Substance and Sexual Activity   ? Alcohol use: No   ? Drug use: No   ? Sexual activity: Not on file   Lifestyle   ? Physical activity:     Days per week: Not on file     Minutes per session: Not on file   ? Stress: Not on file   Relationships   ? Social connections:     Talks on phone: Not on file     Gets together: Not on file     Attends Hoah[employer] service: Not on file     Active member of club or organization: Not on file     Attends meetings of clubs or organizations: Not on file     Relationship status: Not on file   ? Intimate partner violence:     Fear of current or ex partner: Not on file     Emotionally abused: Not on file     Physically abused: Not on file     Forced sexual activity: Not on  file   Other Topics Concern   ? Not on file   Social History Narrative   ? Not on file       Current Meds:  Current Outpatient Medications   Medication Sig Dispense Refill   ? ALPRAZolam (XANAX) 1 MG tablet TAKE 1-2 TABLET AT BEDTIME AS NEEDED 60 tablet 0   ? amLODIPine (NORVASC) 10 MG tablet Take 1 tablet (10 mg total) by mouth daily. 90 tablet 3   ? benzonatate (TESSALON) 100 MG capsule TAKE ONE CAPSULE BY MOUTH EVERY 6 HOURS AS NEEDED FOR COUGH 20 capsule 0   ? cholecalciferol, vitamin D3, 5,000 unit capsule Take 1 capsule (5,000 Units total) by mouth daily. 90 capsule 3   ? citalopram (CELEXA) 40 MG tablet TAKE 1 TABLET DAILY. 90 tablet 1   ? gabapentin (NEURONTIN) 300 MG capsule TAKE 2 CAPSULES BY MOUTH 3 TIMES A DAY INCREASE AS INSTRUCTED  5   ? hydrOXYzine pamoate (VISTARIL) 25 MG capsule TAKE 1 CAPSULE BY MOUTH 3 TIMES A DAY AS NEEDED FOR ITCHING 270 capsule 1   ? lisinopril (PRINIVIL,ZESTRIL) 20 MG tablet TAKE 1 TABLET (20 MG TOTAL) BY MOUTH DAILY. 90 tablet 3   ? omeprazole (PRILOSEC) 20 MG capsule Take 1 capsule (20 mg total) by mouth daily. 90 capsule 3   ? prednisoLONE acetate (PRED-FORTE) 1 % ophthalmic suspension Administer 1 drop to both eyes 2 (two) times a day. 5 mL 0   ? predniSONE (DELTASONE) 10 mg tablet Starting March 13, take 15 mg (1.5 tablets) daily for 14 days, then 10 mg daily until clinic follow-up.. 45 tablet 3   ? predniSONE (DELTASONE) 20 MG tablet Take 40 mg by mouth daily. 60 tablet 3   ? propranolol (INDERAL) 20 MG tablet Take 1 tablet (20 mg total) by mouth 2 (two) times a day. 180 tablet 3   ? sulfamethoxazole-trimethoprim (BACTRIM) 400-80 mg per tablet Take 1 tablet by mouth daily. 30 tablet 0   ? SUMAtriptan (IMITREX) 50 MG tablet TAKE 1 TABLET (50 MG TOTAL) BY MOUTH ONCE AS NEEDED FOR MIGRAINE. 30 tablet 5   ? traZODone (DESYREL) 50 MG tablet TAKE 1 TABLET BY MOUTH EVERYDAY AT BEDTIME 30 tablet 11   ? viscous lidocaine HC (LIDOCAINE) 2 % Soln viscous solution Take 5 mL by mouth  "3 (three) times a day. Swish and spit 5 mL three to four times daily as needed 120 mL 0     No current facility-administered medications for this visit.        Physical Exam:  /60   Pulse 60   Ht 5' 4\" (1.626 m)   Wt 160 lb (72.6 kg)   SpO2 95%   BMI 27.46 kg/m     Gen: alert, oriented, no distress  HEENT: nasal turbinates are unremarkable, no oropharyngeal lesions, no cervical or supraclavicular lymphadenopathy  CV: RRR, no M/G/R  Resp: CTAB, no focal crackles or wheezes  Abd: soft, nontender, no palpable organomegaly  Skin: no apparent rashes  Ext: no cyanosis, clubbing or edema  Neuro: alert, nonfocal    Labs:  Normal BMP and LFTs  WBC 13.1 with no differential  Normal Hgb  ESR 54  1,25-dihydroxyvitamin D level 83 in November 2016     Imaging studies:  CT chest (11/25/16)  - personally reviewed  - mediastinal and bilateral hilar lymphadenopathy with partial calcification  - multiple bilateral fluffy nodules (\"cotton ball\")  - peribronchovascular opacity sue. LLL     CT chest high-res (10/20/17):  - images directly reviewed, formal interpretation follows:  FINDINGS:  LUNGS AND PLEURA: Airways are patent in inspiration and expiration without tracheobronchomalacia or air trapping. There is no significant bronchial wall thickening. No significant bronchiectasis. There is scarring in the right middle lobe. Multiple   pulmonary nodules throughout the lungs are less conspicuous than on the comparison study. Example, a small area of linear scarring adjacent to the pleura in the right upper lobe was previously a 9 mm x 5 mm nodule (series 3 image 34). There a few small   nodules adjacent to the left major fissure and right major fissure. A few groundglass MR solid nodules are noted in the upper lobes. No diffuse subpleural reticular opacities. No honeycombing. No significant diffuse groundglass.     MEDIASTINUM: Normal size heart. There is atherosclerotic disease including coronary artery calcification. There " are numerous calcified mediastinal and hilar lymph nodes. The largest discrete right hilar lymph node is 15 mm (previously 17 mm). A 19 mm   subcarinal lymph node previously measured 22 mm. Small hiatal hernia.     LIMITED UPPER ABDOMEN: Negative.     MUSCULOSKELETAL: Negative.     IMPRESSION:   CONCLUSION:  1.  Pulmonary parenchymal disease and mediastinal and hilar lymphadenopathy are consistent with history of sarcoidosis. When compared with the prior study, pulmonary nodules have significantly decreased in size. Mediastinal and hilar lymph nodes have   slightly decreased in size. There is minimal fibrosis in the lungs, which is new.   2.  Coronary atherosclerotic disease.  3.  Small hiatal hernia.     CT chest (12/5/18):  - images directly reviewed, formal interpretation follows:  FINDINGS:  LUNGS AND PLEURA: There is new significant pulmonary disease seen diffusely mostly characterized by fine linear reticular interstitial prominence in the lung periphery involving upper and lower lobes but with a slight basal predominance. These are not   typical findings of sarcoid and the rapid progression is also atypical. Its possible this relates to a pneumonia superimposed upon the patient's pre-existing sarcoid. Other interstitial fibrotic processes could also be superimposed upon sarcoid but again   the progression appears fairly rapid.     No new zones of consolidation, there remains chronic atelectasis involving medial segment right middle lobe. There is a single 3 mm nodule within right middle lobe which has not changed significantly. No pleural effusion.     MEDIASTINUM: Partially calcified hilar and mediastinal lymphadenopathy is unchanged and again would be consistent with sarcoid. Coronary artery calcifications.     LIMITED UPPER ABDOMEN: Negative.     MUSCULOSKELETAL: Negative.     IMPRESSION:   CONCLUSION:  1.  Fairly pronounced worsening of diffuse interstitial disease could relate to progression of known  sarcoid but a superimposed atypical pneumonia or additional interstitial fibrosing process may also be present.     CT chest (1/15/19), 5 weeks after starting prednisone 40 mg daily:  - images directly reviewed, formal interpretation follows:  FINDINGS:   LUNGS AND PLEURA: Previously seen interstitial infiltrates throughout both lungs have resolved. There are a few tiny pulmonary nodules which are unchanged. There is chronic atelectasis in the inferior right middle lobe. The lungs are otherwise clear. No   pleural effusion.     MEDIASTINUM: Partially calcified mediastinal and bilateral hilar lymph nodes are unchanged. Small hiatal hernia. Moderate coronary artery calcification.     LIMITED UPPER ABDOMEN: Negative.     MUSCULOSKELETAL: Negative.     IMPRESSION:   CONCLUSION:   1.  Previously seen interstitial infiltrates have resolved.  2.  Partially calcified mediastinal and bilateral hilar lymph nodes are unchanged and consistent with known sarcoidosis.         Brain MRI (July 2016)  - mild atrophy  - minimal to mild SVID      PFT's  November 2016:  FEV1/FVC is 72% and is normal.  FEV1 is 2.49L or 113% predicted and is normal.  FVC is 3.47L or 124% predicted and normal.  There was no improvement in spirometry after a single inhaled dose of bronchodilator.  TLC is 5.13L or 107% predicted and is normal.  RV is 1.54L or 77% predicted and is reduced.  DLCO is 17.61 ml/min/mmHg or 88% predicted and is normal when it   is corrected for hemoglobin.     10/16/17:  FEV1/FVC is 68 and is normal (less than 0.7 but greater than the demographically adjusted lower limit of normal).  FEV1 is 122% predicted and is normal.  FVC is 140% predicted and normal.     DLCO is 80% predicted and is normal when it   is corrected for hemoglobin.     12/5/18:  FEV1/FVC is 74% and is normal.  FEV1 is 1.72 L or 76% predicted and is reduced.  FVC is 2.33 L or 80% predicted and is normal.  There was no improvement in spirometry after a single  inhaled dose of bronchodilator.  DLCO is 9.48 mL/min/mm Hg or 40% predicted and is reduced when it   is corrected for hemoglobin.    2/13/19  FEV1/FVC is 68% and is reduced.  FEV1 is 2.39L (108%) predicted and is normal.  FVC is 3.52L (118%) predicted and normal.  There was no improvement in spirometry after a single inhaled dose of bronchodilator.  DLCO is 13.14ml/min/hg (55%) predicted and is reduced when it is corrected for hemoglobin.  Flow volume loops indicate severe scooping of the expiratory limb.     Impression:  Full Pulmonary Function Test is abnormal.  PFTs are consistent with mild obstructive disease.  Spirometry is not consistent with reversibility.  Diffusion capacity when corrected for hemoglobin is moderately reduced.    Vipin Duron MD  Pulmonary and Critical Care Medicine  [facility]  Cell [phone]  Office [phone]  Pager [phone]

## 2021-06-19 NOTE — LETTER
Letter by Vipin Duron MD at      Author: Vipin Duron MD Service: -- Author Type: --    Filed:  Encounter Date: 5/3/2019 Status: (Other)       3 May 2019    Dear Dr. Ashley,    See below for documentation of a telephone conversation that I had today with Denis Moreno ( 1950).  Please feel free to call me at any time if needed.    Pulmonary Telephone Note    CXR completely clear. LFTs and calcium normal. Cr is up at 1.48; could be effect of SMX-TMP, especially as he is also on ACEI. Will stop the SMX-TMP (no longer indicated with his current prednisone dose anyway) and repeat BMP and Mg in two weeks. Will decrease his prednisone from 15 mg daily to 10 mg daily. Has spirometry/DLCO scheduled for . I will call him with above results. Called Mr. Moreno and discussed the above. He is feeling well and is in agreement. Encouraged him to call any time with questions or concerning symptoms.    Sincerely,    Vipin Duron MD  Pulmonary and Critical Care Medicine  [facility]  Cell [phone]  Office [phone]  Pager [phone]

## 2021-06-19 NOTE — LETTER
Letter by Vipin Duron MD at      Author: Vipin Duron MD Service: -- Author Type: --    Filed:  Encounter Date: 2019 Status: (Other)       2019    Dear Dr. Ashley,    See below for documentation of a telephone conversation that I had today with Denis Moreno ( 1950).  Please feel free to call me at any time if needed.    Pulmonary Telephone Note    Reviewed Mr. Moreno's spirometry and DLCO. Compared to 2019, much improved. Normal spirometry, with improved FEV1/FVC ratio from 0.68 to 0.75, and his flow-volume loop morphology is much improved. Corrected DLCO up from 55% to 72%. Called him to discuss. He is breathing well. Recent tenderness right achilles tendon up to right posterior shin. US negative for DVT; no mention of Baker cyst. He thought the ankle was swollen, but no pitting and a family member told him it did not look swollen. [place] airport [place] gate change and thinks this may have led to some strain. He is on amlodipine but does not think the ankle is swollen now. Also asking about checking kidney function; creatinine was elevated in early May. Says his breathing feels great.    Plan:  - decrease prednisone from 10 mg daily to 7.5 mg daily for 14 days, then 5 mg daily until follow-up  - follow up in 3 months with spirometry/DLCO  - check BMP; I will call him with the result  - advised heating pad to tender area of right leg two times a day to see if this helps and has ongoing follow-up with Dr. Ashley as needed  - encouraged him to call any time with questions or concerning symptoms    Sincerely,    Vipin Duron MD  Pulmonary and Critical Care Medicine  [facility]  Cell [phone]  Office [phone]  Pager [phone]

## 2021-06-19 NOTE — PROGRESS NOTES
"  Office Visit - Follow Up   Denis Moreno   68 y.o. male    Date of Visit: 8/15/2018    Chief Complaint   Patient presents with     Gait Problem     Almost fell when getting up from sitting, just feeling a little unbalanced        Assessment and Plan   1. Postural dizziness   blood pressure today is 110/64 neurologic exam is normal.  No further lab testing is recommended at this time. Continue current medications. Follow-up if symptoms change/worsen          No Follow-up on file.     History of Present Illness   This 68 y.o. old  With hypertension on Norvasc, lisinopril, and propranolol. Nine days ago he was in a group meeting. 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 He got rather upset at the discussion during the meeting. He left the meeting, went home ate a meal and got dizzy on arising after sitting. Since then he's felt intermittently  A bit dizzy on arising. He said no syncope no lightheadedness no chest pain no shortness of breath no headache.  Symptoms overall are mild but he thought he should get them checked out.     Review of Systems: A comprehensive review of systems was negative except as noted.     Medications, Allergies and Problem List   Reviewed and updated     Physical Exam   General Appearance:       /64 (Patient Site: Right Arm, Patient Position: Sitting)  Pulse 75  Ht 5' 4\" (1.626 m)  Wt 152 lb (68.9 kg)  SpO2 98%  BMI 26.09 kg/m2     Cranial nerves three through 12 are intact. Rapid alternating movements are normal. Finger to nose test is normal. Dexterity in his hands is normal. No tremor. Strength is equal bilaterally in his arms and his legs. His gait is normal. romberg test is negative. Heel to toe walking is normal     Additional Information   Current Outpatient Prescriptions   Medication Sig Dispense Refill     ALPRAZolam (XANAX) 1 MG tablet TAKE 1 TABLET BY MOUTH THREE TIMES A DAY AS NEEDED 30 tablet 0     amLODIPine (NORVASC) 10 MG tablet TAKE ONE " TABLET BY MOUTH EVERY DAY 90 tablet 1     benzonatate (TESSALON) 100 MG capsule TAKE 1 CAPSULE (100 MG TOTAL) BY MOUTH EVERY 6 (SIX) HOURS AS NEEDED FOR COUGH. 20 capsule 0     citalopram (CELEXA) 40 MG tablet TAKE 1 TABLET DAILY. 90 tablet 1     diazePAM (VALIUM) 5 mg/5 mL (1 mg/mL) solution        gabapentin (NEURONTIN) 300 MG capsule TAKE 2 CAPSULES BY MOUTH 3 TIMES A DAY INCREASE AS INSTRUCTED  5     hydrOXYzine pamoate (VISTARIL) 25 MG capsule TAKE 1 CAPSULE BY MOUTH 3 TIMES A DAY AS NEEDED FOR ITCHING 270 capsule 1     lisinopril (PRINIVIL,ZESTRIL) 20 MG tablet TAKE 1 TABLET (20 MG TOTAL) BY MOUTH DAILY. 90 tablet 3     mineral oil liquid Take 30 mL by mouth daily as needed for constipation. 180 mL 3     prednisoLONE acetate (PRED-FORTE) 1 % ophthalmic suspension Administer 1 drop to both eyes 2 (two) times a day. 5 mL 0     propranolol (INDERAL) 20 MG tablet TAKE 1 TABLET (20 MG TOTAL) BY MOUTH 2 (TWO) TIMES A DAY. 180 tablet 3     SUMAtriptan (IMITREX) 50 MG tablet TAKE 1 TABLET (50 MG TOTAL) BY MOUTH ONCE AS NEEDED FOR MIGRAINE. 30 tablet 5     zolpidem (AMBIEN) 10 mg tablet TAKE 1 TABLET (10 MG TOTAL) BY MOUTH BEDTIME AS NEEDED. 30 tablet 0     No current facility-administered medications for this visit.      No Known Allergies  Social History   Substance Use Topics     Smoking status: Never Smoker     Smokeless tobacco: Never Used     Alcohol use No       Review and/or order of clinical lab tests:  Review and/or order of radiology tests:  Review and/or order of medicine tests:  Discussion of test results with performing physician:  Decision to obtain old records and/or obtain history from someone other than the patient:  Review and summarization of old records and/or obtaining history from someone other than the patient and.or discussion of case with another health care provider:  Independent visualization of image, tracing or specimen itself:    Time: total time spent with the patient was 15 minutes of  which >50% was spent in counseling and coordination of care     Kenneth Henderson MD

## 2021-06-20 NOTE — LETTER
"Letter by Vipin Duron MD at      Author: Vipin Duron MD Service: -- Author Type: --    Filed:  Encounter Date: 9/15/2020 Status: (Other)       15 September 2020    Dear Dr. Ashley,    See below for documentation of a virtual visit that I had with Denis Moreno ( 1950). Please feel free to call me at any time if needed.    Sincerely,    Vipin Duron MD  Pulmonary and Critical Care Medicine  [facility]  Cell [phone]  Office [phone]  Pager [phone]    Denis Moreno is a 70 y.o. male who is being evaluated via a billable video visit.      The patient has been notified of following:     \"This video visit will be conducted via a call between you and your physician/provider. We have found that certain health care needs can be provided without the need for an in-person physical exam.  This service lets us provide the care you need with a video conversation.  If a prescription is necessary we can send it directly to your pharmacy.  If lab work is needed we can place an order for that and you can then stop by our lab to have the test done at a later time.    Video visits are billed at different rates depending on your insurance coverage. Please reach out to your insurance provider with any questions.    If during the course of the call the physician/provider feels a video visit is not appropriate, you will not be charged for this service.\"    Patient has given verbal consent to a Video visit? Yes  How would you like to obtain your AVS? AVS Preference: MyChart.  If dropped by the video visit, the video invitation should be sent to: Text to cell phone: [phone]  Will anyone else be joining your video visit? No        Video Start Time: 1040    Video-Visit Details    Type of service:  Video Visit    Video End Time (time video stopped): 1058  Originating Location (pt. Location): Home    Distant Location (provider location):  Critical access hospital     Platform used for Video " Visit: [facility]    Pulmonary Clinic Follow-up Visit    Assessment and Plan:  70 year old male never smoker with a history of dyslipidemia, low-grade chronic bilateral uveitis, mild SAPNA, and pulmonary sarcoidosis, presenting for follow-up.      Sarcoidosis: Notes good energy, breathing well. CXR from July continues to show radiographic improvement; current spirometry/DLCO look good. Has regular ophthalmology follow-up. Eyes were watering a couple of weeks ago; no eye pain. Weight is improving with weaning off the prednisone. No new rash. No numbness or tingling. No leg swelling. Recall that Mr. Moreno has a history of pulmonary and ocular involvement, with chronic bilateral uveitis, and mediastinal/hilar lymphadenopathy with pulmonary nodules but previously normal functional status and normal PFTs. He developed worsening fatigue, dyspnea, and cough, with significant peripheral interstitial changes and worsening pulmonary function tests requiring initiation of prednisone. This led to improvement in symptoms, pulmonary function, and radiographic findings, and we have been able to slowly titrate off the prednisone. Normal renal and hepatic function. Had leukocytosis on CBC in November 2018, not anemic. Prior ECG with possible LVH, otherwise unremarkable.     Plan:  - weight gain on prednisone is now improving after tapering off by his report  - has regular ophthalmology follow-up  - annual high-dose influenza vaccination  - up to date on pneumococcal vaccination  - follow up in one year with spirometry/DLCO, or sooner if needed  - encouraged him to call any time with questions or concerning symptoms    Vipin Duron MD  Pulmonary and Critical Care Medicine  [facility]  Cell [phone]  Office [phone]  Pager [phone]    CCx: sarcoidosis    HPI: 70 year old male never smoker with a history of dyslipidemia, low-grade chronic bilateral uveitis, mild SAPNA, and pulmonary sarcoidosis, presenting  for follow-up. Notes good energy, breathing well. CXR from July continues to show radiographic improvement; current spirometry/DLCO look good. Has regular ophthalmology follow-up. Eyes were watering a couple of weeks ago; no eye pain. Weight is improving with weaning off the prednisone. No new rash. No numbness or tingling. No leg swelling. Recall that Mr. Moreno has a history of pulmonary and ocular involvement, with chronic bilateral uveitis, and mediastinal/hilar lymphadenopathy with pulmonary nodules but previously normal functional status and normal PFTs. He developed worsening fatigue, dyspnea, and cough, with significant peripheral interstitial changes and worsening pulmonary function tests requiring initiation of prednisone. This led to improvement in symptoms, pulmonary function, and radiographic findings, and we have been able to slowly titrate off the prednisone. Normal renal and hepatic function. Had leukocytosis on CBC in November 2018, not anemic. Prior ECG with possible LVH, otherwise unremarkable.    ROS:  A 12-system review was obtained and was negative with the exception of the symptoms endorsed in the history of present illness.    PMH:  Pulmonary and ocular sarcoidosis  Deviated septum  HLD  Depression  HTN  Insomnia  Chronic headaches  Mild SAPNA with AHI 10.4    PSH:  Past Surgical History:   Procedure Laterality Date   ? BRONCHOSCOPY  12/20/2016    ebus       Allergies:  No Known Allergies    Family HX:  No family history on file.    Social Hx:  Social History     Socioeconomic History   ? Marital status: Single     Spouse name: Not on file   ? Number of children: Not on file   ? Years of education: Not on file   ? Highest education level: Not on file   Occupational History   ? Not on file   Social Needs   ? Financial resource strain: Not on file   ? Food insecurity     Worry: Not on file     Inability: Not on file   ? Transportation needs     Medical: Not on file     Non-medical: Not on file    Tobacco Use   ? Smoking status: Never Smoker   ? Smokeless tobacco: Never Used   Substance and Sexual Activity   ? Alcohol use: No   ? Drug use: No   ? Sexual activity: Not on file   Lifestyle   ? Physical activity     Days per week: Not on file     Minutes per session: Not on file   ? Stress: Not on file   Relationships   ? Social connections     Talks on phone: Not on file     Gets together: Not on file     Attends Buddhist service: Not on file     Active member of club or organization: Not on file     Attends meetings of clubs or organizations: Not on file     Relationship status: Not on file   ? Intimate partner violence     Fear of current or ex partner: Not on file     Emotionally abused: Not on file     Physically abused: Not on file     Forced sexual activity: Not on file   Other Topics Concern   ? Not on file   Social History Narrative   ? Not on file       Current Meds:  Current Outpatient Medications   Medication Sig Dispense Refill   ? ALPRAZolam (XANAX) 1 MG tablet TAKE 1 TABLET BY MOUTH TWO TIMES A DAY AS NEEDED FOR ANXIETY 60 tablet 0   ? amLODIPine (NORVASC) 10 MG tablet TAKE 1 TABLET BY MOUTH EVERY DAY 90 tablet 3   ? benzonatate (TESSALON) 100 MG capsule TAKE ONE CAPSULE BY MOUTH EVERY 6 HOURS AS NEEDED FOR COUGH 20 capsule 0   ? cholecalciferol, vitamin D3, 125 mcg (5,000 unit) capsule Take 1 capsule (5,000 Units total) by mouth daily. 90 capsule 3   ? citalopram (CELEXA) 40 MG tablet TAKE 1 TABLET DAILY. 90 tablet 1   ? gabapentin (NEURONTIN) 300 MG capsule TAKE 2 CAPSULES BY MOUTH 3 TIMES A DAY INCREASE AS INSTRUCTED  5   ? hydrOXYzine HCL (ATARAX) 10 MG tablet Take 1 tablet (10 mg total) by mouth 3 (three) times a day as needed for itching. 90 tablet 3   ? lisinopriL (PRINIVIL,ZESTRIL) 20 MG tablet TAKE 1 TABLET BY MOUTH EVERY DAY 90 tablet 1   ? omeprazole (PRILOSEC) 20 MG capsule TAKE 1 CAPSULE BY MOUTH EVERY DAY 90 capsule 3   ? prednisoLONE acetate (PRED-FORTE) 1 % ophthalmic suspension  "Administer 1 drop to both eyes 2 (two) times a day. 5 mL 0   ? predniSONE (DELTASONE) 1 MG tablet Take 4 tabs (4 mg) daily x 10 days, then 3 tabs (3 mg) daily x 10 days, then 2 tabs (2 mg) daily x 10 days, then 1 tab (1 mg) daily x 10 days.. 120 tablet 11   ? propranolol (INDERAL) 20 MG tablet TAKE 1 TABLET (20 MG TOTAL) BY MOUTH 2 (TWO) TIMES A DAY. 180 tablet 3   ? SUMAtriptan (IMITREX) 50 MG tablet TAKE 1 TABLET (50 MG TOTAL) BY MOUTH ONCE AS NEEDED FOR MIGRAINE. 12 tablet 14   ? traZODone (DESYREL) 50 MG tablet Take 1 tablet (50 mg total) by mouth at bedtime. 90 tablet 3   ? azithromycin (ZITHROMAX) 250 MG tablet Take 500mg (2 tablets) day one, and then 250mg (1 tablet) days 2-5 (Patient taking differently: Take 500mg (2 tablets) day one, and then 250mg (1 tablet) days 2-5  AS NEEDED) 6 tablet 0     No current facility-administered medications for this visit.        Physical Exam:  no exam due to virtual visit    Labs:  Normal BMP and LFTs  WBC 13.1 with no differential  Normal Hgb  ESR 54  1,25-dihydroxyvitamin D level 83 in November 2016     Imaging studies:  CT chest (November 2016)  - personally reviewed  - mediastinal and bilateral hilar lymphadenopathy with partial calcification  - multiple bilateral fluffy nodules (\"cotton ball\")  - peribronchovascular opacity sue. LLL     CT chest high-res (October 2017):  - images directly reviewed, formal interpretation follows:  FINDINGS:  LUNGS AND PLEURA: Airways are patent in inspiration and expiration without tracheobronchomalacia or air trapping. There is no significant bronchial wall thickening. No significant bronchiectasis. There is scarring in the right middle lobe. Multiple   pulmonary nodules throughout the lungs are less conspicuous than on the comparison study. Example, a small area of linear scarring adjacent to the pleura in the right upper lobe was previously a 9 mm x 5 mm nodule (series 3 image 34). There a few small   nodules adjacent to the left " major fissure and right major fissure. A few groundglass MR solid nodules are noted in the upper lobes. No diffuse subpleural reticular opacities. No honeycombing. No significant diffuse groundglass.     MEDIASTINUM: Normal size heart. There is atherosclerotic disease including coronary artery calcification. There are numerous calcified mediastinal and hilar lymph nodes. The largest discrete right hilar lymph node is 15 mm (previously 17 mm). A 19 mm   subcarinal lymph node previously measured 22 mm. Small hiatal hernia.     LIMITED UPPER ABDOMEN: Negative.     MUSCULOSKELETAL: Negative.     IMPRESSION:   CONCLUSION:  1.  Pulmonary parenchymal disease and mediastinal and hilar lymphadenopathy are consistent with history of sarcoidosis. When compared with the prior study, pulmonary nodules have significantly decreased in size. Mediastinal and hilar lymph nodes have   slightly decreased in size. There is minimal fibrosis in the lungs, which is new.   2.  Coronary atherosclerotic disease.  3.  Small hiatal hernia.     CT chest (December 2018):  - images directly reviewed, formal interpretation follows:  FINDINGS:  LUNGS AND PLEURA: There is new significant pulmonary disease seen diffusely mostly characterized by fine linear reticular interstitial prominence in the lung periphery involving upper and lower lobes but with a slight basal predominance. These are not   typical findings of sarcoid and the rapid progression is also atypical. Its possible this relates to a pneumonia superimposed upon the patient's pre-existing sarcoid. Other interstitial fibrotic processes could also be superimposed upon sarcoid but again   the progression appears fairly rapid.     No new zones of consolidation, there remains chronic atelectasis involving medial segment right middle lobe. There is a single 3 mm nodule within right middle lobe which has not changed significantly. No pleural effusion.     MEDIASTINUM: Partially calcified hilar  and mediastinal lymphadenopathy is unchanged and again would be consistent with sarcoid. Coronary artery calcifications.     LIMITED UPPER ABDOMEN: Negative.     MUSCULOSKELETAL: Negative.     IMPRESSION:   CONCLUSION:  1.  Fairly pronounced worsening of diffuse interstitial disease could relate to progression of known sarcoid but a superimposed atypical pneumonia or additional interstitial fibrosing process may also be present.     CT chest (January 2019), 5 weeks after starting prednisone 40 mg daily:  - images directly reviewed, formal interpretation follows:  FINDINGS:   LUNGS AND PLEURA: Previously seen interstitial infiltrates throughout both lungs have resolved. There are a few tiny pulmonary nodules which are unchanged. There is chronic atelectasis in the inferior right middle lobe. The lungs are otherwise clear. No   pleural effusion.     MEDIASTINUM: Partially calcified mediastinal and bilateral hilar lymph nodes are unchanged. Small hiatal hernia. Moderate coronary artery calcification.     LIMITED UPPER ABDOMEN: Negative.     MUSCULOSKELETAL: Negative.     IMPRESSION:   CONCLUSION:   1.  Previously seen interstitial infiltrates have resolved.  2.  Partially calcified mediastinal and bilateral hilar lymph nodes are unchanged and consistent with known sarcoidosis.       CXR (July 2020):  - images directly reviewed, formal interpretation follows:  IMPRESSION:      Cardiac silhouette is normal in size. Unchanged bilateral hilar and subcarinal fullness reflecting enlarged lymph nodes some of which have internal dystrophic calcification consistent with reported diagnosis of sarcoidosis. Mild atheromatous aortic   calcification. No new mediastinal border abnormality.     The lungs are symmetrically inflated. There is a small linear band of atelectasis in the right middle lobe. There are no new nodular or airspace opacities. No new lung parenchymal volume loss.     Diaphragm curvature is preserved. Mild smooth  elevation of the anterior one half of the right hemidiaphragm likely represents focal eventration, unchanged. No pleural fluid or thickening.     Minimal unchanged thoracic spine degenerative disc disease.    Brain MRI (July 2016)  - mild atrophy  - minimal to mild SVID      PFT's  November 2016:  FEV1/FVC is 72% and is normal.  FEV1 is 2.49L or 113% predicted and is normal.  FVC is 3.47L or 124% predicted and normal.  There was no improvement in spirometry after a single inhaled dose of bronchodilator.  TLC is 5.13L or 107% predicted and is normal.  RV is 1.54L or 77% predicted and is reduced.  DLCO is 17.61 ml/min/mmHg or 88% predicted and is normal when it   is corrected for hemoglobin.     October 2017:  FEV1/FVC is 68 and is normal (less than 0.7 but greater than the demographically adjusted lower limit of normal).  FEV1 is 122% predicted and is normal.  FVC is 140% predicted and normal.     DLCO is 80% predicted and is normal when it   is corrected for hemoglobin.     December 2018:  FEV1/FVC is 74% and is normal.  FEV1 is 1.72 L or 76% predicted and is reduced.  FVC is 2.33 L or 80% predicted and is normal.  There was no improvement in spirometry after a single inhaled dose of bronchodilator.  DLCO is 9.48 mL/min/mm Hg or 40% predicted and is reduced when it   is corrected for hemoglobin.     February 2019:  FEV1/FVC is 68% and is reduced.  FEV1 is 2.39L (108%) predicted and is normal.  FVC is 3.52L (118%) predicted and normal.  There was no improvement in spirometry after a single inhaled dose of bronchodilator.  DLCO is 13.14ml/min/hg (55%) predicted and is reduced when it is corrected for hemoglobin.  Flow volume loops indicate severe scooping of the expiratory limb.     Impression:  Full Pulmonary Function Test is abnormal.  PFTs are consistent with mild obstructive disease.  Spirometry is not consistent with reversibility.  Diffusion capacity when corrected for hemoglobin is  moderately reduced.    September 2020:  FEV1/FVC is 69 and is normal.  FEV1 is 106% predicted and is normal.  FVC is 119% predicted and is normal.  There was no improvement in spirometry after a single inhaled dose of bronchodilator.  DLCO is 78% predicted and is normal when it   is corrected for hemoglobin.  The flow volume loop is normal

## 2021-06-20 NOTE — PROGRESS NOTES
Office Visit - Follow Up   Denis Moreno   68 y.o. male    Date of Visit: 9/13/2018    Chief Complaint   Patient presents with     Follow-up     zolpidem and alprazolam ineffective for sleep      Flu Vaccine        Assessment and Plan   1. Insomnia  Not responding anymore to the combination of alprazolam and zolpidem for his insomnia.  Unable to fall asleep and maintain his sleep.  On average, only sleeps about 3 hours nightly now.  Used to be  able to sleep at least 7-8 hours with  these medications taken before bedtime. Discussed with him I am not sure what is causing his insomnia.  Needs to determine the cause of his insomnia..  Agrees to be referred to sleep medicine clinic.  Will refer to sleep medicine.  - Ambulatory referral to Sleep Medicine    2. Anxiety  Has anxiety but not flaring up constantly.  Okay to continue alprazolam take 1-2 tablets at bedtime together with zolpidem 10 mg at bedtime while waiting  for his appointment with sleep medicine.  - ALPRAZolam (XANAX) 1 MG tablet; TAKE 1-2 TABLET AT BEDTIME AS NEEDED  Dispense: 60 tablet; Refill: 0    3. Benign Essential Hypertension  Controlled.  Continue amlodipine, lisinopril and propranolol.  Propranolol also  prophylactically controls his headaches.    4. Sarcoidosis of lung (H)  Has sarcoidosis of the lung.  Found by his eye doctor he has uveitis due to his sarcoidosis. Treated with steroid eyedrops.  Followed by his ophthalmologist.  But his lung sarcoidosis is stable.    5. Need for immunization against influenza  Flu shot was given.  - Influenza High Dose, Seasonal 65+ yrs      Follow up in in 3 months.  Advised to fast next visit.     History of Present Illness   This 68 y.o. old male called our clinic informing me that he is not sleeping well despite his alprazolam and zolpidem.  Used to sleep well with this to medications.  But now only sleeping 3 hours nightly instead of 7 to 8 hours previously.  Has anxiety.  His anxiety is not flaring up.   Does not feel he is stressed.  But reports he takes care of 2  grand nephews who are in their teens, 11 and 14  years old which  can be a form but of stress for him.  Does not have depression.  Last PHQ 9 score was normal at 3.  Has hypertension controlled by combination of amlodipine lisinopril and propranolol.  Propranolol also used for prophylaxis against headaches. This is  working well.  Has sarcoidosis of the lung.  This is stable.  But it caused uveitis.  Now uses steroid eyedrops and followed by ophthalmology.    Review of Systems   A 12 point comprehensive review of systems was negative except as noted..     Medications, Allergies and Problem List   Reviewed and updated             Chief Complaint   Follow-up (zolpidem and alprazolam ineffective for sleep ) and Flu Vaccine       Patient Profile   Social History     Social History Narrative        Past Medical History   Patient Active Problem List   Diagnosis     Hyperlipidemia     Mild Recurrent Major Depression     Male Erectile Disorder     Benign Essential Hypertension     Insomnia     Anxiety     Deviated nasal septum     Hilar adenopathy     Uveitis     Sarcoidosis of lung (H)       Past Surgical History  He has a past surgical history that includes Bronchoscopy (12/20/2016).       Medications and Allergies   Current Outpatient Prescriptions   Medication Sig     ALPRAZolam (XANAX) 1 MG tablet TAKE 1-2 TABLET AT BEDTIME AS NEEDED     amLODIPine (NORVASC) 10 MG tablet Take 1 tablet (10 mg total) by mouth daily.     citalopram (CELEXA) 40 MG tablet TAKE 1 TABLET DAILY.     diazePAM (VALIUM) 5 mg/5 mL (1 mg/mL) solution      gabapentin (NEURONTIN) 300 MG capsule TAKE 2 CAPSULES BY MOUTH 3 TIMES A DAY INCREASE AS INSTRUCTED     hydrOXYzine pamoate (VISTARIL) 25 MG capsule TAKE 1 CAPSULE BY MOUTH 3 TIMES A DAY AS NEEDED FOR ITCHING     lisinopril (PRINIVIL,ZESTRIL) 20 MG tablet TAKE 1 TABLET (20 MG TOTAL) BY MOUTH DAILY.     mineral oil liquid Take 30 mL by  "mouth daily as needed for constipation.     prednisoLONE acetate (PRED-FORTE) 1 % ophthalmic suspension Administer 1 drop to both eyes 2 (two) times a day.     propranolol (INDERAL) 20 MG tablet TAKE 1 TABLET (20 MG TOTAL) BY MOUTH 2 (TWO) TIMES A DAY.     SUMAtriptan (IMITREX) 50 MG tablet TAKE 1 TABLET (50 MG TOTAL) BY MOUTH ONCE AS NEEDED FOR MIGRAINE.     zolpidem (AMBIEN) 10 mg tablet TAKE 1 TABLET (10 MG TOTAL) BY MOUTH BEDTIME AS NEEDED.     No Known Allergies     Family and Social History   No family history on file.     Social History   Substance Use Topics     Smoking status: Never Smoker     Smokeless tobacco: Never Used     Alcohol use No        Physical Exam       Physical Exam  /70  Pulse 68  Ht 5' 4\" (1.626 m)  Wt 154 lb (69.9 kg)  SpO2 96%  BMI 26.43 kg/m2  General appearance: alert, appears stated age, cooperative and no distress  Head: Normocephalic, without obvious abnormality, atraumatic  Throat: lips, mucosa, and tongue normal; teeth and gums normal  Neck: no adenopathy, no carotid bruit, no JVD, supple, symmetrical, trachea midline and thyroid not enlarged, symmetric, no tenderness/mass/nodules  Lungs: clear to auscultation bilaterally  Heart: regular rate and rhythm, S1, S2 normal, no murmur, click, rub or gallop  Abdomen: soft, non-tender; bowel sounds normal; no masses,  no organomegaly  Extremities: extremities normal, atraumatic, no cyanosis or edema  Skin: Skin color, texture, turgor normal. No rashes or lesions  Psychiatric: Normal affect, appropriate mood     Additional Information        Beka Ashley MD  Internal Medicine  Contact me at [phone]     Additional Information   Current Outpatient Prescriptions   Medication Sig     ALPRAZolam (XANAX) 1 MG tablet TAKE 1-2 TABLET AT BEDTIME AS NEEDED     amLODIPine (NORVASC) 10 MG tablet Take 1 tablet (10 mg total) by mouth daily.     citalopram (CELEXA) 40 MG tablet TAKE 1 TABLET DAILY.     diazePAM (VALIUM) 5 mg/5 mL (1 " mg/mL) solution      gabapentin (NEURONTIN) 300 MG capsule TAKE 2 CAPSULES BY MOUTH 3 TIMES A DAY INCREASE AS INSTRUCTED     hydrOXYzine pamoate (VISTARIL) 25 MG capsule TAKE 1 CAPSULE BY MOUTH 3 TIMES A DAY AS NEEDED FOR ITCHING     lisinopril (PRINIVIL,ZESTRIL) 20 MG tablet TAKE 1 TABLET (20 MG TOTAL) BY MOUTH DAILY.     mineral oil liquid Take 30 mL by mouth daily as needed for constipation.     prednisoLONE acetate (PRED-FORTE) 1 % ophthalmic suspension Administer 1 drop to both eyes 2 (two) times a day.     propranolol (INDERAL) 20 MG tablet TAKE 1 TABLET (20 MG TOTAL) BY MOUTH 2 (TWO) TIMES A DAY.     SUMAtriptan (IMITREX) 50 MG tablet TAKE 1 TABLET (50 MG TOTAL) BY MOUTH ONCE AS NEEDED FOR MIGRAINE.     zolpidem (AMBIEN) 10 mg tablet TAKE 1 TABLET (10 MG TOTAL) BY MOUTH BEDTIME AS NEEDED.     No Known Allergies  Social History   Substance Use Topics     Smoking status: Never Smoker     Smokeless tobacco: Never Used     Alcohol use No         Time: total time spent with the patient was 25 minutes of which >50% was spent in counseling and coordination of care

## 2021-06-20 NOTE — LETTER
Letter by Beka Ashley MD at      Author: Beka Ashley MD Service: -- Author Type: --    Filed:  Encounter Date: 2/19/2020 Status: (Other)        [facility]  [address] [place] [address]  [facility] [id number]  [phone]         Denis Moreno  [address] [place] [address]        02/19/20    Dear Denis Moreno,     At [facility] we care about your health and well-being. Your primary care provider is committed to ensuring you receive high quality care and has chosen a network of specialists to assist in providing that care. Recently Dr. Ashley referred you to Urology for specialty care.      Please call [facility] [phone] at your earliest convenience for assistance in scheduling an appointment.  If you have already scheduled this appointment, please disregard this notice.  Thank you for choosing [facility] for your healthcare needs.       Sincerely,       [facility] Specialty Scheduling

## 2021-06-20 NOTE — LETTER
Letter by Vipin Duron MD at      Author: Vipin Duron MD Service: -- Author Type: --    Filed:  Encounter Date: 2020 Status: (Other)       2020    Dear Dr. Ashley,    See below for documentation of a telephone conversation that I had with Denis Moreno ( 1950). Please feel free to call me at any time if needed.    Sincerely,    Vipin Duron MD  Pulmonary and Critical Care Medicine  [facility]  Cell [phone]  Office [phone]  Pager [phone]    Pulmonary Telephone Note    Denis called me. He is gaining weight. Taking prednisone 5 mg daily. Now 160 lbs; usually around 140 lbs by his report, though when I last saw him in 2019 was 160 lbs and 2019 was 152 lbs. No leg swelling. No dyspnea or cough. I last saw him in 2019 and last talked to him in 2019. Had planned follow-up in late  with spirometry/DLCO, but somehow that was not scheduled. He reports that he has regular ophthalmology follow-up, and that the steroid eye drops were discontinued; he reports that his ophthalmologist is aware of his sarcoidosis and uveitis diagnoses and is monitoring that.    Will obtain PA/lateral CXR and PFT (the latter at [facility] due to current pandemic restrictions at our PFT labs) and I will call him with results. Should be able to slowly titrate off prednisone. Encouraged him to call any time with questions or concerning symptoms.

## 2021-06-20 NOTE — LETTER
Letter by Vipin Duron MD at      Author: Vipin Duron MD Service: -- Author Type: --    Filed:  Encounter Date: 2020 Status: (Other)       2020    Dear Dr. Ashley,    See below for documentation of a telephone conversation that I had with Denis Moreno ( 1950). Please feel free to call me at any time if needed.    Sincerely,    Vipin Duron MD  Pulmonary and Critical Care Medicine  [facility]  Cell [phone]  Office [phone]  Pager [phone]    Pulmonary Telephone Note    Called Denis. His CXR looks quite clear; maybe small amount of atelectasis at the right base. He has no dyspnea. Has ongoing prednisone-induced weight gain that he is so far unable to control with diet modification. Will plan to taper him off prednisone (currently on 5 mg daily) as follows: 4 mg daily x 10 days, then 3 mg daily x 10 days, then 2 mg daily x 10 days, then 1 mg daily x 10 days. If his disease flares, can go back up on the prednisone dose and start methotrexate prior to any subsequent attempt at steroid taper. Will also refer him to an outpatient dietician to help address his prednisone-induced weight gain. I will see him in 6 weeks with complete PFTs completed beforehand at the [facility] (our PFT labs are still closed due to pandemic restrictions). He is in agreement. Encouraged him to call any time with questions or concerning symptoms.

## 2021-06-20 NOTE — PROGRESS NOTES
Dear  Beka Ashley Md  [address] [place] [address]    Thank you for the opportunity to participate in the care of  Denis Moreno.    He is a 68 y.o. y/o who comes to the clinic for consultation.    Mr. Moreno reports that he has been dealing with progressive difficulties to fall asleep for the last 4 years. He goes to around 11:45 PM and it takes him 15 minutes to fall asleep as long as he takes medications. He is unable to fall asleep if he does not take any medications. He started taking sleep medications (zolpidem) 2 years ago and this was working well for some time but 1 month ago, zolpidem stopped working completely. He also has a history of anxiety and takes alprazolam for anxiety. Last week, he started taking Alprazolam around 11 pm and zolpidem close to 11 pm.    Once he falls asleep, he is able to stay asleep.    The patient has been told that he fights in his sleep. He describes having dreams that involve confrontations and he has been told that he acts his dreams out. He acts his dreams every 2 to 3 months. He sleeps alone and denies injuring any bed partners or himself.    He denies any tremors or shaking. No changes in his sense of smell.    He also reports snoring at night. Snoring is very loud.     ESS: 3  STOP BAN: 4/8    Past Medical History  Past Medical History:   Diagnosis Date     Anxiety and depression      Hypertension      Migraines         Past Surgical History  Past Surgical History:   Procedure Laterality Date     BRONCHOSCOPY  12/20/2016    ebus        Meds  Current Outpatient Prescriptions   Medication Sig Dispense Refill     ALPRAZolam (XANAX) 1 MG tablet TAKE 1-2 TABLET AT BEDTIME AS NEEDED 60 tablet 0     amLODIPine (NORVASC) 10 MG tablet Take 1 tablet (10 mg total) by mouth daily. 90 tablet 3     citalopram (CELEXA) 40 MG tablet TAKE 1 TABLET DAILY. 90 tablet 1     diazePAM (VALIUM) 5 mg/5 mL (1 mg/mL) solution        gabapentin (NEURONTIN) 300 MG capsule TAKE  2 CAPSULES BY MOUTH 3 TIMES A DAY INCREASE AS INSTRUCTED  5     hydrOXYzine pamoate (VISTARIL) 25 MG capsule TAKE 1 CAPSULE BY MOUTH 3 TIMES A DAY AS NEEDED FOR ITCHING 270 capsule 1     lisinopril (PRINIVIL,ZESTRIL) 20 MG tablet TAKE 1 TABLET (20 MG TOTAL) BY MOUTH DAILY. 90 tablet 3     mineral oil liquid Take 30 mL by mouth daily as needed for constipation. 180 mL 3     prednisoLONE acetate (PRED-FORTE) 1 % ophthalmic suspension Administer 1 drop to both eyes 2 (two) times a day. 5 mL 0     propranolol (INDERAL) 20 MG tablet TAKE 1 TABLET (20 MG TOTAL) BY MOUTH 2 (TWO) TIMES A DAY. 180 tablet 3     SUMAtriptan (IMITREX) 50 MG tablet TAKE 1 TABLET (50 MG TOTAL) BY MOUTH ONCE AS NEEDED FOR MIGRAINE. 30 tablet 5     zolpidem (AMBIEN) 10 mg tablet TAKE 1 TABLET (10 MG TOTAL) BY MOUTH BEDTIME AS NEEDED. 30 tablet 0     No current facility-administered medications for this visit.         Allergies  Review of patient's allergies indicates no known allergies.     Social History  Social History     Social History     Marital status: Single     Spouse name: N/A     Number of children: N/A     Years of education: N/A     Occupational History     Not on file.     Social History Main Topics     Smoking status: Never Smoker     Smokeless tobacco: Never Used     Alcohol use No     Drug use: No     Sexual activity: Not on file     Other Topics Concern     Not on file     Social History Narrative        Family History  No family history of sleep apnea or parasomnia       Review of Systems:  Constitutional: Negative except as noted in HPI.   Eyes: Negative except as noted in HPI.   ENT: Negative except as noted in HPI.   Cardiovascular: Negative except as noted in HPI.   Respiratory: Negative except as noted in HPI.   Gastrointestinal: Negative except as noted in HPI.   Genitourinary: Negative except as noted in HPI.   Musculoskeletal: Negative except as noted in HPI.   Integumentary: Negative except as noted in HPI.  "  Neurological: Negative except as noted in HPI.   Psychiatric: Negative except as noted in HPI.   Endocrine: Negative except as noted in HPI.   Hematologic/Lymphatic: Negative except as noted in HPI.      Physical Exam:  /74  Pulse 76  Ht 5' 4\" (1.626 m)  Wt 153 lb (69.4 kg)  SpO2 97%  BMI 26.26 kg/m2  BMI:Body mass index is 26.26 kg/(m^2).   GEN: NAD  Head: Normocephalic.  EYES: PERRLA, EOMI  ENT: Oropharynx is clear, Mallampatti class IV airway. Uvula is edematous  Nasal mucosa is pink  Neck : Thyroid is not palpable  CV: Regular rate and rhythm, S1 & S2 are normal. No murmurs  LUNGS: clear to auscultation bilaterally, no wheezes  MUSCULOSKELETAL: no clubbing, cyanosis or edema  SKIN: warm, dry, no rashes  Neurological: Alert, oriented to time, place, and person.  No tremor  Range of motion is normal.  No cog-wheeling  Psych:  normal mood, normal affect     Labs/Studies:     Lab Results   Component Value Date    WBC 7.5 08/23/2017    HGB 14.6 10/16/2017    HCT 45.0 08/23/2017    MCV 87 08/23/2017     08/23/2017         Chemistry        Component Value Date/Time     08/23/2017 1423    K 4.2 08/23/2017 1423     08/23/2017 1423    CO2 27 08/23/2017 1423    BUN 16 08/23/2017 1423    CREATININE 1.04 08/23/2017 1423    GLU 83 08/23/2017 1423        Component Value Date/Time    CALCIUM 9.7 08/23/2017 1423    ALKPHOS 62 08/23/2017 1423    AST 14 08/23/2017 1423    ALT 13 08/23/2017 1423    BILITOT 0.7 08/23/2017 1423            No results found for: FERRITIN  Lab Results   Component Value Date    TSH 1.63 08/23/2017         Assessment and Plan:  In summary Denis Moreno is a 68 y.o. year old male here for consultation.    1. Sleep onset insomnia.  His insomnia is well controlled with zolpidem and his anxiety medication but this is not a good long term plan.  His sleep difficulties could be multifactorial and I would like to get more information before we suggest any changes to his " medications.  I am concerned about his dream enactment behavior and since he may potentially need to take Clonazepam, I would like to wait until he completes his PSG to decide if we should attempt to wean his medications off or switch to clonazepam.  He could benefit from CBTi if his PSG is normal.    2- snoring/ risk for sleep apnea.  Denis Moreno has high risk for obstructive sleep apnea based on the results of his STOP BANG questionnaire and crowded airway.  Recommend getting an overnight polysomnography.  The patient should return to the clinic to discuss results and treatment option in a patient-centered approach.    3. Dream enactment behavior.  He is taking citalopram and also has high risk for sleep-disordered breathing.  His neurological exam is normal and this is reassuring.  We will add arm leads to his PSG and then decide on the best course of action.     Patient verbalized understanding of these issues, agrees with the plan and all questions were answered today. Patient was given an opportuntity to voice any other symptoms or concerns not listed above. Patient did not have any other symptoms or concerns.      MD MERCEDES Rodriguez Board Certified in Internal Medicine and Sleep Medicine  [facility].

## 2021-06-20 NOTE — LETTER
Letter by Beka Ashley MD at      Author: Beka Ashley MD Service: -- Author Type: --    Filed:  Encounter Date: 3/4/2020 Status: (Other)         [facility] INTERNAL MEDICINE  03/04/20    Patient: Denis Moreno  YOB: 1950  Medical Record Number: [id number]  CSN: [id number]                                                                              Non-opioid Controlled Substance Agreement    I understand that my care provider has prescribed a controlled substance to help manage my condition(s). I am taking this medicine to help me function or work. I know this is strong medicine, and that it can cause serious side effects. Controlled substances can be sedating, addicting and may cause a dependency on the drug. They can affect my ability to drive or think, and cause depression. They need to be taken exactly as prescribed. Combining controlled substances with certain medicines or chemicals (such as cocaine, sedatives and tranquilizers, sleeping pills, meth) can be dangerous or even fatal. Also, if I stop controlled substances suddenly, I may have severe withdrawal symptoms.  If not helpful, I may be asked to stop them.    The risks, benefits, and side effects of these medicine(s) were explained to me. I agree that:    1. I will take part in other treatments as advised by my care team. This may be psychiatry or counseling, physical therapy, behavioral therapy, group treatment or a referral to a pain clinic. I will reduce or stop my medicine when my care team tells me to do so.  2. I will take my medicines as prescribed. I will not change the dose or schedule unless my care team tells me to. There will be no refills if I run out early.  I may be contactedwithout warning and asked to complete a urine drug test or pill count at any time.   3. I will keep all my appointments, and understand this is part of the monitoring of controlled substances. My care team may require an office  visit for EVERY controlled substance refill. If I miss appointments or dont follow instructions, my care team may stop my medicine.  4. I will not ask other providers to prescribe controlled substances, and I will not accept controlled substances from other people. If I need another prescribed controlled substance for a new reason, I will tell my care team within 1 business day.  5. I will use one pharmacy to fill all of my controlled substance prescriptions, and it is up to me to make sure that I do not run out of my medicines on weekends or holidays. If my care team is willing to refill my controlled substance prescription without a visit, I must request refills only during office hours, refills may take up to 3 days to process, and it may take up to 5 to 7 days for my medicine to be mailed and ready at my pharmacy. Prescriptions will not be mailed anywhere except my pharmacy.    6. I am responsible for my prescriptions. If the medicine/prescription is lost or stolen, it will not be replaced. I also agree not to share controlled substance medicines with anyone.          [facility] INTERNAL MEDICINE  03/04/20  Patient:  Denis Moreno  YOB: 1950  Medical Record Number: [id number]  CSN: [id number]    7. I agree to not use ANY illegal or recreational drugs. This includes marijuana, cocaine, bath salts or other drugs. I agree not to use alcohol unless my care team says I may. I agree to give urine samples whenever asked. If I dont give a urine sample, the care team may stop my medicine.    8. If I enroll in the [place] Medical Marijuana program, I will tell my care team. I will also sign an agreement to share my medical records with my care team.    9. I will bring in my list of medicines (or my medicine bottles) each time I come to the clinic.   10. I will tell my care team right away if I become pregnant or have a new medical problem treated outside of my regular clinic.  11. I understand  that this medicine can affect my thinking and judgment. It may be unsafe for me to drive, use machinery and do dangerous tasks. I will not do any of these things until I know how the medicine affects me. If my dose changes, I will wait to see how it affects me. I will contact my care team if I have concerns about medicine side effects.    I understand that if I do not follow any of the conditions above, my prescriptions or treatment may be stopped.      I agree that my provider, clinic care team, and pharmacy may work with any city, state or federal law enforcement agency that investigates the misuse, sale, or other diversion of my controlled medicine. I will allow my provider to discuss my care with or share a copy of this agreement with any other treating provider, pharmacy or emergency room where I receive care. I agree to give up (waive) any right of privacy or confidentiality with respect to these consents.   I have read this agreement and have asked questions about anything I did not understand.    ___________________________________________________________________________  Patient signature - Date/Time  -Denis Moreno                                      ___________________________________________________________________________  Witness signature                                                                    ___________________________________________________________________________  Provider signature- Beka Ashley MD

## 2021-06-21 NOTE — LETTER
Letter by Beka Ashley MD at      Author: Beka Ashley MD Service: -- Author Type: --    Filed:  Encounter Date: 1/14/2021 Status: (Other)         Denis Moreno  [address] [place] [address]             January 14, 2021         Dear Mr. Moreno,    Below are the results from your recent visit:    Resulted Orders   HM2(CBC w/o Differential)   Result Value Ref Range    WBC 7.6 4.0 - 11.0 thou/uL    RBC 4.84 4.40 - 6.20 mill/uL    Hemoglobin 14.6 14.0 - 18.0 g/dL    Hematocrit 44.1 40.0 - 54.0 %    MCV 91 80 - 100 fL    MCH 30.2 27.0 - 34.0 pg    MCHC 33.2 32.0 - 36.0 g/dL    RDW 12.0 11.0 - 14.5 %    Platelets 264 140 - 440 thou/uL    MPV 7.8 7.0 - 10.0 fL   Lipid Cascade   Result Value Ref Range    Cholesterol 214 (H) <=199 mg/dL    Triglycerides 98 <=149 mg/dL    HDL Cholesterol 58 >=40 mg/dL    LDL Calculated 136 (H) <=129 mg/dL    Patient Fasting > 8hrs? Yes    Basic Metabolic Panel   Result Value Ref Range    Sodium 140 136 - 145 mmol/L    Potassium 5.1 (H) 3.5 - 5.0 mmol/L    Chloride 104 98 - 107 mmol/L    CO2 28 22 - 31 mmol/L    Anion Gap, Calculation 8 5 - 18 mmol/L    Glucose 86 70 - 125 mg/dL    Calcium 9.9 8.5 - 10.5 mg/dL    BUN 16 8 - 28 mg/dL    Creatinine 1.18 0.70 - 1.30 mg/dL    GFR MDRD Af Amer >60 >60 mL/min/1.73m2    GFR MDRD Non Af Amer >60 >60 mL/min/1.73m2    Narrative    Fasting Glucose reference range is 70-99 mg/dL per  American Diabetes Association (ADA) guidelines.   Hepatic Profile   Result Value Ref Range    Bilirubin, Total 0.9 0.0 - 1.0 mg/dL    Bilirubin, Direct 0.3 <=0.5 mg/dL    Protein, Total 7.2 6.0 - 8.0 g/dL    Albumin 4.2 3.5 - 5.0 g/dL    Alkaline Phosphatase 52 45 - 120 U/L    AST 11 0 - 40 U/L    ALT <9 0 - 45 U/L       Now normal kidney function (creatinine and gfr). Still normal lipids. Normal all other labs. Continue same medications. Thanks.     Please call with questions or contact us using [email or web address].    Sincerely,        Electronically signed by  Beka Ashley MD

## 2021-06-21 NOTE — PROGRESS NOTES
Dear Beka Ashley MD  [address] [place] [address],    Thank you for the opportunity to participate in the care of Denis Moreno.     He is a 68 y.o. y/o male patient who comes to the sleep medicine clinic for a follow up visit.    We had an extensive conversation to review the results of his sleep study.    The overnight polysomnography was completed with a digital sleep system using the international 10-20 electrode placement for recording EEG, EOG, EMG from chin, ECG, respiratory effort, oximetry, body position, airflow, nasal pressure, snoring sound, pulse rate and limb movement channels.    The study was completed as a split night study.    1. During the diagnostic portion of the study respiratory monitoring showed intermittent snoring and overall mild obstructive sleep apnea/hypopnea (AHI=10.4).  2. A trial of nasal CPAP was initiated given the presence of sleep-disordered breathing and CPAP of 8 cwp was effective at eliminating obstructive events.    3. No complex movement was observed in the video recording. REM atonia was intact.     Note: patient took zolpidem 10 mg PO prior to lights out.    We reviewed the oxygen saturation graph as well as the result tables from the report.    He continues to have difficulties to fall asleep and to stay asleep. Since our last visit, he started taking trazodone 50 mg PO every night and stopped taking zolpidem. Trazodone helped with his sleep but he had an upset stomach and had to stop taking it. He would like to try a different medication. He continues to use alprazolam 1 mg PO every night for anxiety.    Past Medical History:   Diagnosis Date     Anxiety and depression      Hypertension      Migraines        Past Surgical History:   Procedure Laterality Date     BRONCHOSCOPY  12/20/2016    ebus       Social History     Socioeconomic History     Marital status: Single     Spouse name: Not on file     Number of children: Not on file     Years of  education: Not on file     Highest education level: Not on file   Social Needs     Financial resource strain: Not on file     Food insecurity - worry: Not on file     Food insecurity - inability: Not on file     Transportation needs - medical: Not on file     Transportation needs - non-medical: Not on file   Occupational History     Not on file   Tobacco Use     Smoking status: Never Smoker     Smokeless tobacco: Never Used   Substance and Sexual Activity     Alcohol use: No     Drug use: No     Sexual activity: Not on file   Other Topics Concern     Not on file   Social History Narrative     Not on file       No family history on file.      Review of Systems:  General: No weight gain, no weight loss  Eyes: No vision changes  ENT: No hearing changes  Cardio: No chest pain, no nocturnal dyspnea  Respiratory: No shortness of breath, no cough  Gastrointestinal: No diarrhea, no constipation  Genitourinary: No excessive urination  Tegumentary: No rashes  Neurological: No seizures, no loss of consciousness  Endo: No heat or cold intolerance.    Current Outpatient Medications   Medication Sig Dispense Refill     ALPRAZolam (XANAX) 1 MG tablet TAKE 1-2 TABLET AT BEDTIME AS NEEDED 60 tablet 0     amLODIPine (NORVASC) 10 MG tablet Take 1 tablet (10 mg total) by mouth daily. 90 tablet 3     citalopram (CELEXA) 40 MG tablet TAKE 1 TABLET DAILY. 90 tablet 1     diazePAM (VALIUM) 5 mg/5 mL (1 mg/mL) solution        gabapentin (NEURONTIN) 300 MG capsule TAKE 2 CAPSULES BY MOUTH 3 TIMES A DAY INCREASE AS INSTRUCTED  5     hydrOXYzine pamoate (VISTARIL) 25 MG capsule TAKE 1 CAPSULE BY MOUTH 3 TIMES A DAY AS NEEDED FOR ITCHING 270 capsule 1     lisinopril (PRINIVIL,ZESTRIL) 20 MG tablet TAKE 1 TABLET (20 MG TOTAL) BY MOUTH DAILY. 90 tablet 3     mineral oil liquid Take 30 mL by mouth daily as needed for constipation. 180 mL 3     prednisoLONE acetate (PRED-FORTE) 1 % ophthalmic suspension Administer 1 drop to both eyes 2 (two) times  a day. 5 mL 0     propranolol (INDERAL) 20 MG tablet Take 1 tablet (20 mg total) by mouth 2 (two) times a day. 180 tablet 3     SUMAtriptan (IMITREX) 50 MG tablet TAKE 1 TABLET (50 MG TOTAL) BY MOUTH ONCE AS NEEDED FOR MIGRAINE. 30 tablet 5     traZODone (DESYREL) 50 MG tablet Take 1 tablet (50 mg total) by mouth at bedtime. 30 tablet 0     No current facility-administered medications for this visit.        No Known Allergies    Physical Exam:  Pulse 85   Wt 151 lb 6.4 oz (68.7 kg)   SpO2 93%   BMI 25.99 kg/m    BMI:Body mass index is 25.99 kg/m .   GEN: NAD  Neurological: Alert, oriented to time, place, and person.  Psych: normal mood, normal affect     Labs/Studies:  - We reviewed the results of the overnight PSG as described on the HPI.     Lab Results   Component Value Date    WBC 13.1 (H) 11/15/2018    HGB 14.2 11/15/2018    HCT 43.1 11/15/2018    MCV 89 11/15/2018     (H) 11/15/2018         Chemistry        Component Value Date/Time     11/15/2018 1410    K 4.6 11/15/2018 1410     11/15/2018 1410    CO2 25 11/15/2018 1410    BUN 17 11/15/2018 1410    CREATININE 1.25 11/15/2018 1410    GLU 88 11/15/2018 1410        Component Value Date/Time    CALCIUM 10.0 11/15/2018 1410    ALKPHOS 72 11/15/2018 1410    AST 10 11/15/2018 1410    ALT <9 11/15/2018 1410    BILITOT 0.4 11/15/2018 1410             No results found for: FERRITIN    No results found for: HGBA1C    Assessment and Plan:  In summary Denis Moreno is a 68 y.o. year old male here for follow up.  1. Obstructive Sleep Apnea  We had an extensive conversation to review the results of his sleep study and to  him on the importance of treating sleep apnea.   We discussed treatment options including MAD therapy and CPAP therapy.  We talked about the advantages and disadvantages of each therapy.  At this point, he is more inclined towards a dental device and I will provide a prescription for dental evaluation to bring to his  doctor.  If he decides for a CPAP therapy, he will call us.    2. Persistent insomnia.  We will try suvorexant 10 mg PO .  I spoke with the patient about the risks of taking a benzodiazepine every night.  We will try to avoid ABELARDO agonists due to his current use of a benzodiazepine.     Patient verbalized understanding of these issues, agrees with the plan and all questions were answered today. Patient was given an opportuntity to voice any other symptoms or concerns not listed above. Patient did not have any other symptoms or concerns.      I explained to the patient that I will be transitioning to a different job soon. I reassured him that this transition should not cause any significant disruptions to his care. I provided some information on the process and encouraged him to contact our main number if he has any questions or needs any help.    MD MERCEDES Rodriguez Board Certified in Internal Medicine and Sleep Medicine  [facility].

## 2021-06-21 NOTE — PROGRESS NOTES
Pulmonary Clinic Follow-up Visit    Assessment and Plan:  68 year old male never smoker with a history of dyslipidemia, low-grade chronic bilateral uveitis, and pulmonary sarcoidosis, presenting for follow-up.      Sarcoidosis: Pulmonary and ocular involvement, with chronic bilateral uveitis, and mediastinal/hilar lymphadenopathy with pulmonary nodules but normal functional status and normal PFTs, with improvement radiographically on chest CT from October 2017 compared to November 2016. However, he has had four weeks of worsening fatigue that is interfering with daily activity, and intermittent nonproductive cough. Normal renal and hepatic function, has leukocytosis on CBC, not anemic. CXR shows new bilateral patchy interstitial changes. Prior ECG with possible LVH, otherwise unremarkable.    Plan:  - repeat spirometry and DLCO  - repeat chest CT  - 1,25-dihydroxyvitamin D and TSH (other blood tests already done earlier this month)  - referral to [facility] pulmonary clinic for additional evaluation per patient preference; I think this is reasonable  - I will call him with results and discuss possible initiation of prednisone depending on above test results  - last saw ophthalmologist about one year ago and follow-up is recommended  - encouraged him to call any time with questions or concerning symptoms     I appreciate the opportunity to participate in the care of Mr. Moreno.  Please feel free to contact me at any time.     CCx: pulmonary and ocular sarcoidosis    HPI: 68 year old male never smoker with a history of dyslipidemia, low-grade chronic bilateral uveitis, and pulmonary sarcoidosis, presenting for follow-up. Previous normal spirometry and DLCO, and chest CT one year ago had improved markedly compared to two years ago, so did not start steroids. However, he has noted significant fatigue over the last four weeks that has interfered with daily activity. Does have trouble getting to sleep, but improved with  trazodone. Denies daytime hypersomnolence or napping. Had an occasional dry cough when the fatigue started, but has improved a bit. Has a rash in the right groin in recent months, but no other new rashes.    ROS:  A 12-system review was obtained and was negative with the exception of the symptoms endorsed in the history of present illness.    PMH:  Pulmonary and ocular sarcoidosis  Deviated septum  HLD  Depression/anxiety  HTN  Insomnia  Chronic headaches    PSH:  Past Surgical History:   Procedure Laterality Date     BRONCHOSCOPY  12/20/2016    ebus       Allergies:  No Known Allergies    Family HX:  No family history on file.    Social Hx:  Social History     Socioeconomic History     Marital status: Single     Spouse name: Not on file     Number of children: Not on file     Years of education: Not on file     Highest education level: Not on file   Social Needs     Financial resource strain: Not on file     Food insecurity - worry: Not on file     Food insecurity - inability: Not on file     Transportation needs - medical: Not on file     Transportation needs - non-medical: Not on file   Occupational History     Not on file   Tobacco Use     Smoking status: Never Smoker     Smokeless tobacco: Never Used   Substance and Sexual Activity     Alcohol use: No     Drug use: No     Sexual activity: Not on file   Other Topics Concern     Not on file   Social History Narrative     Not on file       Current Meds:  Current Outpatient Medications   Medication Sig Dispense Refill     ALPRAZolam (XANAX) 1 MG tablet TAKE 1-2 TABLET AT BEDTIME AS NEEDED. 60 tablet 0     amLODIPine (NORVASC) 10 MG tablet Take 1 tablet (10 mg total) by mouth daily. 90 tablet 3     citalopram (CELEXA) 40 MG tablet TAKE 1 TABLET DAILY. 90 tablet 1     diazePAM (VALIUM) 5 mg/5 mL (1 mg/mL) solution        gabapentin (NEURONTIN) 300 MG capsule TAKE 2 CAPSULES BY MOUTH 3 TIMES A DAY INCREASE AS INSTRUCTED  5     hydrOXYzine pamoate (VISTARIL) 25 MG  "capsule TAKE 1 CAPSULE BY MOUTH 3 TIMES A DAY AS NEEDED FOR ITCHING 270 capsule 1     lisinopril (PRINIVIL,ZESTRIL) 20 MG tablet TAKE 1 TABLET (20 MG TOTAL) BY MOUTH DAILY. 90 tablet 3     mineral oil liquid Take 30 mL by mouth daily as needed for constipation. 180 mL 3     prednisoLONE acetate (PRED-FORTE) 1 % ophthalmic suspension Administer 1 drop to both eyes 2 (two) times a day. 5 mL 0     propranolol (INDERAL) 20 MG tablet Take 1 tablet (20 mg total) by mouth 2 (two) times a day. 180 tablet 3     SUMAtriptan (IMITREX) 50 MG tablet TAKE 1 TABLET (50 MG TOTAL) BY MOUTH ONCE AS NEEDED FOR MIGRAINE. 30 tablet 5     suvorexant 10 mg Tab Take 10 mg by mouth at bedtime. 30 tablet 0     traZODone (DESYREL) 50 MG tablet Take 1 tablet (50 mg total) by mouth at bedtime. 30 tablet 0     No current facility-administered medications for this visit.        Physical Exam:  /54   Pulse 76   Resp 20   Wt 148 lb 1.6 oz (67.2 kg)   SpO2 95% Comment: RA  BMI 25.42 kg/m    Gen: alert, oriented, no distress  HEENT: nasal turbinates are unremarkable, no oropharyngeal lesions, no cervical or supraclavicular lymphadenopathy  CV: RRR, no M/G/R  Resp: mild bibasilar rhonchi  Abd: soft, nontender, no palpable organomegaly  Skin: right inguinal hypopigmented scaly rash, appears most consistent with candidiasis  Ext: no cyanosis, clubbing or edema  Neuro: alert, nonfocal    Labs:  Normal BMP and LFTs  WBC 13.1 with no differential  Normal Hgb  ESR 54  1,25-dihydroxyvitamin D level 83 in November 2016    Imaging studies:  CT chest (11/25/16)  - personally reviewed  - mediastinal and bilateral hilar lymphadenopathy with partial calcification  - multiple bilateral fluffy nodules (\"cotton ball\")  - peribronchovascular opacity sue. LLL    CT chest high-res (10/20/17):  - images directly reviewed, formal interpretation follows:  FINDINGS:  LUNGS AND PLEURA: Airways are patent in inspiration and expiration without tracheobronchomalacia " or air trapping. There is no significant bronchial wall thickening. No significant bronchiectasis. There is scarring in the right middle lobe. Multiple   pulmonary nodules throughout the lungs are less conspicuous than on the comparison study. Example, a small area of linear scarring adjacent to the pleura in the right upper lobe was previously a 9 mm x 5 mm nodule (series 3 image 34). There a few small   nodules adjacent to the left major fissure and right major fissure. A few groundglass MR solid nodules are noted in the upper lobes. No diffuse subpleural reticular opacities. No honeycombing. No significant diffuse groundglass.     MEDIASTINUM: Normal size heart. There is atherosclerotic disease including coronary artery calcification. There are numerous calcified mediastinal and hilar lymph nodes. The largest discrete right hilar lymph node is 15 mm (previously 17 mm). A 19 mm   subcarinal lymph node previously measured 22 mm. Small hiatal hernia.     LIMITED UPPER ABDOMEN: Negative.     MUSCULOSKELETAL: Negative.     IMPRESSION:   CONCLUSION:  1.  Pulmonary parenchymal disease and mediastinal and hilar lymphadenopathy are consistent with history of sarcoidosis. When compared with the prior study, pulmonary nodules have significantly decreased in size. Mediastinal and hilar lymph nodes have   slightly decreased in size. There is minimal fibrosis in the lungs, which is new.   2.  Coronary atherosclerotic disease.  3.  Small hiatal hernia.    CXR (11/15/18):  FINDINGS: The heart is normal in size. Small opacity in the anterior lung base on the lateral view which may be a mild atelectasis or pneumonia. Mild interstitial prominence most pronounced in the right mid and lower lung and left lung base is   nonspecific and could be incidental or possibly chronic interstitial changes. No additional infiltrate or pleural effusion. No bone abnormalities.      Brain MRI (July 2016)  - mild atrophy  - minimal to mild  SVID      PFT's  November 2016:  FEV1/FVC is 72% and is normal.  FEV1 is 2.49L or 113% predicted and is normal.  FVC is 3.47L or 124% predicted and normal.  There was no improvement in spirometry after a single inhaled dose of bronchodilator.  TLC is 5.13L or 107% predicted and is normal.  RV is 1.54L or 77% predicted and is reduced.  DLCO is 17.61 ml/min/mmHg or 88% predicted and is normal when it   is corrected for hemoglobin.     10/16/17:  FEV1/FVC is 68 and is normal (less than 0.7 but greater than the demographically adjusted lower limit of normal).  FEV1 is 122% predicted and is normal.  FVC is 140% predicted and normal.     DLCO is 80% predicted and is normal when it   is corrected for hemoglobin.    Vipin Duron MD  Pulmonary and Critical Care Medicine  [facility]  Cell [phone]  Office [phone]  Pager [phone]

## 2021-06-21 NOTE — LETTER
Letter by Vipin Duron MD at      Author: Vipin Duron MD Service: -- Author Type: --    Filed:  Encounter Date: 3/5/2021 Status: (Other)       2021      To whom it may concern:    Denis Moreno ( 1950) is a patient under my care at the [facility]. Mr. Moreno has pulmonary sarcoidosis that has led to significant breathing difficulty and has required systemic corticosteroids and frequent pulmonary clinic follow-up for management, and has impacted his ability to perform daily activities.    Sincerely,      Vipin Duron MD  Pulmonary and Critical Care Medicine  [facility]  Office [phone]

## 2021-06-21 NOTE — PROGRESS NOTES
"Office Visit - Follow up    Denis Moreno   68 y.o. male    Date of Visit: 11/15/2018    Chief Complaint   Patient presents with     Follow-up     No energy, short of breath       Subjective: Very pleasant 68-year-old patient of Dr. Ashley with history of pulmonary sarcoid.  He comes in with a several week history of increasing exertional dyspnea and a sensation similar to previous episodes when he had sarcoid.  Was seen by Dr. Vipin Duron and treated for pulmonary sarcoid approximately 2 years ago he has been off of steroids.    He denies any chest pain or discomfort he is generally frustrated because of his increasing dyspnea.    No symptoms of cough or URI symptoms no symptoms of fever or chills.  Denies any symptoms of orthopnea or PND    Previous history of sarcoid noted previous echo was essentially normal    Chest x-ray is reviewed personally I see what appears to represent to me is bilateral hilar adenopathy although this is not read by the radiologist.  Basic labs are pending at this time            ROS: A comprehensive review of systems was performed and was otherwise negative except as mentioned above.     Exam  O2 saturation is down to 90% he is not dyspneic at rest in no acute distress head and neck unremarkable EENT generally negative no adenopathy axillary or cervical skin negative no nodules    Lungs some interstitial sounds otherwise negative    Heart normal without evidence of cardiomegaly or of heart murmur    Extremities no edema abdomen negative   /68 (Patient Site: Left Arm, Patient Position: Sitting)   Pulse 82   Ht 5' 4\" (1.626 m)   Wt 149 lb (67.6 kg)   SpO2 90%   BMI 25.58 kg/m      Assessment and Plan  I suspect recurrence of pulmonary sarcoid although cannot be certain of this.  I am more impressed by x-ray than the radiologist.  He is mildly hypoxic.  I will recheck PFTs in have him evaluated again by arrange for follow-up in the next week or so anticipate a high resolution " CT will be necessary as well and will leave this to Dr. Duron if appropriate    Follow-up with Dr. Ashley in 2 weeks    Denis was seen today for follow-up.    Diagnoses and all orders for this visit:    Sarcoidosis  -     HM2(CBC w/o Differential); Future  -     Comprehensive Metabolic Panel; Future  -     Erythrocyte Sedimentation Rate  -     XR Chest 2 Views; Standing  -     HM2(CBC w/o Differential)  -     Comprehensive Metabolic Panel  -     Ambulatory referral to Pulmonology  -     PFT Complete; Future          Time: total time spent with the patient was 45 minutes of which >50% was spent in counseling and coordination of care        No Known Allergies    Medications :  Prior to Admission medications    Medication Sig Start Date End Date Taking? Authorizing Provider   ALPRAZolam (XANAX) 1 MG tablet TAKE 1-2 TABLET AT BEDTIME AS NEEDED 10/23/18  Yes Beka Ashley MD   amLODIPine (NORVASC) 10 MG tablet Take 1 tablet (10 mg total) by mouth daily. 9/8/18  Yes Beka Ashley MD   citalopram (CELEXA) 40 MG tablet TAKE 1 TABLET DAILY. 8/11/18  Yes Sen Cooper MD   diazePAM (VALIUM) 5 mg/5 mL (1 mg/mL) solution  9/21/17  Yes PROVIDER, HISTORICAL   gabapentin (NEURONTIN) 300 MG capsule TAKE 2 CAPSULES BY MOUTH 3 TIMES A DAY INCREASE AS INSTRUCTED 3/5/17  Yes PROVIDER, HISTORICAL   hydrOXYzine pamoate (VISTARIL) 25 MG capsule TAKE 1 CAPSULE BY MOUTH 3 TIMES A DAY AS NEEDED FOR ITCHING 1/15/18  Yes Beka Ashley MD   lisinopril (PRINIVIL,ZESTRIL) 20 MG tablet TAKE 1 TABLET (20 MG TOTAL) BY MOUTH DAILY. 1/11/18  Yes Beka Ashley MD   mineral oil liquid Take 30 mL by mouth daily as needed for constipation. 1/15/18  Yes Beka Aslhey MD   prednisoLONE acetate (PRED-FORTE) 1 % ophthalmic suspension Administer 1 drop to both eyes 2 (two) times a day. 4/19/18  Yes Beka Ashley MD   propranolol (INDERAL) 20 MG tablet Take 1 tablet (20 mg total) by mouth 2 (two) times a day. 11/10/18  Yes  Beka Ashley MD   SUMAtriptan (IMITREX) 50 MG tablet TAKE 1 TABLET (50 MG TOTAL) BY MOUTH ONCE AS NEEDED FOR MIGRAINE. 11/12/17  Yes Beka Ashley MD   traZODone (DESYREL) 50 MG tablet Take 1 tablet (50 mg total) by mouth at bedtime. 11/7/18  Yes Beka Ashley MD   zolpidem (AMBIEN) 10 mg tablet TAKE 1 TABLET (10 MG TOTAL) BY MOUTH BEDTIME AS NEEDED. 10/17/18 11/15/18 Yes Beka Ashley MD        Past Medical History:   Past Medical History:   Diagnosis Date     Anxiety and depression      Hypertension      Migraines        Past Surgical History:   Past Surgical History:   Procedure Laterality Date     BRONCHOSCOPY  12/20/2016    ebus       Social History:   Social History     Socioeconomic History     Marital status: Single     Spouse name: Not on file     Number of children: Not on file     Years of education: Not on file     Highest education level: Not on file   Social Needs     Financial resource strain: Not on file     Food insecurity - worry: Not on file     Food insecurity - inability: Not on file     Transportation needs - medical: Not on file     Transportation needs - non-medical: Not on file   Occupational History     Not on file   Tobacco Use     Smoking status: Never Smoker     Smokeless tobacco: Never Used   Substance and Sexual Activity     Alcohol use: No     Drug use: No     Sexual activity: Not on file   Other Topics Concern     Not on file   Social History Narrative     Not on file       Family History: No family history on file.      Tyree Lozano MD

## 2021-06-22 NOTE — PROGRESS NOTES
RESPIRATORY CARE NOTE     Patient Name: Denis Moreno  Today's Date: 12/5/2018     Pre and post spirometry with diffusion done. Pt performed tests with good effort. Test results meet ATS criteria. Results scanned into epic. Pt left in no distress.       Nina Wright RRT

## 2021-06-22 NOTE — PROGRESS NOTES
Office Visit - Follow Up   Denis Moreno   68 y.o. male    Date of Visit: 12/20/2018    Chief Complaint   Patient presents with     Follow-up     saw pulmonary and had CT scan , states energy level is improving         Assessment and Plan   1. Pulmonary sarcoidosis (H)  Had exertional dyspnea.  Did not feel well in November.  Saw Dr. Lozano in my absence.  Was referred back to pulmonary.  Saw Dr. Duron in November and early December.  Had a chest CT which showed progression of his pulmonary sarcoid showing worsening of the diffuse interstitial changes.  Also had decreased PFT, decreased in FEV1 and DLCO.  Was started on prednisone 40 mg daily and daily Bactrim.  Known doing and feeling well.  Shortness of breath and exertional dyspnea improved.  Will follow up with Dr. Crisostomo on in 5 weeks which will be sometime in January or early February with a repeat chest CT.  Also was referred to [facility] and awaiting appointment with the [facility] for his pulmonary sarcoidosis.    2. SAPNA (obstructive sleep apnea)  Also found to have obstructive sleep apnea.  Does not want to use CPAP so he was prescribed dental appliance which he will have it a custom made by his dentist.  Followed by sleep clinic, Dr. Rodriguez.  But Dr. Rodriguez has moved.  Will see somebody else at the sleep clinic on his next follow-up.  Sleeping better with the use of trazodone.  Reports he sleeps 7-8 hours nightly.    3. Benign Essential Hypertension  Controlled.  Continue amlodipine, lisinopril.  And propranolol.    4. Mixed hyperlipidemia  Controlled without need for medication.  Last lipids were good.    5. Mild Recurrent Major Depression  In remission.  Continue citalopram    6. Anxiety  Controlled.  Continue alprazolam as needed together with daily gabapentin and daily hydroxyzine.  - ALPRAZolam (XANAX) 1 MG tablet; TAKE 1-2 TABLET AT BEDTIME AS NEEDED  Dispense: 60 tablet; Refill: 0    Reviewed pulmonary notes, his chest x-ray, chest CT and pulmonary  function test.  Discussed these with the patient.      Follow up in 4 weeks.     History of Present Illness   This 68 y.o. old male is here for follow-up.  Complained of exertional dyspnea and shortness of breath in November.  Was seen by Dr. Lozano.  Was referred back to pulmonary because of his pulmonary sarcoidosis.  Was in by pulmonary, Dr. Duron and  had chest CT which showed progression of his pulmonary sarcoidosis and had decreased pulmonary function test particularly FEV1 and DLCO.  Was started on prednisone 40 mg daily which he is continuing and given Bactrim daily prophylaxis.  Now feels he is much improved.  Does not feel short of breath and having exertional dyspnea.  Feels 100% improved with prednisone.  Has follow-up with Dr. Duron in 5 weeks with a repeat chest CT.  Self referred himself to [facility] and still waiting for his appointment.  Found also to have sleep apnea.  Declined prescribed CPAP.  Followed in treated by the sleep clinic.  Declined prescribed CPAP and instead  will  get  a custom made dental appliance to be done by his dentist.  His sleep doctor is moving elsewhere. He will see somebody else in the sleep clinic on his follow-up in February.  Has anxiety and major depression which are controlled by his medications.  Has hypertension controlled by combination of amlodipine, lisinopril and propranolol.  Propranolol also control his his previous recurring headaches.  Has hyperlipidemia which is diet controlled.  Does not need medication, statin.  Overall, doing and feeling well now.  No other complaints.    Review of Systems   A 12 point comprehensive review of systems was negative except as noted..     Medications, Allergies and Problem List   Reviewed and updated             Chief Complaint   Follow-up (saw pulmonary and had CT scan , states energy level is improving )       Patient Profile   Social History     Social History Narrative     Not on file        Past Medical History    Patient Active Problem List   Diagnosis     Mixed hyperlipidemia     Mild Recurrent Major Depression     Male Erectile Disorder     Benign Essential Hypertension     Insomnia     Anxiety     Deviated nasal septum     Hilar adenopathy     Uveitis     Sarcoidosis of lung (H)       Past Surgical History  He has a past surgical history that includes Bronchoscopy (12/20/2016).       Medications and Allergies   Current Outpatient Medications   Medication Sig     amLODIPine (NORVASC) 10 MG tablet Take 1 tablet (10 mg total) by mouth daily.     citalopram (CELEXA) 40 MG tablet TAKE 1 TABLET DAILY.     gabapentin (NEURONTIN) 300 MG capsule TAKE 2 CAPSULES BY MOUTH 3 TIMES A DAY INCREASE AS INSTRUCTED     hydrOXYzine pamoate (VISTARIL) 25 MG capsule TAKE 1 CAPSULE BY MOUTH 3 TIMES A DAY AS NEEDED FOR ITCHING     lisinopril (PRINIVIL,ZESTRIL) 20 MG tablet TAKE 1 TABLET (20 MG TOTAL) BY MOUTH DAILY.     mineral oil liquid Take 30 mL by mouth daily as needed for constipation.     prednisoLONE acetate (PRED-FORTE) 1 % ophthalmic suspension Administer 1 drop to both eyes 2 (two) times a day.     predniSONE (DELTASONE) 20 MG tablet Take 40 mg by mouth daily.     propranolol (INDERAL) 20 MG tablet Take 1 tablet (20 mg total) by mouth 2 (two) times a day.     sulfamethoxazole-trimethoprim (BACTRIM) 400-80 mg per tablet Take 1 tablet by mouth daily.     SUMAtriptan (IMITREX) 50 MG tablet TAKE 1 TABLET (50 MG TOTAL) BY MOUTH ONCE AS NEEDED FOR MIGRAINE.     suvorexant 10 mg Tab Take 10 mg by mouth at bedtime.     traZODone (DESYREL) 50 MG tablet TAKE 1 TABLET BY MOUTH EVERYDAY AT BEDTIME     ALPRAZolam (XANAX) 1 MG tablet TAKE 1-2 TABLET AT BEDTIME AS NEEDED     No Known Allergies     Family and Social History   No family history on file.     Social History     Tobacco Use     Smoking status: Never Smoker     Smokeless tobacco: Never Used   Substance Use Topics     Alcohol use: No     Drug use: No        Physical Exam       Physical  "Exam  /62   Pulse 64   Ht 5' 4\" (1.626 m)   Wt 148 lb (67.1 kg)   SpO2 95%   BMI 25.40 kg/m    General appearance: alert, appears stated age, cooperative and no distress  Head: Normocephalic, without obvious abnormality, atraumatic  Throat: lips, mucosa, and tongue normal; teeth and gums normal  Neck: no adenopathy, no carotid bruit, no JVD, supple, symmetrical, trachea midline and thyroid not enlarged, symmetric, no tenderness/mass/nodules  Lungs: clear to auscultation bilaterally  Heart: regular rate and rhythm, S1, S2 normal, no murmur, click, rub or gallop  Abdomen: soft, non-tender; bowel sounds normal; no masses,  no organomegaly  Extremities: extremities normal, atraumatic, no cyanosis or edema  Skin: Skin color, texture, turgor normal. No rashes or lesions  Neurologic: Grossly normal  Psychiatric: Normal affect and mood     Additional Information        Beka Ashley MD  Internal Medicine  Contact me at [phone]     Additional Information   Current Outpatient Medications   Medication Sig     amLODIPine (NORVASC) 10 MG tablet Take 1 tablet (10 mg total) by mouth daily.     citalopram (CELEXA) 40 MG tablet TAKE 1 TABLET DAILY.     gabapentin (NEURONTIN) 300 MG capsule TAKE 2 CAPSULES BY MOUTH 3 TIMES A DAY INCREASE AS INSTRUCTED     hydrOXYzine pamoate (VISTARIL) 25 MG capsule TAKE 1 CAPSULE BY MOUTH 3 TIMES A DAY AS NEEDED FOR ITCHING     lisinopril (PRINIVIL,ZESTRIL) 20 MG tablet TAKE 1 TABLET (20 MG TOTAL) BY MOUTH DAILY.     mineral oil liquid Take 30 mL by mouth daily as needed for constipation.     prednisoLONE acetate (PRED-FORTE) 1 % ophthalmic suspension Administer 1 drop to both eyes 2 (two) times a day.     predniSONE (DELTASONE) 20 MG tablet Take 40 mg by mouth daily.     propranolol (INDERAL) 20 MG tablet Take 1 tablet (20 mg total) by mouth 2 (two) times a day.     sulfamethoxazole-trimethoprim (BACTRIM) 400-80 mg per tablet Take 1 tablet by mouth daily.     SUMAtriptan " (IMITREX) 50 MG tablet TAKE 1 TABLET (50 MG TOTAL) BY MOUTH ONCE AS NEEDED FOR MIGRAINE.     suvorexant 10 mg Tab Take 10 mg by mouth at bedtime.     traZODone (DESYREL) 50 MG tablet TAKE 1 TABLET BY MOUTH EVERYDAY AT BEDTIME     ALPRAZolam (XANAX) 1 MG tablet TAKE 1-2 TABLET AT BEDTIME AS NEEDED     No Known Allergies  Social History     Tobacco Use     Smoking status: Never Smoker     Smokeless tobacco: Never Used   Substance Use Topics     Alcohol use: No     Drug use: No         Time: total time spent with the patient was 40 minutes of which >50% was spent in counseling and coordination of care

## 2021-06-22 NOTE — TELEPHONE ENCOUNTER
Who is calling:  Patient.  Reason for Call:  To thank Dr Ashley and his nurse for all the help they have given him through out the years. The mouth wash has helped with the pain and he is very grateful. Patient states he looks forward to seeing Dr Ashley in the new year and getting the wonderful care he has been already receiving.   Date of last appointment with primary care: 12/20/2018  Has the patient been recently seen:  Yes  Okay to leave a detailed message: Not needed.

## 2021-06-22 NOTE — TELEPHONE ENCOUNTER
Refill Approved    Rx renewed per Medication Renewal Policy. Medication was last renewed on 1/11/18.    Marilyn Thompson, [facility] Triage/Med Refill 1/9/2019     Requested Prescriptions   Pending Prescriptions Disp Refills     lisinopril (PRINIVIL,ZESTRIL) 20 MG tablet [Pharmacy Med Name: LISINOPRIL 20 MG TABLET] 90 tablet 3     Sig: TAKE 1 TABLET (20 MG TOTAL) BY MOUTH DAILY.    [employer] Inhibitors Refill Protocol Passed - 1/8/2019  1:43 AM       Passed - PCP or prescribing provider visit in past 12 months      Last office visit with prescriber/PCP: 12/20/2018 Beka Ashley MD OR same dept: 12/20/2018 Beka Ashley MD OR same specialty: 12/20/2018 Beka Ashley MD  Last physical: Visit date not found Last MTM visit: Visit date not found   Next visit within 3 mo: Visit date not found  Next physical within 3 mo: Visit date not found  Prescriber OR PCP: Beka Ashley MD  Last diagnosis associated with med order: 1. Benign Essential Hypertension  - lisinopril (PRINIVIL,ZESTRIL) 20 MG tablet [Pharmacy Med Name: LISINOPRIL 20 MG TABLET]; TAKE 1 TABLET (20 MG TOTAL) BY MOUTH DAILY.  Dispense: 90 tablet; Refill: 3    If protocol passes may refill for 12 months if within 3 months of last provider visit (or a total of 15 months).            Passed - Serum Potassium in past 12 months    Lab Results   Component Value Date    Potassium 4.6 11/15/2018            Passed - Blood pressure filed in past 12 months    BP Readings from Last 1 Encounters:   12/20/18 112/62            Passed - Serum Creatinine in past 12 months    Creatinine   Date Value Ref Range Status   11/15/2018 1.25 0.70 - 1.30 mg/dL Final

## 2021-06-23 NOTE — TELEPHONE ENCOUNTER
Controlled Substance Refill Request  Medication Name:   Requested Prescriptions     Pending Prescriptions Disp Refills     ALPRAZolam (XANAX) 1 MG tablet 60 tablet 0     Sig: TAKE 1-2 TABLET AT BEDTIME AS NEEDED     Date Last Fill: 12/20/2018  Pharmacy: [id number]      Submit electronically to pharmacy  Controlled Substance Agreement Date Scanned:   Encounter-Level CSA Scan Date:    There are no encounter-level csa scan date.       Last office visit with prescriber/PCP: 1/17/2019 Beka Ashley MD OR same dept: 1/17/2019 Beka Ashley MD OR same specialty: 1/17/2019 Beka Ashley MD  Last physical: Visit date not found Last MTM visit: Visit date not found      Patient will be out Saturday night.

## 2021-06-23 NOTE — PATIENT INSTRUCTIONS - HE
It was good to see you in clinic today. This is what we discussed:    1. We will get lung function tests done now.  2. Decrease the prednisone to 30 mg (1.5 tablets) daily for 14 days, then 20 mg (1 tablet) daily.  3. We will get repeat lung function tests in one month.  4. I will call you with the result and we can discuss a plan.  5. We will tentatively plan on 3-month follow-up.  6. Call any time with questions or concerning symptoms.    Vipin Duron MD  Pulmonary and Critical Care Medicine  [facility]  Office [phone]

## 2021-06-23 NOTE — TELEPHONE ENCOUNTER
Pulmonary Telephone Note    Called Mr. Moreno to discuss his chest CT from 1/15/19, which shows complete resolution of the interstitial infiltrates seen on the chest CT from 12/5/18. This is likely a response to the prednisone initiated on 12/12/18. Reached his voice mailbox and left a brief message with the the clinic phone number and a message that I would try him again at another time. Will also see him in follow-up on 1/25/19.    Vipin Duron MD  Pulmonary and Critical Care Medicine  [facility]  Cell [phone]  Office [phone]  Pager [phone]

## 2021-06-23 NOTE — TELEPHONE ENCOUNTER
Prescription set up in a separate encounter. Left message informing patient.    Cynthia Hernandez, CMA

## 2021-06-23 NOTE — TELEPHONE ENCOUNTER
RN cannot approve Refill Request    RN can NOT refill this medication med is not covered by policy/route to provider and medication not on med list.     Last office visit: 12/20/2018 Beka Ashley MD Last Physical: Visit date not found Last MTM visit: Visit date not found Last visit same specialty: Visit date not found.  Next visit within 3 mo: Visit date not found  Next physical within 3 mo: Visit date not found      Paulino Bermudez, [facility] Triage/Med Refill 1/14/2019    Requested Prescriptions   Pending Prescriptions Disp Refills     benzonatate (TESSALON) 100 MG capsule [Pharmacy Med Name: BENZONATATE 100 MG CAPSULE] 20 capsule 0     Sig: TAKE ONE CAPSULE BY MOUTH EVERY 6 HOURS AS NEEDED FOR COUGH    There is no refill protocol information for this order

## 2021-06-23 NOTE — TELEPHONE ENCOUNTER
Medication Question or Clarification  Who is calling: Patient  What medication are you calling about?: Vit D3  What dose do you take?: 5,000 cap   How often are you taking the medication?: daily   Who prescribed the medication?: Beka Ashley MD  What is your question/concern?: Requesting the medication changed from OTC to RX order.   Pharmacy: [facility] [address]   Okay to leave a detailed message?: Yes  Site CMT - Please call the pharmacy to obtain any additional needed information.

## 2021-06-23 NOTE — PROGRESS NOTES
Office Visit - Follow Up   Denis Moreno   68 y.o. male    Date of Visit: 1/17/2019    Chief Complaint   Patient presents with     Follow-up     had CT chest scan done on 1/15. Periodically hearing rattling in chest, states he is feeling better         Assessment and Plan   1. Sarcoidosis of lung (H)   Has sarcoidosis of the lung. Followed by pulmonary, Dr. Duron.  Was started in December with prednisone 40 mg daily together with weekly single strength trimethoprim sulfamethoxazole.  After starting prednisone he feels better and stronger.  Tiredness or fatigue is resolved.  Wiill continue prednisone and trimethoprim sulfamethoxazole for 6 weeks or until he sees Dr. Duron next month.    2. Uveitis  Has uvulitis from sarcoidosis.  Followed by Dr. Daniels pulmonology.  Has some vision changes again.  Wants to see his ophthalmologist.  But Dr. Daniels moved to [place] so he wants to see somebody else.  Will refer to ophthalmology  at [facility]  - Ambulatory referral to Ophthalmology    3. Mild Recurrent Major Depression  Now in remission.  PHQ 9 score is only 2.  Gets anxious intermittently.  Takes alprazolam as needed but continues to take hydroxyzine and gabapentin combination.    4. Adjustment insomnia  Sleeping better.  Basically due to his anxiety and depression.  Now that  these are improved his sleeping is much improved.  Takes trazodone as needed.    5. Benign Essential Hypertension  Controlled.  Continue lisinopril.  Also takes propranolol for his migraine headaches as prophylaxis and to control his blood pressure.  Continue propranolol    6. Mixed hyperlipidemia  Controlled without need for medications.  Last lipids were good.    7. Vitamin D deficiency  His vitamin D level was found to be low.  Not taking vitamin supplement yet.  Start cholecalciferol 5000 units daily.  Recheck vitamin D next visit.  - cholecalciferol, vitamin D3, 5,000 unit capsule; Take 1 capsule (5,000 Units total) by mouth daily.;  Refill: 0      Follow up in 4 months.     History of Present Illness   This 68 y.o. old male is here for follow-up.  Has lung sarcoidosis.  Followed by pulmonary.  Now on daily prednisone and weekly Bactrim.  Since taking prednisone his fatigue resolved.  Will follow with pulmonary next month.  Had uveitis from sarcoidosis in the past.  Now has some visual changes.  Needs to see ophthalmology again.  Has hypertension controlled by combination of propranolol and lisinopril.  Propranolol was also given for his migraine headache prophylaxis.  Has hyperlipidemia controlled without need for medication.  Found to be vitamin D deficient.  Has not started vitamin D supplement.  Has depression and anxiety.  But  his depression is  now resolved.  Does not take antidepressant. Takes daily gabapentin and hydroxyzine and takes alprazolam as needed.  Overall, doing and feeling well.    Review of Systems   A 12 point comprehensive review of systems was negative except as noted..     Medications, Allergies and Problem List   Reviewed and updated             Chief Complaint   Follow-up (had CT chest scan done on 1/15. Periodically hearing rattling in chest, states he is feeling better )       Patient Profile   Social History     Social History Narrative     Not on file        Past Medical History   Patient Active Problem List   Diagnosis     Mixed hyperlipidemia     Mild Recurrent Major Depression     Male Erectile Disorder     Benign Essential Hypertension     Insomnia     Anxiety     Deviated nasal septum     Hilar adenopathy     Uveitis     Sarcoidosis of lung (H)       Past Surgical History  He has a past surgical history that includes Bronchoscopy (12/20/2016).       Medications and Allergies   Current Outpatient Medications   Medication Sig     ALPRAZolam (XANAX) 1 MG tablet TAKE 1-2 TABLET AT BEDTIME AS NEEDED     amLODIPine (NORVASC) 10 MG tablet Take 1 tablet (10 mg total) by mouth daily.     benzonatate (TESSALON) 100 MG  "capsule TAKE ONE CAPSULE BY MOUTH EVERY 6 HOURS AS NEEDED FOR COUGH     citalopram (CELEXA) 40 MG tablet TAKE 1 TABLET DAILY.     gabapentin (NEURONTIN) 300 MG capsule TAKE 2 CAPSULES BY MOUTH 3 TIMES A DAY INCREASE AS INSTRUCTED     hydrOXYzine pamoate (VISTARIL) 25 MG capsule TAKE 1 CAPSULE BY MOUTH 3 TIMES A DAY AS NEEDED FOR ITCHING     lisinopril (PRINIVIL,ZESTRIL) 20 MG tablet TAKE 1 TABLET (20 MG TOTAL) BY MOUTH DAILY.     mineral oil liquid Take 30 mL by mouth daily as needed for constipation.     prednisoLONE acetate (PRED-FORTE) 1 % ophthalmic suspension Administer 1 drop to both eyes 2 (two) times a day.     predniSONE (DELTASONE) 20 MG tablet Take 40 mg by mouth daily.     propranolol (INDERAL) 20 MG tablet Take 1 tablet (20 mg total) by mouth 2 (two) times a day.     sulfamethoxazole-trimethoprim (BACTRIM) 400-80 mg per tablet Take 1 tablet by mouth daily.     SUMAtriptan (IMITREX) 50 MG tablet TAKE 1 TABLET (50 MG TOTAL) BY MOUTH ONCE AS NEEDED FOR MIGRAINE.     suvorexant 10 mg Tab Take 10 mg by mouth at bedtime.     traZODone (DESYREL) 50 MG tablet TAKE 1 TABLET BY MOUTH EVERYDAY AT BEDTIME     viscous lidocaine HC (LIDOCAINE) 2 % Soln viscous solution Take 5 mL by mouth 3 (three) times a day. Swish and spit 5 mL three to four times daily as needed     cholecalciferol, vitamin D3, 5,000 unit capsule Take 1 capsule (5,000 Units total) by mouth daily.     No Known Allergies     Family and Social History   No family history on file.     Social History     Tobacco Use     Smoking status: Never Smoker     Smokeless tobacco: Never Used   Substance Use Topics     Alcohol use: No     Drug use: No        Physical Exam       Physical Exam  /78   Pulse 64   Ht 5' 4\" (1.626 m)   Wt 150 lb (68 kg)   SpO2 95%   BMI 25.75 kg/m    General appearance: alert, appears stated age, cooperative and no distress  Head: Normocephalic, without obvious abnormality, atraumatic  Throat: lips, mucosa, and tongue " normal; teeth and gums normal  Neck: no adenopathy, no carotid bruit, no JVD, supple, symmetrical, trachea midline and thyroid not enlarged, symmetric, no tenderness/mass/nodules  Lungs: clear to auscultation bilaterally  Heart: regular rate and rhythm, S1, S2 normal, no murmur, click, rub or gallop  Abdomen: soft, non-tender; bowel sounds normal; no masses,  no organomegaly  Extremities: extremities normal, atraumatic, no cyanosis or edema  Skin: Skin color, texture, turgor normal. No rashes or lesions  Neurologic: Grossly normal  Psychiatric: Appropriate affect, normal mood     Additional Information        Beka Ashley MD  Internal Medicine  Contact me at [phone]     Additional Information   Current Outpatient Medications   Medication Sig     ALPRAZolam (XANAX) 1 MG tablet TAKE 1-2 TABLET AT BEDTIME AS NEEDED     amLODIPine (NORVASC) 10 MG tablet Take 1 tablet (10 mg total) by mouth daily.     benzonatate (TESSALON) 100 MG capsule TAKE ONE CAPSULE BY MOUTH EVERY 6 HOURS AS NEEDED FOR COUGH     citalopram (CELEXA) 40 MG tablet TAKE 1 TABLET DAILY.     gabapentin (NEURONTIN) 300 MG capsule TAKE 2 CAPSULES BY MOUTH 3 TIMES A DAY INCREASE AS INSTRUCTED     hydrOXYzine pamoate (VISTARIL) 25 MG capsule TAKE 1 CAPSULE BY MOUTH 3 TIMES A DAY AS NEEDED FOR ITCHING     lisinopril (PRINIVIL,ZESTRIL) 20 MG tablet TAKE 1 TABLET (20 MG TOTAL) BY MOUTH DAILY.     mineral oil liquid Take 30 mL by mouth daily as needed for constipation.     prednisoLONE acetate (PRED-FORTE) 1 % ophthalmic suspension Administer 1 drop to both eyes 2 (two) times a day.     predniSONE (DELTASONE) 20 MG tablet Take 40 mg by mouth daily.     propranolol (INDERAL) 20 MG tablet Take 1 tablet (20 mg total) by mouth 2 (two) times a day.     sulfamethoxazole-trimethoprim (BACTRIM) 400-80 mg per tablet Take 1 tablet by mouth daily.     SUMAtriptan (IMITREX) 50 MG tablet TAKE 1 TABLET (50 MG TOTAL) BY MOUTH ONCE AS NEEDED FOR MIGRAINE.      suvorexant 10 mg Tab Take 10 mg by mouth at bedtime.     traZODone (DESYREL) 50 MG tablet TAKE 1 TABLET BY MOUTH EVERYDAY AT BEDTIME     viscous lidocaine HC (LIDOCAINE) 2 % Soln viscous solution Take 5 mL by mouth 3 (three) times a day. Swish and spit 5 mL three to four times daily as needed     cholecalciferol, vitamin D3, 5,000 unit capsule Take 1 capsule (5,000 Units total) by mouth daily.     No Known Allergies  Social History     Tobacco Use     Smoking status: Never Smoker     Smokeless tobacco: Never Used   Substance Use Topics     Alcohol use: No     Drug use: No         Time: total time spent with the patient was 25 minutes of which >50% was spent in counseling and coordination of care

## 2021-06-23 NOTE — PROGRESS NOTES
Pulmonary Clinic Follow-up Visit    Assessment and Plan:  68 year old male never smoker with a history of dyslipidemia, low-grade chronic bilateral uveitis, mild SAPNA, and pulmonary sarcoidosis, presenting for follow-up.      Sarcoidosis: Pulmonary and ocular involvement, with chronic bilateral uveitis, and mediastinal/hilar lymphadenopathy with pulmonary nodules but previously normal functional status and normal PFTs, with improvement radiographically on chest CT from October 2017 compared to November 2016. Developed worsening fatigue, dyspnea, and cough, with significant peripheral interstitial changes and worsening pulmonary function tests. Normal renal and hepatic function. Had leukocytosis on CBC in November 2018, not anemic. Prior ECG with possible LVH, otherwise unremarkable. Started prednisone 40 mg daily and PJP prophylaxis with SMX-TMP on 12/12/18. Since then, he has noted that his fatigue, dyspnea, and cough completely resolved, and his chest CT from January 15 shows complete resolution of interstitial changes. We had planned spirometry and DLCO prior to this visit, but that was not completed.     Plan:  - repeat spirometry and DLCO now  - decrease prednisone from 40 mg daily to 30 mg daily for 14 days, then 20 mg daily  - repeat spirometry and DLCO again in one month  - I will contact him with results; if his PFTs are normalizing now and remain stable in one month, will down-titrate the prednisone dose further with a goal of discontinuation over the next 6-8 weeks  - he has a [facility] appointment scheduled for February 20; if he shows no sign of recurrence during tapering of prednisone, that visit could potentially be canceled  - he had an ophthalmology visit on January 18 at [facility], with no sign of active intraocular inflammation  - he is being evaluated for a mandibular advancement device for mild SAPNA (AHI 10.4)  - follow-up with me in 3 months  - encouraged him to call any time with  questions or concerning symptoms     I appreciate the opportunity to participate in the care of Mr. Moreno.  Please feel free to contact me at any time.     CCx: pulmonary and ocular sarcoidosis    HPI: 68 year old male never smoker with a history of dyslipidemia, low-grade chronic bilateral uveitis, and pulmonary sarcoidosis, presenting for follow-up. Pulmonary and ocular involvement, with chronic bilateral uveitis, and mediastinal/hilar lymphadenopathy with pulmonary nodules but previously normal functional status and normal PFTs, with improvement radiographically on chest CT from October 2017 compared to November 2016. Developed worsening fatigue, dyspnea, and cough, with significant peripheral interstitial changes and worsening pulmonary function tests. Normal renal and hepatic function. Had leukocytosis on CBC in November 2018, not anemic. Prior ECG with possible LVH, otherwise unremarkable. Started prednisone 40 mg daily and PJP prophylaxis with SMX-TMP on 12/12/18. Since then, he has noted that his fatigue, dyspnea, and cough completely resolved, and his chest CT from January 15 shows complete resolution of interstitial changes. We had planned spirometry and DLCO prior to this visit, but that was not completed.    ROS:  A 12-system review was obtained and was negative with the exception of the symptoms endorsed in the history of present illness.    PMH:  Pulmonary and ocular sarcoidosis  Deviated septum  HLD  Depression  HTN  Insomnia  Chronic headaches  Mild SAPNA with AHI 10.4; he is being evaluated for mandibular advancement device    PSH:  Past Surgical History:   Procedure Laterality Date     BRONCHOSCOPY  12/20/2016    ebus       Allergies:  No Known Allergies    Family HX:  No family history on file.    Social Hx:  Social History     Socioeconomic History     Marital status: Single     Spouse name: Not on file     Number of children: Not on file     Years of education: Not on file     Highest education  level: Not on file   Social Needs     Financial resource strain: Not on file     Food insecurity - worry: Not on file     Food insecurity - inability: Not on file     Transportation needs - medical: Not on file     Transportation needs - non-medical: Not on file   Occupational History     Not on file   Tobacco Use     Smoking status: Never Smoker     Smokeless tobacco: Never Used   Substance and Sexual Activity     Alcohol use: No     Drug use: No     Sexual activity: Not on file   Other Topics Concern     Not on file   Social History Narrative     Not on file       Current Meds:  Current Outpatient Medications   Medication Sig Dispense Refill     ALPRAZolam (XANAX) 1 MG tablet TAKE 1-2 TABLET AT BEDTIME AS NEEDED 60 tablet 0     amLODIPine (NORVASC) 10 MG tablet Take 1 tablet (10 mg total) by mouth daily. 90 tablet 3     benzonatate (TESSALON) 100 MG capsule TAKE ONE CAPSULE BY MOUTH EVERY 6 HOURS AS NEEDED FOR COUGH 20 capsule 0     cholecalciferol, vitamin D3, 5,000 unit capsule Take 1 capsule (5,000 Units total) by mouth daily.  0     citalopram (CELEXA) 40 MG tablet TAKE 1 TABLET DAILY. 90 tablet 1     gabapentin (NEURONTIN) 300 MG capsule TAKE 2 CAPSULES BY MOUTH 3 TIMES A DAY INCREASE AS INSTRUCTED  5     hydrOXYzine pamoate (VISTARIL) 25 MG capsule TAKE 1 CAPSULE BY MOUTH 3 TIMES A DAY AS NEEDED FOR ITCHING 270 capsule 1     lisinopril (PRINIVIL,ZESTRIL) 20 MG tablet TAKE 1 TABLET (20 MG TOTAL) BY MOUTH DAILY. 90 tablet 3     prednisoLONE acetate (PRED-FORTE) 1 % ophthalmic suspension Administer 1 drop to both eyes 2 (two) times a day. 5 mL 0     predniSONE (DELTASONE) 20 MG tablet Take 40 mg by mouth daily. 60 tablet 3     propranolol (INDERAL) 20 MG tablet Take 1 tablet (20 mg total) by mouth 2 (two) times a day. 180 tablet 3     sulfamethoxazole-trimethoprim (BACTRIM) 400-80 mg per tablet Take 1 tablet by mouth daily. 30 tablet 3     SUMAtriptan (IMITREX) 50 MG tablet TAKE 1 TABLET (50 MG TOTAL) BY MOUTH  "ONCE AS NEEDED FOR MIGRAINE. 30 tablet 5     traZODone (DESYREL) 50 MG tablet TAKE 1 TABLET BY MOUTH EVERYDAY AT BEDTIME 30 tablet 11     viscous lidocaine HC (LIDOCAINE) 2 % Soln viscous solution Take 5 mL by mouth 3 (three) times a day. Swish and spit 5 mL three to four times daily as needed 120 mL 0     No current facility-administered medications for this visit.        Physical Exam:  /60   Pulse 61   Resp 12   Ht 5' 4\" (1.626 m)   Wt 152 lb (68.9 kg)   SpO2 95%   BMI 26.09 kg/m    Gen: alert, oriented, no distress  HEENT: nasal turbinates are unremarkable, no oropharyngeal lesions, no cervical or supraclavicular lymphadenopathy  CV: RRR, no M/G/R  Resp: CTAB, no focal crackles or wheezes  Abd: soft, nontender, no palpable organomegaly  Skin: no apparent rashes  Ext: no cyanosis, clubbing or edema  Neuro: alert, nonfocal    Labs:  Normal BMP and LFTs  WBC 13.1 with no differential  Normal Hgb  ESR 54  1,25-dihydroxyvitamin D level 83 in November 2016     Imaging studies:  CT chest (11/25/16)  - personally reviewed  - mediastinal and bilateral hilar lymphadenopathy with partial calcification  - multiple bilateral fluffy nodules (\"cotton ball\")  - peribronchovascular opacity sue. LLL     CT chest high-res (10/20/17):  - images directly reviewed, formal interpretation follows:  FINDINGS:  LUNGS AND PLEURA: Airways are patent in inspiration and expiration without tracheobronchomalacia or air trapping. There is no significant bronchial wall thickening. No significant bronchiectasis. There is scarring in the right middle lobe. Multiple   pulmonary nodules throughout the lungs are less conspicuous than on the comparison study. Example, a small area of linear scarring adjacent to the pleura in the right upper lobe was previously a 9 mm x 5 mm nodule (series 3 image 34). There a few small   nodules adjacent to the left major fissure and right major fissure. A few groundglass MR solid nodules are noted in the " upper lobes. No diffuse subpleural reticular opacities. No honeycombing. No significant diffuse groundglass.     MEDIASTINUM: Normal size heart. There is atherosclerotic disease including coronary artery calcification. There are numerous calcified mediastinal and hilar lymph nodes. The largest discrete right hilar lymph node is 15 mm (previously 17 mm). A 19 mm   subcarinal lymph node previously measured 22 mm. Small hiatal hernia.     LIMITED UPPER ABDOMEN: Negative.     MUSCULOSKELETAL: Negative.     IMPRESSION:   CONCLUSION:  1.  Pulmonary parenchymal disease and mediastinal and hilar lymphadenopathy are consistent with history of sarcoidosis. When compared with the prior study, pulmonary nodules have significantly decreased in size. Mediastinal and hilar lymph nodes have   slightly decreased in size. There is minimal fibrosis in the lungs, which is new.   2.  Coronary atherosclerotic disease.  3.  Small hiatal hernia.     CT chest (12/5/18):  - images directly reviewed, formal interpretation follows:  FINDINGS:  LUNGS AND PLEURA: There is new significant pulmonary disease seen diffusely mostly characterized by fine linear reticular interstitial prominence in the lung periphery involving upper and lower lobes but with a slight basal predominance. These are not   typical findings of sarcoid and the rapid progression is also atypical. Its possible this relates to a pneumonia superimposed upon the patient's pre-existing sarcoid. Other interstitial fibrotic processes could also be superimposed upon sarcoid but again   the progression appears fairly rapid.     No new zones of consolidation, there remains chronic atelectasis involving medial segment right middle lobe. There is a single 3 mm nodule within right middle lobe which has not changed significantly. No pleural effusion.     MEDIASTINUM: Partially calcified hilar and mediastinal lymphadenopathy is unchanged and again would be consistent with sarcoid. Coronary  artery calcifications.     LIMITED UPPER ABDOMEN: Negative.     MUSCULOSKELETAL: Negative.     IMPRESSION:   CONCLUSION:  1.  Fairly pronounced worsening of diffuse interstitial disease could relate to progression of known sarcoid but a superimposed atypical pneumonia or additional interstitial fibrosing process may also be present.    CT chest (1/15/19), 5 weeks after starting prednisone 40 mg daily:  - images directly reviewed, formal interpretation follows:  FINDINGS:   LUNGS AND PLEURA: Previously seen interstitial infiltrates throughout both lungs have resolved. There are a few tiny pulmonary nodules which are unchanged. There is chronic atelectasis in the inferior right middle lobe. The lungs are otherwise clear. No   pleural effusion.     MEDIASTINUM: Partially calcified mediastinal and bilateral hilar lymph nodes are unchanged. Small hiatal hernia. Moderate coronary artery calcification.     LIMITED UPPER ABDOMEN: Negative.     MUSCULOSKELETAL: Negative.     IMPRESSION:   CONCLUSION:   1.  Previously seen interstitial infiltrates have resolved.  2.  Partially calcified mediastinal and bilateral hilar lymph nodes are unchanged and consistent with known sarcoidosis.         Brain MRI (July 2016)  - mild atrophy  - minimal to mild SVID      PFT's  November 2016:  FEV1/FVC is 72% and is normal.  FEV1 is 2.49L or 113% predicted and is normal.  FVC is 3.47L or 124% predicted and normal.  There was no improvement in spirometry after a single inhaled dose of bronchodilator.  TLC is 5.13L or 107% predicted and is normal.  RV is 1.54L or 77% predicted and is reduced.  DLCO is 17.61 ml/min/mmHg or 88% predicted and is normal when it   is corrected for hemoglobin.     10/16/17:  FEV1/FVC is 68 and is normal (less than 0.7 but greater than the demographically adjusted lower limit of normal).  FEV1 is 122% predicted and is normal.  FVC is 140% predicted and normal.     DLCO is 80% predicted and is normal when it    is corrected for hemoglobin.    12/5/18:  FEV1/FVC is 74% and is normal.  FEV1 is 1.72 L or 76% predicted and is reduced.  FVC is 2.33 L or 80% predicted and is normal.  There was no improvement in spirometry after a single inhaled dose of bronchodilator.  DLCO is 9.48 mL/min/mm Hg or 40% predicted and is reduced when it   is corrected for hemoglobin.    Vipin Duron MD  Pulmonary and Critical Care Medicine  [facility]  Cell [phone]  Office [phone]  Pager [phone]

## 2021-06-24 NOTE — TELEPHONE ENCOUNTER
Controlled Substance Refill Request  Medication Name:   Requested Prescriptions     Pending Prescriptions Disp Refills     ALPRAZolam (XANAX) 1 MG tablet 60 tablet 0     Sig: TAKE 1-2 TABLET AT BEDTIME AS NEEDED     Date Last Fill: 1/24/19  Pharmacy: [facility]      Submit electronically to pharmacy  Controlled Substance Agreement Date Scanned:   Encounter-Level CSA Scan Date:    There are no encounter-level csa scan date.       Last office visit with prescriber/PCP: 1/17/2019 Beka Ashley MD OR same dept: 1/17/2019 Beka Ashley MD OR same specialty: 1/17/2019 Beka Ashley MD  Last physical: Visit date not found Last MTM visit: Visit date not found

## 2021-06-24 NOTE — PROGRESS NOTES
RESPIRATORY CARE NOTE    Pre and Post Spirometry with DLCO completed by patient.  Good patient effort and cooperation. Albuterol 2.5 mg neb given for bronchodilation.  The results of this test meet the ATS standards for acceptability and repeatability except 1 DLCO maneuver had a breath hold >12 seconds.  PT left in no distress and at baseline.    Brock Loera, LRT  2/13/2019

## 2021-06-24 NOTE — TELEPHONE ENCOUNTER
Prescription Monitoring Program activity reviewed with no discrepancies noted.  Last fill per : 1/24/19    Controlled Substance Agreement on file: No  Date:     Last office visit with provider:  1/17/2019 Beka Ashley MD    Please advise.  Madelyn Franics CMA ([facility])

## 2021-06-24 NOTE — TELEPHONE ENCOUNTER
Controlled Substance Refill Request  Medication:   Requested Prescriptions     Pending Prescriptions Disp Refills     ALPRAZolam (XANAX) 1 MG tablet [Pharmacy Med Name: ALPRAZOLAM 1 MG TABLET] 60 tablet 0     Sig: TAKE 1-2 TABLET AT BEDTIME AS NEEDED     Date Last Fill: 1/24/19  [id number]   Submit electronically to pharmacy  Controlled Substance Agreement on File:   Encounter-Level CSA Scan Date:    There are no encounter-level csa scan date.       Last office visit: Last office visit pertaining to requested medication was 1/17/19.

## 2021-06-24 NOTE — TELEPHONE ENCOUNTER
Pulmonary Telephone Note    Spirometry has normalized with steroids, from FEV1 1.72 L (76%) and FVC 2.33 L (80%) in early December 2018, to FEV1 2.39 L (108%) and FVC 3.52 L (118%) now. DLCO up from 40% to 55% predicted over the same period. Radiographically he had >90% improvement with steroids as well. Should consider further tapering the steroids, perhaps tapering off over the next six weeks followed by repeat spirometry and DLCO. He has [facility] pulmonary evaluation scheduled for February 20.    Called Mr. Moreno to discuss the above and reached voicemail. Left a brief message with the clinic phone number and a message that I would try him again at another time.    Vipin Duron MD  Pulmonary and Critical Care Medicine  [facility]  Cell [phone]  Office [phone]  Pager [phone]

## 2021-06-24 NOTE — TELEPHONE ENCOUNTER
Prescription Monitoring Program activity reviewed with no discrepancies noted.      Last fill per : 1/24/19  Quantity/days supply: #60, 30 day supply    Controlled Substance Agreement on file: No    Last office visit with provider:  1/17/2019 Beka Ashley MD    Please advise.    Leonor Moreno LPN

## 2021-06-24 NOTE — TELEPHONE ENCOUNTER
I left a message for Denis to call back to schedule a pre and post near or on the day of his April follow up with Dr. Duron.

## 2021-06-24 NOTE — TELEPHONE ENCOUNTER
Pulmonary Telephone Note    Again tried to reach Mr. Moreno and reached voicemail. Did not leave a repeat message. See my telephone encounter from 2/18/19 for additional details. Will try him again at another time.    Vipin Duron MD  Pulmonary and Critical Care Medicine  [facility]  Cell [phone]  Office [phone]  Pager [phone]

## 2021-06-24 NOTE — TELEPHONE ENCOUNTER
Pulmonary Telephone Note    I was able to reach Mr. Moreno to discuss normalization spirometry and improved DLCO with the prednisone (from FEV1 1.72 L (76%) and FVC 2.33 L (80%) in early December 2018, to FEV1 2.39 L (108%) and FVC 3.52 L (118%) now. DLCO up from 40% to 55% predicted over the same period). Radiographically he had >90% improvement by CT. He cancelled his [place] pulmonary appointment due to symptomatic, physiologic and radiographic improvement.    He is currently taking prednisone 30 mg daily. He is recovering from a viral URI. I advised him to decrease the prednisone to 20 mg daily for 14 days starting tomorrow, then will decrease to 15 mg daily x 14 days, then 10 mg daily until I see him in follow-up with spirometry and DLCO in late April. He is in agreement. Encouraged him to call any time with questions or concerning symptoms.    Vipin Duron MD  Pulmonary and Critical Care Medicine  [facility]  Cell [phone]  Office [phone]  Pager [phone]

## 2021-06-25 NOTE — PROGRESS NOTES
Office Visit - Follow Up   Denis Moreno   71 y.o. male    Date of Visit: 6/3/2021    Chief Complaint   Patient presents with     Sinusitis        Assessment and Plan   1. Acute sinusitis with symptoms > 10 days  Complains of left sinus pains with congestion and headaches. These are going on for almost 2 weeks. Does not have fever. Thinks he has recurrence of his sinusitis. Will treat with Augmentin twice a day for 10 days. Advised to update in a week; if these are still present, he may need a CT of his sinuses.   - amoxicillin-clavulanate (AUGMENTIN) 875-125 mg per tablet; Take 1 tablet by mouth 2 (two) times a day for 10 days.  Dispense: 20 tablet; Refill: 0        Follow up in  10 days.      History of Present Illness   This 71 y.o. old male complains of left sinus pains with sinus congestion and headaches. These are going almost 2 weeks now. Thinks he has recurrence of his sinusitis. Overall, he feels well. Does not have fever, cough, sore throat and other upper respiratory symptoms.      Review of Systems   A 12 point comprehensive review of systems was negative except as noted..     Medications, Allergies and Problem List   Reviewed and updated             Chief Complaint   Sinusitis       Patient Profile   Social History     Social History Narrative    , 2 grown daughters, 2 granddaughters. 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 Nonsmoker. Socially drinks alcohol.         Past Medical History   Patient Active Problem List   Diagnosis     Mixed hyperlipidemia     Mild Recurrent Major Depression     Male Erectile Disorder     Benign Essential Hypertension     Insomnia     Anxiety     Deviated nasal septum     Hilar adenopathy     Uveitis     Sarcoidosis of lung (H)       Past Surgical History  He has a past surgical history that includes Bronchoscopy (12/20/2016).       Medications and Allergies   Current Outpatient Medications   Medication Sig     ALPRAZolam (XANAX) 1 MG tablet TAKE 1 TABLET BY MOUTH THREE TIMES A  DAY AS NEEDED FOR ANXIETY     amLODIPine (NORVASC) 10 MG tablet TAKE 1 TABLET BY MOUTH EVERY DAY     benzonatate (TESSALON) 100 MG capsule TAKE ONE CAPSULE BY MOUTH EVERY 6 HOURS AS NEEDED FOR COUGH     celecoxib (CELEBREX) 200 MG capsule Take 1 capsule (200 mg total) by mouth daily.     cholecalciferol, vitamin D3, 125 mcg (5,000 unit) capsule TAKE 1 CAPSULE (5,000 UNITS TOTAL) BY MOUTH DAILY.     citalopram (CELEXA) 40 MG tablet TAKE 1 TABLET DAILY.     gabapentin (NEURONTIN) 300 MG capsule TAKE 2 CAPSULES BY MOUTH 3 TIMES A DAY INCREASE AS INSTRUCTED     hydrOXYzine HCL (ATARAX) 10 MG tablet Take 1 tablet (10 mg total) by mouth 3 (three) times a day as needed for itching.     lisinopriL (PRINIVIL,ZESTRIL) 20 MG tablet TAKE 1 TABLET BY MOUTH EVERY DAY     omeprazole (PRILOSEC) 20 MG capsule Take 1 capsule (20 mg total) by mouth daily before breakfast.     predniSONE (DELTASONE) 1 MG tablet Take 4 tabs (4 mg) daily x 10 days, then 3 tabs (3 mg) daily x 10 days, then 2 tabs (2 mg) daily x 10 days, then 1 tab (1 mg) daily x 10 days.. (Patient taking differently: Take 0.5 mg by mouth daily Take 4 tabs (4 mg) daily x 10 days, then 3 tabs (3 mg) daily x 10 days, then 2 tabs (2 mg) daily x 10 days, then 1 tab (1 mg) daily x 10 days..)     propranolol (INDERAL) 20 MG tablet TAKE 1 TABLET (20 MG TOTAL) BY MOUTH 2 (TWO) TIMES A DAY.     SUMAtriptan (IMITREX) 50 MG tablet TAKE 1 TABLET (50 MG TOTAL) BY MOUTH ONCE AS NEEDED FOR MIGRAINE.     traZODone (DESYREL) 50 MG tablet TAKE 1 TABLET BY MOUTH EVERYDAY AT BEDTIME     amoxicillin-clavulanate (AUGMENTIN) 875-125 mg per tablet Take 1 tablet by mouth 2 (two) times a day for 10 days.     No Known Allergies     Family and Social History   No family history on file.     Social History     Tobacco Use     Smoking status: Never Smoker     Smokeless tobacco: Never Used   Substance Use Topics     Alcohol use: No     Drug use: No        Physical Exam       Physical Exam  /56    Temp 98.3  F (36.8  C) (Temporal)   Wt 139 lb (63 kg)   BMI 23.86 kg/m    General appearance: alert, appears stated age, cooperative and no distress  Nose: sinus tenderness left  Throat: lips, mucosa, and tongue normal; teeth and gums normal  Neck: no adenopathy, no carotid bruit, no JVD, supple, symmetrical, trachea midline and thyroid not enlarged, symmetric, no tenderness/mass/nodules  Lungs: clear to auscultation bilaterally  Heart: regular rate and rhythm, S1, S2 normal, no murmur, click, rub or gallop  Abdomen: soft, non-tender; bowel sounds normal; no masses,  no organomegaly  Extremities: extremities normal, atraumatic, no cyanosis or edema     Additional Information   Post Discharge Medication Reconciliation Status: discharge medications reconciled, continue medications without change      Beka Ashley MD  Internal Medicine  Contact me at [phone]     Additional Information   Current Outpatient Medications   Medication Sig     ALPRAZolam (XANAX) 1 MG tablet TAKE 1 TABLET BY MOUTH THREE TIMES A DAY AS NEEDED FOR ANXIETY     amLODIPine (NORVASC) 10 MG tablet TAKE 1 TABLET BY MOUTH EVERY DAY     benzonatate (TESSALON) 100 MG capsule TAKE ONE CAPSULE BY MOUTH EVERY 6 HOURS AS NEEDED FOR COUGH     celecoxib (CELEBREX) 200 MG capsule Take 1 capsule (200 mg total) by mouth daily.     cholecalciferol, vitamin D3, 125 mcg (5,000 unit) capsule TAKE 1 CAPSULE (5,000 UNITS TOTAL) BY MOUTH DAILY.     citalopram (CELEXA) 40 MG tablet TAKE 1 TABLET DAILY.     gabapentin (NEURONTIN) 300 MG capsule TAKE 2 CAPSULES BY MOUTH 3 TIMES A DAY INCREASE AS INSTRUCTED     hydrOXYzine HCL (ATARAX) 10 MG tablet Take 1 tablet (10 mg total) by mouth 3 (three) times a day as needed for itching.     lisinopriL (PRINIVIL,ZESTRIL) 20 MG tablet TAKE 1 TABLET BY MOUTH EVERY DAY     omeprazole (PRILOSEC) 20 MG capsule Take 1 capsule (20 mg total) by mouth daily before breakfast.     predniSONE (DELTASONE) 1 MG tablet Take 4 tabs (4  mg) daily x 10 days, then 3 tabs (3 mg) daily x 10 days, then 2 tabs (2 mg) daily x 10 days, then 1 tab (1 mg) daily x 10 days.. (Patient taking differently: Take 0.5 mg by mouth daily Take 4 tabs (4 mg) daily x 10 days, then 3 tabs (3 mg) daily x 10 days, then 2 tabs (2 mg) daily x 10 days, then 1 tab (1 mg) daily x 10 days..)     propranolol (INDERAL) 20 MG tablet TAKE 1 TABLET (20 MG TOTAL) BY MOUTH 2 (TWO) TIMES A DAY.     SUMAtriptan (IMITREX) 50 MG tablet TAKE 1 TABLET (50 MG TOTAL) BY MOUTH ONCE AS NEEDED FOR MIGRAINE.     traZODone (DESYREL) 50 MG tablet TAKE 1 TABLET BY MOUTH EVERYDAY AT BEDTIME     amoxicillin-clavulanate (AUGMENTIN) 875-125 mg per tablet Take 1 tablet by mouth 2 (two) times a day for 10 days.     No Known Allergies  Social History     Tobacco Use     Smoking status: Never Smoker     Smokeless tobacco: Never Used   Substance Use Topics     Alcohol use: No     Drug use: No

## 2021-06-27 ENCOUNTER — HEALTH MAINTENANCE LETTER (OUTPATIENT)
Age: 71
End: 2021-06-27

## 2021-06-28 ENCOUNTER — COMMUNICATION - HEALTHEAST (OUTPATIENT)
Dept: INTERNAL MEDICINE | Facility: CLINIC | Age: 71
End: 2021-06-28

## 2021-06-28 DIAGNOSIS — F41.9 ANXIETY: ICD-10-CM

## 2021-06-28 DIAGNOSIS — M79.621 PAIN OF RIGHT UPPER ARM: ICD-10-CM

## 2021-06-28 RX ORDER — CELECOXIB 200 MG/1
CAPSULE ORAL
Qty: 30 CAPSULE | Refills: 2 | Status: SHIPPED | OUTPATIENT
Start: 2021-06-28 | End: 2021-11-01

## 2021-06-29 ENCOUNTER — OFFICE VISIT - HEALTHEAST (OUTPATIENT)
Dept: INTERNAL MEDICINE | Facility: CLINIC | Age: 71
End: 2021-06-29

## 2021-06-29 DIAGNOSIS — J32.0 CHRONIC MAXILLARY SINUSITIS: ICD-10-CM

## 2021-06-29 DIAGNOSIS — D86.0 SARCOIDOSIS OF LUNG (H): ICD-10-CM

## 2021-06-29 RX ORDER — ALPRAZOLAM 1 MG
TABLET ORAL
Qty: 90 TABLET | Refills: 0 | Status: SHIPPED | OUTPATIENT
Start: 2021-06-29 | End: 2021-09-08

## 2021-07-07 NOTE — PROGRESS NOTES
Office Visit - Follow Up   Denis Moreno   71 y.o. male    Date of Visit: 6/29/2021    Chief Complaint   Patient presents with     Follow-up     Sinusitis Abx didn't get rid of it completely        Assessment and Plan   1. Chronic maxillary sinusitis  Suffers from sinusitis. Now getting chronic. Treated with amoxicillin with clavulanic acid but still has some residual pains on his sinuses particularly the maxillary sinus this time. Discussed with him he needs to see ENT this time. His deviated nasal septum is causing his chronic sinusitis. Will refer him to Dr Palomino.   - Ambulatory referral to ENT - [facility], [place]    2. Sarcoidosis of lung (H)  Takes low dose prednisone which makes him mildly immunocompromised.       Follow up in  September as scheduled.      History of Present Illness   This 71 y.o. old male complains of recurring sinus symptoms. Treated with amoxicillin with clavulanic acid a week ago. But his sinus tenderness still persist. Has recurring sinusitis due to his deviated nasal septum. Needs to see ENT for this. Only mildly immunocompromised due to his chronic use of low dose prednisone for his lung sarcoidosis.      Review of Systems   A 12 point comprehensive review of systems was negative except as noted..     Medications, Allergies and Problem List   Reviewed and updated             Chief Complaint   Follow-up (Sinusitis Abx didn't get rid of it completely)       Patient Profile   Social History     Social History Narrative    , 2 grown daughters, 2 granddaughters. 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 Nonsmoker. Socially drinks alcohol.         Past Medical History   Patient Active Problem List   Diagnosis     Mixed hyperlipidemia     Mild Recurrent Major Depression     Male Erectile Disorder     Benign Essential Hypertension     Insomnia     Anxiety     Deviated nasal septum     Hilar adenopathy     Uveitis     Sarcoidosis of lung (H)       Past Surgical History  He has a past  surgical history that includes Bronchoscopy (12/20/2016).       Medications and Allergies   Current Outpatient Medications   Medication Sig     ALPRAZolam (XANAX) 1 MG tablet TAKE 1 TABLET BY MOUTH THREE TIMES A DAY AS NEEDED FOR ANXIETY     amLODIPine (NORVASC) 10 MG tablet TAKE 1 TABLET BY MOUTH EVERY DAY     benzonatate (TESSALON) 100 MG capsule TAKE ONE CAPSULE BY MOUTH EVERY 6 HOURS AS NEEDED FOR COUGH     celecoxib (CELEBREX) 200 MG capsule TAKE 1 CAPSULE BY MOUTH EVERY DAY     cholecalciferol, vitamin D3, 125 mcg (5,000 unit) capsule TAKE 1 CAPSULE (5,000 UNITS TOTAL) BY MOUTH DAILY.     citalopram (CELEXA) 40 MG tablet TAKE 1 TABLET DAILY.     gabapentin (NEURONTIN) 300 MG capsule TAKE 2 CAPSULES BY MOUTH 3 TIMES A DAY INCREASE AS INSTRUCTED     hydrOXYzine HCL (ATARAX) 10 MG tablet Take 1 tablet (10 mg total) by mouth 3 (three) times a day as needed for itching.     lisinopriL (PRINIVIL,ZESTRIL) 20 MG tablet TAKE 1 TABLET BY MOUTH EVERY DAY     omeprazole (PRILOSEC) 20 MG capsule Take 1 capsule (20 mg total) by mouth daily before breakfast.     predniSONE (DELTASONE) 1 MG tablet Take 4 tabs (4 mg) daily x 10 days, then 3 tabs (3 mg) daily x 10 days, then 2 tabs (2 mg) daily x 10 days, then 1 tab (1 mg) daily x 10 days.. (Patient taking differently: Take 0.5 mg by mouth daily Take 4 tabs (4 mg) daily x 10 days, then 3 tabs (3 mg) daily x 10 days, then 2 tabs (2 mg) daily x 10 days, then 1 tab (1 mg) daily x 10 days..)     propranolol (INDERAL) 20 MG tablet TAKE 1 TABLET (20 MG TOTAL) BY MOUTH 2 (TWO) TIMES A DAY.     SUMAtriptan (IMITREX) 50 MG tablet TAKE 1 TABLET (50 MG TOTAL) BY MOUTH ONCE AS NEEDED FOR MIGRAINE.     traZODone (DESYREL) 50 MG tablet TAKE 1 TABLET BY MOUTH EVERYDAY AT BEDTIME     No Known Allergies     Family and Social History   No family history on file.     Social History     Tobacco Use     Smoking status: Never Smoker     Smokeless tobacco: Never Used   Substance Use Topics      Alcohol use: No     Drug use: No        Physical Exam       Physical Exam  /56   Pulse 66   Wt 144 lb (65.3 kg)   SpO2 95%   BMI 24.72 kg/m    General appearance: alert, appears stated age, cooperative and no distress  Ears: normal TM's and external ear canals both ears  Nose: sinus tenderness left  Throat: lips, mucosa, and tongue normal; teeth and gums normal  Neck: no adenopathy, no carotid bruit, no JVD, supple, symmetrical, trachea midline and thyroid not enlarged, symmetric, no tenderness/mass/nodules  Lungs: clear to auscultation bilaterally  Heart: regular rate and rhythm, S1, S2 normal, no murmur, click, rub or gallop  Abdomen: soft, non-tender; bowel sounds normal; no masses,  no organomegaly  Extremities: extremities normal, atraumatic, no cyanosis or edema     Additional Information   Post Discharge Medication Reconciliation Status: discharge medications reconciled, continue medications without change      Beka Ashley MD  Internal Medicine  Contact me at [phone]     Additional Information   Current Outpatient Medications   Medication Sig     ALPRAZolam (XANAX) 1 MG tablet TAKE 1 TABLET BY MOUTH THREE TIMES A DAY AS NEEDED FOR ANXIETY     amLODIPine (NORVASC) 10 MG tablet TAKE 1 TABLET BY MOUTH EVERY DAY     benzonatate (TESSALON) 100 MG capsule TAKE ONE CAPSULE BY MOUTH EVERY 6 HOURS AS NEEDED FOR COUGH     celecoxib (CELEBREX) 200 MG capsule TAKE 1 CAPSULE BY MOUTH EVERY DAY     cholecalciferol, vitamin D3, 125 mcg (5,000 unit) capsule TAKE 1 CAPSULE (5,000 UNITS TOTAL) BY MOUTH DAILY.     citalopram (CELEXA) 40 MG tablet TAKE 1 TABLET DAILY.     gabapentin (NEURONTIN) 300 MG capsule TAKE 2 CAPSULES BY MOUTH 3 TIMES A DAY INCREASE AS INSTRUCTED     hydrOXYzine HCL (ATARAX) 10 MG tablet Take 1 tablet (10 mg total) by mouth 3 (three) times a day as needed for itching.     lisinopriL (PRINIVIL,ZESTRIL) 20 MG tablet TAKE 1 TABLET BY MOUTH EVERY DAY     omeprazole (PRILOSEC) 20 MG  capsule Take 1 capsule (20 mg total) by mouth daily before breakfast.     predniSONE (DELTASONE) 1 MG tablet Take 4 tabs (4 mg) daily x 10 days, then 3 tabs (3 mg) daily x 10 days, then 2 tabs (2 mg) daily x 10 days, then 1 tab (1 mg) daily x 10 days.. (Patient taking differently: Take 0.5 mg by mouth daily Take 4 tabs (4 mg) daily x 10 days, then 3 tabs (3 mg) daily x 10 days, then 2 tabs (2 mg) daily x 10 days, then 1 tab (1 mg) daily x 10 days..)     propranolol (INDERAL) 20 MG tablet TAKE 1 TABLET (20 MG TOTAL) BY MOUTH 2 (TWO) TIMES A DAY.     SUMAtriptan (IMITREX) 50 MG tablet TAKE 1 TABLET (50 MG TOTAL) BY MOUTH ONCE AS NEEDED FOR MIGRAINE.     traZODone (DESYREL) 50 MG tablet TAKE 1 TABLET BY MOUTH EVERYDAY AT BEDTIME     No Known Allergies  Social History     Tobacco Use     Smoking status: Never Smoker     Smokeless tobacco: Never Used   Substance Use Topics     Alcohol use: No     Drug use: No

## 2021-07-07 NOTE — TELEPHONE ENCOUNTER
Last Office Visit  5/20/2021 Beka Ashley MD    Notes:  1. Routine general medical examination at a health care facility  Advised his physical exam is normal.      2. Mixed hyperlipidemia  Controlled. Last lipids were good. Not taking statin yet.      3. Benign Essential Hypertension  Controlled. Continue amlodipine, lisinopril and propranolol.      4. Sarcoidosis of lung (H)  Takes low dose prednisone 0.5 mg daily. Followed by pulmonary. Informed his lung sarcoidosis is stable.      5. Mild Recurrent Major Depression  Has mild depression, now in remission. Still takes citalopram. PHQ 9 score is only 2.      6. Anxiety  Has anxiety which comes and goes. Takes alprazolam as needed. Sleeps well now with trazodone at night.      7. Migraine without status migrainosus, not intractable, unspecified migraine type  Takes propranolol for this also as prophylaxis. Gets occasional migraine headaches. Takes sumatriptan which does not work all the time. Takes additional ibuprofen which helps.       The patient's current medical problems were reviewed.    Last Filled:  ALPRAZolam (XANAX) 1 MG tablet 90 tablet 0 5/18/2021  No   Sig: TAKE 1 TABLET BY MOUTH THREE TIMES A DAY AS NEEDED FOR ANXIETY   Sent to pharmacy as: ALPRAZolam 1 mg tablet (XANAX)   E-Prescribing Status: Receipt confirmed by pharmacy (5/18/2021  9:01 AM CDT)         No results found for: AMPHET, HEBENZODIAZ, OPIATES, PCP, THC, BARBIT, COCAINEMETAB, METHADNE, OXYCODONE, CREAUR      Next OV:  6/29/2021 Beka Ashley MD      Encounter-Level CSA Scan Date:    There are no encounter-level csa scan date.           Medication teed up for provider signature - please adjust as appropriate.

## 2021-07-07 NOTE — TELEPHONE ENCOUNTER
Refill Request  Did you contact pharmacy: No  Medication name:   Alpraxolam    Who prescribed the medication: PCP  Requested Pharmacy: [facility] [address]  Is patient out of medication: Yes  Patient notified refills processed in 3 business days:  yes  Okay to leave a detailed message: yes

## 2021-07-07 NOTE — TELEPHONE ENCOUNTER
RN cannot approve Refill Request    RN can NOT refill this medication med is not covered by policy/route to provider. Last office visit: 6/3/2021 Beka Ashley MD Last Physical: 5/20/2021 Last MTM visit: Visit date not found Last visit same specialty: 6/3/2021 Beka Ashley MD.  Next visit within 3 mo: Visit date not found  Next physical within 3 mo: Visit date not found      Macrina Stringer, [facility] Triage/Med Refill 6/28/2021    Requested Prescriptions   Pending Prescriptions Disp Refills     celecoxib (CELEBREX) 200 MG capsule [Pharmacy Med Name: CELECOXIB 200 MG CAPSULE] 30 capsule 2     Sig: TAKE 1 CAPSULE BY MOUTH EVERY DAY       There is no refill protocol information for this order

## 2021-07-22 DIAGNOSIS — F41.9 ANXIETY: ICD-10-CM

## 2021-07-26 NOTE — TELEPHONE ENCOUNTER
"Routing refill request to provider for review/approval because:  Drug interaction warning    Last Written Prescription Date:  10/18/2020  Last Fill Quantity: 90,  # refills: 2   Last office visit provider:  5/20/21    Requested Prescriptions   Pending Prescriptions Disp Refills     citalopram (CELEXA) 40 MG tablet [Pharmacy Med Name: CITALOPRAM HBR 40 MG TABLET] 90 tablet 1     Sig: TAKE 1 TABLET BY MOUTH EVERY DAY       SSRIs Protocol Passed - 7/22/2021 12:28 AM        Passed - Recent (12 mo) or future (30 days) visit within the authorizing provider's specialty     Patient has had an office visit with the authorizing provider or a provider within the authorizing providers department within the previous 12 mos or has a future within next 30 days. See \"Patient Info\" tab in inbasket, or \"Choose Columns\" in Meds & Orders section of the refill encounter.              Passed - Medication is active on med list        Passed - Patient is age 18 or older             Macrina Stringer RN 07/26/21 9:43 AM  "

## 2021-07-27 RX ORDER — CITALOPRAM HYDROBROMIDE 40 MG/1
TABLET ORAL
Qty: 90 TABLET | Refills: 1 | Status: SHIPPED | OUTPATIENT
Start: 2021-07-27 | End: 2022-03-04

## 2021-08-06 NOTE — PATIENT INSTRUCTIONS - HE
Patient Instructions by Beka Ashley MD at 5/20/2021 12:00 PM     Author: Beka Ashley MD Service: -- Author Type: Physician    Filed: 5/20/2021 12:24 PM Encounter Date: 5/20/2021 Status: Signed    : Beka Ashley MD (Physician)         Patient Education     Your Health Risk Assessment indicates you feel you are not in good physical health.    A healthy lifestyle helps keep the body fit and the mind alert. It helps protect you from disease, helps you fight disease, and helps prevent chronic disease (disease that doesn't go away) from getting worse. This is important as you get older and begin to notice twinges in muscles and joints and a decline in the strength and stamina you once took for granted. A healthy lifestyle includes good healthcare, good nutrition, weight control, recreation, and regular exercise. Avoid harmful substances and do what you can to keep safe. Another part of a healthy lifestyle is stay mentally active and socially involved.    Good healthcare     Have a wellness visit every year.     If you have new symptoms, let us know right away. Don't wait until the next checkup.     Take medicines exactly as prescribed and keep your medicines in a safe place. Tell us if your medicine causes problems.   Healthy diet and weight control     Eat 3 or 4 small, nutritious, low-fat, high-fiber meals a day. Include a variety of fruits, vegetables, and whole-grain foods.     Make sure you get enough calcium in your diet. Calcium, vitamin D, and exercise help prevent osteoporosis (bone thinning).     If you live alone, try eating with others when you can. That way you get a good meal and have company while you eat it.     Try to keep a healthy weight. If you eat more calories than your body uses for energy, it will be stored as fat and you will gain weight.     Recreation   Recreation is not limited to sports and team events. It includes any activity that provides relaxation, interest,  enjoyment, and exercise. Recreation provides an outlet for physical, mental, and social energy. It can give a sense of worth and achievement. It can help you stay healthy.       Patient Education     Exercise for a Healthier Heart  You may wonder how you can improve the health of your heart. If youre thinking about exercise, youre on the right track. You dont need to become an athlete, but you do need a certain amount of brisk exercise to help strengthen your heart. If you have been diagnosed with a heart condition, your doctor may recommend exercise to help stabilize your condition. To help make exercise a habit, choose safe, fun activities.       Be sure to check with your health care provider before starting an exercise program.    Why exercise?  Exercising regularly offers many healthy rewards. It can help you do all of the following:    Improve your blood cholesterol levels to help prevent further heart trouble    Lower your blood pressure to help prevent a stroke or heart attack    Control diabetes, or reduce your risk of getting this disease    Improve your heart and lung function    Reach and maintain a healthy weight    Make your muscles stronger and more limber so you can stay active    Prevent falls and fractures by slowing the loss of bone mass (osteoporosis)    Manage stress better  Exercise tips  Ease into your routine. Set small goals. Then build on them.  Exercise on most days. Aim for a total of 150 or more minutes of moderate to  vigorous intensity activity each week. Consider 40 minutes, 3 to 4 times a week. For best results, activity should last for 40 minutes on average. It is OK to work up to the 40 minute period over time. Examples of moderate-intensity activity is walking one mile in 15 minutes or 30 to 45 minutes of yard work.  Step up your daily activity level. Along with your exercise program, try being more active throughout the day. Walk instead of drive. Do more household tasks or yard  work.  Choose one or more activities you enjoy. Walking is one of the easiest things you can do. You can also try swimming, riding a bike, or taking an exercise class.  Stop exercising and call your doctor if you:    Have chest pain or feel dizzy or lightheaded    Feel burning, tightness, pressure, or heaviness in your chest, neck, shoulders, back, or arms    Have unusual shortness of breath    Have increased joint or muscle pain    Have palpitations or an irregular heartbeat      1222-8390 [employer]. [address] [place] [address]. All rights reserved. This information is not intended as a substitute for professional medical care. Always follow your healthcare professional's instructions.         Patient Education    Home Fire Safety  Each year, thousands of people, including children, are injured and killed in home fires. Children are often curious about fire, and may not understand the dangers. This makes home fire safety practices especially important. Three important things you can do to keep your home safe from fire are:    Install smoke alarms in your home and make sure they work properly.    Teach children not to play with matches, lighters, and other materials that can be used to start fires. And keep these materials out of childrens reach.    Teach children what to do in case of fire. Create a fire safety action plan and practice it.  Read on for more details about keeping your family and home safe from fire.        Being Prepared for a Fire  A home fire can happen at any time. The following can help you be prepared:    Install smoke alarms on every level of your home, including the basement and outside all sleeping areas. This simple step cuts your familys risk of dying in a fire nearly in half.    Test smoke alarms monthly, and change the batteries once a year or when the alarm chirps.    Dont disable smoke alarms, even for a short time.    Ask your local fire department for  tips on where to place smoke alarms in your home.    Replace all smoke alarms every 10 years.    Consider using voice smoke alarms. These alarms allow you to substitute your own voice for the alarm sound. They are helpful because many children dont wake up to the sound of a regular smoke alarm.    Install carbon monoxide detectors near sleeping areas.    Be aware that carbon monoxide is a byproduct of smoke that can be deadly. Its a gas that you cant see, smell, or taste.    Consider buying a combination smoke alarm / carbon monoxide detector.    Keep fire extinguishers in the home.    Keep them in accessible locations, especially in the kitchen.    Check usage dates to make sure they are not .    Use fire extinguishers only when the fire is in a contained area and is not spreading. (Otherwise, you should focus on getting out of the home.)    Train adults to use fire extinguishers. (Children should focus on getting out of the home during a fire.)    If you live in an apartment, talk to your landlord about where smoke alarms are and how often they are tested. Also ask about fire extinguisher locations and emergency exit routes.  Indoor Fire Safety  Many things in your home are potential fire hazards. Follow these steps to help keep your home safe.    Be careful in the kitchen.    Never leave food thats cooking unattended.    If a fire breaks out in a cooking pan, put a lid on it to smother it. And never throw water on a grease fire. It will make the fire worse.    Conduct a home safety inspection. Look for anything, such as frayed wires and cords, that can cause a fire. Fix or remove any fire safety hazards you find.    Keep all matches and lighters in a secured drawer or cabinet out of the reach of children. Use childproof lighters.    Check to make sure all appliances, including the stove, are turned off before leaving the home.    Know where the gas main shut-off is located.    Make sure space heaters are  stable and have protective covers. Keep them at least 3 feet from anything that can burn, such as curtains. Dont use space heaters in areas where young kids spend time alone.    Keep flammable liquids such as kerosene and gasoline locked up and safely stored away from kids and heat.    Keep all smoking materials out of reach of children. And never smoke in bed. If possible, smoke outdoors only.  Outdoor Fire Safety  Fire can be a hazard outdoors as well as indoors. When outdoors, be sure to do the following:    Always supervise kids near a barbecue grill, campfire, or portable stove.    Dont use fire pits around children. Kids can fall into them, and pits can be hot even after the fire goes out.    Keep a garden hose or fire extinguisher handy when cooking outdoors in case of fire.  Teaching Your Child About Fire Safety  One of the best ways to keep your home safe from fire is education. Make sure everyone in your family knows fire safety rules, including children.    Teach your children the dangers of matches, lighters, and other dangerous items.    Teach them to never touch these and other objects that are hot, such as candles.    Have them tell you right away whenever they find matches or lighters. Explain that these items are tools for grown-ups, not toys. And never amuse children with matches or lighters.    Round up all matches and lighters and store them safely. In case you missed some, ask your children to tell you where any are located throughout your home.    Never leave a child alone in a room with a lit candle. Dont allow teens to have candles in their rooms.    Show children what to do in case of fire.    Be sure your kids know what the fire alarm sounds like and what to do if it goes off.    Teach kids what to do if their clothes catch fire: Stop, Drop to the ground, and Roll until the fire is put out. They should also cover their face with their hands. Practice these steps with your children. Make  sure they understand that running will make the fire burn faster.    Show children how to crawl below smoke during a fire.    Make sure kids know at least two escape routes from each room in the home. These escape routes can be windows.    Teach kids to test doors for nearby fire by feeling for heat with the back of their hand. If the door is warm or hot, they should try their second exit.    Explain to children that they cant hide from a fire. Hiding in a closet or under a bed wont make them safe. Instead, they should try to escape the home. And if they cant escape, they should let others know they are trapped. They can do this by shutting the door to the room, opening a window, and turning on the lights.    Talk to your local fire department.    Introduce your children to a . Let them know that firefighters will look different when in full protective gear. Tell them to never hide from firefighters, and to follow all directions from firefighters during a fire.    Find out if the fire department has a fire safety program for kids.      Create a Fire Safety Plan  Create a plan for your family to follow in case of a fire. Try making it a family project. Important steps for the plan include leaving the home right away and having a designated meeting place.    Make sure your child understands to get out and stay out. He or she should get out of the home immediately and not go back in, even if family members or pets are still inside.    Decide on a safe meeting place away from the home for everyone to gather.    Teach children to call 911 or emergency services from a cell phone or neighbors phone. Make sure they know to do this only after they are safely out of the home.    Teach your children the fire safety plan. Practice it and make sure they understand it.    Have fire drills twice a year to keep your children prepared in case of fire.    Visit the National Fire Protection Association web site at  www.nfpa.org for more information.      3463-0185 [employer]. [address] [place] [address], [place] [address]. All rights reserved. This information is not intended as a substitute for professional medical care. Always follow your healthcare professional's instructions.         Patient Education   Your Health Risk Assessment indicates you feel you are not in good emotional health.    Recreation   Recreation is not limited to sports and team events. It includes any activity that provides relaxation, interest, enjoyment, and exercise. Recreation provides an outlet for physical, mental, and social energy. It can give a sense of worth and achievement. It can help you stay healthy.    Mental Exercise and Social Involvement  Mental and emotional health is as important as physical health. Keep in touch with friends and family. Stay as active as possible. Continue to learn and challenge yourself.   Things you can do to stay mentally active are:    Learn something new, like a foreign language or musical instrument.     Play SCRABBLE or do crossword puzzles. If you cannot find people to play these games with you at home, you can play them with others on your computer through the Internet.     Join a games club--anything from card games to chess or checkers or lawn bowling.     Start a new hobby.     Go back to school.     Volunteer.     Read.     Keep up with world events.       Patient Education   Understanding Advance Care Planning  Advance care planning is the process of deciding ones own future medical care. It helps ensure that if you cant speak for yourself, your wishes can still be carried out. The plan is a series of legal documents that note a persons wishes. The documents vary by state. Advance care planning may be done when a person has a serious illness that is expected to get worse. It may be done before major surgery. And it can help you and your family be prepared in case of a major illness or injury.  Advance care planning helps with making decisions at these times.       A health care proxy is a person who acts as the voice of a patient when the patient cant speak for himself or herself. The name of this role varies by state. It may be called a Durable Medical Power of  or Durable Power of  for Healthcare. It may be called an agent, surrogate, or advocate. Or it may be called a representative or decision maker. It is an official duty that is identified by a legal document. The document also varies by state.    Why Is Advance Care Planning Important?  If a person communicates their healthcare wishes:    They will be given medical care that matches their values and goals.    Their family members will not be forced to make decisions in a crisis with no guidance.  Creating a Plan  Making an advance care plan is often done in 3 steps:    Thinking about ones wishes. To create an advance care plan, you should think about what kind of medical treatment you would want if you lose the ability to communicate. Are there any situations in which you would refuse or stop treatment? Are there therapies you would want or not want? And whom do you want to make decisions for you? There are many places to learn more about how to plan for your care. Ask your doctor or  for resources.    Picking a health care proxy. This means choosing a trusted person to speak for you only when you cant speak for yourself. When you cannot make medical decisions, your proxy makes sure the instructions in your advance care plan are followed. A proxy does not make decisions based on his or her own opinions. They must put aside those opinions and values if needed, and carry out your wishes.    Filling out the legal documents. There are several kinds of legal documents for advance care planning. Each one tells health care providers your wishes. The documents may vary by state. They must be signed and may need to be witnessed  or notarized. You can cancel or change them whenever you wish. Depending on your state, the documents may include a Healthcare Proxy form, Living Will, Durable Medical Power of , Advance Directive, or others.  The Familys Role  The best help a family can give is to support their loved ones wishes. Open and honest communication is vital. Family should express any concerns they have about the patients choices while the patient can still make decisions.    7447-6657 [employer]. [address] [place] [address]. All rights reserved. This information is not intended as a substitute for professional medical care. Always follow your healthcare professional's instructions.         Also, [facility] offers a free, downloadable health care directive that allows you to share your treatment choices and personal preferences if you cannot communicate your wishes. It also allows you to appoint another person (called a health care agent) to make health care decisions if you are unable to do so. You can download an advance directive by going here: [email or web address]     Patient Education   Personalized Prevention Plan  You are due for the preventive services outlined below.  Your care team is available to assist you in scheduling these services.  If you have already completed any of these items, please share that information with your care team to update in your medical record.  Health Maintenance Due   Topic Date Due   ? HEPATITIS C SCREENING  Never done   ? ZOSTER VACCINES (1 of 2) Never done

## 2021-08-11 ENCOUNTER — TELEPHONE (OUTPATIENT)
Dept: INTERNAL MEDICINE | Facility: CLINIC | Age: 71
End: 2021-08-11

## 2021-08-11 DIAGNOSIS — F41.9 ANXIETY DISORDER, UNSPECIFIED: Primary | ICD-10-CM

## 2021-08-11 DIAGNOSIS — I10 ESSENTIAL HYPERTENSION: ICD-10-CM

## 2021-08-12 NOTE — TELEPHONE ENCOUNTER
Patient calling to check status of refill request below.  Patient states he will be out of medication starting tomorrow morning 8/13/21.  If this can be expedited he would appreciate it.    Any questions please call patient at [phone]. Okay to leave message if needed.

## 2021-08-13 RX ORDER — ALPRAZOLAM 1 MG
TABLET ORAL
Qty: 90 TABLET | Refills: 0 | Status: SHIPPED | OUTPATIENT
Start: 2021-08-13 | End: 2021-09-27

## 2021-08-13 NOTE — TELEPHONE ENCOUNTER
Pt is out of medication.  Requested refill on 8/11 and has been following up as PCP is out of office.    Very concerned that this will not be refilled today.    [facility] on [facility] in [place] is the preferred pharmacy.    Patient's last office visit with PCP = 06/29/2021    Requesting call back with update.

## 2021-09-01 DIAGNOSIS — K21.9 GASTROESOPHAGEAL REFLUX DISEASE WITHOUT ESOPHAGITIS: ICD-10-CM

## 2021-09-02 NOTE — TELEPHONE ENCOUNTER
"Last Written Prescription Date:  10/18/20  Last Fill Quantity: 90,  # refills: 2   Last office visit provider:  6/29/21     Requested Prescriptions   Pending Prescriptions Disp Refills     omeprazole (PRILOSEC) 20 MG DR capsule [Pharmacy Med Name: OMEPRAZOLE DR 20 MG CAPSULE] 90 capsule 2     Sig: TAKE 1 CAPSULE (20 MG TOTAL) BY MOUTH DAILY BEFORE BREAKFAST.       PPI Protocol Passed - 9/1/2021  5:11 PM        Passed - Not on Clopidogrel (unless Pantoprazole ordered)        Passed - No diagnosis of osteoporosis on record        Passed - Recent (12 mo) or future (30 days) visit within the authorizing provider's specialty     Patient has had an office visit with the authorizing provider or a provider within the authorizing providers department within the previous 12 mos or has a future within next 30 days. See \"Patient Info\" tab in inbasket, or \"Choose Columns\" in Meds & Orders section of the refill encounter.              Passed - Medication is active on med list        Passed - Patient is age 18 or older             Fady Petit RN 09/02/21 1:10 PM  "

## 2021-09-08 ENCOUNTER — OFFICE VISIT (OUTPATIENT)
Dept: INTERNAL MEDICINE | Facility: CLINIC | Age: 71
End: 2021-09-08
Payer: COMMERCIAL

## 2021-09-08 VITALS — DIASTOLIC BLOOD PRESSURE: 68 MMHG | WEIGHT: 143 LBS | BODY MASS INDEX: 24.55 KG/M2 | SYSTOLIC BLOOD PRESSURE: 132 MMHG

## 2021-09-08 DIAGNOSIS — D86.0 SARCOIDOSIS OF LUNG (H): ICD-10-CM

## 2021-09-08 DIAGNOSIS — I10 ESSENTIAL HYPERTENSION, BENIGN: Primary | ICD-10-CM

## 2021-09-08 DIAGNOSIS — F41.9 ANXIETY: ICD-10-CM

## 2021-09-08 DIAGNOSIS — F33.0 MAJOR DEPRESSIVE DISORDER, RECURRENT EPISODE, MILD (H): ICD-10-CM

## 2021-09-08 PROCEDURE — 99214 OFFICE O/P EST MOD 30 MIN: CPT | Performed by: INTERNAL MEDICINE

## 2021-09-08 RX ORDER — AZELASTINE 1 MG/ML
SPRAY, METERED NASAL
COMMUNITY
Start: 2021-08-02

## 2021-09-08 NOTE — PROGRESS NOTES
Assessment & Plan     Benign Essential Hypertension  Controlled. Continue amlodipine and lisinopril.     Sarcoidosis of lung (H)  Stable. Followed by pulmonary. Sees pulmonary 3 times a year. Still takes low dose prednisone.     Anxiety  Comes and goes. Takes gabapentin and alprazolam as needed.     Mild Recurrent Major Depression  Now in remission. Continue citalopram.       FUTURE APPOINTMENTS:       - Follow-up visit in 3 months with a new provider.         EFREN LUKE MD  [facility]   Denis is a 71 year old who presents for the following health issues     HPI  Follow up visit for Denis. Reports he is doing and feeling well. Has hypertension controlled by lisinopril and amlodipine. Has lung sarcoidosis which is now stable. Treated and followed by pulmonary. Takes low dose prednisone. Has anxiety and depression which are in remission by his medications. Does not have complaints.     Hypertension Follow-up      Do you check your blood pressure regularly outside of the clinic? Yes     Are you following a low salt diet? Yes    Are your blood pressures ever more than 140 on the top number (systolic) OR more   than 90 on the bottom number (diastolic), for example 140/90? No      How many servings of fruits and vegetables do you eat daily?  2-3    On average, how many sweetened beverages do you drink each day (Examples: soda, juice, sweet tea, etc.  Do NOT count diet or artificially sweetened beverages)?   2    How many days per week do you exercise enough to make your heart beat faster? 3 or less    How many minutes a day do you exercise enough to make your heart beat faster? 20 - 29    How many days per week do you miss taking your medication? 0    Review of Systems   Constitutional, HEENT, cardiovascular, pulmonary, gi and gu systems are negative, except as otherwise noted.      Objective    /68 (BP Location: Right arm, Patient Position: Sitting, Cuff Size: Adult  Regular)   Wt 64.9 kg (143 lb)   BMI 24.55 kg/m    Body mass index is 24.55 kg/m .  Physical Exam   GENERAL: healthy, alert and no distress  NECK: no adenopathy, no asymmetry, masses, or scars and thyroid normal to palpation  RESP: lungs clear to auscultation - no rales, rhonchi or wheezes  CV: regular rate and rhythm, normal S1 S2, no S3 or S4, no murmur, click or rub, no peripheral edema and peripheral pulses strong  ABDOMEN: soft, nontender, no hepatosplenomegaly, no masses and bowel sounds normal  MS: no gross musculoskeletal defects noted, no edema  SKIN: no suspicious lesions or rashes  NEURO: Normal strength and tone, mentation intact and speech normal

## 2021-09-09 DIAGNOSIS — J01.90 ACUTE SINUSITIS WITH SYMPTOMS > 10 DAYS: Primary | ICD-10-CM

## 2021-09-09 RX ORDER — AMOXICILLIN 500 MG/1
500 CAPSULE ORAL 3 TIMES DAILY
Qty: 21 CAPSULE | Refills: 0 | Status: SHIPPED | OUTPATIENT
Start: 2021-09-09 | End: 2021-09-16

## 2021-09-09 NOTE — TELEPHONE ENCOUNTER
Patient woke up today with a sever sinus headache.    He can not get in to see Dr. Palomino until next Thursday is her fist available appt.    He is wondering if there is any thing Dr. Ashley can RX for him.     [facility] on [facility] is the preferred pharmacy.

## 2021-09-23 DIAGNOSIS — I10 ESSENTIAL HYPERTENSION: ICD-10-CM

## 2021-09-23 NOTE — TELEPHONE ENCOUNTER
Pt is requesting refill of Alprazolam.  Pt has two days remaining of medication.    [facility] on [facility] is the preferred pharmacy.    Last office visit with PCP = 9/8/21  [facility] appt scheduled - 11/29/21 Dr. Nash

## 2021-09-24 DIAGNOSIS — I10 ESSENTIAL HYPERTENSION: ICD-10-CM

## 2021-09-25 NOTE — TELEPHONE ENCOUNTER
ALPRAZolam (XANAX) 1 MG tablet   0 ordered  Edit       Summary: TAKE 1 TABLET BY MOUTH THREE TIMES A DAY AS NEEDED FOR ANXIETY, Disp-90 tablet, R-0, E-Prescribe  Not to exceed 5 additional fills before 12/26/2021 DX Code Needed .       Start: 8/13/2021    Ord/Sold: 8/13/2021 (O)      Report    Adh:     Taking:     Long-term:       Pharmacy: [facility]/pharmacy #[id number] - [place], [place] - [address]    Med Dose History         Patient Sig: TAKE 1 TABLET BY MOUTH THREE TIMES A DAY AS NEEDED FOR ANXIETY       Ordered on: 8/13/2021       Authorized by: ELOISE MADRIGAL       Dispense: 90 tablet       Note to Pharmacy: Not to exceed 5 additional fills before 12/26/2021 DX Code Needed .        Routing refill request to provider for review/approval because:  Controlled Substance Refill request.    Last Written Prescription Date:  08/13/2021  Last Fill Quantity: 90,  # refills: 0   Last office visit provider:  09/08/2021 with Dr Ashley.    Requested Prescriptions   Pending Prescriptions Disp Refills     ALPRAZolam (XANAX) 1 MG tablet [Pharmacy Med Name: ALPRAZOLAM 1 MG TABLET] 90 tablet 0     Sig: TAKE 1 TABLET BY MOUTH THREE TIMES A DAY AS NEEDED FOR ANXIETY       There is no refill protocol information for this order          Hien Myrick 09/25/21 12:48 AM

## 2021-09-27 RX ORDER — ALPRAZOLAM 1 MG
TABLET ORAL
Qty: 90 TABLET | Refills: 0 | Status: SHIPPED | OUTPATIENT
Start: 2021-09-27 | End: 2021-10-28

## 2021-09-27 RX ORDER — ALPRAZOLAM 1 MG
TABLET ORAL
Qty: 90 TABLET | Refills: 0 | OUTPATIENT
Start: 2021-09-27

## 2021-09-27 NOTE — TELEPHONE ENCOUNTER
Please forward this request to Dr. Nash to see if willing to refill medication until establish care visit. I will only refill if patient is going to establish care with me, otherwise will not refill a controlled substance without me being their primary or intending to be. Please let patient know.    Dr. Lunsford

## 2021-09-27 NOTE — TELEPHONE ENCOUNTER
Pt is following up refill request.  He is out of medication.     Very concerned that his medications will not be refilled while he waits to establish care with Dr. Nash.    Requesting RX is filled today.

## 2021-09-27 NOTE — TELEPHONE ENCOUNTER
Patient calling requesting status of Alprazolam refill request below.   Stating he took his last dose yesterday.   Patient is requesting refill for today.    Minerva Zamora RN  [facility]

## 2021-09-28 NOTE — TELEPHONE ENCOUNTER
Until this patient establishes with me as his primary care physician, I cannot refill his medications.  I would ask that the providers covering Dr. Ashley at the [facility] after his recent [facility] review the chart and refill if appropriate.

## 2021-10-01 NOTE — TELEPHONE ENCOUNTER
Please let patient know I sent a refill already on 9/27 and per  it was filled that day.    This is for 30 days so should last him until end of October.    He is not UTD with CSA or urine drug screen despite having been seen recently. I'm not sure if this is something that PCP had forgotten as he's been on this chronically per  review and doesn't seem to have any concerning fills. Please let patient know I will refill until his establish care, and Dr. Nash will discuss appropriateness of this medication moving forward.    Dr. Lunsford

## 2021-10-04 NOTE — TELEPHONE ENCOUNTER
Spoke with patient regarding medication was sent in on September 27th. He stated he did  this medication.

## 2021-10-06 ENCOUNTER — VIRTUAL VISIT (OUTPATIENT)
Dept: URGENT CARE | Facility: CLINIC | Age: 71
End: 2021-10-06
Payer: COMMERCIAL

## 2021-10-06 DIAGNOSIS — R30.0 DYSURIA: Primary | ICD-10-CM

## 2021-10-06 PROCEDURE — 99213 OFFICE O/P EST LOW 20 MIN: CPT | Mod: 95 | Performed by: NURSE PRACTITIONER

## 2021-10-06 NOTE — PROGRESS NOTES
"Denis Moreno is a 71 year old male who is being evaluated via a billable video visit.      Assessment & Plan     ICD-10-CM    1. Dysuria  R30.0 UA with Microscopic reflex to Culture - lab collect   Patient will bring in urine sample after scheduling a lab only appointment today.  Will await results from that test.    The patient has been notified of following:     \"This video visit will be conducted via a call between you and your physician/provider. We have found that certain health care needs can be provided without the need for an in-person physical exam.  This service lets us provide the care you need with a video conversation.  If a prescription is necessary we can send it directly to your pharmacy.     Video visits are billed at different rates depending on your insurance coverage.  Please reach out to your insurance provider with any questions.    If during the course of the call the physician/provider feels a video visit is not appropriate, you will not be charged for this service.\"    Patient has given verbal consent for Video visit? Yes    Subjective   Video Start Time: 1400    Denis Moreno is a 71 year old male who presents to clinic today for the following health issues: foul smelling urine, dysuria, and increased urinary frequency for about a week.    Review of Systems   A 10 point review of systems is negative except as noted in HPI.        Objective    There were no vitals taken for this visit.  GENERAL: Healthy, alert and no distress  EYES: Eyes grossly normal to inspection, conjunctivae and sclerae normal  RESP: no audible wheeze, cough, or visible cyanosis.  No visible retractions or increased work of breathing.  Able to speak fully in complete sentences.  NEURO: Cranial nerves grossly intact, mentation intact and speech normal  PSYCH: mentation appears normal, affect normal/bright, judgement and insight intact, normal speech and appearance well-groomed          Video-Visit Details    Type of " service:  Video Visit    Video End Time (time video stopped): 1412    Originating Location (pt. Location): Home    Distant Location (provider location):  [facility]     Mode of Communication:  Video Conference via [facility]

## 2021-10-06 NOTE — PATIENT INSTRUCTIONS
Schedule urinalysis through [facility] or call [employer] scheduling line to set up a lab only appointment for today to complete urinalysis.

## 2021-10-07 ENCOUNTER — LAB (OUTPATIENT)
Dept: LAB | Facility: CLINIC | Age: 71
End: 2021-10-07
Payer: COMMERCIAL

## 2021-10-07 DIAGNOSIS — R30.0 DYSURIA: ICD-10-CM

## 2021-10-07 LAB
ALBUMIN UR-MCNC: 100 MG/DL
APPEARANCE UR: ABNORMAL
BILIRUB UR QL STRIP: ABNORMAL
COLOR UR AUTO: YELLOW
GLUCOSE UR STRIP-MCNC: NEGATIVE MG/DL
HGB UR QL STRIP: ABNORMAL
KETONES UR STRIP-MCNC: 15 MG/DL
LEUKOCYTE ESTERASE UR QL STRIP: ABNORMAL
NITRATE UR QL: NEGATIVE
PH UR STRIP: 6 [PH] (ref 5–8)
SP GR UR STRIP: 1.02 (ref 1–1.03)
UROBILINOGEN UR STRIP-ACNC: 0.2 E.U./DL

## 2021-10-07 PROCEDURE — 87086 URINE CULTURE/COLONY COUNT: CPT

## 2021-10-07 PROCEDURE — 81003 URINALYSIS AUTO W/O SCOPE: CPT

## 2021-10-07 PROCEDURE — 87186 SC STD MICRODIL/AGAR DIL: CPT

## 2021-10-07 RX ORDER — CEPHALEXIN 500 MG/1
500 CAPSULE ORAL 2 TIMES DAILY
Qty: 14 CAPSULE | Refills: 0 | OUTPATIENT
Start: 2021-10-07 | End: 2024-08-08

## 2021-10-07 NOTE — TELEPHONE ENCOUNTER
Andreia,     Patient called about his UA results, hoping to chat with you or get a message regarding them and to hopefully be treated- He has a big weekend planned and he's feeling pretty awful right now.     Pharmacy [facility]     Can we leave a detailed message on this number? YES  Phone number patient can be reached at: Cell number on file:    Telephone Information:   Mobile [phone]       Chica Thao RN  [facility] Triage

## 2021-10-09 LAB — BACTERIA UR CULT: ABNORMAL

## 2021-10-15 RX ORDER — FLUTICASONE PROPIONATE 50 MCG
SPRAY, SUSPENSION (ML) NASAL
COMMUNITY
Start: 2021-09-21 | End: 2022-05-05

## 2021-10-17 ENCOUNTER — HEALTH MAINTENANCE LETTER (OUTPATIENT)
Age: 71
End: 2021-10-17

## 2021-10-20 ENCOUNTER — OFFICE VISIT (OUTPATIENT)
Dept: PULMONOLOGY | Facility: OTHER | Age: 71
End: 2021-10-20
Payer: COMMERCIAL

## 2021-10-20 ENCOUNTER — ALLIED HEALTH/NURSE VISIT (OUTPATIENT)
Dept: PULMONOLOGY | Facility: OTHER | Age: 71
End: 2021-10-20
Attending: INTERNAL MEDICINE
Payer: COMMERCIAL

## 2021-10-20 VITALS
SYSTOLIC BLOOD PRESSURE: 112 MMHG | WEIGHT: 143.6 LBS | HEIGHT: 64 IN | RESPIRATION RATE: 14 BRPM | HEART RATE: 65 BPM | BODY MASS INDEX: 24.52 KG/M2 | OXYGEN SATURATION: 97 % | DIASTOLIC BLOOD PRESSURE: 58 MMHG

## 2021-10-20 DIAGNOSIS — D86.0 PULMONARY SARCOIDOSIS (H): ICD-10-CM

## 2021-10-20 DIAGNOSIS — D86.0 PULMONARY SARCOIDOSIS (H): Primary | ICD-10-CM

## 2021-10-20 LAB — HGB BLD-MCNC: 12.8 G/DL

## 2021-10-20 PROCEDURE — 85018 HEMOGLOBIN: CPT

## 2021-10-20 PROCEDURE — 94060 EVALUATION OF WHEEZING: CPT | Performed by: INTERNAL MEDICINE

## 2021-10-20 PROCEDURE — 94729 DIFFUSING CAPACITY: CPT | Performed by: INTERNAL MEDICINE

## 2021-10-20 PROCEDURE — 99214 OFFICE O/P EST MOD 30 MIN: CPT | Mod: 25 | Performed by: INTERNAL MEDICINE

## 2021-10-20 ASSESSMENT — MIFFLIN-ST. JEOR: SCORE: 1312.37

## 2021-10-20 NOTE — PROGRESS NOTES
Pulmonary Clinic Follow-up Visit    Assessment and Plan:  71 year old male never smoker with a history of dyslipidemia, low-grade chronic bilateral uveitis, mild SAPNA, and pulmonary sarcoidosis, presenting for follow-up.      Sarcoidosis: Denis is doing well. Has had three wheezing episodes but otherwise no breathing difficulty. No cough. Spirometry and DLCO are stable with no concerning findings. Taking prednisone 0.5 mg daily as maintenance. Has been able to lose the weight that he gained while on higher-dose prednisone. Plans to schedule an ophthalmology follow-up visit at [facility]. Recall that Denis has a history of pulmonary and ocular involvement, with chronic bilateral uveitis, and mediastinal/hilar lymphadenopathy with pulmonary nodules but previously normal functional status and normal PFTs. He developed worsening fatigue, dyspnea, and cough, with significant peripheral interstitial changes and worsening pulmonary function tests requiring initiation of prednisone. This led to improvement in symptoms, pulmonary function, and radiographic findings, and we have been able to slowly titrate off the prednisone down to maintenance of 0.5 mg daily. Normal renal and hepatic function. Had leukocytosis on CBC in November 2018, not anemic. Prior ECG with possible LVH, otherwise unremarkable.     Plan:  - staying active and able to lose the weight he gained while on higher-dose prednisone  - continue prednisone 0.5 mg daily  - has regular ophthalmology follow-up planned at [facility]  - annual high-dose influenza vaccination  - up to date on pneumococcal vaccination  - completed COVID-19 vaccination ([employer])  - follow up in six months without tests  - plan for repeat spirometry/DLCO in one year  - encouraged him to call any time with questions or concerning symptoms    Vipin Duron MD  Pulmonary and Critical Care Medicine  [facility]  Cell [phone]  Office  [phone]  Pager [phone]  (he/him/his)    CCx: sarcoidosis    HPI: 71 year old male never smoker with a history of dyslipidemia, low-grade chronic bilateral uveitis, mild SAPNA, and pulmonary sarcoidosis, presenting for follow-up. Denis is doing well. Has had three wheezing episodes but otherwise no breathing difficulty. No cough. Spirometry and DLCO are stable with no concerning findings. Taking prednisone 0.5 mg daily as maintenance. Has been able to lose the weight that he gained while on higher-dose prednisone. Plans to schedule an ophthalmology follow-up visit at [facility]. Recall that Denis has a history of pulmonary and ocular involvement, with chronic bilateral uveitis, and mediastinal/hilar lymphadenopathy with pulmonary nodules but previously normal functional status and normal PFTs. He developed worsening fatigue, dyspnea, and cough, with significant peripheral interstitial changes and worsening pulmonary function tests requiring initiation of prednisone. This led to improvement in symptoms, pulmonary function, and radiographic findings, and we have been able to slowly titrate off the prednisone down to maintenance of 0.5 mg daily. Normal renal and hepatic function. Had leukocytosis on CBC in November 2018, not anemic. Prior ECG with possible LVH, otherwise unremarkable.    ROS:  A 12-system review was obtained and was negative with the exception of the symptoms endorsed in the history of present illness.    PMH:  Pulmonary and ocular sarcoidosis  Deviated septum  HLD  Depression  HTN  Insomnia  Chronic headaches  Mild SAPNA with AHI 10.4    PSH:  Past Surgical History:   Procedure Laterality Date     BRONCHOSCOPY FLEXIBLE AND RIGID  12/20/2016    ebus       Allergies:  No Known Allergies    Family HX:  No family history on file.    Social Hx:  Social History     Socioeconomic History     Marital status: Single     Spouse name: Not on file     Number of children: Not on file     Years of  education: Not on file     Highest education level: Not on file   Occupational History     Not on file   Tobacco Use     Smoking status: Never Smoker     Smokeless tobacco: Never Used   Substance and Sexual Activity     Alcohol use: No     Drug use: No     Sexual activity: Not on file   Other Topics Concern     Not on file   Social History Narrative    , 2 grown daughters, 2 granddaughters. 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 Nonsmoker. Socially drinks alcohol.      Social Determinants of Health     Financial Resource Strain:      Difficulty of Paying Living Expenses:    Food Insecurity:      Worried About Running Out of Food in the Last Year:      Ran Out of Food in the Last Year:    Transportation Needs:      Lack of Transportation (Medical):      Lack of Transportation (Non-Medical):    Physical Activity:      Days of Exercise per Week:      Minutes of Exercise per Session:    Stress:      Feeling of Stress :    Social Connections:      Frequency of Communication with Friends and Family:      Frequency of Social Gatherings with Friends and Family:      Attends [employer]oration[employer] Services:      Active Member of Clubs or Organizations:      Attends Club or Organization Meetings:      Marital Status:    Intimate Partner Violence:      Fear of Current or Ex-Partner:      Emotionally Abused:      Physically Abused:      Sexually Abused:        Current Meds:  Current Outpatient Medications   Medication Sig Dispense Refill     ALPRAZolam (XANAX) 1 MG tablet TAKE 1 TABLET BY MOUTH THREE TIMES A DAY AS NEEDED FOR ANXIETY 90 tablet 0     amLODIPine (NORVASC) 10 MG tablet [AMLODIPINE (NORVASC) 10 MG TABLET] TAKE 1 TABLET BY MOUTH EVERY DAY 90 tablet 3     azelastine (ASTELIN) 0.1 % nasal spray USE 2 SPRAYS IN EACH NOSTRIL TWICE A DAY       benzonatate (TESSALON) 100 MG capsule [BENZONATATE (TESSALON) 100 MG CAPSULE] TAKE ONE CAPSULE BY MOUTH EVERY 6 HOURS AS NEEDED FOR COUGH (Patient not taking: Reported on 9/8/2021) 20 capsule 0      "celecoxib (CELEBREX) 200 MG capsule [CELECOXIB (CELEBREX) 200 MG CAPSULE] TAKE 1 CAPSULE BY MOUTH EVERY DAY 30 capsule 2     cholecalciferol, vitamin D3, 125 mcg (5,000 unit) capsule [CHOLECALCIFEROL, VITAMIN D3, 125 MCG (5,000 UNIT) CAPSULE] TAKE 1 CAPSULE (5,000 UNITS TOTAL) BY MOUTH DAILY. 90 capsule 2     citalopram (CELEXA) 40 MG tablet TAKE 1 TABLET BY MOUTH EVERY DAY 90 tablet 1     fluticasone (FLONASE) 50 MCG/ACT nasal spray SPRAY 2 SPRAYS INTO EACH NOSTRIL EVERY DAY       gabapentin (NEURONTIN) 300 MG capsule [GABAPENTIN (NEURONTIN) 300 MG CAPSULE] TAKE 2 CAPSULES BY MOUTH 3 TIMES A DAY INCREASE AS INSTRUCTED (Patient not taking: Reported on 10/20/2021)  5     hydrOXYzine HCL (ATARAX) 10 MG tablet [HYDROXYZINE HCL (ATARAX) 10 MG TABLET] Take 1 tablet (10 mg total) by mouth 3 (three) times a day as needed for itching. 90 tablet 3     lisinopriL (PRINIVIL,ZESTRIL) 20 MG tablet [LISINOPRIL (PRINIVIL,ZESTRIL) 20 MG TABLET] TAKE 1 TABLET BY MOUTH EVERY DAY 90 tablet 1     omeprazole (PRILOSEC) 20 MG DR capsule TAKE 1 CAPSULE (20 MG TOTAL) BY MOUTH DAILY BEFORE BREAKFAST. 90 capsule 3     predniSONE (DELTASONE) 1 MG tablet [PREDNISONE (DELTASONE) 1 MG TABLET] Take 4 tabs (4 mg) daily x 10 days, then 3 tabs (3 mg) daily x 10 days, then 2 tabs (2 mg) daily x 10 days, then 1 tab (1 mg) daily x 10 days.. 120 tablet 11     propranolol (INDERAL) 20 MG tablet [PROPRANOLOL (INDERAL) 20 MG TABLET] TAKE 1 TABLET (20 MG TOTAL) BY MOUTH 2 (TWO) TIMES A DAY. 180 tablet 3     SUMAtriptan (IMITREX) 50 MG tablet [SUMATRIPTAN (IMITREX) 50 MG TABLET] TAKE 1 TABLET (50 MG TOTAL) BY MOUTH ONCE AS NEEDED FOR MIGRAINE. 12 tablet 2     traZODone (DESYREL) 50 MG tablet [TRAZODONE (DESYREL) 50 MG TABLET] TAKE 1 TABLET BY MOUTH EVERYDAY AT BEDTIME 90 tablet 2       Physical Exam:  /58 (BP Location: Right arm, Patient Position: Sitting, Cuff Size: Adult Regular)   Pulse 65   Resp 14   Ht 1.626 m (5' 4\")   Wt 65.1 kg (143 lb 9.6 " "oz)   SpO2 97%   BMI 24.65 kg/m    Gen: alert, oriented, no distress  HEENT: no cervical or supraclavicular lymphadenopathy  CV: RRR, no M/G/R  Resp: CTAB, no focal crackles or wheezes  Skin: no apparent rashes  Ext: no cyanosis, clubbing or edema  Neuro: alert, nonfocal    Labs:  Normal BMP and LFTs  WBC 13.1 with no differential  Normal Hgb  ESR 54  1,25-dihydroxyvitamin D level 83 in November 2016     Imaging studies:  CT chest (November 2016)  - personally reviewed  - mediastinal and bilateral hilar lymphadenopathy with partial calcification  - multiple bilateral fluffy nodules (\"cotton ball\")  - peribronchovascular opacity sue. LLL     CT chest high-res (October 2017):  - images directly reviewed, formal interpretation follows:  FINDINGS:  LUNGS AND PLEURA: Airways are patent in inspiration and expiration without tracheobronchomalacia or air trapping. There is no significant bronchial wall thickening. No significant bronchiectasis. There is scarring in the right middle lobe. Multiple   pulmonary nodules throughout the lungs are less conspicuous than on the comparison study. Example, a small area of linear scarring adjacent to the pleura in the right upper lobe was previously a 9 mm x 5 mm nodule (series 3 image 34). There a few small   nodules adjacent to the left major fissure and right major fissure. A few groundglass MR solid nodules are noted in the upper lobes. No diffuse subpleural reticular opacities. No honeycombing. No significant diffuse groundglass.     MEDIASTINUM: Normal size heart. There is atherosclerotic disease including coronary artery calcification. There are numerous calcified mediastinal and hilar lymph nodes. The largest discrete right hilar lymph node is 15 mm (previously 17 mm). A 19 mm   subcarinal lymph node previously measured 22 mm. Small hiatal hernia.     LIMITED UPPER ABDOMEN: Negative.     MUSCULOSKELETAL: Negative.     IMPRESSION:   CONCLUSION:  1.  Pulmonary parenchymal " disease and mediastinal and hilar lymphadenopathy are consistent with history of sarcoidosis. When compared with the prior study, pulmonary nodules have significantly decreased in size. Mediastinal and hilar lymph nodes have   slightly decreased in size. There is minimal fibrosis in the lungs, which is new.   2.  Coronary atherosclerotic disease.  3.  Small hiatal hernia.     CT chest (December 2018):  - images directly reviewed, formal interpretation follows:  FINDINGS:  LUNGS AND PLEURA: There is new significant pulmonary disease seen diffusely mostly characterized by fine linear reticular interstitial prominence in the lung periphery involving upper and lower lobes but with a slight basal predominance. These are not   typical findings of sarcoid and the rapid progression is also atypical. Its possible this relates to a pneumonia superimposed upon the patient's pre-existing sarcoid. Other interstitial fibrotic processes could also be superimposed upon sarcoid but again   the progression appears fairly rapid.     No new zones of consolidation, there remains chronic atelectasis involving medial segment right middle lobe. There is a single 3 mm nodule within right middle lobe which has not changed significantly. No pleural effusion.     MEDIASTINUM: Partially calcified hilar and mediastinal lymphadenopathy is unchanged and again would be consistent with sarcoid. Coronary artery calcifications.     LIMITED UPPER ABDOMEN: Negative.     MUSCULOSKELETAL: Negative.     IMPRESSION:   CONCLUSION:  1.  Fairly pronounced worsening of diffuse interstitial disease could relate to progression of known sarcoid but a superimposed atypical pneumonia or additional interstitial fibrosing process may also be present.     CT chest (January 2019), 5 weeks after starting prednisone 40 mg daily:  - images directly reviewed, formal interpretation follows:  FINDINGS:   LUNGS AND PLEURA: Previously seen interstitial infiltrates throughout  both lungs have resolved. There are a few tiny pulmonary nodules which are unchanged. There is chronic atelectasis in the inferior right middle lobe. The lungs are otherwise clear. No   pleural effusion.     MEDIASTINUM: Partially calcified mediastinal and bilateral hilar lymph nodes are unchanged. Small hiatal hernia. Moderate coronary artery calcification.     LIMITED UPPER ABDOMEN: Negative.     MUSCULOSKELETAL: Negative.     IMPRESSION:   CONCLUSION:   1.  Previously seen interstitial infiltrates have resolved.  2.  Partially calcified mediastinal and bilateral hilar lymph nodes are unchanged and consistent with known sarcoidosis.        CXR (July 2020):  - images directly reviewed, formal interpretation follows:  IMPRESSION:      Cardiac silhouette is normal in size. Unchanged bilateral hilar and subcarinal fullness reflecting enlarged lymph nodes some of which have internal dystrophic calcification consistent with reported diagnosis of sarcoidosis. Mild atheromatous aortic   calcification. No new mediastinal border abnormality.     The lungs are symmetrically inflated. There is a small linear band of atelectasis in the right middle lobe. There are no new nodular or airspace opacities. No new lung parenchymal volume loss.     Diaphragm curvature is preserved. Mild smooth elevation of the anterior one half of the right hemidiaphragm likely represents focal eventration, unchanged. No pleural fluid or thickening.     Minimal unchanged thoracic spine degenerative disc disease.     Brain MRI (July 2016)  - mild atrophy  - minimal to mild SVID      PFT's  November 2016:  FEV1/FVC is 72% and is normal.  FEV1 is 2.49L or 113% predicted and is normal.  FVC is 3.47L or 124% predicted and normal.  There was no improvement in spirometry after a single inhaled dose of bronchodilator.  TLC is 5.13L or 107% predicted and is normal.  RV is 1.54L or 77% predicted and is reduced.  DLCO is 17.61 ml/min/mmHg or 88% predicted and  is normal when it   is corrected for hemoglobin.     October 2017:  FEV1/FVC is 68 and is normal (less than 0.7 but greater than the demographically adjusted lower limit of normal).  FEV1 is 122% predicted and is normal.  FVC is 140% predicted and normal.     DLCO is 80% predicted and is normal when it   is corrected for hemoglobin.     December 2018:  FEV1/FVC is 74% and is normal.  FEV1 is 1.72 L or 76% predicted and is reduced.  FVC is 2.33 L or 80% predicted and is normal.  There was no improvement in spirometry after a single inhaled dose of bronchodilator.  DLCO is 9.48 mL/min/mm Hg or 40% predicted and is reduced when it   is corrected for hemoglobin.     February 2019:  FEV1/FVC is 68% and is reduced.  FEV1 is 2.39L (108%) predicted and is normal.  FVC is 3.52L (118%) predicted and normal.  There was no improvement in spirometry after a single inhaled dose of bronchodilator.  DLCO is 13.14ml/min/hg (55%) predicted and is reduced when it is corrected for hemoglobin.  Flow volume loops indicate severe scooping of the expiratory limb.     Impression:  Full Pulmonary Function Test is abnormal.  PFTs are consistent with mild obstructive disease.  Spirometry is not consistent with reversibility.  Diffusion capacity when corrected for hemoglobin is moderately reduced.     September 2020:  FEV1/FVC is 69 and is normal.  FEV1 is 106% predicted and is normal.  FVC is 119% predicted and is normal.  There was no improvement in spirometry after a single inhaled dose of bronchodilator.  DLCO is 78% predicted and is normal when it   is corrected for hemoglobin.  The flow volume loop is normal    October 2021:  FVC 3.28 L (106%)  FEV1 2.28 L (94%)  Ratio 0.69 (less than 0.7 but greater than the demographically adjusted lower limit of normal)  No significant bronchodilator response  DLCOc 70%  Loop with some expiratory concavity

## 2021-10-20 NOTE — PATIENT INSTRUCTIONS
It was good to see you in clinic today. This is what she discussed:    1. Continue the prednisone 0.5 mg daily.  2. Your lung function is normal and stable.  3. Stay active with exercise.  4. I will see you in six months.    Vipin Duron MD  Pulmonary and Critical Care Medicine  [facility]  Office [phone]

## 2021-10-20 NOTE — LETTER
10/20/2021         RE: Denis Moreno  [address] [place] [address]        Dear Colleague,    Thank you for referring your patient, Denis Moreno, to the [facility]. Please see a copy of my visit note below.    Pulmonary Clinic Follow-up Visit    Assessment and Plan:  71 year old male never smoker with a history of dyslipidemia, low-grade chronic bilateral uveitis, mild SAPNA, and pulmonary sarcoidosis, presenting for follow-up.      Sarcoidosis: Denis is doing well. Has had three wheezing episodes but otherwise no breathing difficulty. No cough. Spirometry and DLCO are stable with no concerning findings. Taking prednisone 0.5 mg daily as maintenance. Has been able to lose the weight that he gained while on higher-dose prednisone. Plans to schedule an ophthalmology follow-up visit at [facility]. Recall that Denis has a history of pulmonary and ocular involvement, with chronic bilateral uveitis, and mediastinal/hilar lymphadenopathy with pulmonary nodules but previously normal functional status and normal PFTs. He developed worsening fatigue, dyspnea, and cough, with significant peripheral interstitial changes and worsening pulmonary function tests requiring initiation of prednisone. This led to improvement in symptoms, pulmonary function, and radiographic findings, and we have been able to slowly titrate off the prednisone down to maintenance of 0.5 mg daily. Normal renal and hepatic function. Had leukocytosis on CBC in November 2018, not anemic. Prior ECG with possible LVH, otherwise unremarkable.     Plan:  - staying active and able to lose the weight he gained while on higher-dose prednisone  - continue prednisone 0.5 mg daily  - has regular ophthalmology follow-up planned at [facility]  - annual high-dose influenza vaccination  - up to date on pneumococcal vaccination  - completed COVID-19 vaccination (Pfizer-BioNTTHREAT STREAM)  - follow up in six months without tests  - plan  for repeat spirometry/DLCO in one year  - encouraged him to call any time with questions or concerning symptoms    Vipin Duron MD  Pulmonary and Critical Care Medicine  [facility]  Cell [phone]  Office [phone]  Pager [phone]  (he/him/his)    CCx: sarcoidosis    HPI: 71 year old male never smoker with a history of dyslipidemia, low-grade chronic bilateral uveitis, mild SAPNA, and pulmonary sarcoidosis, presenting for follow-up. Denis is doing well. Has had three wheezing episodes but otherwise no breathing difficulty. No cough. Spirometry and DLCO are stable with no concerning findings. Taking prednisone 0.5 mg daily as maintenance. Has been able to lose the weight that he gained while on higher-dose prednisone. Plans to schedule an ophthalmology follow-up visit at [facility]. Recall that Denis has a history of pulmonary and ocular involvement, with chronic bilateral uveitis, and mediastinal/hilar lymphadenopathy with pulmonary nodules but previously normal functional status and normal PFTs. He developed worsening fatigue, dyspnea, and cough, with significant peripheral interstitial changes and worsening pulmonary function tests requiring initiation of prednisone. This led to improvement in symptoms, pulmonary function, and radiographic findings, and we have been able to slowly titrate off the prednisone down to maintenance of 0.5 mg daily. Normal renal and hepatic function. Had leukocytosis on CBC in November 2018, not anemic. Prior ECG with possible LVH, otherwise unremarkable.    ROS:  A 12-system review was obtained and was negative with the exception of the symptoms endorsed in the history of present illness.    PMH:  Pulmonary and ocular sarcoidosis  Deviated septum  HLD  Depression  HTN  Insomnia  Chronic headaches  Mild SAPNA with AHI 10.4    PSH:  Past Surgical History:   Procedure Laterality Date     BRONCHOSCOPY FLEXIBLE AND RIGID  12/20/2016    ebus        Allergies:  No Known Allergies    Family HX:  No family history on file.    Social Hx:  Social History     Socioeconomic History     Marital status: Single     Spouse name: Not on file     Number of children: Not on file     Years of education: Not on file     Highest education level: Not on file   Occupational History     Not on file   Tobacco Use     Smoking status: Never Smoker     Smokeless tobacco: Never Used   Substance and Sexual Activity     Alcohol use: No     Drug use: No     Sexual activity: Not on file   Other Topics Concern     Not on file   Social History Narrative    , 2 grown daughters, 2 granddaughters. 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 Nonsmoker. Socially drinks alcohol.      Social Determinants of Health     Financial Resource Strain:      Difficulty of Paying Living Expenses:    Food Insecurity:      Worried About Running Out of Food in the Last Year:      Ran Out of Food in the Last Year:    Transportation Needs:      Lack of Transportation (Medical):      Lack of Transportation (Non-Medical):    Physical Activity:      Days of Exercise per Week:      Minutes of Exercise per Session:    Stress:      Feeling of Stress :    Social Connections:      Frequency of Communication with Friends and Family:      Frequency of Social Gatherings with Friends and Family:      Attends Worship Services:      Active Member of Clubs or Organizations:      Attends Club or Organization Meetings:      Marital Status:    Intimate Partner Violence:      Fear of Current or Ex-Partner:      Emotionally Abused:      Physically Abused:      Sexually Abused:        Current Meds:  Current Outpatient Medications   Medication Sig Dispense Refill     ALPRAZolam (XANAX) 1 MG tablet TAKE 1 TABLET BY MOUTH THREE TIMES A DAY AS NEEDED FOR ANXIETY 90 tablet 0     amLODIPine (NORVASC) 10 MG tablet [AMLODIPINE (NORVASC) 10 MG TABLET] TAKE 1 TABLET BY MOUTH EVERY DAY 90 tablet 3     azelastine (ASTELIN) 0.1 % nasal spray USE 2  SPRAYS IN EACH NOSTRIL TWICE A DAY       benzonatate (TESSALON) 100 MG capsule [BENZONATATE (TESSALON) 100 MG CAPSULE] TAKE ONE CAPSULE BY MOUTH EVERY 6 HOURS AS NEEDED FOR COUGH (Patient not taking: Reported on 9/8/2021) 20 capsule 0     celecoxib (CELEBREX) 200 MG capsule [CELECOXIB (CELEBREX) 200 MG CAPSULE] TAKE 1 CAPSULE BY MOUTH EVERY DAY 30 capsule 2     cholecalciferol, vitamin D3, 125 mcg (5,000 unit) capsule [CHOLECALCIFEROL, VITAMIN D3, 125 MCG (5,000 UNIT) CAPSULE] TAKE 1 CAPSULE (5,000 UNITS TOTAL) BY MOUTH DAILY. 90 capsule 2     citalopram (CELEXA) 40 MG tablet TAKE 1 TABLET BY MOUTH EVERY DAY 90 tablet 1     fluticasone (FLONASE) 50 MCG/ACT nasal spray SPRAY 2 SPRAYS INTO EACH NOSTRIL EVERY DAY       gabapentin (NEURONTIN) 300 MG capsule [GABAPENTIN (NEURONTIN) 300 MG CAPSULE] TAKE 2 CAPSULES BY MOUTH 3 TIMES A DAY INCREASE AS INSTRUCTED (Patient not taking: Reported on 10/20/2021)  5     hydrOXYzine HCL (ATARAX) 10 MG tablet [HYDROXYZINE HCL (ATARAX) 10 MG TABLET] Take 1 tablet (10 mg total) by mouth 3 (three) times a day as needed for itching. 90 tablet 3     lisinopriL (PRINIVIL,ZESTRIL) 20 MG tablet [LISINOPRIL (PRINIVIL,ZESTRIL) 20 MG TABLET] TAKE 1 TABLET BY MOUTH EVERY DAY 90 tablet 1     omeprazole (PRILOSEC) 20 MG DR capsule TAKE 1 CAPSULE (20 MG TOTAL) BY MOUTH DAILY BEFORE BREAKFAST. 90 capsule 3     predniSONE (DELTASONE) 1 MG tablet [PREDNISONE (DELTASONE) 1 MG TABLET] Take 4 tabs (4 mg) daily x 10 days, then 3 tabs (3 mg) daily x 10 days, then 2 tabs (2 mg) daily x 10 days, then 1 tab (1 mg) daily x 10 days.. 120 tablet 11     propranolol (INDERAL) 20 MG tablet [PROPRANOLOL (INDERAL) 20 MG TABLET] TAKE 1 TABLET (20 MG TOTAL) BY MOUTH 2 (TWO) TIMES A DAY. 180 tablet 3     SUMAtriptan (IMITREX) 50 MG tablet [SUMATRIPTAN (IMITREX) 50 MG TABLET] TAKE 1 TABLET (50 MG TOTAL) BY MOUTH ONCE AS NEEDED FOR MIGRAINE. 12 tablet 2     traZODone (DESYREL) 50 MG tablet [TRAZODONE (DESYREL) 50 MG  "TABLET] TAKE 1 TABLET BY MOUTH EVERYDAY AT BEDTIME 90 tablet 2       Physical Exam:  /58 (BP Location: Right arm, Patient Position: Sitting, Cuff Size: Adult Regular)   Pulse 65   Resp 14   Ht 1.626 m (5' 4\")   Wt 65.1 kg (143 lb 9.6 oz)   SpO2 97%   BMI 24.65 kg/m    Gen: alert, oriented, no distress  HEENT: no cervical or supraclavicular lymphadenopathy  CV: RRR, no M/G/R  Resp: CTAB, no focal crackles or wheezes  Skin: no apparent rashes  Ext: no cyanosis, clubbing or edema  Neuro: alert, nonfocal    Labs:  Normal BMP and LFTs  WBC 13.1 with no differential  Normal Hgb  ESR 54  1,25-dihydroxyvitamin D level 83 in November 2016     Imaging studies:  CT chest (November 2016)  - personally reviewed  - mediastinal and bilateral hilar lymphadenopathy with partial calcification  - multiple bilateral fluffy nodules (\"cotton ball\")  - peribronchovascular opacity sue. LLL     CT chest high-res (October 2017):  - images directly reviewed, formal interpretation follows:  FINDINGS:  LUNGS AND PLEURA: Airways are patent in inspiration and expiration without tracheobronchomalacia or air trapping. There is no significant bronchial wall thickening. No significant bronchiectasis. There is scarring in the right middle lobe. Multiple   pulmonary nodules throughout the lungs are less conspicuous than on the comparison study. Example, a small area of linear scarring adjacent to the pleura in the right upper lobe was previously a 9 mm x 5 mm nodule (series 3 image 34). There a few small   nodules adjacent to the left major fissure and right major fissure. A few groundglass MR solid nodules are noted in the upper lobes. No diffuse subpleural reticular opacities. No honeycombing. No significant diffuse groundglass.     MEDIASTINUM: Normal size heart. There is atherosclerotic disease including coronary artery calcification. There are numerous calcified mediastinal and hilar lymph nodes. The largest discrete right hilar lymph " node is 15 mm (previously 17 mm). A 19 mm   subcarinal lymph node previously measured 22 mm. Small hiatal hernia.     LIMITED UPPER ABDOMEN: Negative.     MUSCULOSKELETAL: Negative.     IMPRESSION:   CONCLUSION:  1.  Pulmonary parenchymal disease and mediastinal and hilar lymphadenopathy are consistent with history of sarcoidosis. When compared with the prior study, pulmonary nodules have significantly decreased in size. Mediastinal and hilar lymph nodes have   slightly decreased in size. There is minimal fibrosis in the lungs, which is new.   2.  Coronary atherosclerotic disease.  3.  Small hiatal hernia.     CT chest (December 2018):  - images directly reviewed, formal interpretation follows:  FINDINGS:  LUNGS AND PLEURA: There is new significant pulmonary disease seen diffusely mostly characterized by fine linear reticular interstitial prominence in the lung periphery involving upper and lower lobes but with a slight basal predominance. These are not   typical findings of sarcoid and the rapid progression is also atypical. Its possible this relates to a pneumonia superimposed upon the patient's pre-existing sarcoid. Other interstitial fibrotic processes could also be superimposed upon sarcoid but again   the progression appears fairly rapid.     No new zones of consolidation, there remains chronic atelectasis involving medial segment right middle lobe. There is a single 3 mm nodule within right middle lobe which has not changed significantly. No pleural effusion.     MEDIASTINUM: Partially calcified hilar and mediastinal lymphadenopathy is unchanged and again would be consistent with sarcoid. Coronary artery calcifications.     LIMITED UPPER ABDOMEN: Negative.     MUSCULOSKELETAL: Negative.     IMPRESSION:   CONCLUSION:  1.  Fairly pronounced worsening of diffuse interstitial disease could relate to progression of known sarcoid but a superimposed atypical pneumonia or additional interstitial fibrosing process may  also be present.     CT chest (January 2019), 5 weeks after starting prednisone 40 mg daily:  - images directly reviewed, formal interpretation follows:  FINDINGS:   LUNGS AND PLEURA: Previously seen interstitial infiltrates throughout both lungs have resolved. There are a few tiny pulmonary nodules which are unchanged. There is chronic atelectasis in the inferior right middle lobe. The lungs are otherwise clear. No   pleural effusion.     MEDIASTINUM: Partially calcified mediastinal and bilateral hilar lymph nodes are unchanged. Small hiatal hernia. Moderate coronary artery calcification.     LIMITED UPPER ABDOMEN: Negative.     MUSCULOSKELETAL: Negative.     IMPRESSION:   CONCLUSION:   1.  Previously seen interstitial infiltrates have resolved.  2.  Partially calcified mediastinal and bilateral hilar lymph nodes are unchanged and consistent with known sarcoidosis.        CXR (July 2020):  - images directly reviewed, formal interpretation follows:  IMPRESSION:      Cardiac silhouette is normal in size. Unchanged bilateral hilar and subcarinal fullness reflecting enlarged lymph nodes some of which have internal dystrophic calcification consistent with reported diagnosis of sarcoidosis. Mild atheromatous aortic   calcification. No new mediastinal border abnormality.     The lungs are symmetrically inflated. There is a small linear band of atelectasis in the right middle lobe. There are no new nodular or airspace opacities. No new lung parenchymal volume loss.     Diaphragm curvature is preserved. Mild smooth elevation of the anterior one half of the right hemidiaphragm likely represents focal eventration, unchanged. No pleural fluid or thickening.     Minimal unchanged thoracic spine degenerative disc disease.     Brain MRI (July 2016)  - mild atrophy  - minimal to mild SVID      PFT's  November 2016:  FEV1/FVC is 72% and is normal.  FEV1 is 2.49L or 113% predicted and is normal.  FVC is 3.47L or 124% predicted and  normal.  There was no improvement in spirometry after a single inhaled dose of bronchodilator.  TLC is 5.13L or 107% predicted and is normal.  RV is 1.54L or 77% predicted and is reduced.  DLCO is 17.61 ml/min/mmHg or 88% predicted and is normal when it   is corrected for hemoglobin.     October 2017:  FEV1/FVC is 68 and is normal (less than 0.7 but greater than the demographically adjusted lower limit of normal).  FEV1 is 122% predicted and is normal.  FVC is 140% predicted and normal.     DLCO is 80% predicted and is normal when it   is corrected for hemoglobin.     December 2018:  FEV1/FVC is 74% and is normal.  FEV1 is 1.72 L or 76% predicted and is reduced.  FVC is 2.33 L or 80% predicted and is normal.  There was no improvement in spirometry after a single inhaled dose of bronchodilator.  DLCO is 9.48 mL/min/mm Hg or 40% predicted and is reduced when it   is corrected for hemoglobin.     February 2019:  FEV1/FVC is 68% and is reduced.  FEV1 is 2.39L (108%) predicted and is normal.  FVC is 3.52L (118%) predicted and normal.  There was no improvement in spirometry after a single inhaled dose of bronchodilator.  DLCO is 13.14ml/min/hg (55%) predicted and is reduced when it is corrected for hemoglobin.  Flow volume loops indicate severe scooping of the expiratory limb.     Impression:  Full Pulmonary Function Test is abnormal.  PFTs are consistent with mild obstructive disease.  Spirometry is not consistent with reversibility.  Diffusion capacity when corrected for hemoglobin is moderately reduced.     September 2020:  FEV1/FVC is 69 and is normal.  FEV1 is 106% predicted and is normal.  FVC is 119% predicted and is normal.  There was no improvement in spirometry after a single inhaled dose of bronchodilator.  DLCO is 78% predicted and is normal when it   is corrected for hemoglobin.  The flow volume loop is normal    October 2021:  FVC 3.28 L (106%)  FEV1 2.28 L (94%)  Ratio 0.69 (less than 0.7 but greater than  the demographically adjusted lower limit of normal)  No significant bronchodilator response  DLCOc 70%  Loop with some expiratory concavity      Again, thank you for allowing me to participate in the care of your patient.        Sincerely,        Vipin Duron MD

## 2021-10-22 DIAGNOSIS — I10 ESSENTIAL HYPERTENSION: ICD-10-CM

## 2021-10-22 RX ORDER — ALPRAZOLAM 1 MG
TABLET ORAL
Qty: 90 TABLET | Refills: 0 | OUTPATIENT
Start: 2021-10-22

## 2021-10-22 NOTE — TELEPHONE ENCOUNTER
Reason for Call:  Medication or medication refill:    Do you use a [facility]?  Name of the pharmacy and phone number for the [facility]    Name of the medication requested:     Alprazolam 1 mg    Other request: Patient reports he's got about 5 days of meds left at this time.    Has [facility] appt with Dr Gaming on 11/29/21.    Can we leave a detailed message on this number? YES    Phone number patient can be reached at: Home number on file [phone] (home)    Best Time: Any time    Call taken on 10/22/2021 at 11:02 AM by Francisco Duke

## 2021-10-22 NOTE — TELEPHONE ENCOUNTER
This patient has not established care with me as his provider.  He was seeing Dr. Ashley and whoever is covering Dr. Ashley at his former clinic should be refilling the medicine if appropriate.

## 2021-10-23 LAB
DLCOCOR-%PRED-PRE: 70 %
DLCOCOR-PRE: 14.79 ML/MIN/MMHG
DLCOUNC-%PRED-PRE: 66 %
DLCOUNC-PRE: 13.98 ML/MIN/MMHG
DLCOUNC-PRED: 21.11 ML/MIN/MMHG
ERV-%PRED-PRE: 94 %
ERV-PRE: 0.77 L
ERV-PRED: 0.82 L
EXPTIME-PRE: 8.6 SEC
FEF2575-%PRED-POST: 97 %
FEF2575-%PRED-PRE: 70 %
FEF2575-POST: 1.88 L/SEC
FEF2575-PRE: 1.36 L/SEC
FEF2575-PRED: 1.92 L/SEC
FEFMAX-%PRED-PRE: 106 %
FEFMAX-PRE: 7.34 L/SEC
FEFMAX-PRED: 6.86 L/SEC
FEV1-%PRED-PRE: 94 %
FEV1-PRE: 2.28 L
FEV1FEV6-PRE: 70 %
FEV1FEV6-PRED: 77 %
FEV1FVC-PRE: 69 %
FEV1FVC-PRED: 78 %
FEV1SVC-PRE: 65 %
FEV1SVC-PRED: 66 %
FIFMAX-PRE: 3.86 L/SEC
FVC-%PRED-PRE: 106 %
FVC-PRE: 3.28 L
FVC-PRED: 3.1 L
IC-%PRED-PRE: 96 %
IC-PRE: 2.74 L
IC-PRED: 2.85 L
VA-%PRED-PRE: 101 %
VA-PRE: 5.18 L
VC-%PRED-PRE: 95 %
VC-PRE: 3.51 L
VC-PRED: 3.66 L

## 2021-10-27 ENCOUNTER — TRANSFERRED RECORDS (OUTPATIENT)
Dept: HEALTH INFORMATION MANAGEMENT | Facility: CLINIC | Age: 71
End: 2021-10-27
Payer: COMMERCIAL

## 2021-10-28 ENCOUNTER — VIRTUAL VISIT (OUTPATIENT)
Dept: INTERNAL MEDICINE | Facility: CLINIC | Age: 71
End: 2021-10-28
Payer: COMMERCIAL

## 2021-10-28 DIAGNOSIS — I10 ESSENTIAL HYPERTENSION: ICD-10-CM

## 2021-10-28 PROCEDURE — 99442 PR PHYSICIAN TELEPHONE EVALUATION 11-20 MIN: CPT | Performed by: INTERNAL MEDICINE

## 2021-10-28 RX ORDER — ALPRAZOLAM 1 MG
1 TABLET ORAL 2 TIMES DAILY
Qty: 60 TABLET | Refills: 0 | Status: SHIPPED | OUTPATIENT
Start: 2021-10-28 | End: 2021-11-24

## 2021-10-28 ASSESSMENT — PATIENT HEALTH QUESTIONNAIRE - PHQ9: SUM OF ALL RESPONSES TO PHQ QUESTIONS 1-9: 2

## 2021-10-28 NOTE — PROGRESS NOTES
"Denis is a 71 year old who is being evaluated via a billable telephone visit.      What phone number would you like to be contacted at? [phone]  How would you like to obtain your AVS? MyChart    {PROVIDER CHARTING PREFERENCE:609375}    Subjective   Denis is a 71 year old who presents for the following health issues {ACCOMPANIED BY STATEMENT (Optional):296415}    HPI     {SUPERLIST (Optional):610335}  {additonal problems for provider to add (Optional):329221}    Review of Systems   {ROS COMP (Optional):964981}      Objective           Vitals:  No vitals were obtained today due to virtual visit.    Physical Exam   {GENERAL APPEARANCE:50::\"healthy\",\"alert\",\"no distress\"}  PSYCH: Alert and oriented times 3; coherent speech, normal   rate and volume, able to articulate logical thoughts, able   to abstract reason, no tangential thoughts, no hallucinations   or delusions  His affect is { :5361407::\"normal\"}  RESP: No cough, no audible wheezing, able to talk in full sentences  Remainder of exam unable to be completed due to telephone visits    {Diagnostic Test Results (Optional):571843}    {AMBULATORY ATTESTATION (Optional):802867}        Phone call duration: *** minutes  "

## 2021-10-28 NOTE — PROGRESS NOTES
Denis Moreno is a 71 year oldwho is being evaluated via a billable telephone visit.       What phone number would you like to be contacted at? [phone]      Assessment & Plan  Anxiety.  Refill alprazolam 0.5 mg take 2/day.  Previously had been on 3/day during a rough.  As he described.     11 minutes spent on the date of the encounter doing chart review, patient visit and documentation        Subjective   Former patient of Dr. Beka Ashley.  Now planning to see Dr. Carmine Gaming from our [facility]     Review of Systems   No blood in stool or urine denies chest pain shortness of breath medication list reviewed reconciled.  Alprazolam seems to be helping 0.5 mg twice a day previously had used 3/day now down to 2/day as things have gotten better no suicidal ideations.  May be a candidate for psychiatric and/or psychologic counseling.       Sen Cooper MD

## 2021-11-02 ENCOUNTER — TELEPHONE (OUTPATIENT)
Dept: INTERNAL MEDICINE | Facility: CLINIC | Age: 71
End: 2021-11-02

## 2021-11-02 DIAGNOSIS — R50.9 FEVER, UNSPECIFIED FEVER CAUSE: Primary | ICD-10-CM

## 2021-11-02 NOTE — TELEPHONE ENCOUNTER
Dr. Nash    He said that his symptoms started about 10 days ago.  He said that he doesn't have a fever today.  No cough, no SOB, no rash.  No travel either.  No loss of taste or smell.  Today he said that his symptoms are a sore throat and he has been having diarrhea for the past week.  He said that he is tired.  [facility] [address]  He is wondering what he should do.  He is eating and drinking plenty of fluids.  He would like a call back at .

## 2021-11-02 NOTE — TELEPHONE ENCOUNTER
Notified pt and he said that he already set the appt up to get tested for COVID and notified him of Dr. Nash's message and he will try tylenol and imodium OTC.

## 2021-11-02 NOTE — TELEPHONE ENCOUNTER
I agree with getting checked for Covid.  I will place the order and he will be contacted by schedulers.  He can use over-the-counter Tylenol/acetaminophen 1 to 2 tablets every 6 hours as needed for sore throat and fever.  Over-the-counter Imodium can be used for diarrhea.  1 tablet every 6 hours as needed.

## 2021-11-02 NOTE — TELEPHONE ENCOUNTER
Pt is requesting order placed for Covid Testing.    He does not think he has Covid but wants to rule it out.  Has no fever, but has experienced chills off and on.

## 2021-11-03 ENCOUNTER — LAB (OUTPATIENT)
Dept: FAMILY MEDICINE | Facility: CLINIC | Age: 71
End: 2021-11-03
Attending: INTERNAL MEDICINE
Payer: COMMERCIAL

## 2021-11-03 DIAGNOSIS — R50.9 FEVER, UNSPECIFIED FEVER CAUSE: ICD-10-CM

## 2021-11-03 PROCEDURE — U0005 INFEC AGEN DETEC AMPLI PROBE: HCPCS

## 2021-11-03 PROCEDURE — U0003 INFECTIOUS AGENT DETECTION BY NUCLEIC ACID (DNA OR RNA); SEVERE ACUTE RESPIRATORY SYNDROME CORONAVIRUS 2 (SARS-COV-2) (CORONAVIRUS DISEASE [COVID-19]), AMPLIFIED PROBE TECHNIQUE, MAKING USE OF HIGH THROUGHPUT TECHNOLOGIES AS DESCRIBED BY CMS-2020-01-R: HCPCS

## 2021-11-04 LAB — SARS-COV-2 RNA RESP QL NAA+PROBE: NEGATIVE

## 2021-11-05 ENCOUNTER — TRANSFERRED RECORDS (OUTPATIENT)
Dept: HEALTH INFORMATION MANAGEMENT | Facility: CLINIC | Age: 71
End: 2021-11-05
Payer: COMMERCIAL

## 2021-11-23 ENCOUNTER — IMMUNIZATION (OUTPATIENT)
Dept: FAMILY MEDICINE | Facility: CLINIC | Age: 71
End: 2021-11-23
Payer: COMMERCIAL

## 2021-11-23 ENCOUNTER — TELEPHONE (OUTPATIENT)
Dept: INTERNAL MEDICINE | Facility: CLINIC | Age: 71
End: 2021-11-23

## 2021-11-23 DIAGNOSIS — I10 ESSENTIAL HYPERTENSION: ICD-10-CM

## 2021-11-23 PROCEDURE — 0004A PR COVID VAC PFIZER DIL RECON 30 MCG/0.3 ML IM: CPT | Performed by: STUDENT IN AN ORGANIZED HEALTH CARE EDUCATION/TRAINING PROGRAM

## 2021-11-23 PROCEDURE — 91300 PR COVID VAC PFIZER DIL RECON 30 MCG/0.3 ML IM: CPT | Performed by: STUDENT IN AN ORGANIZED HEALTH CARE EDUCATION/TRAINING PROGRAM

## 2021-11-23 NOTE — TELEPHONE ENCOUNTER
Reason for Call:  Medication or medication refill:    Name of the pharmacy and phone number for the current request: [id number] [place], [place] - [address]    Name of the medication requested: ALPRAZolam (XANAX) 1 MG tablet     Other request: NONE    Can we leave a detailed message on this number? YES    Phone number patient can be reached at: Home number on file [phone] (home)    Best Time: ANY    Call taken on 11/23/2021 at 11:44 AM by Myles Nicolas

## 2021-11-24 DIAGNOSIS — I10 ESSENTIAL HYPERTENSION: ICD-10-CM

## 2021-11-24 RX ORDER — ALPRAZOLAM 1 MG
1 TABLET ORAL 2 TIMES DAILY
Qty: 60 TABLET | Refills: 1 | Status: CANCELLED | OUTPATIENT
Start: 2021-11-24

## 2021-11-24 RX ORDER — ALPRAZOLAM 1 MG
1 TABLET ORAL 2 TIMES DAILY
Qty: 28 TABLET | Refills: 0 | Status: SHIPPED | OUTPATIENT
Start: 2021-11-24 | End: 2021-11-29

## 2021-11-24 NOTE — TELEPHONE ENCOUNTER
Shy Lyons    Patient used to see Dr. Ashley but he retired and last Dr he saw Dr. Cooper but he isn't there today.  Patient has an appt with Dr. Nash on Monday to [facility].  Patient hasn't seen Dr. Nash yet so he won't refill his rx until he sees him.  Can you refill his rx today so he doesn't have to wait until Monday. Patient would like a call back at .

## 2021-11-24 NOTE — TELEPHONE ENCOUNTER
I have never seen this patient.  He has yet to schedule an appointment with me.  I cannot refill alprazolam.  Maybe he can contact Dr. Cooper who did see him last month

## 2021-11-26 DIAGNOSIS — M79.621 PAIN OF RIGHT UPPER ARM: ICD-10-CM

## 2021-11-26 RX ORDER — CELECOXIB 200 MG/1
CAPSULE ORAL
Qty: 30 CAPSULE | Refills: 0 | Status: SHIPPED | OUTPATIENT
Start: 2021-11-26 | End: 2021-12-21

## 2021-11-26 NOTE — TELEPHONE ENCOUNTER
"Routing refill request to provider for review/approval because:  Age warning    Last Written Prescription Date:  11/1/21  Last Fill Quantity: 30,  # refills: 0   Last office visit provider:  10/28/21     Requested Prescriptions   Pending Prescriptions Disp Refills     celecoxib (CELEBREX) 200 MG capsule [Pharmacy Med Name: CELECOXIB 200 MG CAPSULE] 30 capsule 0     Sig: TAKE 1 CAPSULE BY MOUTH EVERY DAY       NSAID Medications Failed - 11/26/2021 12:19 AM        Failed - Patient is age 6-64 years        Passed - Blood pressure under 140/90 in past 12 months     BP Readings from Last 3 Encounters:   10/20/21 112/58   09/08/21 132/68   06/29/21 102/56                 Passed - Normal ALT on file in past 12 months     Recent Labs   Lab Test 01/13/21  1340   ALT <9             Passed - Normal AST on file in past 12 months     Recent Labs   Lab Test 01/13/21  1340   AST 11             Passed - Recent (12 mo) or future (30 days) visit within the authorizing provider's specialty     Patient has had an office visit with the authorizing provider or a provider within the authorizing providers department within the previous 12 mos or has a future within next 30 days. See \"Patient Info\" tab in inbasket, or \"Choose Columns\" in Meds & Orders section of the refill encounter.              Passed - Normal CBC on file in past 12 months     Recent Labs   Lab Test 10/20/21  0843 01/13/21  1340   WBC  --  7.6   RBC  --  4.84   HGB 12.8 14.6   HCT  --  44.1   PLT  --  264                 Passed - Medication is active on med list        Passed - Normal serum creatinine on file in past 12 months     Recent Labs   Lab Test 01/13/21  1340   CR 1.18       Ok to refill medication if creatinine is low               Fady Petit, RN 11/26/21 11:26 AM  "

## 2021-11-29 ENCOUNTER — OFFICE VISIT (OUTPATIENT)
Dept: INTERNAL MEDICINE | Facility: CLINIC | Age: 71
End: 2021-11-29
Payer: COMMERCIAL

## 2021-11-29 VITALS
HEIGHT: 64 IN | SYSTOLIC BLOOD PRESSURE: 122 MMHG | BODY MASS INDEX: 24.41 KG/M2 | DIASTOLIC BLOOD PRESSURE: 64 MMHG | OXYGEN SATURATION: 98 % | HEART RATE: 52 BPM | WEIGHT: 143 LBS

## 2021-11-29 DIAGNOSIS — F41.9 ANXIETY: ICD-10-CM

## 2021-11-29 DIAGNOSIS — I10 ESSENTIAL HYPERTENSION: ICD-10-CM

## 2021-11-29 DIAGNOSIS — Z12.5 SCREENING FOR PROSTATE CANCER: ICD-10-CM

## 2021-11-29 DIAGNOSIS — G47.00 INSOMNIA, UNSPECIFIED TYPE: ICD-10-CM

## 2021-11-29 DIAGNOSIS — N39.0 RECURRENT UTI: ICD-10-CM

## 2021-11-29 DIAGNOSIS — D86.0 SARCOIDOSIS OF LUNG (H): Primary | ICD-10-CM

## 2021-11-29 DIAGNOSIS — R51.9 CHRONIC NONINTRACTABLE HEADACHE, UNSPECIFIED HEADACHE TYPE: ICD-10-CM

## 2021-11-29 DIAGNOSIS — F52.8 PSYCHOSEXUAL DYSFUNCTION WITH INHIBITED SEXUAL EXCITEMENT: ICD-10-CM

## 2021-11-29 DIAGNOSIS — J32.0 CHRONIC MAXILLARY SINUSITIS: ICD-10-CM

## 2021-11-29 DIAGNOSIS — G89.29 CHRONIC NONINTRACTABLE HEADACHE, UNSPECIFIED HEADACHE TYPE: ICD-10-CM

## 2021-11-29 PROBLEM — E78.2 MIXED HYPERLIPIDEMIA: Status: ACTIVE | Noted: 2021-11-29

## 2021-11-29 PROBLEM — F33.0 MAJOR DEPRESSIVE DISORDER, RECURRENT EPISODE, MILD (H): Status: ACTIVE | Noted: 2021-11-29

## 2021-11-29 PROBLEM — R59.0 HILAR ADENOPATHY: Status: ACTIVE | Noted: 2021-11-29

## 2021-11-29 PROBLEM — H20.9 UVEITIS: Status: ACTIVE | Noted: 2021-11-29

## 2021-11-29 LAB — PSA SERPL-MCNC: 14.28 UG/L (ref 0–6.5)

## 2021-11-29 PROCEDURE — G0103 PSA SCREENING: HCPCS | Performed by: INTERNAL MEDICINE

## 2021-11-29 PROCEDURE — 36415 COLL VENOUS BLD VENIPUNCTURE: CPT | Performed by: INTERNAL MEDICINE

## 2021-11-29 PROCEDURE — 99215 OFFICE O/P EST HI 40 MIN: CPT | Performed by: INTERNAL MEDICINE

## 2021-11-29 RX ORDER — SILDENAFIL 100 MG/1
100 TABLET, FILM COATED ORAL DAILY PRN
Qty: 12 TABLET | Refills: 11 | Status: SHIPPED | OUTPATIENT
Start: 2021-11-29 | End: 2022-05-05

## 2021-11-29 RX ORDER — PREDNISONE 1 MG/1
1 TABLET ORAL DAILY
Start: 2021-11-29 | End: 2022-05-03

## 2021-11-29 RX ORDER — ALPRAZOLAM 1 MG
1 TABLET ORAL 2 TIMES DAILY PRN
Qty: 60 TABLET | Refills: 0 | Status: SHIPPED | OUTPATIENT
Start: 2021-11-29 | End: 2022-01-05

## 2021-11-29 ASSESSMENT — MIFFLIN-ST. JEOR: SCORE: 1309.64

## 2021-11-29 NOTE — PATIENT INSTRUCTIONS
I would recommend getting the shingles vaccine, Shingrix at your pharmacy.  After receiving the first dose, return in 2 to 6 months for the second

## 2021-11-30 ENCOUNTER — TELEPHONE (OUTPATIENT)
Dept: INTERNAL MEDICINE | Facility: CLINIC | Age: 71
End: 2021-11-30
Payer: COMMERCIAL

## 2021-11-30 NOTE — TELEPHONE ENCOUNTER
..Reason for Call:  Request for results:    Name of test or procedure: PSA     Date of test of procedure: 11/29/21    Location of the test or procedure: [facility]    OK to leave the result message on voice mail or with a family member? YES    Phone number Patient can be reached at:  Home number on file [phone] (home)    Additional comments: Patient concerned about his PSA results. Please call patient to review.     Call taken on 11/30/2021 at 8:46 AM by JOANNA Ray

## 2021-11-30 NOTE — PROGRESS NOTES
Assessment & Plan     Sarcoidosis of lung (H)  Sarcoidosis involving lung and eye.  Followed by both pulmonary and ophthalmology.  Continues on prednisone 1 mg daily.    Essential hypertension  Blood pressure looking well controlled taking amlodipine, lisinopril and propranolol  - ALPRAZolam (XANAX) 1 MG tablet  Dispense: 60 tablet; Refill: 0    Anxiety  Longstanding problem with anxiety.  Also history of mild depression.  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  He plans to retire after the start of the year.  He is managing his mood disorder with 40 mg of citalopram but is also taking gabapentin.  He has hydroxyzine available but finds alprazolam extremely helpful taking up to twice daily.  We discussed caution on ongoing use at this amount especially as he gets older as benzodiazepines can increase the risk for falling and cognitive impairment.  Hopefully if above stresses are improved, his need for this amount of alprazolam will diminish.  May also want to consider switching from citalopram to a different agent such as venlafaxine.    Male Erectile Disorder  He does not recall trying the 100 mg dose of Viagra and I will send this prescription to his pharmacy.  He has been evaluated by urology.  We also discussed using homeopathic medications including yohimbine  - sildenafil (VIAGRA) 100 MG tablet  Dispense: 12 tablet; Refill: 11    Insomnia, unspecified type  He takes trazodone nightly    Recurrent UTI  He has had 2 urinary tract infections this past year.  He does have symptoms consistent with BPH and should rule out prostate cancer with PSA.    Chronic maxillary sinusitis  He is followed by ENT with recurrent sinus infections.  Frequently has needed antibiotics.    Chronic nonintractable headache, unspecified headache type  Chronic headaches which have been attributed to migraines may be more related to  "chronic sinus problem    Screening for prostate cancer    - PSA, screen  - PSA, screen              Return in about 6 months (around 5/29/2022) for Annual wellness visit.    Carmine Nash MD  [facility]      52 minutes spent on the date of the encounter doing chart review, history and exam, documentation and further activities per the note    Subjective       HPI 71-year-old male here to establish care with me as his primary care physician and to discuss chronic medical problems including sarcoid, hypertension, anxiety, erectile dysfunction, insomnia and chronic headaches.  See assessment and plan for details of visit    Current Outpatient Medications   Medication     ALPRAZolam (XANAX) 1 MG tablet     amLODIPine (NORVASC) 10 MG tablet     azelastine (ASTELIN) 0.1 % nasal spray     benzonatate (TESSALON) 100 MG capsule     celecoxib (CELEBREX) 200 MG capsule     cholecalciferol, vitamin D3, 125 mcg (5,000 unit) capsule     citalopram (CELEXA) 40 MG tablet     fluticasone (FLONASE) 50 MCG/ACT nasal spray     gabapentin (NEURONTIN) 300 MG capsule     hydrOXYzine HCL (ATARAX) 10 MG tablet     lisinopriL (PRINIVIL,ZESTRIL) 20 MG tablet     omeprazole (PRILOSEC) 20 MG DR capsule     predniSONE (DELTASONE) 1 MG tablet     propranolol (INDERAL) 20 MG tablet     sildenafil (VIAGRA) 100 MG tablet     SUMAtriptan (IMITREX) 50 MG tablet     traZODone (DESYREL) 50 MG tablet     No current facility-administered medications for this visit.        Review of Systems    12 point ROS is negative other than what is described in assessment and plan and above      Objective    Vitals:    11/29/21 1555   BP: 122/64   BP Location: Right arm   Patient Position: Sitting   Cuff Size: Adult Regular   Pulse: 52   SpO2: 98%   Weight: 64.9 kg (143 lb)   Height: 1.626 m (5' 4\")        Physical Exam  Pleasant well-appearing elderly male      "

## 2021-12-01 DIAGNOSIS — R97.20 ELEVATED PROSTATE SPECIFIC ANTIGEN (PSA): Primary | ICD-10-CM

## 2021-12-02 NOTE — TELEPHONE ENCOUNTER
Result note read to patient:   Misael Barrios.  This is potentially concerning.  Your PSA is quite elevated and may indicate the presence of prostate cancer.  With that said, an elevated PSA is not always prostate cancer and can often represent a benign enlarged prostate.  Nevertheless, I think it is very important that I have you see a urologist to further evaluate.  Further evaluation could include an MRI study of your prostate and/or a prostate biopsy.  I will send a referral to [facility] and I would recommend seeing either Dr. Lopez or Dr. Lucas Sahu.  You can contact their office at [phone] to schedule an appointment.    Patient notified of clinician's message and verbalized understanding. No further questions at this time.

## 2021-12-03 NOTE — TELEPHONE ENCOUNTER
Patient called back and said Dr Sahu and Dr Lopez is no longer working there and asking if Dr Nash can recommend someone else.     Please advise and call patient back.

## 2021-12-03 NOTE — TELEPHONE ENCOUNTER
I am pretty sure Dr. Lopez is still at [facility].  At this point, the most important thing is for him to get an appointment with a urologist and I would suggest him calling and getting names of who is available in the next 2 to 3 weeks.  Once he has those names, he can call and let us know and I can suggest who best to see.

## 2021-12-08 ASSESSMENT — ANXIETY QUESTIONNAIRES
7. FEELING AFRAID AS IF SOMETHING AWFUL MIGHT HAPPEN: SEVERAL DAYS
6. BECOMING EASILY ANNOYED OR IRRITABLE: NOT AT ALL
GAD7 TOTAL SCORE: 4
3. WORRYING TOO MUCH ABOUT DIFFERENT THINGS: SEVERAL DAYS
GAD7 TOTAL SCORE: 4
5. BEING SO RESTLESS THAT IT IS HARD TO SIT STILL: NOT AT ALL
1. FEELING NERVOUS, ANXIOUS, OR ON EDGE: SEVERAL DAYS
7. FEELING AFRAID AS IF SOMETHING AWFUL MIGHT HAPPEN: SEVERAL DAYS
2. NOT BEING ABLE TO STOP OR CONTROL WORRYING: SEVERAL DAYS
GAD7 TOTAL SCORE: 4
4. TROUBLE RELAXING: NOT AT ALL

## 2021-12-08 NOTE — PROGRESS NOTES
Assessment & Plan   Problem List Items Addressed This Visit     Anxiety     Longstanding history of anxiety. He does take Celexa 40 mg daily. He has not had a recent EKG, but that can be done at a future visit. He also is on hydroxyzine and alprazolam. Currently he takes Xanax twice a day. In the past he did have to take 3 times a day in the setting of a family situation, however he reports that he is doing well on twice a day. We did discuss the side effects of benzodiazepines and my preference for him to be on the lowest dose possible in the long run. At this time, with the uncertainty of this elevated PSA in the upcoming work-up which is increasing his anxiety, we will not make any dose changes today. We will continue to see each other every 3 months and we can always reassess at each visit whether or not to continue on this medication. A urine drug screen and controlled substance agreement was signed today.         Benign Essential Hypertension - Primary     Well controlled on amlodipine, propanolol, and lisinopril.          Relevant Orders    Lipid panel reflex to direct LDL Fasting    Comprehensive metabolic panel (BMP + Alb, Alk Phos, ALT, AST, Total. Bili, TP)    CBC with platelets and differential (Completed)    Drugs of Abuse 1 Panel, Urine ([facility] Only)    Insomnia     He is on trazodone for insomnia         Chronic headache     He feels quite strongly that his history of chronic headaches actually chronic sinusitis. He does not take the Imitrex. This has been removed from his list.         Mild Recurrent Major Depression     Stable on Celexa.         Mixed hyperlipidemia     Not on statin.         Sarcoidosis of lung (H)     Was initially diagnosed by ophthalmology. Sees pulmonology. He is on 1mg daily of prednisone. Very minimal respiratory symptoms. Reports he takes tessalon as needed for intermittent cough.         Chronic maxillary sinusitis     Follows with ENT. Has flonase spray, well  "controlled.         Elevated prostate specific antigen (PSA)     He had a recent PSA of 14. This was checked due to a history of recurrent UTI. He already has an appointment with the urologist coming up soon. He will be seeing [facility].                Ordering of each unique test  Prescription drug management           Return in about 3 months (around 3/10/2022) for Follow up, with me.    Jed Parada MD  [facility] MARIA DEL ROSARIO Barrios is a 71 year old who presents for the following health issues     History of Present Illness       Mental Health Follow-up:  Patient presents to follow-up on Anxiety.    Patient's anxiety since last visit has been:  Better  The patient is not having other symptoms associated with anxiety.  Any significant life events: health concerns  Patient is feeling anxious or having panic attacks.  Patient has no concerns about alcohol or drug use.     Social History  Tobacco Use    Smoking status: Never Smoker    Smokeless tobacco: Never Used  Alcohol use: No    Comment: quit 2013  Drug use: No      Today's PHQ-9         PHQ-9 Total Score:         PHQ-9 Q9 Thoughts of better off dead/self-harm past 2 weeks :       Thoughts of suicide or self harm:      Self-harm Plan:        Self-harm Action:          Safety concerns for self or others:           Hypertension: He presents for follow up of hypertension.  He does not check blood pressure  regularly outside of the clinic. Outpatient blood pressures have not been over 140/90. He follows a low salt diet.     He eats 2-3 servings of fruits and vegetables daily.He consumes 0 sweetened beverage(s) daily.He exercises with enough effort to increase his heart rate 9 or less minutes per day.    He is taking medications regularly.      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 Moved back to [place] 9 years ago because parents had dementia, so moved back here to take care of them. He has " an older sister and younger brother (John). He has a girlfriend. She lives in [place]. His ex-wife passed away from breast cancer. Had 2 girls in that marriage. Oldest girl has 2 granddaughters. Both live in [place]. 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 He will probably stop volunteering soon though.    Saw Dr. Nash 2 weeks ago. Had a UTI a few months ago. Ordered a PSA and it was elevated. Will be seeing [facility] next Friday. That shot up his anxiety.    Feels like stream is lower. Not as strong as it used to be. No prostate cancer in the family that he is aware. No history of smoking.    Celexa for anxiety. Has both hydroxyzine and alprazolam for anxiety attacks. There are times when not needed.    Has been having trouble with teeth. Neglected his teeth for a very long time.     2 tablets a day. 60# a month. Xanax.     He has sarcoidosis, followed by pulmonology. Was initially diagnosed by ophthalmology. Every once in a while gets a cough. Has been on prednisone for a long time.     HTN well controlled    Has a history of headaches. Thinks it was probably sinusitis.    He has an ENT for chronic sinusitis. Has a nasal spray. Has had a couple of bad sinus infections.    Was having bursitis in shoulder and was on celebrex in the past.    Has reflux. Takes prilosec, but it really doesn't bother him much.      Review of Systems   See above      Objective    /66 (BP Location: Right arm, Patient Position: Sitting, Cuff Size: Adult Regular)   Pulse 66   Wt 64.5 kg (142 lb 4 oz)   SpO2 99%   BMI 24.42 kg/m    Body mass index is 24.42 kg/m .  Physical Exam   GENERAL: healthy, alert and no distress  RESP: lungs clear to auscultation - no rales, rhonchi or wheezes  CV: regular rate and rhythm, normal S1 S2, no S3 or S4, no murmur, click or rub, no peripheral edema and peripheral pulses strong  MS: no gross musculoskeletal defects noted, no edema  PSYCH: mentation appears  normal, affect normal/bright                Answers for HPI/ROS submitted by the patient on 12/8/2021  ICNDI 7 TOTAL SCORE: 4

## 2021-12-09 ASSESSMENT — ANXIETY QUESTIONNAIRES: GAD7 TOTAL SCORE: 4

## 2021-12-10 ENCOUNTER — OFFICE VISIT (OUTPATIENT)
Dept: PEDIATRICS | Facility: CLINIC | Age: 71
End: 2021-12-10
Payer: COMMERCIAL

## 2021-12-10 VITALS
HEART RATE: 66 BPM | BODY MASS INDEX: 24.42 KG/M2 | DIASTOLIC BLOOD PRESSURE: 66 MMHG | WEIGHT: 142.25 LBS | OXYGEN SATURATION: 99 % | SYSTOLIC BLOOD PRESSURE: 102 MMHG

## 2021-12-10 DIAGNOSIS — F33.0 MAJOR DEPRESSIVE DISORDER, RECURRENT EPISODE, MILD (H): ICD-10-CM

## 2021-12-10 DIAGNOSIS — J32.0 CHRONIC MAXILLARY SINUSITIS: ICD-10-CM

## 2021-12-10 DIAGNOSIS — G47.00 INSOMNIA, UNSPECIFIED TYPE: ICD-10-CM

## 2021-12-10 DIAGNOSIS — R51.9 CHRONIC NONINTRACTABLE HEADACHE, UNSPECIFIED HEADACHE TYPE: ICD-10-CM

## 2021-12-10 DIAGNOSIS — D86.0 SARCOIDOSIS OF LUNG (H): ICD-10-CM

## 2021-12-10 DIAGNOSIS — I10 ESSENTIAL HYPERTENSION, BENIGN: Primary | ICD-10-CM

## 2021-12-10 DIAGNOSIS — F41.9 ANXIETY: ICD-10-CM

## 2021-12-10 DIAGNOSIS — R97.20 ELEVATED PROSTATE SPECIFIC ANTIGEN (PSA): ICD-10-CM

## 2021-12-10 DIAGNOSIS — E78.2 MIXED HYPERLIPIDEMIA: ICD-10-CM

## 2021-12-10 DIAGNOSIS — G89.29 CHRONIC NONINTRACTABLE HEADACHE, UNSPECIFIED HEADACHE TYPE: ICD-10-CM

## 2021-12-10 LAB
ALBUMIN SERPL-MCNC: 3.7 G/DL (ref 3.5–5)
ALP SERPL-CCNC: 66 U/L (ref 45–120)
ALT SERPL W P-5'-P-CCNC: 10 U/L (ref 0–45)
ANION GAP SERPL CALCULATED.3IONS-SCNC: 11 MMOL/L (ref 5–18)
AST SERPL W P-5'-P-CCNC: 14 U/L (ref 0–40)
BASOPHILS # BLD AUTO: 0.1 10E3/UL (ref 0–0.2)
BASOPHILS NFR BLD AUTO: 1 %
BILIRUB SERPL-MCNC: 0.4 MG/DL (ref 0–1)
BUN SERPL-MCNC: 13 MG/DL (ref 8–28)
CALCIUM SERPL-MCNC: 9.5 MG/DL (ref 8.5–10.5)
CHLORIDE BLD-SCNC: 103 MMOL/L (ref 98–107)
CHOLEST SERPL-MCNC: 213 MG/DL
CO2 SERPL-SCNC: 23 MMOL/L (ref 22–31)
CREAT SERPL-MCNC: 1.09 MG/DL (ref 0.7–1.3)
EOSINOPHIL # BLD AUTO: 0.1 10E3/UL (ref 0–0.7)
EOSINOPHIL NFR BLD AUTO: 2 %
ERYTHROCYTE [DISTWIDTH] IN BLOOD BY AUTOMATED COUNT: 13.9 % (ref 10–15)
FASTING STATUS PATIENT QL REPORTED: YES
GFR SERPL CREATININE-BSD FRML MDRD: 67 ML/MIN/1.73M2
GLUCOSE BLD-MCNC: 94 MG/DL (ref 70–125)
HCT VFR BLD AUTO: 41.4 % (ref 40–53)
HDLC SERPL-MCNC: 48 MG/DL
HGB BLD-MCNC: 13.4 G/DL (ref 13.3–17.7)
IMM GRANULOCYTES # BLD: 0.1 10E3/UL
IMM GRANULOCYTES NFR BLD: 1 %
LDLC SERPL CALC-MCNC: 143 MG/DL
LYMPHOCYTES # BLD AUTO: 1.2 10E3/UL (ref 0.8–5.3)
LYMPHOCYTES NFR BLD AUTO: 16 %
MCH RBC QN AUTO: 29.3 PG (ref 26.5–33)
MCHC RBC AUTO-ENTMCNC: 32.4 G/DL (ref 31.5–36.5)
MCV RBC AUTO: 90 FL (ref 78–100)
MONOCYTES # BLD AUTO: 0.7 10E3/UL (ref 0–1.3)
MONOCYTES NFR BLD AUTO: 9 %
NEUTROPHILS # BLD AUTO: 5.4 10E3/UL (ref 1.6–8.3)
NEUTROPHILS NFR BLD AUTO: 72 %
PLATELET # BLD AUTO: 278 10E3/UL (ref 150–450)
POTASSIUM BLD-SCNC: 4.6 MMOL/L (ref 3.5–5)
PROT SERPL-MCNC: 6.9 G/DL (ref 6–8)
RBC # BLD AUTO: 4.58 10E6/UL (ref 4.4–5.9)
SODIUM SERPL-SCNC: 137 MMOL/L (ref 136–145)
TRIGL SERPL-MCNC: 112 MG/DL
WBC # BLD AUTO: 7.5 10E3/UL (ref 4–11)

## 2021-12-10 PROCEDURE — 80307 DRUG TEST PRSMV CHEM ANLYZR: CPT | Performed by: PEDIATRICS

## 2021-12-10 PROCEDURE — 99214 OFFICE O/P EST MOD 30 MIN: CPT | Performed by: PEDIATRICS

## 2021-12-10 PROCEDURE — 80061 LIPID PANEL: CPT | Performed by: PEDIATRICS

## 2021-12-10 PROCEDURE — 85025 COMPLETE CBC W/AUTO DIFF WBC: CPT | Performed by: PEDIATRICS

## 2021-12-10 PROCEDURE — 80053 COMPREHEN METABOLIC PANEL: CPT | Performed by: PEDIATRICS

## 2021-12-10 PROCEDURE — 36415 COLL VENOUS BLD VENIPUNCTURE: CPT | Performed by: PEDIATRICS

## 2021-12-10 NOTE — ASSESSMENT & PLAN NOTE
Longstanding history of anxiety. He does take Celexa 40 mg daily. He has not had a recent EKG, but that can be done at a future visit. He also is on hydroxyzine and alprazolam. Currently he takes Xanax twice a day. In the past he did have to take 3 times a day in the setting of a family situation, however he reports that he is doing well on twice a day. We did discuss the side effects of benzodiazepines and my preference for him to be on the lowest dose possible in the long run. At this time, with the uncertainty of this elevated PSA in the upcoming work-up which is increasing his anxiety, we will not make any dose changes today. We will continue to see each other every 3 months and we can always reassess at each visit whether or not to continue on this medication. A urine drug screen and controlled substance agreement was signed today.

## 2021-12-10 NOTE — ASSESSMENT & PLAN NOTE
He feels quite strongly that his history of chronic headaches actually chronic sinusitis. He does not take the Imitrex. This has been removed from his list.

## 2021-12-10 NOTE — LETTER
[facility]  12/10/21  Patient: Denis Moreno  YOB: 1950  Medical Record Number: [id number]                                                                                  Non-Opioid Controlled Substance Agreement    This is an agreement between you and your provider regarding safe and appropriate use of controlled substances prescribed by your care team. Controlled substances are?medicines that can cause physical and mental dependence (abuse).     There are strict laws about having and using these medicines. We here at [facility] are  committed to working with you in your efforts to get better. To support you in this work, we'll help you schedule regular office appointments for medicine refills. If we must cancel or change your appointment for any reason, we'll make sure you have enough medicine to last until your next appointment.     As a Provider, I will:     Listen carefully to your concerns while treating you with respect.     Recommend a treatment plan that I believe is in your best interest and may involve therapies other than medicine.      Talk with you often about the possible benefits and the risk of harm of any medicine that we prescribe for you.    Assess the safety of this medicine and check how well it works.      Provide a plan on how to taper (discontinue or go off) using this medicine if the decision is made to stop its use.      ::  As a Patient, I understand controlled substances:       Are prescribed by my care provider to help me function or work and manage my condition(s).?    Are strong medicines and can cause serious side effects.       Need to be taken exactly as prescribed.?Combining controlled substances with certain medicines or chemicals (such as illegal drugs, alcohol, sedatives, sleeping pills, and benzodiazepines) can be dangerous or even fatal.? If I stop taking my medicines suddenly, I may have severe withdrawal symptoms.     The risks,  benefits, and side effects of these medicine(s) were explained to me. I agree that:    1. I will take part in other treatments as advised by my care team. This may be psychiatry or counseling, physical therapy, behavioral therapy, group treatment or a referral to specialist.    2. I will keep all my appointments and understand this is part of the monitoring of controlled substances.?My care team may require an office visit for EVERY controlled substance refill. If I miss appointments or don t follow instructions, my care team may stop my medicine    3. I will take my medicines as prescribed. I will not change the dose or schedule unless my care team tells me to. There will be no refills if I run out early.      4. I may be asked to come to the clinic and complete a urine drug test or complete a pill count. If I don t give a urine sample or participate in a pill count, the care team may stop my medicine.    5. I will only receive controlled substance prescriptions from this clinic. If I am treated by another provider, I will tell them that I am taking controlled substances and that I have a treatment agreement with this provider. I will inform my [facility] care team within one business day if I am given a prescription for any controlled substance by another healthcare provider. My [facility] care team can contact other providers and pharmacists about my use of any medicines.    6. It is up to me to make sure that I don't run out of my medicines on weekends or holidays.?If my care team is willing to refill my prescription without a visit, I must request refills only during office hours. Refills may take up to 3 business days to process. I will use one pharmacy to fill all my controlled substance prescriptions. I will notify the clinic about any changes to my insurance or medicine availability.    7. I am responsible for my prescriptions. If the medicine/prescription is lost, stolen or destroyed, it will  not be replaced.?I also agree not to share controlled substance medicines with anyone.     8. I am aware I should not use any illegal or recreational drugs. I agree not to drink alcohol unless my care team says I can.     9. If I enroll in the [place] [facility] program, I will tell my care team before my next refill.    10. I will tell my care team right away if I become pregnant, have a new medical problem treated outside of my regular clinic, or have a change in my medicines.     11. I understand that this medicine can affect my thinking, judgment and reaction time.? Alcohol and drugs affect the brain and body, which can affect the safety of my driving. Being under the influence of alcohol or drugs can affect my decision-making, behaviors, personal safety and the safety of others. Driving while impaired (DWI) can occur if a person is driving, operating or in physical control of a car, motorcycle, boat, snowmobile, ATV, motorbike, off-road vehicle or any other motor vehicle ([place] Statute 169A.20). I understand the risk if I choose to drive or operate any vehicle or machinery.    I understand that if I do not follow any of the conditions above, my prescriptions or treatment may be stopped or changed.   I agree that my provider, clinic care team and pharmacy may work with any city, state or federal law enforcement agency that investigates the misuse, sale or other diversion of my controlled medicine. I will allow my provider to discuss my care with, or share a copy of, this agreement with any other treating provider, pharmacy or emergency room where I receive care.     I have read this agreement and have asked questions about anything I did not understand.    ________________________________________________________  Patient Signature - Denis Moreno     ___________________                   Date     ________________________________________________________  Provider Signature - Jed Pardaa MD        ___________________                   Date     ________________________________________________________  Witness Signature (required if provider not present while patient signing)          ___________________                   Date

## 2021-12-10 NOTE — ASSESSMENT & PLAN NOTE
Was initially diagnosed by ophthalmology. Sees pulmonology. He is on 1mg daily of prednisone. Very minimal respiratory symptoms. Reports he takes tessalon as needed for intermittent cough.

## 2021-12-10 NOTE — ASSESSMENT & PLAN NOTE
He had a recent PSA of 14. This was checked due to a history of recurrent UTI. He already has an appointment with the urologist coming up soon. He will be seeing [facility].

## 2021-12-11 LAB
AMPHETAMINES UR QL SCN: ABNORMAL
BARBITURATES UR QL: ABNORMAL
BENZODIAZ UR QL: ABNORMAL
CANNABINOIDS UR QL SCN: ABNORMAL
COCAINE UR QL: ABNORMAL
CREAT UR-MCNC: 259 MG/DL
OPIATES UR QL SCN: ABNORMAL
OXYCODONE UR QL: ABNORMAL
PCP UR QL SCN: ABNORMAL

## 2021-12-13 DIAGNOSIS — I10 ESSENTIAL HYPERTENSION, BENIGN: ICD-10-CM

## 2021-12-13 RX ORDER — LISINOPRIL 20 MG/1
20 TABLET ORAL DAILY
Qty: 90 TABLET | Refills: 1 | Status: SHIPPED | OUTPATIENT
Start: 2021-12-13 | End: 2022-05-11

## 2021-12-13 NOTE — RESULT ENCOUNTER NOTE
Please call with message below and let me know if pt needs Rx for statin. Thanks - Dr. Lunsford  ----  Misael Barrios,  It was great meeting you the other day.  Your blood counts, electrolytes, and liver tests are normal.  Your kidney function is just slightly decreased, and just something that we can continue to monitor over time.  Your urine drug screen was positive for the alprazolam as expected.  Your fasting glucose was 94, which is good.Your cholesterol levels are elevated.  Based on these numbers, your 10-year risk for heart attack or stroke is estimated to be about 17%, which is considered to be moderate risk. Would you be willing to start a cholesterol lowering medication called a statin? While most people tolerate statins well, there are some potential side effects, mainly muscle pain, aches, or weakness. Very rarely it can cause more serious muscle injury. If you develop these symptoms, you would let me know and we would then talk about the next steps in that situation. There have also been rare episodes of liver injury and cognitive impairment associated with statins.     Let me know your thoughts on starting a statin. If you are agreeable to starting, I can send the prescription through and then we should recheck a fasting lipid panel in 3 months' time. You can call [phone] to schedule a lab appointment around that time.    Please let me know if you have any questions or concerns,  Dr. Lunsford

## 2021-12-13 NOTE — TELEPHONE ENCOUNTER
Pt is requesting refill of Lisinopril 20 mg. [facility] is the preferred pharmacy.     Last office visit with PCP = 12/10/21

## 2021-12-14 ENCOUNTER — TELEPHONE (OUTPATIENT)
Dept: INTERNAL MEDICINE | Facility: CLINIC | Age: 71
End: 2021-12-14
Payer: COMMERCIAL

## 2021-12-14 DIAGNOSIS — E78.2 MIXED HYPERLIPIDEMIA: Primary | ICD-10-CM

## 2021-12-14 RX ORDER — ROSUVASTATIN CALCIUM 10 MG/1
10 TABLET, COATED ORAL DAILY
Qty: 90 TABLET | Refills: 1 | Status: SHIPPED | OUTPATIENT
Start: 2021-12-14 | End: 2022-05-11

## 2021-12-14 NOTE — TELEPHONE ENCOUNTER
Spoke with patient and relayed below message. He verbalized understanding and would like to start a statin. verified that pharmacy attached is preferred and lab appt has been scheduled as well. Patient had no further questions at this time and no need to call back. He will await notification from pharmacy when medication is ready  Tab Joy CMA on 12/14/2021 at 12:31 PM

## 2021-12-14 NOTE — TELEPHONE ENCOUNTER
----- Message from Jed Parada MD sent at 12/13/2021  8:31 AM CST -----  Please call with message below and let me know if pt needs Rx for statin. Thanks - Dr. Lunsford  ----  Misael Barrios,  It was great meeting you the other day.  Your blood counts, electrolytes, and liver tests are normal.  Your kidney function is just slightly decreased, and just something that we can continue to monitor over time.  Your urine drug screen was positive for the alprazolam as expected.  Your fasting glucose was 94, which is good.Your cholesterol levels are elevated.  Based on these numbers, your 10-year risk for heart attack or stroke is estimated to be about 17%, which is considered to be moderate risk. Would you be willing to start a cholesterol lowering medication called a statin? While most people tolerate statins well, there are some potential side effects, mainly muscle pain, aches, or weakness. Very rarely it can cause more serious muscle injury. If you develop these symptoms, you would let me know and we would then talk about the next steps in that situation. There have also been rare episodes of liver injury and cognitive impairment associated with statins.     Let me know your thoughts on starting a statin. If you are agreeable to starting, I can send the prescription through and then we should recheck a fasting lipid panel in 3 months' time. You can call [phone] to schedule a lab appointment around that time.    Please let me know if you have any questions or concerns,  Dr. Lunsford

## 2021-12-19 DIAGNOSIS — M79.621 PAIN OF RIGHT UPPER ARM: ICD-10-CM

## 2021-12-21 RX ORDER — CELECOXIB 200 MG/1
CAPSULE ORAL
Qty: 30 CAPSULE | Refills: 0 | Status: SHIPPED | OUTPATIENT
Start: 2021-12-21 | End: 2022-03-23

## 2021-12-21 NOTE — TELEPHONE ENCOUNTER
"Routing refill request to provider for review/approval because:  Age warning    Last Written Prescription Date:  11/26/21  Last Fill Quantity: 30,  # refills: 0   Last office visit provider:  12/10/21     Requested Prescriptions   Pending Prescriptions Disp Refills     celecoxib (CELEBREX) 200 MG capsule [Pharmacy Med Name: CELECOXIB 200 MG CAPSULE] 30 capsule 0     Sig: TAKE 1 CAPSULE BY MOUTH EVERY DAY       NSAID Medications Failed - 12/19/2021  7:15 AM        Failed - Patient is age 6-64 years        Passed - Blood pressure under 140/90 in past 12 months     BP Readings from Last 3 Encounters:   12/10/21 102/66   11/29/21 122/64   10/20/21 112/58                 Passed - Normal ALT on file in past 12 months     Recent Labs   Lab Test 12/10/21  0933   ALT 10             Passed - Normal AST on file in past 12 months     Recent Labs   Lab Test 12/10/21  0933   AST 14             Passed - Recent (12 mo) or future (30 days) visit within the authorizing provider's specialty     Patient has had an office visit with the authorizing provider or a provider within the authorizing providers department within the previous 12 mos or has a future within next 30 days. See \"Patient Info\" tab in inbasket, or \"Choose Columns\" in Meds & Orders section of the refill encounter.              Passed - Normal CBC on file in past 12 months     Recent Labs   Lab Test 12/10/21  0933   WBC 7.5   RBC 4.58   HGB 13.4   HCT 41.4                    Passed - Medication is active on med list        Passed - Normal serum creatinine on file in past 12 months     Recent Labs   Lab Test 12/10/21  0933   CR 1.09       Ok to refill medication if creatinine is low               Fady Petit RN 12/21/21 8:50 AM  "

## 2022-01-05 ENCOUNTER — TELEPHONE (OUTPATIENT)
Dept: INTERNAL MEDICINE | Facility: CLINIC | Age: 72
End: 2022-01-05
Payer: COMMERCIAL

## 2022-01-05 DIAGNOSIS — I10 ESSENTIAL HYPERTENSION: ICD-10-CM

## 2022-01-05 RX ORDER — ALPRAZOLAM 1 MG
1 TABLET ORAL 2 TIMES DAILY PRN
Qty: 60 TABLET | Refills: 0 | Status: SHIPPED | OUTPATIENT
Start: 2022-01-05 | End: 2022-02-04

## 2022-01-05 NOTE — TELEPHONE ENCOUNTER
Pt is requesting refill of Alprazolam 1 mg tablet.    Pt is out of medication.  He forgot to call earlier, his heat went out and just got side tracked getting his heater fixed.    Alprazolam (Xanax) 1 mg tablet    [facility] is the preferred pharmacy on [address].    Last office visit with PCP = 12/10/21

## 2022-01-05 NOTE — TELEPHONE ENCOUNTER
Last fill 11/29/21    Last seen 12/10/21    Next appointment is March 14th       Longstanding history of anxiety. He does take Celexa 40 mg daily. He has not had a recent EKG, but that can be done at a future visit. He also is on hydroxyzine and alprazolam. Currently he takes Xanax twice a day. In the past he did have to take 3 times a day in the setting of a family situation, however he reports that he is doing well on twice a day. We did discuss the side effects of benzodiazepines and my preference for him to be on the lowest dose possible in the long run. At this time, with the uncertainty of this elevated PSA in the upcoming work-up which is increasing his anxiety, we will not make any dose changes today. We will continue to see each other every 3 months and we can always reassess at each visit whether or not to continue on this medication. A urine drug screen and controlled substance agreement was signed today.        Last UDS 12/10/21    Last CSA 12/10/21

## 2022-01-11 ENCOUNTER — NURSE TRIAGE (OUTPATIENT)
Dept: NURSING | Facility: CLINIC | Age: 72
End: 2022-01-11
Payer: COMMERCIAL

## 2022-01-11 NOTE — TELEPHONE ENCOUNTER
Contacted patient and relayed PCP message below.  Patient is still having hiccups, advised he go to ED for evaluation.  Patient verbalized understanding and agreed with ED evaluation.  
Patient reports taht he started having hiccups yesterday. Kept him awake, unable to sleep last ngithNon-stop hiccups.    He has tried vinegar and honey, held his breath, pulled his tongue out with his fingers. None of these helped.    Recently started on Xanax, has anxiety as he was told he may have cancer.    Patient asks for home care advice. FNA informed him of the following:  TABLE SUGAR: Swallow a teaspoonful of dry granulated sugar. Can repeat several times at 1 minute intervals.  OTHER HOME REMEDIES:   * Sip ice water   * Drink cold water through a straw while pinching the nose   * Gargle with cold water   * Bite into a slice of lemon   * Pull on your tongue with your fingers   * Lift up your uvula with a cold spoon (i.e., make yourself gag)   * Hold your breath and count to 20 (or higher if you can)   * Breathe into a paper (not plastic) bag for 30-60 seconds    He'll try these first. FNA advised to have him call back for further concerns. Informed him that he may need to be seen today if these methods don't help.    Alejandra Winter RN/[facility] Nurse Advisor        Reason for Disposition    Onset after starting new prescription medication    Additional Information    Negative: Patient sounds very sick or weak to the triager    Negative: [1] Hiccups present > 3 hours AND [2] severe AND [3] no improvement using hiccup treatment per protocol    Negative: [1] Hiccups present > 24 hours AND [2] nearly continuous    Hiccups interfere with work, school, or sleep    Negative: Recent abdominal, chest or brain surgery    Negative: Hiccups are a chronic symptom (recurrent or ongoing AND present > 4 weeks)    Negative: Hiccups occur while asleep (must be witnessed by someone else)    Negative: [1] Weight loss > 10 pounds (5 kg) AND [2] not dieting    Hiccups present < 24 hours    Protocols used: IBUALMY-H-UT      
To: CHRIS RN Pool   01/11/22     Can someone let patient know that if he truly has intractable hiccups lasting this long, should go to ED for neuro/stroke evaluation as opposed to a clinic visit.    Thanks,  Dr. Lunsford  
Implemented All Universal Safety Interventions:  [place] to call system. Call bell, personal items and telephone within reach. Instruct patient to call for assistance. Room bathroom lighting operational. Non-slip footwear when patient is off stretcher. Physically safe environment: no spills, clutter or unnecessary equipment. Stretcher in lowest position, wheels locked, appropriate side rails in place.

## 2022-01-17 DIAGNOSIS — I10 HYPERTENSION: ICD-10-CM

## 2022-01-18 VITALS
HEIGHT: 64 IN | DIASTOLIC BLOOD PRESSURE: 60 MMHG | BODY MASS INDEX: 26.29 KG/M2 | HEART RATE: 68 BPM | SYSTOLIC BLOOD PRESSURE: 128 MMHG | OXYGEN SATURATION: 97 % | WEIGHT: 154 LBS | TEMPERATURE: 97.8 F

## 2022-01-18 VITALS
HEIGHT: 64 IN | BODY MASS INDEX: 24.07 KG/M2 | SYSTOLIC BLOOD PRESSURE: 120 MMHG | OXYGEN SATURATION: 97 % | DIASTOLIC BLOOD PRESSURE: 58 MMHG | WEIGHT: 141 LBS | HEART RATE: 67 BPM

## 2022-01-18 VITALS
WEIGHT: 139 LBS | DIASTOLIC BLOOD PRESSURE: 56 MMHG | SYSTOLIC BLOOD PRESSURE: 126 MMHG | BODY MASS INDEX: 23.86 KG/M2 | TEMPERATURE: 98.3 F

## 2022-01-18 VITALS
DIASTOLIC BLOOD PRESSURE: 60 MMHG | SYSTOLIC BLOOD PRESSURE: 110 MMHG | BODY MASS INDEX: 24.07 KG/M2 | HEART RATE: 61 BPM | WEIGHT: 141 LBS | HEIGHT: 64 IN | OXYGEN SATURATION: 97 %

## 2022-01-18 VITALS
BODY MASS INDEX: 24.07 KG/M2 | HEIGHT: 64 IN | OXYGEN SATURATION: 98 % | DIASTOLIC BLOOD PRESSURE: 60 MMHG | WEIGHT: 141 LBS | SYSTOLIC BLOOD PRESSURE: 112 MMHG | HEART RATE: 92 BPM

## 2022-01-18 VITALS
SYSTOLIC BLOOD PRESSURE: 102 MMHG | WEIGHT: 144 LBS | BODY MASS INDEX: 24.72 KG/M2 | DIASTOLIC BLOOD PRESSURE: 56 MMHG | HEART RATE: 66 BPM | OXYGEN SATURATION: 95 %

## 2022-01-18 VITALS
WEIGHT: 140 LBS | HEART RATE: 97 BPM | SYSTOLIC BLOOD PRESSURE: 116 MMHG | HEIGHT: 64 IN | BODY MASS INDEX: 23.9 KG/M2 | OXYGEN SATURATION: 98 % | DIASTOLIC BLOOD PRESSURE: 60 MMHG

## 2022-01-18 VITALS
DIASTOLIC BLOOD PRESSURE: 60 MMHG | WEIGHT: 154 LBS | BODY MASS INDEX: 26.29 KG/M2 | HEIGHT: 64 IN | OXYGEN SATURATION: 92 % | SYSTOLIC BLOOD PRESSURE: 138 MMHG | HEART RATE: 89 BPM

## 2022-01-18 ASSESSMENT — PATIENT HEALTH QUESTIONNAIRE - PHQ9
SUM OF ALL RESPONSES TO PHQ QUESTIONS 1-9: 9
SUM OF ALL RESPONSES TO PHQ QUESTIONS 1-9: 6
SUM OF ALL RESPONSES TO PHQ QUESTIONS 1-9: 2

## 2022-01-19 RX ORDER — AMLODIPINE BESYLATE 10 MG/1
TABLET ORAL
Qty: 90 TABLET | Refills: 3 | Status: SHIPPED | OUTPATIENT
Start: 2022-01-19 | End: 2022-07-27

## 2022-01-19 NOTE — TELEPHONE ENCOUNTER
"Last Written Prescription Date:  01/12/2021  Last Fill Quantity: 90,  # refills: 3   Last office visit provider:   12/10/2021 with Dr Parada.    Requested Prescriptions   Pending Prescriptions Disp Refills     amLODIPine (NORVASC) 10 MG tablet [Pharmacy Med Name: AMLODIPINE BESYLATE 10 MG TAB] 90 tablet 3     Sig: TAKE 1 TABLET BY MOUTH EVERY DAY       Calcium Channel Blockers Protocol  Passed - 1/17/2022 12:21 AM        Passed - Blood pressure under 140/90 in past 12 months     BP Readings from Last 3 Encounters:   12/10/21 102/66   11/29/21 122/64   10/20/21 112/58                 Passed - Recent (12 mo) or future (30 days) visit within the authorizing provider's specialty     Patient has had an office visit with the authorizing provider or a provider within the authorizing providers department within the previous 12 mos or has a future within next 30 days. See \"Patient Info\" tab in inbasket, or \"Choose Columns\" in Meds & Orders section of the refill encounter.              Passed - Medication is active on med list        Passed - Patient is age 18 or older        Passed - Normal serum creatinine on file in past 12 months     Recent Labs   Lab Test 12/10/21  0933   CR 1.09       Ok to refill medication if creatinine is low               Hien Myrick 01/19/22 12:20 AM  "

## 2022-01-31 DIAGNOSIS — E55.9 VITAMIN D DEFICIENCY: ICD-10-CM

## 2022-02-01 RX ORDER — ACETAMINOPHEN 500 MG
TABLET ORAL
Qty: 90 CAPSULE | Refills: 3 | Status: SHIPPED | OUTPATIENT
Start: 2022-02-01 | End: 2024-04-15

## 2022-02-01 NOTE — TELEPHONE ENCOUNTER
"Last Written Prescription Date:  5/12/21  Last Fill Quantity: 90,  # refills: 2   Last office visit provider:  12/10/21     Requested Prescriptions   Pending Prescriptions Disp Refills     [facility] D3 125 MCG (5000 UT) CAPS [Pharmacy Med Name: [facility] VITAMIN D3 5,000 UNIT SFGL] 90 capsule 2     Sig: TAKE 1 CAPSULE (5,000 UNITS TOTAL) BY MOUTH DAILY.       Vitamin Supplements (Adult) Protocol Passed - 1/31/2022 12:22 AM        Passed - High dose Vitamin D not ordered        Passed - Recent (12 mo) or future (30 days) visit within the authorizing provider's specialty     Patient has had an office visit with the authorizing provider or a provider within the authorizing providers department within the previous 12 mos or has a future within next 30 days. See \"Patient Info\" tab in inbasket, or \"Choose Columns\" in Meds & Orders section of the refill encounter.              Passed - Medication is active on med list             Fady Petit RN 02/01/22 2:43 PM  "

## 2022-02-02 DIAGNOSIS — G47.00 INSOMNIA: ICD-10-CM

## 2022-02-02 RX ORDER — TRAZODONE HYDROCHLORIDE 50 MG/1
TABLET, FILM COATED ORAL
Qty: 90 TABLET | Refills: 2 | Status: SHIPPED | OUTPATIENT
Start: 2022-02-02 | End: 2022-04-05

## 2022-02-02 NOTE — TELEPHONE ENCOUNTER
Last fill 5/10/21    Last seen 12/10/21    Insomnia        He is on trazodone for insomnia        Return in about 3 months (around 3/10/2022) for Follow up, with me.    Next appointment is 3/14/22

## 2022-02-02 NOTE — TELEPHONE ENCOUNTER
Reason for Call:  Medication or medication refill:    Do you use a [facility]?  Name of the pharmacy and phone number for the current [facility]    Name of the medication requested:     Trazodone 50 mg tablet    Other request: N/A    Can we leave a detailed message on this number? YES    Phone number patient can be reached at: Home number on file [phone] (home)    Best Time: Any time    Call taken on 2/2/2022 at 12:12 PM by Francisco Duke

## 2022-02-03 ENCOUNTER — TELEPHONE (OUTPATIENT)
Dept: INTERNAL MEDICINE | Facility: CLINIC | Age: 72
End: 2022-02-03
Payer: COMMERCIAL

## 2022-02-03 DIAGNOSIS — F41.9 ANXIETY: Primary | ICD-10-CM

## 2022-02-03 DIAGNOSIS — I10 ESSENTIAL HYPERTENSION: ICD-10-CM

## 2022-02-03 NOTE — TELEPHONE ENCOUNTER
Controlled Substance Refill Request for Xanax    Last refill: 60 with 0    Last clinic visit: 12-10-21     Clinic visit frequency required: Q 3 months  Next appt: 3-14-22    Controlled substance agreement on file: Yes:  Date 12-10-21.    Documentation in problem list reviewed:  Yes    Processing:  Rx to be electronically transmitted to pharmacy by provider

## 2022-02-03 NOTE — TELEPHONE ENCOUNTER
Reason for Call:  Medication refill:    Do you use a [facility]?   No    Name of the [facility] and phone [facility] for the [facility] [address] [facility] [place]    Name of the medication requested:   ALPRAZolam (XANAX) 1 mg tablet    Other request: N/A    Call taken on 2/3/2022 at 11:24 AM by Marilyn Wu

## 2022-02-04 RX ORDER — ALPRAZOLAM 1 MG
1 TABLET ORAL 2 TIMES DAILY PRN
Qty: 60 TABLET | Refills: 0 | Status: SHIPPED | OUTPATIENT
Start: 2022-02-04 | End: 2022-03-08

## 2022-03-04 DIAGNOSIS — F41.9 ANXIETY: Primary | ICD-10-CM

## 2022-03-04 NOTE — TELEPHONE ENCOUNTER
Refill request from pharmacy for citalopram     Last appt: 12/10/2021    Next appt: 03/14/2022    Jules Saab Jr., CMA on 3/4/2022 at 3:57 PM

## 2022-03-07 DIAGNOSIS — F41.9 ANXIETY: ICD-10-CM

## 2022-03-07 RX ORDER — CITALOPRAM HYDROBROMIDE 40 MG/1
40 TABLET ORAL DAILY
Qty: 90 TABLET | Refills: 1 | Status: SHIPPED | OUTPATIENT
Start: 2022-03-07 | End: 2022-07-27

## 2022-03-07 NOTE — TELEPHONE ENCOUNTER
Patient calling to request refill of the attached medication.    Last refill 2/4/22    Last visit 12/10/21    Next visit 3/14/22

## 2022-03-07 NOTE — TELEPHONE ENCOUNTER
"Last Written Prescription Date:  7/27/2021  Last Fill Quantity: 90,  # refills: 1   Last office visit provider:  12/10/2021     Requested Prescriptions   Pending Prescriptions Disp Refills     citalopram (CELEXA) 40 MG tablet 90 tablet 1     Sig: Take 1 tablet (40 mg) by mouth daily       SSRIs Protocol Passed - 3/4/2022  3:58 PM        Passed - Recent (12 mo) or future (30 days) visit within the authorizing provider's specialty     Patient has had an office visit with the authorizing provider or a provider within the authorizing providers department within the previous 12 mos or has a future within next 30 days. See \"Patient Info\" tab in inbasket, or \"Choose Columns\" in Meds & Orders section of the refill encounter.              Passed - Medication is active on med list        Passed - Patient is age 18 or older             Jamaica Srinivasan RN 03/07/22 12:22 AM  "

## 2022-03-08 RX ORDER — ALPRAZOLAM 1 MG
1 TABLET ORAL 2 TIMES DAILY PRN
Qty: 60 TABLET | Refills: 0 | Status: SHIPPED | OUTPATIENT
Start: 2022-03-08 | End: 2022-04-05

## 2022-03-08 NOTE — TELEPHONE ENCOUNTER
Anxiety       Longstanding history of anxiety. He does take Celexa 40 mg daily. He has not had a recent EKG, but that can be done at a future visit. He also is on hydroxyzine and alprazolam. Currently he takes Xanax twice a day. In the past he did have to take 3 times a day in the setting of a family situation, however he reports that he is doing well on twice a day. We did discuss the side effects of benzodiazepines and my preference for him to be on the lowest dose possible in the long run. At this time, with the uncertainty of this elevated PSA in the upcoming work-up which is increasing his anxiety, we will not make any dose changes today. We will continue to see each other every 3 months and we can always reassess at each visit whether or not to continue on this medication. A urine drug screen and controlled substance agreement was signed today.

## 2022-03-14 ENCOUNTER — NURSE TRIAGE (OUTPATIENT)
Dept: NURSING | Facility: CLINIC | Age: 72
End: 2022-03-14

## 2022-03-14 ENCOUNTER — TELEPHONE (OUTPATIENT)
Dept: INTERNAL MEDICINE | Facility: CLINIC | Age: 72
End: 2022-03-14

## 2022-03-14 ENCOUNTER — VIRTUAL VISIT (OUTPATIENT)
Dept: INTERNAL MEDICINE | Facility: CLINIC | Age: 72
End: 2022-03-14
Payer: COMMERCIAL

## 2022-03-14 DIAGNOSIS — Z09 FOLLOW UP: Primary | ICD-10-CM

## 2022-03-14 PROCEDURE — 99207 PR NO CHARGE LOS: CPT | Performed by: PEDIATRICS

## 2022-03-14 RX ORDER — AZITHROMYCIN 250 MG/1
1 TABLET, FILM COATED ORAL DAILY
COMMUNITY
End: 2022-05-05

## 2022-03-14 RX ORDER — PREDNISOLONE ACETATE 10 MG/ML
SUSPENSION/ DROPS OPHTHALMIC
COMMUNITY
End: 2022-05-05

## 2022-03-14 RX ORDER — CEFDINIR 300 MG/1
1 CAPSULE ORAL PRN
COMMUNITY
End: 2022-03-23

## 2022-03-14 RX ORDER — CEPHALEXIN 500 MG/1
1 CAPSULE ORAL PRN
COMMUNITY
End: 2022-03-23

## 2022-03-14 RX ORDER — SUMATRIPTAN 50 MG/1
50 TABLET, FILM COATED ORAL
COMMUNITY
End: 2023-08-16

## 2022-03-14 RX ORDER — AMOXICILLIN 500 MG/1
CAPSULE ORAL
COMMUNITY
End: 2022-03-23

## 2022-03-14 RX ORDER — CIPROFLOXACIN 500 MG/1
1 TABLET, FILM COATED ORAL PRN
COMMUNITY
End: 2022-07-27

## 2022-03-14 NOTE — PROGRESS NOTES
"MA \"roomed patient\"    I then called multiple times and patient did not . VM full.    Asked another MA to call afterward and she also could not get through to patient    Dr. Lunsford  "

## 2022-03-14 NOTE — TELEPHONE ENCOUNTER
"Caller  reports persisting hiccups  for  30 hrs,    States he is unable to do any ADL's and is staying in bed ans seems to be in some distress. States he has tried \" everything\" and is unable to follow any directions, hiccupping continually   EMR  Reviewed   Similar episode  In 1/22; at that time  PCP advised ED but patient does not recall this and states that hiccups just went away   Caller is advised to seek treatment in ED as in unable to follow any  Home care advisement   Caller understands and niece will drive him   Allison Cardoza RN  FNA                            Reason for Disposition    Patient sounds very sick or weak to the triager    Additional Information    Negative: Chest pain    Negative: Difficulty breathing    Negative: [1] Abdomen pain is main symptom AND [2] male    Negative: [1] Abdomen pain is main symptom AND [2] adult female    Negative: Vomiting    Protocols used: [id number]      "

## 2022-03-14 NOTE — TELEPHONE ENCOUNTER
Patient is requesting call back from PCP.  He had appt scheduled today and states he has had his phone on him the whole time and no one called .    He has the hic ups and is having a hard time sleeping, speaking etc.    Would like to get a prescription so that he can get some rest tonight.

## 2022-03-15 ENCOUNTER — VIRTUAL VISIT (OUTPATIENT)
Dept: INTERNAL MEDICINE | Facility: CLINIC | Age: 72
End: 2022-03-15
Payer: COMMERCIAL

## 2022-03-15 ENCOUNTER — NURSE TRIAGE (OUTPATIENT)
Dept: PEDIATRICS | Facility: CLINIC | Age: 72
End: 2022-03-15
Payer: COMMERCIAL

## 2022-03-15 ENCOUNTER — MYC MEDICAL ADVICE (OUTPATIENT)
Dept: PEDIATRICS | Facility: CLINIC | Age: 72
End: 2022-03-15
Payer: COMMERCIAL

## 2022-03-15 DIAGNOSIS — K22.89 THICKENING OF ESOPHAGUS: ICD-10-CM

## 2022-03-15 DIAGNOSIS — R06.6 HICCUPS: Primary | ICD-10-CM

## 2022-03-15 PROCEDURE — 99213 OFFICE O/P EST LOW 20 MIN: CPT | Performed by: PEDIATRICS

## 2022-03-15 RX ORDER — GABAPENTIN 100 MG/1
CAPSULE ORAL
Qty: 120 CAPSULE | Refills: 1 | Status: SHIPPED | OUTPATIENT
Start: 2022-03-15 | End: 2022-03-16

## 2022-03-15 RX ORDER — PANTOPRAZOLE SODIUM 40 MG/1
40 TABLET, DELAYED RELEASE ORAL DAILY
Qty: 90 TABLET | Refills: 0 | Status: SHIPPED | OUTPATIENT
Start: 2022-03-15 | End: 2022-04-13

## 2022-03-15 NOTE — TELEPHONE ENCOUNTER
Attempted to contact patient no answer and voicemail full.  Attempting to schedule patient for virtual apt with PCP to discuss hiccups and recent ED visit.  Per PCP ok to use any blocks or peds schedule today or tomorrow to get him in.  Will also send [employer] message indicating PCP would like to have virtual apt.

## 2022-03-15 NOTE — TELEPHONE ENCOUNTER
Patient is returning call.  Message relayed.  No open appt slot on PCP's schedule and not note indicating if okay to double book.    Pt accepted offer to virtual video with Earline Hodge tomorrow at 8:40    Pt is seeking some kind of RX for today to help him get some rest tonight.  This is day four and he states this is just impossible and is wearing him out

## 2022-03-15 NOTE — PATIENT INSTRUCTIONS
Start taking gabapentin 100mg in the morning, 100mg in the afternoon, and 300mg in the evening. If in 1-2 days hiccups aren't better, then increase to 300mg three times a day.     Switch omeprazole to pantoprazole.     I have referred you to gastroenterology because of thick esophagus seen on CT.    I will discuss lung findings with your pulmonologist

## 2022-03-15 NOTE — TELEPHONE ENCOUNTER
Writer scheduled with PCP in her personal block as she advised.  Informed patient and cancelled tomorrow's visit.

## 2022-03-15 NOTE — TELEPHONE ENCOUNTER
Please let him know we tried multiple times to reach him yesterday and he did not  and VM was full    I did review the ED visit from yesterday where they saw him for hiccups    They started him on reglan. Did he start taking it? If not, that is my first recommendation is for him to  the prescription and start taking it.    If he has started reglan and still having hiccups, then I recommend we start gabapentin 100mg TID to see if it'll help. I recommend he do not take the gabapentin with his alprazolam as it can cause excessive drowsiness. I also recommend we switch omeprazole to pantoprazole. I can send new Rx's in for the gabapentin and the pantoprazole if he is agreeable.  If you do not feel comfortable with communicating this to him, I recommend you get him scheduled for a virtual visit with me to discuss this in further detail. You can use any blocks or PEDS schedule to get him in today or tomorrow with me.    Regardless, I do want an ED visit with him to go over abnormal findings seen on chest CT as well. That can be with virtual visit per above or a separate appointment (in person if we do not do a virtual visit today or tomorrow to discuss the above)    Please find me if any ?'s  Dr. Lunsford

## 2022-03-15 NOTE — TELEPHONE ENCOUNTER
"Patient has had hiccups for over 48 hours.  Was seen in ED yesterday and \"they gave me meds and we tried stuff like holding breath, but nothing worked.\"  Patient is exhausted and would like PCP to advise.  Home remedies and information on hiccups sent via [employer] message so patient could try additional home remedies.  Clinic to call back once PCP advises.  "

## 2022-03-15 NOTE — PROGRESS NOTES
03/15/22     Seen in ED for hiccups  CTA chest done due chest pain  Given reglan, not helpful  Na 130  Hiccuping so much he cannot sleep    CTA chest done  LUNGS AND PLEURA: There is an 8 mm spiculated nodule in the right upper lobe anteriorly, significantly increased in size since the previous exam. There is new nodularity along the minor fissure of uncertain significance. 6 mm right middle lobe nodule has increased in size. Chronic scarring in the right middle lobe. 6 mm nodule posterior to the major fissure in the right perihilar region has increased in size. Ovoid irregular nodule posterior left major fissure has increased now measuring up to 1.7 cm. There is scarring at the lung bases. No pneumothorax or pleural effusion.     MEDIASTINUM/AXILLAE: There are again multiple partially calcified lymph nodes in the mediastinum and bilateral mini. There has been an overall increase in size of these lymph nodes. The heart size is normal. Small hiatal hernia.     CORONARY ARTERY CALCIFICATION: Moderate.     UPPER ABDOMEN: Normal.     MUSCULOSKELETAL: Degenerative disease in the spine.     IMPRESSION:   1.  There is no pulmonary embolus, aortic aneurysm or dissection.   2.  Several small bilateral lung nodules are new or increased in size from the previous exam. Follow-up recommended.   3.  Multiple enlarged mediastinal and bilateral hilar lymph nodes are partially calcified and have increased in size since the previous exam.   4.  Hiatal hernia. Circumferential wall thickening of the distal esophagus may be inflammatory or neoplastic.     Recommendations for an incidental lung nodule > 8mm:     Low risk patients: Consider follow-up CT in 3 months, PET/CT, and/or tissue sampling.     High risk patients: Same as for low risk patients.     PLAN:  - will try gabapentin 100mg in morning, 100mg afternoon, and 300mg at nighttime; can increase up to 300mg TID if not significantly improved in a few days  - advised against using  hydroxyzine with it  - switch omeprazole to pantoprazole  - referral to [facility] for esophageal thickening  - sent message to pulm about the enlarging pulm nodules   - follow up in person with me in 1 week; may need head imaging if hiccups are this persistent. Though it seems he was having hiccups months ago too    Dr. Lunsford

## 2022-03-15 NOTE — Clinical Note
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 He had a recent CTA chest done 3/14 due to persistent hiccups and this did show 8mm spiculated nodule in RUL, 6mm RML nodule, 6mm nodule in R perihilar region, and 1.7cm irregular nodule in L major fissure. These have all grown. I did tell patient I would have pulmonology weigh in. Wondering if you'd like to see him for advanced imaging like PET scan, or if you want to monitor still? Please let me know - uDyen

## 2022-03-15 NOTE — TELEPHONE ENCOUNTER
Additional Information    [1] Hiccups present > 24 hours AND [2] nearly continuous    Negative: [1] Hiccups present > 3 hours AND [2] severe AND [3] no improvement using hiccup treatment per protocol    Negative: Patient sounds very sick or weak to the triager    Negative: Chest pain    Negative: Difficulty breathing    Negative: [1] Abdomen pain is main symptom AND [2] male    Negative: [1] Abdomen pain is main symptom AND [2] adult female    Negative: Vomiting    Protocols used: [id number]

## 2022-03-16 ENCOUNTER — NURSE TRIAGE (OUTPATIENT)
Dept: NURSING | Facility: CLINIC | Age: 72
End: 2022-03-16

## 2022-03-16 ENCOUNTER — TELEPHONE (OUTPATIENT)
Dept: INTERNAL MEDICINE | Facility: CLINIC | Age: 72
End: 2022-03-16

## 2022-03-16 DIAGNOSIS — R06.6 HICCUPS: Primary | ICD-10-CM

## 2022-03-16 RX ORDER — BACLOFEN 5 MG/1
5 TABLET ORAL 3 TIMES DAILY
Qty: 30 TABLET | Refills: 0 | Status: SHIPPED | OUTPATIENT
Start: 2022-03-16 | End: 2022-03-23

## 2022-03-16 NOTE — TELEPHONE ENCOUNTER
Pt called stating he is having hiccups that make struggle to breath. Pt was having difficulty breathing while he was taking.       Per protocal pt was advised to call 911. Pt stated he is not the richest man but agreed to call 911.      Santi Rivera RN  [facility] Nurse Advisors       COVID 19 Nurse Triage Plan/Patient Instructions    Please be aware that novel coronavirus (COVID-19) may be circulating in the community. If you develop symptoms such as fever, cough, or SOB or if you have concerns about the presence of another infection including coronavirus (COVID-19), please contact your health care provider or visit [email or web address].     Disposition/Instructions    Call to EMS/911 recommended. Follow protocol based instructions.     Bring Your Own Device:  Please also bring your smart device(s) (smart phones, tablets, laptops) and their charging cables for your personal use and to communicate with your care team during your visit.    Thank you for taking steps to prevent the spread of this virus.  o Limit your contact with others.  o Wear a simple mask to cover your cough.  o Wash your hands well and often.    Resources    [facility]: About COVID-19: [email or web address]    CDC: What to Do If You're Sick: www.cdc.gov/coronavirus/2019-ncov/about/steps-when-sick.html    CDC: Ending Home Isolation: www.cdc.gov/coronavirus/2019-ncov/hcp/disposition-in-home-patients.html     CDC: Caring for Someone: www.cdc.gov/coronavirus/2019-ncov/if-you-are-sick/care-for-someone.html     [facility]: Interim Guidance for Hospital Discharge to Home: www.health.[email or web address].[email or web address]/diseases/coronavirus/hcp/hospdischarge.pdf    [facility] clinical trials (COVID-19 research studies): [email or web address]     Below are the COVID-19 hotlines at the [facility] ([facility]). Interpreters are available.   o For health questions: Call 247-646-2135 or 1-748.255.7773 (7 a.m.  to 7 p.m.)  o For questions about schools and childcare: Call 730-359-5751 or 1-343.598.4523 (7 a.m. to 7 p.m.)                     Reason for Disposition    Severe difficulty breathing (e.g., struggling for each breath, speaks in single words)    Additional Information    Negative: [1] Breathing stopped AND [2] hasn't returned    Negative: Choking on something    Protocols used: BREATHING DIFFICULTY-A-AH

## 2022-03-16 NOTE — TELEPHONE ENCOUNTER
Patient is seeking help with the hic ups.  He had virtual appt yesterday and he has not gotten any relief.    He has tried to hold his breath a few time today but then suffers from shortness of breath where he feels he can't catch his breath.    Patient would like a call back to discuss what he can do.

## 2022-03-16 NOTE — TELEPHONE ENCOUNTER
Please tell him to stop the gabapentin  Try baclofen instead; I sent the baclofen to the pharmacy, 5 mg three times a day  He can get drowsy and dizzy with this, so if he gets it and has a lot of side effects, stop    I don't see he scheduled appt with me. I want to see him in clinic next week in person to see how he's doing. He might need head imaging if his hiccups aren't better    Did he switch from omeprazole to pantoprazole as we discussed?    Dr. Lunsford

## 2022-03-17 ENCOUNTER — TELEPHONE (OUTPATIENT)
Dept: PULMONOLOGY | Facility: OTHER | Age: 72
End: 2022-03-17
Payer: COMMERCIAL

## 2022-03-17 NOTE — TELEPHONE ENCOUNTER
Mail box was full, please let pt know when he calls back the below message from PCP    Please tell him to stop the gabapentin  Try baclofen instead; I sent the baclofen to the pharmacy, 5 mg three times a day  He can get drowsy and dizzy with this, so if he gets it and has a lot of side effects, stop    I don't see he scheduled appt with me. I want to see him in clinic next week in person to see how he's doing. He might need head imaging if his hiccups aren't better    Did he switch from omeprazole to pantoprazole as we discussed?    Dr. Lunsford

## 2022-03-17 NOTE — CONFIDENTIAL NOTE
Pulmonary Telephone Note    Denis recently underwent a chest CT at [facility] (on 3/14/22) for refractory hiccups, which showed enlarging multiple bilateral pulmonary nodules, mostly perifissural or peribronchovascular, which are consistent with sarcoid nodules though the DDx includes neoplastic process. I think these are due to his sarcoid. The refractory hiccups are unlikely to be sarcoid-related, though there are case reports of sarcoid-associated hiccups. He is seeing his PCP, who is trying gabapentin, and we discussed possible baclofen if gabapentin is ineffective. Could consider PET-CT to evaluate the nodules, though the specificity for malignancy is low as sarcoid nodules can be FDG-avid; an alternative would be surveillance imaging in 3-6 months. Could also consider getting input from the sarcoid clinic at the [facility] if he is open to that. I called Denis to discuss and reached voicemail and his voice mailbox is full. Will try him again another day.    Vipin Duron MD  Pulmonary and Critical Care Medicine  [facility]  Office [phone]  Pager [phone]  (he/him/his)     Mother and brother

## 2022-03-18 ENCOUNTER — TELEPHONE (OUTPATIENT)
Dept: INTERNAL MEDICINE | Facility: CLINIC | Age: 72
End: 2022-03-18
Payer: COMMERCIAL

## 2022-03-18 NOTE — TELEPHONE ENCOUNTER
Patient was back in ERD due to hic ups.    They are not constant but every few minutes now.  He was encourage to see PCP again as soon as feasible.    Scheduled on PEDS schedule for Monday 3/21/22.  Sending note to PCP for approval for use of PEDS schedule.

## 2022-03-18 NOTE — PROGRESS NOTES
E-  Assessment & Plan   Problem List Items Addressed This Visit     Anxiety     He is already taking alprazolam.  On 3/16 when he was seen in the emergency department, they gave him lorazepam to use for hiccups.  His niece is on the phone and concerned because she is concerned there might be contraindications to being on lorazepam.  I discussed with the family and the patient that he should not be on both Xanax and lorazepam.  He will stop the lorazepam.  He fortunately did not  baclofen the other day.  He is doing 100 mg of gabapentin in the morning and 300 mg gabapentin in the evening.  He has been doing this just over the last couple of days.  He continues to have hiccups.  We will trial him on baclofen.  He is to stop gabapentin and start baclofen 5 mg 3 times a day.    Staying on his home med xanax for anxiety.         Mixed hyperlipidemia    Hiccups     Rather sudden onset of hiccups over last week.    Cranial nerves II through XII are grossly normal.  Gait is normal.  Heel-to-shin is normal and alternating rapid movements are normal.  His left patellar reflex is 3+, R patellar reflex 2+. Bilateral biceps reflex 2+ bilaterally. Denies any headache, focal weakness/numbness/tingling, speech concerns. Niece reports he is more confused lately but I am concerned this could be due to polypharmacy with taking xanax, lorazepam, and gabapentin. Alert and oriented today. Advised he stop the lorazepam and gabapentin. Will just try baclofen for hiccups.    I will do an e consult for neurology given slight asymmetry in the patellar reflexes, to see if head imaging recommended. Denies any head trauma and no headache.           Hyponatremia - Primary     Na 130 in the ED on 3/14 and Na 128 on 3/16. Also noted to be hypokalemic 3.1 in ED on 3/16, given potassium supplementation in the ED.    He is not on diuretic.  In talking to patient and his family, he has been doing a ketogenic diet.  There are some days where he  only eats once a day.  Other days he does eat 3 meals a day, just avoids large carbohydrates.  He does eat salad and a protein generally for his meals.  He has been cutting back on his salt significantly over the last year because he was told the salt intake was not good.  He is not on any diuretic as a cause for his electrolyte abnormalities.  He has been drinking 5 L a day to help with his hiccups.  I have advised that he cut this down to about 2 L a day.  On exam he appears euvolemic today.  We did not obtain orthostatic blood pressures, but we should could consider this in the future.  He does not appear hypervolemic with any symptoms of CHF either.  He has been on celexa chronically before with no natremia.  There are some new lung nodules for which his pulmonologist is ordering a PET scan, so could be SIADH due to a lung malignancy as well.     We will start with some blood and urine testing today.  May need to involve nephrology if the evaluation is unrevealing         Relevant Orders    Comprehensive metabolic panel (BMP + Alb, Alk Phos, ALT, AST, Total. Bili, TP)    URINE SODIUM    Cortisol    TSH with free T4 reflex    UA Macro with Reflex to Micro and Culture - lab collect    Osmolality urine    Osmolality         Return in about 2 days (around 3/23/2022) for Follow up, with me, using a phone visit.    ADDENDUM: Cannot find e consult for neurology. Will try to find this order. If unable to find, will d/w patient on Wed and if baclofen not helpful, consider head MRI or neuro referral    Jed Parada MD  [facility]    Monica Barrios is a 72 year old who presents for the following health issues     HPI     ED/UC Followup:    [facility]   Date of visit: 3/14 and 3/16  Reason for visit: hiccups  Current Status: slightly improved       Yesterday was a good day  Drinking a lot of water  Sometimes will reflux up/regurgitate the liquids  Got lorazepam in the ED  Was ok for a  bit then hiccups would start again  No nausea  Only throws up with hiccuping    Gabapentin did seem to help    Has cut back on sodium intake for a year now    Salad and salmon for breakfast  Sometimes eggs and lester for breakfast  Chicken and salad for dinner  Fruit for snack    Sometimes he eats only once a day  He is trying a keto diet- -avoiding carbs    On Wednesday urine looked pink tinged when using portable commode in the ED. No abdominal pain or urinary symptoms otherwise    Review of Systems   See above      Objective    /60 (BP Location: Right arm, Patient Position: Sitting)   Pulse 82   Wt 61.9 kg (136 lb 6 oz)   SpO2 93%   BMI 23.41 kg/m    Body mass index is 23.41 kg/m .  Physical Exam   GENERAL: healthy, alert and no distress  RESP: lungs clear to auscultation - no rales, rhonchi or wheezes  CV: regular rate and rhythm, normal S1 S2, no S3 or S4, no murmur, click or rub, no peripheral edema and peripheral pulses strong  ABDOMEN: soft, nontender, no hepatosplenomegaly, no masses and bowel sounds normal  MS: no gross musculoskeletal defects noted, no edema

## 2022-03-19 ENCOUNTER — TELEPHONE (OUTPATIENT)
Dept: PULMONOLOGY | Facility: OTHER | Age: 72
End: 2022-03-19
Payer: COMMERCIAL

## 2022-03-19 NOTE — CONFIDENTIAL NOTE
Pulmonary Telephone Note    Called Denis and reached voicemail. This time the voice mailbox was not full and left a brief message with the clinic phone number and a message that I would try him again another day. See my note from 3/17/22 for additional details.    Vipin Duron MD  Pulmonary and Critical Care Medicine  [facility]  Office [phone]  Pager [phone]  (he/him/his)

## 2022-03-20 ENCOUNTER — TELEPHONE (OUTPATIENT)
Dept: PULMONOLOGY | Facility: OTHER | Age: 72
End: 2022-03-20
Payer: COMMERCIAL

## 2022-03-20 DIAGNOSIS — R91.8 MULTIPLE PULMONARY NODULES: Primary | ICD-10-CM

## 2022-03-20 NOTE — CONFIDENTIAL NOTE
Pulmonary Telephone Note    Denis recently underwent a chest CT at [place] [facility] (on 3/14/22) for refractory hiccups, which showed enlarging multiple bilateral pulmonary nodules, mostly perifissural or peribronchovascular, which are consistent with sarcoid nodules though the DDx includes neoplastic process. I think these are due to his sarcoid. The refractory hiccups are unlikely to be sarcoid-related, though there are case reports of sarcoid-associated hiccups. Called Denis. He will be undergoing prostate biopsy. Has had refractory hiccups for one week. Hiccups are now less frequent. Breathing is fine unless he has hiccups, then harder to breathe, sore chest. He is starting baclofen for refractory hiccups, which is likely to help. Will plan a PET-CT, though unfortunately the specificity is low as sarcoid nodules can also be FDG-avid. Will contact him with results. Could consider increased prednisone, though without new respiratory issues, and unless the hiccups are sarcoid-related, will need to carefully consider this. Discussed with Denis and he is in agreement. Encouraged him to contact me with questions or concerning symptoms.    Vipin Duron MD  Pulmonary and Critical Care Medicine  [facility]  Office [phone]  Pager [phone]  (he/him/his)

## 2022-03-21 ENCOUNTER — OFFICE VISIT (OUTPATIENT)
Dept: PEDIATRICS | Facility: CLINIC | Age: 72
End: 2022-03-21
Payer: COMMERCIAL

## 2022-03-21 VITALS
WEIGHT: 136.38 LBS | DIASTOLIC BLOOD PRESSURE: 60 MMHG | HEART RATE: 82 BPM | SYSTOLIC BLOOD PRESSURE: 121 MMHG | BODY MASS INDEX: 23.41 KG/M2 | OXYGEN SATURATION: 93 %

## 2022-03-21 DIAGNOSIS — R06.6 HICCUPS: ICD-10-CM

## 2022-03-21 DIAGNOSIS — F41.9 ANXIETY: ICD-10-CM

## 2022-03-21 DIAGNOSIS — E78.2 MIXED HYPERLIPIDEMIA: ICD-10-CM

## 2022-03-21 DIAGNOSIS — E87.1 HYPONATREMIA: Primary | ICD-10-CM

## 2022-03-21 DIAGNOSIS — R91.8 MULTIPLE PULMONARY NODULES: Primary | ICD-10-CM

## 2022-03-21 PROBLEM — D86.0 PULMONARY SARCOIDOSIS (H): Status: ACTIVE | Noted: 2017-01-09

## 2022-03-21 LAB
ALBUMIN SERPL-MCNC: 3.1 G/DL (ref 3.5–5)
ALBUMIN UR-MCNC: NEGATIVE MG/DL
ALP SERPL-CCNC: 68 U/L (ref 45–120)
ALT SERPL W P-5'-P-CCNC: 17 U/L (ref 0–45)
ANION GAP SERPL CALCULATED.3IONS-SCNC: 15 MMOL/L (ref 5–18)
APPEARANCE UR: ABNORMAL
AST SERPL W P-5'-P-CCNC: 14 U/L (ref 0–40)
BACTERIA #/AREA URNS HPF: ABNORMAL /HPF
BILIRUB SERPL-MCNC: 0.4 MG/DL (ref 0–1)
BILIRUB UR QL STRIP: NEGATIVE
BUN SERPL-MCNC: 14 MG/DL (ref 8–28)
CALCIUM SERPL-MCNC: 9.2 MG/DL (ref 8.5–10.5)
CHLORIDE BLD-SCNC: 94 MMOL/L (ref 98–107)
CHOLEST SERPL-MCNC: 123 MG/DL
CO2 SERPL-SCNC: 28 MMOL/L (ref 22–31)
COLOR UR AUTO: YELLOW
CREAT SERPL-MCNC: 1.23 MG/DL (ref 0.7–1.3)
FASTING STATUS PATIENT QL REPORTED: NO
GFR SERPL CREATININE-BSD FRML MDRD: 62 ML/MIN/1.73M2
GLUCOSE BLD-MCNC: 110 MG/DL (ref 70–125)
GLUCOSE UR STRIP-MCNC: NEGATIVE MG/DL
HDLC SERPL-MCNC: 21 MG/DL
HGB UR QL STRIP: NEGATIVE
KETONES UR STRIP-MCNC: NEGATIVE MG/DL
LDLC SERPL CALC-MCNC: 60 MG/DL
LEUKOCYTE ESTERASE UR QL STRIP: ABNORMAL
NITRATE UR QL: NEGATIVE
OSMOLALITY SERPL: 285 MOSM/KG (ref 270–300)
PH UR STRIP: 6 [PH] (ref 5–8)
POTASSIUM BLD-SCNC: 4 MMOL/L (ref 3.5–5)
PROT SERPL-MCNC: 6.5 G/DL (ref 6–8)
RBC #/AREA URNS AUTO: ABNORMAL /HPF
SODIUM SERPL-SCNC: 137 MMOL/L (ref 136–145)
SP GR UR STRIP: 1.01 (ref 1–1.03)
SQUAMOUS #/AREA URNS AUTO: ABNORMAL /LPF
TRIGL SERPL-MCNC: 209 MG/DL
TSH SERPL DL<=0.005 MIU/L-ACNC: 1.76 UIU/ML (ref 0.3–5)
UROBILINOGEN UR STRIP-ACNC: 0.2 E.U./DL
WBC #/AREA URNS AUTO: ABNORMAL /HPF
WBC CLUMPS #/AREA URNS HPF: PRESENT /HPF

## 2022-03-21 PROCEDURE — 36415 COLL VENOUS BLD VENIPUNCTURE: CPT | Performed by: PEDIATRICS

## 2022-03-21 PROCEDURE — 80061 LIPID PANEL: CPT | Performed by: PEDIATRICS

## 2022-03-21 PROCEDURE — 87186 SC STD MICRODIL/AGAR DIL: CPT | Performed by: PEDIATRICS

## 2022-03-21 PROCEDURE — 83935 ASSAY OF URINE OSMOLALITY: CPT | Performed by: PEDIATRICS

## 2022-03-21 PROCEDURE — 87086 URINE CULTURE/COLONY COUNT: CPT | Performed by: PEDIATRICS

## 2022-03-21 PROCEDURE — 80053 COMPREHEN METABOLIC PANEL: CPT | Performed by: PEDIATRICS

## 2022-03-21 PROCEDURE — 84443 ASSAY THYROID STIM HORMONE: CPT | Performed by: PEDIATRICS

## 2022-03-21 PROCEDURE — 83930 ASSAY OF BLOOD OSMOLALITY: CPT | Performed by: PEDIATRICS

## 2022-03-21 PROCEDURE — 99214 OFFICE O/P EST MOD 30 MIN: CPT | Performed by: PEDIATRICS

## 2022-03-21 PROCEDURE — 81001 URINALYSIS AUTO W/SCOPE: CPT | Performed by: PEDIATRICS

## 2022-03-21 PROCEDURE — 82533 TOTAL CORTISOL: CPT | Performed by: PEDIATRICS

## 2022-03-21 PROCEDURE — 84300 ASSAY OF URINE SODIUM: CPT | Performed by: PEDIATRICS

## 2022-03-21 NOTE — ASSESSMENT & PLAN NOTE
He is already taking alprazolam.  On 3/16 when he was seen in the emergency department, they gave him lorazepam to use for hiccups.  His niece is on the phone and concerned because she is concerned there might be contraindications to being on lorazepam.  I discussed with the family and the patient that he should not be on both Xanax and lorazepam.  He will stop the lorazepam.  He fortunately did not  baclofen the other day.  He is doing 100 mg of gabapentin in the morning and 300 mg gabapentin in the evening.  He has been doing this just over the last couple of days.  He continues to have hiccups.  We will trial him on baclofen.  He is to stop gabapentin and start baclofen 5 mg 3 times a day.    Staying on his home med xanax for anxiety.

## 2022-03-21 NOTE — ASSESSMENT & PLAN NOTE
Rather sudden onset of hiccups over last week.    Cranial nerves II through XII are grossly normal.  Gait is normal.  Heel-to-shin is normal and alternating rapid movements are normal.  His left patellar reflex is 3+, R patellar reflex 2+. Bilateral biceps reflex 2+ bilaterally. Denies any headache, focal weakness/numbness/tingling, speech concerns. Niece reports he is more confused lately but I am concerned this could be due to polypharmacy with taking xanax, lorazepam, and gabapentin. Alert and oriented today. Advised he stop the lorazepam and gabapentin. Will just try baclofen for hiccups.    I will do an e consult for neurology given slight asymmetry in the patellar reflexes, to see if head imaging recommended. Denies any head trauma and no headache.

## 2022-03-21 NOTE — ASSESSMENT & PLAN NOTE
Na 130 in the ED on 3/14 and Na 128 on 3/16. Also noted to be hypokalemic 3.1 in ED on 3/16, given potassium supplementation in the ED.    He is not on diuretic.  In talking to patient and his family, he has been doing a ketogenic diet.  There are some days where he only eats once a day.  Other days he does eat 3 meals a day, just avoids large carbohydrates.  He does eat salad and a protein generally for his meals.  He has been cutting back on his salt significantly over the last year because he was told the salt intake was not good.  He is not on any diuretic as a cause for his electrolyte abnormalities.  He has been drinking 5 L a day to help with his hiccups.  I have advised that he cut this down to about 2 L a day.  On exam he appears euvolemic today.  We did not obtain orthostatic blood pressures, but we should could consider this in the future.  He does not appear hypervolemic with any symptoms of CHF either.  He has been on celexa chronically before with no natremia.  There are some new lung nodules for which his pulmonologist is ordering a PET scan, so could be SIADH due to a lung malignancy as well.     We will start with some blood and urine testing today.  May need to involve nephrology if the evaluation is unrevealing

## 2022-03-21 NOTE — PATIENT INSTRUCTIONS
STOP the gabapentin  STOP the lorazepam  Start baclofen 5mg three times a day  Let's follow up by phone on Wednesday, in 2 days  I will place an e-consult to neurology to see if we need to do any head imaging  We will do more lab tests to figure out why you are so low on sodium. I recommend you cut back on water intake to 2000 ml a day.

## 2022-03-22 LAB
CORTIS SERPL-MCNC: 9.5 UG/DL
OSMOLALITY UR: 268 MOSM/KG (ref 300–900)
SODIUM UR-SCNC: <20 MMOL/L

## 2022-03-22 NOTE — RESULT ENCOUNTER NOTE
Misael Barrios,    So interestingly, your potassium and sodium levels are completely normal.  Your urine is showing some red blood cells and white blood cells.  It has been sent for urine culture.  I will only treat you for urinary tract infection if your urine culture shows bacteria.    Please start the baclofen as we discussed.  We will talk tomorrow to see how you are feeling. Unfortunately I could not get an electronic neurology consult as discussed; if baclofen isn't helping by tomorrow, we can talk more about seeing a neurologist.    Please let me know if you have any questions or concerns,  Dr. Lunsford

## 2022-03-23 ENCOUNTER — VIRTUAL VISIT (OUTPATIENT)
Dept: INTERNAL MEDICINE | Facility: CLINIC | Age: 72
End: 2022-03-23
Payer: COMMERCIAL

## 2022-03-23 DIAGNOSIS — N39.0 URINARY TRACT INFECTION WITH HEMATURIA, SITE UNSPECIFIED: ICD-10-CM

## 2022-03-23 DIAGNOSIS — R31.9 URINARY TRACT INFECTION WITH HEMATURIA, SITE UNSPECIFIED: ICD-10-CM

## 2022-03-23 DIAGNOSIS — R06.6 HICCUPS: Primary | ICD-10-CM

## 2022-03-23 LAB — BACTERIA UR CULT: ABNORMAL

## 2022-03-23 PROCEDURE — 99441 PR PHYSICIAN TELEPHONE EVALUATION 5-10 MIN: CPT | Performed by: PEDIATRICS

## 2022-03-23 RX ORDER — BACLOFEN 5 MG/1
5-10 TABLET ORAL 3 TIMES DAILY
Qty: 30 TABLET | Refills: 0 | Status: SHIPPED | OUTPATIENT
Start: 2022-03-23 | End: 2022-05-05

## 2022-03-23 RX ORDER — SULFAMETHOXAZOLE/TRIMETHOPRIM 800-160 MG
1 TABLET ORAL 2 TIMES DAILY
Qty: 14 TABLET | Refills: 0 | Status: SHIPPED | OUTPATIENT
Start: 2022-03-23 | End: 2022-03-30

## 2022-03-23 NOTE — ASSESSMENT & PLAN NOTE
He has noticed improvement in the baclofen.  He has not had any significant drowsiness, dizziness or confusion.  I would like him to increase from 5 mg 3 times a day to up to 10 mg 2 times a day.  I will call to follow-up with him closely in 48 hours on Friday.  At this time, we will hold off on neuro imaging and referral to neurology unless his hiccups do not improve on maximum dose of baclofen.  Unfortunately e consult to neurology is no longer available and so I may need to refer him to neurology.

## 2022-03-23 NOTE — RESULT ENCOUNTER NOTE
Called and spoke to April and Denis  Urine culture growing E coli  Sending bactrim x 7 days, waiting urine culture sensitivities     Dr. Lunsford

## 2022-03-23 NOTE — PROGRESS NOTES
"Denis is a 72 year old who is being evaluated via a billable telephone visit.      What phone number would you like to be contacted at? [phone]  How would you like to obtain your AVS? [facility]    Assessment & Plan   Problem List Items Addressed This Visit     Hiccups - Primary     He has noticed improvement in the baclofen.  He has not had any significant drowsiness, dizziness or confusion.  I would like him to increase from 5 mg 3 times a day to up to 10 mg 2 times a day.  I will call to follow-up with him closely in 48 hours on Friday.  At this time, we will hold off on neuro imaging and referral to neurology unless his hiccups do not improve on maximum dose of baclofen.  Unfortunately e consult to neurology is no longer available and so I may need to refer him to neurology.         Relevant Medications    Baclofen (LIORESAL) 5 MG tablet             Return in about 2 days (around 3/25/2022) for Follow up, with me.    Jed Parada MD  [facility]   Denis is a 72 year old who presents for the following health issues     HPI     Acute Illness  Acute illness concerns: Hiccups   Onset/Duration: patient states \"quite some-time\"- 5-6 days   Symptoms:  Fever: no  Chills/Sweats: no  Headache (location?): no  Sinus Pressure: no  Conjunctivitis:  no  Ear Pain: no  Rhinorrhea: no  Congestion: no  Sore Throat: no  Cough: YES-non-productive  Wheeze: YES  Decreased Appetite: YES  Nausea: YES-on and off   Vomiting: no  Diarrhea: no  Dysuria/Freq.: YES-increase in frequency with increasing water intake   Dysuria or Hematuria: no  Fatigue/Achiness: no  Sick/Strep Exposure: no  Therapies tried and outcome: Pantoprazole and patient states it helps \"some\" but doesn't take away all the symptoms       Started baclofen yesterday  Has noticed improvement in the hiccups, but still having hiccups  Was just a little drowsy with nighttime dose    Review of Systems   See above      Objective     "       Vitals:  No vitals were obtained today due to virtual visit.    Physical Exam   alert and no distress, occasional hiccup (though improved)  PSYCH: Alert and oriented times 3; coherent speech, normal   rate and volume, able to articulate logical thoughts, able   to abstract reason, no tangential thoughts, no hallucinations   or delusions  His affect is normal  RESP: No cough, no audible wheezing, able to talk in full sentences  Remainder of exam unable to be completed due to telephone visits                Phone call duration: 5 minutes

## 2022-03-24 ENCOUNTER — TELEPHONE (OUTPATIENT)
Dept: PULMONOLOGY | Facility: OTHER | Age: 72
End: 2022-03-24

## 2022-03-24 NOTE — TELEPHONE ENCOUNTER
Mailbox full   ----- Message from Vipin Duron MD sent at 3/20/2022 10:17 AM CDT -----  Please make sure Denis gets scheduled for a PET-CT.    Thanks for your help,  Peter

## 2022-03-25 NOTE — RESULT ENCOUNTER NOTE
Beatrice Barrios,    I tried to reach you earlier today as promised and your voicemail is too full. You didn't . I just wanted to check in to see how your hiccups are doing on the higher baclofen 10mg dose. Also your urine is indeed growing E coli but it is sensitive to bactrim which I sent.    Let me know when you can how you're doing! Feel free to call the clinic [phone]    Please let me know if you have any questions or concerns,  Dr. Lunsford

## 2022-03-26 ENCOUNTER — NURSE TRIAGE (OUTPATIENT)
Dept: NURSING | Facility: CLINIC | Age: 72
End: 2022-03-26
Payer: COMMERCIAL

## 2022-03-26 NOTE — TELEPHONE ENCOUNTER
Patient's niece, April called with some medication questions.  Consent to communicate in media.  Patient was recently diagnosed with a UTI and prescribed bactrim DS.  April wanted to clarify that this was indeed the antibiotic ordered,as  there are other antibiotics listed on this medication list.  Writer confirmed that bactrim is correct and should be started ASAP.  April has been trying to set up pill boxes for patient as he has been a little confused lately.  Patient has been randomly taking out pills from the box.  Writer recommended making an appointment to go over medications with PCP, as there appears to be medications that patient is not on.  April verbalized understanding and agrees with plan.     Tatiana Hall RN  [facility] Nurse Advisor  12:15 PM 3/26/2022    Reason for Disposition    Caller has medication question only, adult not sick, and triager answers question    Protocols used: MEDICATION QUESTION CALL-A-

## 2022-03-28 ENCOUNTER — TELEPHONE (OUTPATIENT)
Dept: INTERNAL MEDICINE | Facility: CLINIC | Age: 72
End: 2022-03-28
Payer: COMMERCIAL

## 2022-03-28 NOTE — TELEPHONE ENCOUNTER
Patient is calling with update for PCP.  He has been on a anti biotic and instructed to call Dr. Parada and provide update.  Patient was warned he may have upset stomach with antibiotic.     He has been eating lite and he states it is just weird.  Doesn't have stomach pain per se, he just knows he is not right yet.  He is feeling a little better, stronger and hoping he is on the right path.

## 2022-03-29 NOTE — TELEPHONE ENCOUNTER
Left message for patient to call back. When he calls back please relay message below and assist as needed.

## 2022-03-29 NOTE — TELEPHONE ENCOUNTER
Can you call and schedule a virtual visit for hiccup follow up? Are his hiccups better? You can also try calling the niece listed under consent. She is a much better historian    Dr. Lunsford

## 2022-04-01 ENCOUNTER — NURSE TRIAGE (OUTPATIENT)
Dept: NURSING | Facility: CLINIC | Age: 72
End: 2022-04-01
Payer: COMMERCIAL

## 2022-04-01 NOTE — TELEPHONE ENCOUNTER
Routing to PCP, Dr. Tal Saab Jr., [facility] on 4/1/2022 at 1:01 PM

## 2022-04-01 NOTE — TELEPHONE ENCOUNTER
I recommend he go in to be seen for chest pain as this can be many different things. I am only in until 4 and have a very packed schedule so would not be able to see him. This may require him to go to the ED for evaluation which could take hours. Urgent care is another alternative if he refuses ED.    Dr. Lunsford

## 2022-04-01 NOTE — TELEPHONE ENCOUNTER
Patient calling reports having pain and burning beneath the breast bone in the middle of the chest. Reports it is uncomfortable and thinks it could be acid reflux. Reports this burning has been ongoing since his hiccups got under control. Reports something isn't quite right but unable to pinpoint it. Patient has had decreased bowel movements as he has not been eating much, doesn't think his stomach can handle it. Patient requesting PCP input regarding OTC acid reflux medication he could try. Reports he has a procedure 4/4 and is unsure if he could start an OTC now or if he should wait till after his procedure. Please advise.     Bella Brannon RN 04/01/22 12:41 PM   [facility] Triage Nurse Advisor    Reason for Disposition    Age > 60 years    Additional Information    Negative: Passed out (i.e., fainted, collapsed and was not responding)    Negative: Shock suspected (e.g., cold/pale/clammy skin, too weak to stand, low BP, rapid pulse)    Negative: Visible sweat on face or sweat is dripping down    Negative: SEVERE abdominal pain (e.g., excruciating)    Negative: Pain lasting > 10 minutes and over 50 years old    Negative: Pain lasting > 10 minutes and over 40 years old and associated chest, arm, neck, upper back, or jaw pain    Negative: Pain lasting > 10 minutes and over 35 years old and at least one cardiac risk factor    Negative: Pain lasting > 10 minutes and history of heart disease (i.e., heart attack, bypass surgery, angina, angioplasty, CHF)    Negative: Recent injury to the abdomen    Negative: Vomiting red blood or black (coffee ground) material    Negative: Bloody, black, or tarry bowel movements (Exception: chronic-unchanged  black-grey bowel movements and is taking iron pills or Pepto-bismol)    Negative: Pregnant > 24 weeks and hand or face swelling    Negative: Constant abdominal pain lasting > 2 hours    Negative: Vomiting bile (green color)    Negative: Patient sounds very sick or weak to the  triager    Negative: Vomiting and abdomen looks much more swollen than usual    Negative: White of the eyes have turned yellow (i.e.,  jaundice)    Negative: Fever > 103 F (39.4 C)    Negative: Fever > 101 F (38.3 C) and over 60 years of age    Negative: Fever > 100.0 F (37.8 C) and has diabetes mellitus or a weak immune system (e.g., HIV positive, cancer chemotherapy, organ transplant, splenectomy, chronic steroids)    Negative: Fever > 100.0 F (37.8 C) and bedridden (e.g., nursing home patient, stroke, chronic illness, recovering from surgery)    Protocols used: ABDOMINAL PAIN - UPPER-A-OH

## 2022-04-01 NOTE — TELEPHONE ENCOUNTER
Contacted patient and advised he be seen in ED right away as PCP recommends below.  Patient verbalized understanding and agreed to go.

## 2022-04-04 ENCOUNTER — HOSPITAL ENCOUNTER (OUTPATIENT)
Dept: PET IMAGING | Facility: HOSPITAL | Age: 72
Discharge: HOME | End: 2022-04-04
Attending: INTERNAL MEDICINE | Admitting: INTERNAL MEDICINE
Payer: COMMERCIAL

## 2022-04-04 DIAGNOSIS — R91.8 MULTIPLE PULMONARY NODULES: ICD-10-CM

## 2022-04-04 LAB — GLUCOSE BLDC GLUCOMTR-MCNC: 107 MG/DL (ref 70–99)

## 2022-04-04 PROCEDURE — 78815 PET IMAGE W/CT SKULL-THIGH: CPT | Mod: 26 | Performed by: RADIOLOGY

## 2022-04-04 PROCEDURE — 78815 PET IMAGE W/CT SKULL-THIGH: CPT | Mod: PI

## 2022-04-04 PROCEDURE — 82962 GLUCOSE BLOOD TEST: CPT | Mod: GZ

## 2022-04-04 PROCEDURE — 74177 CT ABD & PELVIS W/CONTRAST: CPT

## 2022-04-04 PROCEDURE — 71260 CT THORAX DX C+: CPT | Mod: 26 | Performed by: RADIOLOGY

## 2022-04-04 PROCEDURE — 250N000011 HC RX IP 250 OP 636: Performed by: INTERNAL MEDICINE

## 2022-04-04 PROCEDURE — 343N000001 HC RX 343: Performed by: INTERNAL MEDICINE

## 2022-04-04 PROCEDURE — 74177 CT ABD & PELVIS W/CONTRAST: CPT | Mod: 26 | Performed by: RADIOLOGY

## 2022-04-04 PROCEDURE — A9552 F18 FDG: HCPCS | Performed by: INTERNAL MEDICINE

## 2022-04-04 RX ORDER — IOPAMIDOL 755 MG/ML
67 INJECTION, SOLUTION INTRAVASCULAR ONCE
Status: COMPLETED | OUTPATIENT
Start: 2022-04-04 | End: 2022-04-04

## 2022-04-04 RX ADMIN — FLUDEOXYGLUCOSE F-18 10.96 MCI.: 500 INJECTION, SOLUTION INTRAVENOUS at 13:23

## 2022-04-04 RX ADMIN — IOPAMIDOL 67 ML: 755 INJECTION, SOLUTION INTRAVENOUS at 14:54

## 2022-04-04 NOTE — TELEPHONE ENCOUNTER
Spoke with patient and he was seen on 4/1/2022 ER ([facility]) for chest pain from GERD. He states that he no longer has hiccups but has an appt today for imaging. He states he is taking his day, day by day and awaiting further direction after imaging is completed. He is scheduled for follow up on Friday due to ride availability   Tab Joy CMA on 4/4/2022 at 11:35 AM

## 2022-04-05 ENCOUNTER — TELEPHONE (OUTPATIENT)
Dept: PULMONOLOGY | Facility: OTHER | Age: 72
End: 2022-04-05
Payer: COMMERCIAL

## 2022-04-05 DIAGNOSIS — G47.00 INSOMNIA: ICD-10-CM

## 2022-04-05 DIAGNOSIS — F41.9 ANXIETY: ICD-10-CM

## 2022-04-05 DIAGNOSIS — D86.0 PULMONARY SARCOIDOSIS (H): Primary | ICD-10-CM

## 2022-04-05 RX ORDER — ALPRAZOLAM 1 MG
1 TABLET ORAL 2 TIMES DAILY PRN
Qty: 60 TABLET | Refills: 0 | Status: SHIPPED | OUTPATIENT
Start: 2022-04-05 | End: 2022-04-13

## 2022-04-05 RX ORDER — TRAZODONE HYDROCHLORIDE 50 MG/1
TABLET, FILM COATED ORAL
Qty: 90 TABLET | Refills: 2 | Status: SHIPPED | OUTPATIENT
Start: 2022-04-05 | End: 2022-07-27

## 2022-04-05 NOTE — TELEPHONE ENCOUNTER
Patient called to get refills on medications attached. Has 3 days left patient stated.    Controlled Substance Refill Request for: ALPRAZolam (XANAX) 1 MG tablet    Last refill: 3/8/2022    Last clinic visit: 3/28/2022 Via Telephone     Clinic visit frequency required:  Next appt: 4/8/2022      
electronic

## 2022-04-05 NOTE — CONFIDENTIAL NOTE
Pulmonary Telephone Note    PET-CT shows FDG-avid mediastinal and bilateral hilar lymphadenopathy and hypermetabolic nodules. 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 Called Denis to discuss. His refractory hiccups have improved with initiation of baclofen but he is still fatigued from having hiccups for so long. His breathing feels fine. No cough. No fevers, chills, weight loss. Will plan not to pursue EBUS-FNA at this point, obtain spirometry and DLCO prior to my visit with him in early May. If he has decrement in lung function and/or respiratory symptoms, could consider reinitiation of steroid therapy with follow-up PFT and CT imaging. He is in agreement.    Vipin Duron MD  Pulmonary and Critical Care Medicine  [facility]  Office [phone]  Pager [phone]  (he/him/his)

## 2022-04-07 ENCOUNTER — TELEPHONE (OUTPATIENT)
Dept: PULMONOLOGY | Facility: OTHER | Age: 72
End: 2022-04-07
Payer: COMMERCIAL

## 2022-04-07 ENCOUNTER — TELEPHONE (OUTPATIENT)
Dept: INTERNAL MEDICINE | Facility: CLINIC | Age: 72
End: 2022-04-07
Payer: COMMERCIAL

## 2022-04-07 NOTE — TELEPHONE ENCOUNTER
Patient wants to let PCP know that he has his biopsy scheduled for May 12th.    Patient has follow ED/Hosp follow up appt tomorrow with PCP.

## 2022-04-07 NOTE — TELEPHONE ENCOUNTER
Phone message from patient. Wanted to let dr. Duron know that his urologist has scheduled him for a bx May 12.

## 2022-04-11 ENCOUNTER — TELEPHONE (OUTPATIENT)
Dept: INTERNAL MEDICINE | Facility: CLINIC | Age: 72
End: 2022-04-11
Payer: COMMERCIAL

## 2022-04-11 NOTE — TELEPHONE ENCOUNTER
Patient wants to let Dr. Parada know that he thought his appointment on 04/08/2022 was at 7PM not 7AM.  He arrived Friday evening and was just very upset, he has not been himself and he did not mean any disrespect to you.    Patient has scheduled Hosp Follow Up for Wed 4/13 at 2PM.    Reports having stomach issues just non stop worrying.

## 2022-04-11 NOTE — TELEPHONE ENCOUNTER
Patient would like to know if there is anything else he can take besides pepcid   He states he knows he has his appt to see PCP this week but truly his stomach is very upset and he is not able to get any relief.

## 2022-04-12 NOTE — TELEPHONE ENCOUNTER
I would like him to increase his pantoprazole to 40mg BID until he sees me tomorrow for us to come up with a plan    Dr. Lunsford

## 2022-04-13 ENCOUNTER — OFFICE VISIT (OUTPATIENT)
Dept: PEDIATRICS | Facility: CLINIC | Age: 72
End: 2022-04-13
Payer: COMMERCIAL

## 2022-04-13 VITALS
SYSTOLIC BLOOD PRESSURE: 118 MMHG | WEIGHT: 128.44 LBS | HEART RATE: 67 BPM | OXYGEN SATURATION: 100 % | DIASTOLIC BLOOD PRESSURE: 58 MMHG | BODY MASS INDEX: 22.05 KG/M2

## 2022-04-13 DIAGNOSIS — G47.00 INSOMNIA, UNSPECIFIED TYPE: ICD-10-CM

## 2022-04-13 DIAGNOSIS — R10.13 DYSPEPSIA: ICD-10-CM

## 2022-04-13 DIAGNOSIS — F41.9 ANXIETY: ICD-10-CM

## 2022-04-13 DIAGNOSIS — Z23 HIGH PRIORITY FOR 2019-NCOV VACCINE: ICD-10-CM

## 2022-04-13 DIAGNOSIS — R06.6 HICCUPS: Primary | ICD-10-CM

## 2022-04-13 DIAGNOSIS — K22.89 THICKENING OF ESOPHAGUS: ICD-10-CM

## 2022-04-13 DIAGNOSIS — R97.20 ELEVATED PROSTATE SPECIFIC ANTIGEN (PSA): ICD-10-CM

## 2022-04-13 DIAGNOSIS — D86.0 PULMONARY SARCOIDOSIS (H): ICD-10-CM

## 2022-04-13 PROBLEM — E87.1 HYPONATREMIA: Status: RESOLVED | Noted: 2022-03-21 | Resolved: 2022-04-13

## 2022-04-13 PROCEDURE — 99214 OFFICE O/P EST MOD 30 MIN: CPT | Performed by: PEDIATRICS

## 2022-04-13 PROCEDURE — 0054A COVID-19,PF,PFIZER (12+ YRS): CPT | Performed by: PEDIATRICS

## 2022-04-13 PROCEDURE — 91305 COVID-19,PF,PFIZER (12+ YRS): CPT | Performed by: PEDIATRICS

## 2022-04-13 RX ORDER — ALPRAZOLAM 1 MG
1 TABLET ORAL
Qty: 30 TABLET | Refills: 0 | COMMUNITY
Start: 2022-04-13 | End: 2022-05-06

## 2022-04-13 RX ORDER — LANOLIN ALCOHOL/MO/W.PET/CERES
3 CREAM (GRAM) TOPICAL
Qty: 90 TABLET | Refills: 1 | Status: SHIPPED | OUTPATIENT
Start: 2022-04-13 | End: 2022-08-19

## 2022-04-13 RX ORDER — PANTOPRAZOLE SODIUM 40 MG/1
40 TABLET, DELAYED RELEASE ORAL 2 TIMES DAILY
Qty: 90 TABLET | Refills: 0 | Status: SHIPPED | OUTPATIENT
Start: 2022-04-13 | End: 2022-05-11

## 2022-04-13 RX ORDER — SULFAMETHOXAZOLE/TRIMETHOPRIM 800-160 MG
TABLET ORAL
COMMUNITY
End: 2022-05-05

## 2022-04-13 NOTE — ASSESSMENT & PLAN NOTE
The patient clarified today that he has been taking 2 mg of Xanax at nighttime for insomnia chronically.  In the past I was under the impression that he was taking 1 in the morning for anxiety and then 1 at nighttime for sleep.  In any case, I did discuss with him my concerns given that he is on baclofen now 3 times a day.  He also seems to have some baseline memory impairment, and so we did discuss weaning him down to just 1 mg of Xanax at nighttime.  First next fill, he will just get 30 tablets.  I will give him melatonin to try as well.

## 2022-04-13 NOTE — PATIENT INSTRUCTIONS
Increase your pantoprazole from 40mg daily to twice daily  You should see the stomach specialist   Keep taking baclofen for now  Only take 1 of the xanax pill each night. Take melatonin too.  I placed a specialty referral during today's visit for: [facility]. If you are having issues with this, please message me through [employer] or call our clinic at [phone] (press option 2 to speak with someone from our [employer] team).    GENERAL INFORMATION AND HELPFUL NUMBERS:  If labs were ordered today, please go to the outpatient lab located in the same building. Once the results are back, we will notify you with results.  If imaging was ordered to be done today, please go to [facility] (located in the same building across our lobby). Once the results are back, we will notify you with results.  If imaging was ordered to be done in the future at an [facility] facility, someone will call to schedule otherwise you may also call [phone] to schedule.  If a specialty referral was placed during today's visit, someone will call to schedule unless you were instructed to call yourself. If you are having issues with this, please message me through [employer] or call our clinic at [phone] (press option 2 to speak with someone from our [employer] team).  If a mammogram was ordered during today's visit, someone will call to schedule otherwise you may also call [phone] to schedule   If a heart ultrasound or stress test was ordered today, someone will call to schedule otherwise you may also call the main Cardiology clinic at [phone] to schedule.  If a bone density scan was ordered today, someone will call to schedule otherwise you may also call [phone] to schedule.  If you have any questions or concerns after our visit today, please message me through [employer] or call our clinic at [phone] (press option 2 to speak with someone from our [employer] team).

## 2022-04-13 NOTE — ASSESSMENT & PLAN NOTE
This has resolved on baclofen 10 mg 3 times a day.  He does seem to have some issues with memory and so I did ask him more about his Xanax use.  Previously he had said he was using it twice a day, however today he clarified that he takes 2 mg at nighttime for sleep.  Given that we just added the baclofen, I did advise that he go down to just 1 mg the Xanax and hopefully we can also try to get him off of it completely one day, though it has been a medication that he has been on for a long time and is very resistant to changing the dose.  However today he is agreeable to going down to 1 mg.  I will also give him melatonin to use to help with sleep.

## 2022-04-13 NOTE — ASSESSMENT & PLAN NOTE
He is following with pulmonology.  He is on prednisone 1 mg daily.  Recent PET scan did show active sarcoidosis.  He is following up with pulmonology later this week.

## 2022-04-13 NOTE — PROGRESS NOTES
Assessment & Plan   Problem List Items Addressed This Visit     Anxiety     The patient clarified today that he has been taking 2 mg of Xanax at nighttime for insomnia chronically.  In the past I was under the impression that he was taking 1 in the morning for anxiety and then 1 at nighttime for sleep.  In any case, I did discuss with him my concerns given that he is on baclofen now 3 times a day.  He also seems to have some baseline memory impairment, and so we did discuss weaning him down to just 1 mg of Xanax at nighttime.  First next fill, he will just get 30 tablets.  I will give him melatonin to try as well.             Relevant Medications    ALPRAZolam (XANAX) 1 MG tablet    Insomnia     The patient clarified today that he has been taking 2 mg of Xanax at nighttime for insomnia chronically.  In the past I was under the impression that he was taking 1 in the morning for anxiety and then 1 at nighttime for sleep.  In any case, I did discuss with him my concerns given that he is on baclofen now 3 times a day.  He also seems to have some baseline memory impairment, and so we did discuss weaning him down to just 1 mg of Xanax at nighttime.  First next fill, he will just get 30 tablets.  I will give him melatonin to try as well.           Relevant Medications    melatonin 3 MG tablet    Pulmonary sarcoidosis (H)     He is following with pulmonology.  He is on prednisone 1 mg daily.  Recent PET scan did show active sarcoidosis.  He is following up with pulmonology later this week.           Elevated prostate specific antigen (PSA)     He is going to get a biopsy of his prostate in the middle of May.           Hiccups - Primary     This has resolved on baclofen 10 mg 3 times a day.  He does seem to have some issues with memory and so I did ask him more about his Xanax use.  Previously he had said he was using it twice a day, however today he clarified that he takes 2 mg at nighttime for sleep.  Given that we just  added the baclofen, I did advise that he go down to just 1 mg the Xanax and hopefully we can also try to get him off of it completely one day, though it has been a medication that he has been on for a long time and is very resistant to changing the dose.  However today he is agreeable to going down to 1 mg.  I will also give him melatonin to use to help with sleep.           Relevant Medications    pantoprazole (PROTONIX) 40 MG EC tablet    Dyspepsia     CTA 3/14 in ED  4.  Hiatal hernia. Circumferential wall thickening of the distal esophagus may be inflammatory or neoplastic.     I previously did refer him over to [facility], but he did not set that up.  I emphasized again with him that he should see gastroenterology for follow-up of his thickened esophagus as well as persistent dyspepsia.  We will increase his pantoprazole from 40 mg daily to twice a day until he can get in with [facility].           Relevant Orders    Adult Gastro Ref - Consult Only    Thickening of esophagus- seen 3/2022, referred to GI      Other Visit Diagnoses     High priority for 2019-nCoV vaccine             Return in about 3 months (around 7/13/2022) for Follow up, with me.    Jed Parada MD  [facility]    Monica Barrios is a 72 year old who presents for the following health issues     HPI     No more hiccups  Taking baclofen around 10mg 3 times per day  That is doing well  Went to hospital on 4/1 for chest pain, got GI cocktail   Hasn't set up appt with [facility]    Had PET scan  Then will see pulmonologist this Friday    Doing biopsy in May for prostate    Not doing a strict keto diet anymore  Trying to cut down carbs still    Taking 2 xanax tabs at nighttime for sleep      Hospital Follow-up Visit:    Hospital/Nursing Home/IP Rehab Facility: [facility]   Date of Admission: 4/1/22  Date of Discharge: 4/1/22  Reason(s) for Admission: Hiccups       Was your hospitalization related to COVID-19? No    Problems taking medications regularly:  None  Medication changes since discharge: None  Problems adhering to non-medication therapy:  None    Summary of hospitalization:  Discharge summary unavailable  Diagnostic Tests/Treatments reviewed.  Follow up needed: none  Other Healthcare Providers Involved in Patient s Care:         None  Update since discharge: staying the same Post Discharge Medication Reconciliation: discharge medications reconciled and changed, per note/orders.  Plan of care communicated with patient            Review of Systems   See above      Objective    /58 (BP Location: Right arm, Patient Position: Sitting, Cuff Size: Adult Regular)   Pulse 67   Wt 58.3 kg (128 lb 7 oz)   SpO2 100%   BMI 22.05 kg/m    Body mass index is 22.05 kg/m .  Physical Exam   GENERAL: healthy, alert and no distress  RESP: lungs clear to auscultation - no rales, rhonchi or wheezes  CV: regular rate and rhythm, normal S1 S2, no S3 or S4, no murmur, click or rub, no peripheral edema and peripheral pulses strong  ABDOMEN: soft, nontender, no hepatosplenomegaly, no masses and bowel sounds normal  MS: no gross musculoskeletal defects noted, no edema

## 2022-04-13 NOTE — ASSESSMENT & PLAN NOTE
CTA 3/14 in ED  4.  Hiatal hernia. Circumferential wall thickening of the distal esophagus may be inflammatory or neoplastic.     I previously did refer him over to [facility], but he did not set that up.  I emphasized again with him that he should see gastroenterology for follow-up of his thickened esophagus as well as persistent dyspepsia.  We will increase his pantoprazole from 40 mg daily to twice a day until he can get in with [facility].

## 2022-04-14 ENCOUNTER — TELEPHONE (OUTPATIENT)
Dept: INTERNAL MEDICINE | Facility: CLINIC | Age: 72
End: 2022-04-14
Payer: COMMERCIAL

## 2022-04-14 NOTE — TELEPHONE ENCOUNTER
Patient shares he had an appointment yesterday with PCP.  Would like a call back with Dr. Parada if at all possible.

## 2022-04-14 NOTE — TELEPHONE ENCOUNTER
Can someone call patient and ask what questions he has? He had no concerns yesterday      Dr. Lunsford

## 2022-04-15 ENCOUNTER — ALLIED HEALTH/NURSE VISIT (OUTPATIENT)
Dept: PULMONOLOGY | Facility: OTHER | Age: 72
End: 2022-04-15
Attending: INTERNAL MEDICINE
Payer: COMMERCIAL

## 2022-04-15 ENCOUNTER — TELEPHONE (OUTPATIENT)
Dept: INTERNAL MEDICINE | Facility: CLINIC | Age: 72
End: 2022-04-15

## 2022-04-15 DIAGNOSIS — D86.0 PULMONARY SARCOIDOSIS (H): ICD-10-CM

## 2022-04-15 PROCEDURE — 94375 RESPIRATORY FLOW VOLUME LOOP: CPT | Performed by: INTERNAL MEDICINE

## 2022-04-15 PROCEDURE — 94729 DIFFUSING CAPACITY: CPT | Performed by: INTERNAL MEDICINE

## 2022-04-15 NOTE — TELEPHONE ENCOUNTER
"Called and spoke to patient today  He wanted to let me know he felt anxious when I used the word \"cancer\" when I told him to follow up on the thickening of his esophagus seen on CT back in March 2022.  I told the patient that I did purposely use the word cancer to ensure that he gets follow-up on this thickening of the esophagus.  I wanted him to know that the worst-case situation is that this could be esophageal cancer, but again it also could not. I reiterated that it is important that he gets follow up on this, as it could be something very benign as well.  However it warrants further evaluation with the gastroenterology team.  He thanked me for my clarification.    Dr. Lunsford 04/15/22   "

## 2022-04-15 NOTE — TELEPHONE ENCOUNTER
Contacted patient to inquire to what his question was.  Patient states it is a private matter that he does not want to share with anyone by Dr. Lunsford.  Patient states he was planning on writing Dr. Lunsford a note and dropping it off at the  today so that it could get to Dr. Lunsford.  Writer encouraged patient to do so.  Patient thankful for call back and will drop note for Dr. Lunsford today.

## 2022-04-15 NOTE — TELEPHONE ENCOUNTER
PT DROPPED OFF LETTER FOR DR. MAZARIEGOS. LETTER PLACED INTO MAILBOX FILE. JUST FOR DR. MAZARIEGOS'S COPY.

## 2022-04-16 LAB
DLCOCOR-%PRED-PRE: 58 %
DLCOCOR-PRE: 12.3 ML/MIN/MMHG
DLCOUNC-%PRED-PRE: 56 %
DLCOUNC-PRE: 11.94 ML/MIN/MMHG
DLCOUNC-PRED: 20.98 ML/MIN/MMHG
ERV-%PRED-PRE: 155 %
ERV-PRE: 1.24 L
ERV-PRED: 0.8 L
EXPTIME-PRE: 15.16 SEC
FEF2575-%PRED-PRE: 57 %
FEF2575-PRE: 1.08 L/SEC
FEF2575-PRED: 1.89 L/SEC
FEFMAX-%PRED-PRE: 110 %
FEFMAX-PRE: 7.48 L/SEC
FEFMAX-PRED: 6.75 L/SEC
FEV1-%PRED-PRE: 106 %
FEV1-PRE: 2.52 L
FEV1FEV6-PRE: 70 %
FEV1FEV6-PRED: 77 %
FEV1FVC-PRE: 64 %
FEV1FVC-PRED: 77 %
FEV1SVC-PRE: 65 %
FEV1SVC-PRED: 65 %
FIFMAX-PRE: 3.33 L/SEC
FVC-%PRED-PRE: 128 %
FVC-PRE: 3.95 L
FVC-PRED: 3.07 L
IC-%PRED-PRE: 93 %
IC-PRE: 2.65 L
IC-PRED: 2.84 L
VA-%PRED-PRE: 99 %
VA-PRE: 5.1 L
VC-%PRED-PRE: 107 %
VC-PRE: 3.9 L
VC-PRED: 3.64 L

## 2022-04-22 ENCOUNTER — TELEPHONE (OUTPATIENT)
Dept: PULMONOLOGY | Facility: OTHER | Age: 72
End: 2022-04-22
Payer: COMMERCIAL

## 2022-04-22 DIAGNOSIS — R06.1 STRIDOR: ICD-10-CM

## 2022-04-22 DIAGNOSIS — D86.0 PULMONARY SARCOIDOSIS (H): Primary | ICD-10-CM

## 2022-04-22 DIAGNOSIS — J35.1 HYPERTROPHY OF PALATINE TONSIL: ICD-10-CM

## 2022-04-22 NOTE — CONFIDENTIAL NOTE
Pulmonary Telephone Note    PET-CT with FDG-avid hilar and mediastinal nodes, though this could be c/w sarcoidosis. Left palatine tonsil FDG-avid. PFT with normal FEV1, mild obstruction, reduced DLCO but stable. However, had inspiratory stridor and flattened inspiratory limb. Called [facility] to discuss. He is having prostate biopsy next month. I see him in just over a week in clinic. I would also like him to see ENT to evaluate the left palatine tonsil; DDx includes sarcoid but is broad. He is in agreement.    Vipin Duron MD  Pulmonary and Critical Care Medicine  [facility]  Office [phone]  Pager [phone]  (he/him/his)

## 2022-05-03 ENCOUNTER — OFFICE VISIT (OUTPATIENT)
Dept: PULMONOLOGY | Facility: OTHER | Age: 72
End: 2022-05-03
Payer: COMMERCIAL

## 2022-05-03 VITALS
BODY MASS INDEX: 22.71 KG/M2 | SYSTOLIC BLOOD PRESSURE: 110 MMHG | DIASTOLIC BLOOD PRESSURE: 60 MMHG | HEART RATE: 64 BPM | OXYGEN SATURATION: 97 % | WEIGHT: 132.3 LBS

## 2022-05-03 DIAGNOSIS — D86.9 SARCOIDOSIS: Primary | ICD-10-CM

## 2022-05-03 PROCEDURE — 99214 OFFICE O/P EST MOD 30 MIN: CPT | Performed by: INTERNAL MEDICINE

## 2022-05-03 RX ORDER — PREDNISONE 1 MG/1
3 TABLET ORAL DAILY
Qty: 90 TABLET | Refills: 11 | Status: SHIPPED | OUTPATIENT
Start: 2022-05-03 | End: 2023-03-24

## 2022-05-03 NOTE — LETTER
5/3/2022         RE: Denis Moreno  [address] [place] [address]        Dear Colleague,    Thank you for referring your patient, Denis Moreon, to the [facility]. Please see a copy of my visit note below.    Pulmonary Clinic Follow-up Visit    Assessment and Plan:  72 year old male never smoker with a history of dyslipidemia, low-grade chronic bilateral uveitis, mild SAPNA, and pulmonary sarcoidosis, presenting for follow-up.      Sarcoidosis: Denis has had low energy in the morning. Not dyspnea per se, but some fatigue. Had nocturnal cough for three nights in a row in April, but this did not recur. Had developed severe refractory hiccups, but these have resolved with baclofen. 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 PET-CT has shown some sarcoid activity, mostly mediastinal and hilar nodes, a few pulmonary nodules, but no overt pneumonitis or fibrosis. In April, spirometry showed mild obstruction with normal FEV1, corrected DLCO moderately reduced at 58%. There was some flattening of the inspiratory limb, and has mild end-inspiratory stridor on exam (this is not symptomatic for him), and he is scheduled to see ENT on May 13. With his stable symptoms and lack of airflow limitation, I am reluctant to put him back on a high prednisone dose given the potential side effects and he did have fairly significant steroid-induced weight gain in the past; unclear role for a steroid-sparing immunosuppressant at this point. Will increase his maintenance prednisone a bit, from 1 mg to 3 mg daily. Recall that Denis has a history of pulmonary and ocular involvement with sarcoidosis, with chronic bilateral uveitis, and mediastinal/hilar lymphadenopathy with pulmonary nodules but previously normal functional status and normal PFTs. He previously developed worsening fatigue, dyspnea, and cough, with significant peripheral interstitial  changes and worsening pulmonary function tests requiring initiation of prednisone. This led to improvement in symptoms, pulmonary function, and radiographic findings, and we were able to slowly titrate off the prednisone down to maintenance of 0.5 mg daily. Normal renal and hepatic function. Prior ECG with possible LVH, otherwise unremarkable.     Plan:  - increase prednisone from 1 mg to 3 mg daily  - ENT evaluation of upper airway on May 13; concerned about flattened inspiratory limb of flow-volume loop and inspiratory stridor on exam, possible upper airway involvement with sarcoidosis or other etiology  - on going ophthalmology follow-up at [facility]  - annual high-dose influenza vaccination  - up to date on pneumococcal vaccination  - COVID-19 vaccination: Pf[employer] with second booster in April 2022  - follow up in 6 months  - encouraged him to contact me with questions or concerning symptoms    Vipin Duron MD  Pulmonary and Critical Care Medicine  [facility]  Office [phone]  Pager [phone]  (he/him/his)    CCx: sarcoidosis    HPI: 72 year old male never smoker with a history of dyslipidemia, low-grade chronic bilateral uveitis, mild SAPNA, and pulmonary sarcoidosis, presenting for follow-up. Denis has had low energy in the morning. Not dyspnea per se, but some fatigue. Had nocturnal cough for three nights in a row in April, but this did not recur. Had developed severe refractory hiccups, but these have resolved with baclofen. 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 PET-CT has shown some sarcoid activity, mostly mediastinal and hilar nodes, a few pulmonary nodules, but no overt pneumonitis or fibrosis. In April, spirometry was normal, corrected DLCO moderately reduced at 58%. There was some flattening of the inspiratory limb, and has mild end-inspiratory stridor on exam (this is not symptomatic for  him), and he is scheduled to see ENT on May 13. With his stable symptoms and lack of airflow limitation, I am reluctant to put him back on a high prednisone dose given the potential side effects and he did have fairly significant steroid-induced weight gain in the past; unclear role for a steroid-sparing immunosuppressant at this point. Will increase his maintenance prednisone a bit, from 1 mg to 3 mg daily. Recall that Denis has a history of pulmonary and ocular involvement with sarcoidosis, with chronic bilateral uveitis, and mediastinal/hilar lymphadenopathy with pulmonary nodules but previously normal functional status and normal PFTs. He previously developed worsening fatigue, dyspnea, and cough, with significant peripheral interstitial changes and worsening pulmonary function tests requiring initiation of prednisone. This led to improvement in symptoms, pulmonary function, and radiographic findings, and we were able to slowly titrate off the prednisone down to maintenance of 0.5 mg daily. Normal renal and hepatic function. Prior ECG with possible LVH, otherwise unremarkable.    ROS:  A 12-system review was obtained and was negative with the exception of the symptoms endorsed in the history of present illness.    PMH:  Pulmonary and ocular sarcoidosis  Deviated septum  HLD  Depression  HTN  Insomnia  Chronic headaches  Mild SAPNA with AHI 10.4    PSH:  Past Surgical History:   Procedure Laterality Date     BRONCHOSCOPY FLEXIBLE AND RIGID  12/20/2016    ebus     COLONOSCOPY  2014    Nothing found, another scheduled in 2023       Allergies:  No Known Allergies    Family HX:  Family History   Problem Relation Age of Onset     Dementia Mother         d 96     Mental Illness Mother         Alzheimer's     Heart Disease Father         d 92     Hypertension Other         Its normal in the [place] [employer]     Substance Abuse Brother         Alcohol, numerous Makes in my Family abused Alcohol       Social Hx:  Social History  "    Socioeconomic History     Marital status: Single     Spouse name: Not on file     Number of children: Not on file     Years of education: Not on file     Highest education level: Not on file   Occupational History     Not on file   Tobacco Use     Smoking status: Never Smoker     Smokeless tobacco: Never Used     Tobacco comment: N.A.   Substance and Sexual Activity     Alcohol use: Yes     Comment: Socially     Drug use: No     Sexual activity: Not Currently     Partners: Female     Birth control/protection: Pull-out method, None     Comment: My Lady Friend lives outside my state   Other Topics Concern     Parent/sibling w/ CABG, MI or angioplasty before 65F 55M? No   Social History Narrative    , 2 grown daughters, 2 granddaughters. 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  Previously working in administrative position.  Nonsmoker.         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 Moved back to [place] 9 years ago because parents had dementia, so moved back here to take care of them. He has an older sister and younger brother (John). He has a girlfriend. She lives in [place]. His ex-wife passed away from breast cancer. Had 2 girls in that marriage. Oldest girl has 2 granddaughters. Both live in [place]. 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 He will probably stop volunteering soon though. - Dr. Lunsford 12/10/21      Social Determinants of Health     Financial Resource Strain: Not on file   Food Insecurity: Not on file   Transportation Needs: Not on file   Physical Activity: Not on file   Stress: Not on file   Social Connections: Not on file   Intimate Partner Violence: Not on file   Housing Stability: Not on file       Current Meds:  Current Outpatient Medications   Medication Sig Dispense Refill     ALPRAZolam (XANAX) 1 MG tablet Take 1 tablet (1 mg) by mouth nightly as needed for anxiety or sleep 30 tablet 0     amLODIPine (NORVASC) " 10 MG tablet TAKE 1 TABLET BY MOUTH EVERY DAY 90 tablet 3     azelastine (ASTELIN) 0.1 % nasal spray USE 2 SPRAYS IN EACH NOSTRIL TWICE A DAY       ciprofloxacin (CIPRO) 500 MG tablet Take 1 tablet by mouth as needed       citalopram (CELEXA) 40 MG tablet Take 1 tablet (40 mg) by mouth daily 90 tablet 1     [employer] D3 125 MCG (5000 UT) CAPS TAKE 1 CAPSULE (5,000 UNITS TOTAL) BY MOUTH DAILY. 90 capsule 3     fluticasone (FLONASE) 50 MCG/ACT nasal spray SPRAY 2 SPRAYS INTO EACH NOSTRIL EVERY DAY       lisinopril (ZESTRIL) 20 MG tablet Take 1 tablet (20 mg) by mouth daily 90 tablet 1     melatonin 3 MG tablet Take 1 tablet (3 mg) by mouth nightly as needed for sleep 90 tablet 1     pantoprazole (PROTONIX) 40 MG EC tablet Take 1 tablet (40 mg) by mouth 2 times daily 90 tablet 0     predniSONE (DELTASONE) 1 MG tablet Take 3 tablets (3 mg) by mouth daily 90 tablet 11     propranolol (INDERAL) 20 MG tablet [PROPRANOLOL (INDERAL) 20 MG TABLET] TAKE 1 TABLET (20 MG TOTAL) BY MOUTH 2 (TWO) TIMES A DAY. 180 tablet 3     rosuvastatin (CRESTOR) 10 MG tablet Take 1 tablet (10 mg) by mouth daily 90 tablet 1     sildenafil (VIAGRA) 100 MG tablet Take 1 tablet (100 mg) by mouth daily as needed (erectile dysfunction) 12 tablet 11     SUMAtriptan (IMITREX) 50 MG tablet Take 50 mg by mouth at onset of headache       traZODone (DESYREL) 50 MG tablet [TRAZODONE (DESYREL) 50 MG TABLET] TAKE 1 TABLET BY MOUTH EVERYDAY AT BEDTIME 90 tablet 2     azithromycin (ZITHROMAX) 250 MG tablet Take 1 tablet by mouth daily       Baclofen (LIORESAL) 5 MG tablet Take 1-2 tablets (5-10 mg) by mouth 3 times daily 30 tablet 0     prednisoLONE acetate (PRED FORTE) 1 % ophthalmic suspension prednisolone acetate 1 % eye drops,suspension       sulfamethoxazole-trimethoprim (BACTRIM DS) 800-160 MG tablet sulfamethoxazole 800 mg-trimethoprim 160 mg tablet   TAKE 1 TABLET BY MOUTH TWICE A DAY FOR 7 DAYS         Physical Exam:  /60 (BP Location: Left arm,  Patient Position: Chair, Cuff Size: Adult Regular)   Pulse 64   Wt 60 kg (132 lb 4.8 oz)   SpO2 97%   BMI 22.71 kg/m    Gen: alert, oriented, no distress  HEENT: mild inspiratory stridor present, no cervical or supraclavicular lymphadenopathy  CV: RRR, no M/G/R  Resp: CTAB, no focal crackles or wheezes  Skin: no apparent rashes  Ext: no cyanosis, clubbing or edema  Neuro: alert, nonfocal    Labs:  Normal BMP and LFTs  WBC 13.1 with no differential  Normal Hgb  ESR 54  1,25-dihydroxyvitamin D level 83 in November 2016    Imaging studies:  PET-CT (April 2022):  - images directly reviewed, formal interpretation follows:  FINDINGS:      HEAD/NECK:  Asymmetric activity at the left palatine tonsil, SUV max 8.19. No  corresponding lesion on CT.     The paranasal sinuses are clear. The mastoid air cells are clear.      The mucosal pharyngeal space, the , prevertebral and carotid  spaces are within normal limits.      No masses, mass effect or pathologically enlarged lymph nodes.   The  thyroid gland is within normal limits.      CHEST:  Hypermetabolic mediastinal and bilateral hilar partially calcified  lymphadenopathy, for example:  -subcarinal lymph node with SUV max 9.08, measuring 19x17 mm, image  185 series 3  -left hilar lymph node with SUV max 8.63, measuring 15 x 10 mm image  178 series 3  -right paratracheal lymph node with SUV max 6.36, image 170 series 3     Hiatal hernia and mild circumferential wall thickening of the distal  esophagus without associated hypermetabolism. Calcified paraesophageal  lymph node without hypermetabolism.      Multiple bilateral pulmonary nodules again demonstrated in all lobes  of the lungs, including:    -19 x 7 mm, along the left pulmonary fissure, image 50 series 8,  increased since 2019 (at which time measured 4 mm). SUV max 3.53.  -6 mm, right upper lobe, image 34 series 8, increased since 2019 (at  which time measured 3 mm)  These do not demonstrate hypermetabolism  above liver and some only  slightly above mediastinal blood pool.      Scarring in the middle lobe inferiorly again seen, similar to 2019. No  pleural effusion.  No pericardial effusion. No large filling defect in  the central pulmonary arteries.     ABDOMEN AND PELVIS:  No suspicious FDG uptake in the abdomen or pelvis. Low attenuating  nonenhancing hypodensity not hypermetabolic in or adjacent to the  pancreatic head, a 19 x 15 mm image 252 series 4.  There is no mass  effect on adjacent structures. The hepatic artery and portal vein pass  through it.  No abdominal/pelvic lymphadenopathy.     The liver has a somewhat mottled appearance although this may be  secondary to bolus timing as this is an arterial phase.  No suspicious  hepatic lesions. No splenomegaly.  No suspicious adrenal mass lesion.  No opaque gallbladder calculi.   There is symmetric nephrographic  renal phase without hydronephrosis.     No evidence for diverticulitis.  No bowel obstruction.  No free fluid.    Normal appendix. Scattered atherosclerotic calcifications of the  aorta.     LOWER EXTREMITIES:   No abnormal masses or hypermetabolic lesions.     BONES:   No suspicious lytic or blastic osseous lesions.   No suspicious FDG  uptake in the skeleton. Mild degenerative changes of the lumbar spine.                                                                         IMPRESSION:  In summary: constellation of findings (hypermetabolic  mediastinal lymphadenopathy, lung nodules, palatine tonsil) most  consistent with active sarcoidosis. Consider treating and rescanning  with PET/CT to see if hypermetabolism resolves.  Alternatively  biopsies could be obtained.     In detail:  1. Increased size of hypermetabolic mediastinal and hilar  lymphadenopathy since 2019.   2. Multiple enlarging hypermetabolic lung nodules. Most likely  secondary to sarcoidosis, upper lobe predominant and several in  perihepatic locations which would be typical of  sarcoidosis. All but 2  in the upper lobes were present on CT 1/15/2019.  While this does not  exclude metastatic disease, it is very unlikely.     3. No active cardiac sarcoidosis (can be assessed because cardiac  uptake is suppressed).      4. Indeterminate uptake in the area of left palatine tonsil.  Differential sarcoidosis, inflammation, cancer. Consider direct  visualization.  Alternatively short-term follow-up after treatment  sarcoid.     5. Hypoenhancing structure in the pancreatic head region without  hypermetabolism.  Indeterminant but likely benign. Likely present on  1/15/2018 (noncontrast study limits evaluation).   If there is further  concern  abdominal MRI could be obtained.     6. Distal esophageal mild wall thickening does not demonstrate  hypermetabolism. Adjacent lymph node with calcification likely prior sarcoid.    TT$E (October 2017):    No previous study for comparison.    Left ventricle ejection fraction is normal. The calculated left ventricular ejection fraction is 69%.    Mild mitral regurgitation      Pulmonary Function Testing  November 2016:  FEV1/FVC is 72% and is normal.  FEV1 is 2.49L or 113% predicted and is normal.  FVC is 3.47L or 124% predicted and normal.  There was no improvement in spirometry after a single inhaled dose of bronchodilator.  TLC is 5.13L or 107% predicted and is normal.  RV is 1.54L or 77% predicted and is reduced.  DLCO is 17.61 ml/min/mmHg or 88% predicted and is normal when it   is corrected for hemoglobin.     October 2017:  FEV1/FVC is 68 and is normal (less than 0.7 but greater than the demographically adjusted lower limit of normal).  FEV1 is 122% predicted and is normal.  FVC is 140% predicted and normal.     DLCO is 80% predicted and is normal when it   is corrected for hemoglobin.     December 2018:  FEV1/FVC is 74% and is normal.  FEV1 is 1.72 L or 76% predicted and is reduced.  FVC is 2.33 L or 80% predicted and is normal.  There was no improvement  in spirometry after a single inhaled dose of bronchodilator.  DLCO is 9.48 mL/min/mm Hg or 40% predicted and is reduced when it   is corrected for hemoglobin.     February 2019:  FEV1/FVC is 68% and is reduced.  FEV1 is 2.39L (108%) predicted and is normal.  FVC is 3.52L (118%) predicted and normal.  There was no improvement in spirometry after a single inhaled dose of bronchodilator.  DLCO is 13.14ml/min/hg (55%) predicted and is reduced when it is corrected for hemoglobin.  Flow volume loops indicate severe scooping of the expiratory limb.     Impression:  Full Pulmonary Function Test is abnormal.  PFTs are consistent with mild obstructive disease.  Spirometry is not consistent with reversibility.  Diffusion capacity when corrected for hemoglobin is moderately reduced.     September 2020:  FEV1/FVC is 69 and is normal.  FEV1 is 106% predicted and is normal.  FVC is 119% predicted and is normal.  There was no improvement in spirometry after a single inhaled dose of bronchodilator.  DLCO is 78% predicted and is normal when it   is corrected for hemoglobin.  The flow volume loop is normal     October 2021:  FVC 3.28 L (106%)  FEV1 2.28 L (94%)  Ratio 0.69 (less than 0.7 but greater than the demographically adjusted lower limit of normal)  No significant bronchodilator response  DLCOc 70%  Loop with some expiratory concavity    April 2022:  FEV1 2.52 (106%)  FVC 3.95 (128%)  FEV1/FVC 0.64  DLCOc 58%  Flattened inspiratory limb      Again, thank you for allowing me to participate in the care of your patient.        Sincerely,        Vipin Duron MD

## 2022-05-03 NOTE — PATIENT INSTRUCTIONS
I was good to see you in clinic today. This is what we discussed:    Take prednisone 3 mg (three 1-mg tablets) every morning.  We will see what the ENT evaluation shows.  I will see you in 6 months.  Contact me in the meantime with questions or concerns.    Vipin Duron MD  Pulmonary and Critical Care Medicine  [facility]  Office [phone]

## 2022-05-05 NOTE — PROGRESS NOTES
Pulmonary Clinic Follow-up Visit    Assessment and Plan:  72 year old male never smoker with a history of dyslipidemia, low-grade chronic bilateral uveitis, mild SAPNA, and pulmonary sarcoidosis, presenting for follow-up.      Sarcoidosis: Denis has had low energy in the morning. Not dyspnea per se, but some fatigue. Had nocturnal cough for three nights in a row in April, but this did not recur. Had developed severe refractory hiccups, but these have resolved with baclofen. 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 PET-CT has shown some sarcoid activity, mostly mediastinal and hilar nodes, a few pulmonary nodules, but no overt pneumonitis or fibrosis. In April, spirometry showed mild obstruction with normal FEV1, corrected DLCO moderately reduced at 58%. There was some flattening of the inspiratory limb, and has mild end-inspiratory stridor on exam (this is not symptomatic for him), and he is scheduled to see ENT on May 13. With his stable symptoms and lack of airflow limitation, I am reluctant to put him back on a high prednisone dose given the potential side effects and he did have fairly significant steroid-induced weight gain in the past; unclear role for a steroid-sparing immunosuppressant at this point. Will increase his maintenance prednisone a bit, from 1 mg to 3 mg daily. Recall that Denis has a history of pulmonary and ocular involvement with sarcoidosis, with chronic bilateral uveitis, and mediastinal/hilar lymphadenopathy with pulmonary nodules but previously normal functional status and normal PFTs. He previously developed worsening fatigue, dyspnea, and cough, with significant peripheral interstitial changes and worsening pulmonary function tests requiring initiation of prednisone. This led to improvement in symptoms, pulmonary function, and radiographic findings, and we were able to slowly titrate off the prednisone down to maintenance of  0.5 mg daily. Normal renal and hepatic function. Prior ECG with possible LVH, otherwise unremarkable.     Plan:  - increase prednisone from 1 mg to 3 mg daily  - ENT evaluation of upper airway on May 13; concerned about flattened inspiratory limb of flow-volume loop and inspiratory stridor on exam, possible upper airway involvement with sarcoidosis or other etiology  - on going ophthalmology follow-up at [facility]  - annual high-dose influenza vaccination  - up to date on pneumococcal vaccination  - COVID-19 vaccination: Pfizer-Weft with second booster in April 2022  - follow up in 6 months  - encouraged him to contact me with questions or concerning symptoms    Vipin Duron MD  Pulmonary and Critical Care Medicine  [facility]  Office [phone]  Pager [phone]  (he/him/his)    CCx: sarcoidosis    HPI: 72 year old male never smoker with a history of dyslipidemia, low-grade chronic bilateral uveitis, mild SAPNA, and pulmonary sarcoidosis, presenting for follow-up. Denis has had low energy in the morning. Not dyspnea per se, but some fatigue. Had nocturnal cough for three nights in a row in April, but this did not recur. Had developed severe refractory hiccups, but these have resolved with baclofen. 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 PET-CT has shown some sarcoid activity, mostly mediastinal and hilar nodes, a few pulmonary nodules, but no overt pneumonitis or fibrosis. In April, spirometry was normal, corrected DLCO moderately reduced at 58%. There was some flattening of the inspiratory limb, and has mild end-inspiratory stridor on exam (this is not symptomatic for him), and he is scheduled to see ENT on May 13. With his stable symptoms and lack of airflow limitation, I am reluctant to put him back on a high prednisone dose given the potential side effects and he did have fairly significant steroid-induced  weight gain in the past; unclear role for a steroid-sparing immunosuppressant at this point. Will increase his maintenance prednisone a bit, from 1 mg to 3 mg daily. Recall that Denis has a history of pulmonary and ocular involvement with sarcoidosis, with chronic bilateral uveitis, and mediastinal/hilar lymphadenopathy with pulmonary nodules but previously normal functional status and normal PFTs. He previously developed worsening fatigue, dyspnea, and cough, with significant peripheral interstitial changes and worsening pulmonary function tests requiring initiation of prednisone. This led to improvement in symptoms, pulmonary function, and radiographic findings, and we were able to slowly titrate off the prednisone down to maintenance of 0.5 mg daily. Normal renal and hepatic function. Prior ECG with possible LVH, otherwise unremarkable.    ROS:  A 12-system review was obtained and was negative with the exception of the symptoms endorsed in the history of present illness.    PMH:  Pulmonary and ocular sarcoidosis  Deviated septum  HLD  Depression  HTN  Insomnia  Chronic headaches  Mild SAPNA with AHI 10.4    PSH:  Past Surgical History:   Procedure Laterality Date     BRONCHOSCOPY FLEXIBLE AND RIGID  12/20/2016    ebus     COLONOSCOPY  2014    Nothing found, another scheduled in 2023       Allergies:  No Known Allergies    Family HX:  Family History   Problem Relation Age of Onset     Dementia Mother         d 96     Mental Illness Mother         Alzheimer's     Heart Disease Father         d 92     Hypertension Other         Its normal in the [place]     Substance Abuse Brother         Alcohol, numerous Makes in my Family abused Alcohol       Social Hx:  Social History     Socioeconomic History     Marital status: Single     Spouse name: Not on file     Number of children: Not on file     Years of education: Not on file     Highest education level: Not on file   Occupational History     Not on file   Tobacco  "Use     Smoking status: Never Smoker     Smokeless tobacco: Never Used     Tobacco comment: N.A.   Substance and Sexual Activity     Alcohol use: Yes     Comment: Socially     Drug use: No     Sexual activity: Not Currently     Partners: Female     Birth control/protection: Pull-out method, None     Comment: My Lady Friend lives outside my state   Other Topics Concern     Parent/sibling w/ CABG, MI or angioplasty before 65F 55M? No   Social History Narrative    , 2 grown daughters, 2 granddaughters. 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  Previously working in administrative position.  Nonsmoker.         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 Moved back to [place] 9 years ago because parents had dementia, so moved back here to take care of them. He has an older sister and younger brother (John). He has a girlfriend. She lives in [place]. His ex-wife passed away from breast cancer. Had 2 girls in that marriage. Oldest girl has 2 granddaughters. Both live in [place]. 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 He will probably stop volunteering soon though. - Dr. Lunsford 12/10/21      Social Determinants of Health     Financial Resource Strain: Not on file   Food Insecurity: Not on file   Transportation Needs: Not on file   Physical Activity: Not on file   Stress: Not on file   Social Connections: Not on file   Intimate Partner Violence: Not on file   Housing Stability: Not on file       Current Meds:  Current Outpatient Medications   Medication Sig Dispense Refill     ALPRAZolam (XANAX) 1 MG tablet Take 1 tablet (1 mg) by mouth nightly as needed for anxiety or sleep 30 tablet 0     amLODIPine (NORVASC) 10 MG tablet TAKE 1 TABLET BY MOUTH EVERY DAY 90 tablet 3     azelastine (ASTELIN) 0.1 % nasal spray USE 2 SPRAYS IN EACH NOSTRIL TWICE A DAY       ciprofloxacin (CIPRO) 500 MG tablet Take 1 tablet by mouth as needed       citalopram (CELEXA) " 40 MG tablet Take 1 tablet (40 mg) by mouth daily 90 tablet 1     [employer] D3 125 MCG (5000 UT) CAPS TAKE 1 CAPSULE (5,000 UNITS TOTAL) BY MOUTH DAILY. 90 capsule 3     fluticasone (FLONASE) 50 MCG/ACT nasal spray SPRAY 2 SPRAYS INTO EACH NOSTRIL EVERY DAY       lisinopril (ZESTRIL) 20 MG tablet Take 1 tablet (20 mg) by mouth daily 90 tablet 1     melatonin 3 MG tablet Take 1 tablet (3 mg) by mouth nightly as needed for sleep 90 tablet 1     pantoprazole (PROTONIX) 40 MG EC tablet Take 1 tablet (40 mg) by mouth 2 times daily 90 tablet 0     predniSONE (DELTASONE) 1 MG tablet Take 3 tablets (3 mg) by mouth daily 90 tablet 11     propranolol (INDERAL) 20 MG tablet [PROPRANOLOL (INDERAL) 20 MG TABLET] TAKE 1 TABLET (20 MG TOTAL) BY MOUTH 2 (TWO) TIMES A DAY. 180 tablet 3     rosuvastatin (CRESTOR) 10 MG tablet Take 1 tablet (10 mg) by mouth daily 90 tablet 1     sildenafil (VIAGRA) 100 MG tablet Take 1 tablet (100 mg) by mouth daily as needed (erectile dysfunction) 12 tablet 11     SUMAtriptan (IMITREX) 50 MG tablet Take 50 mg by mouth at onset of headache       traZODone (DESYREL) 50 MG tablet [TRAZODONE (DESYREL) 50 MG TABLET] TAKE 1 TABLET BY MOUTH EVERYDAY AT BEDTIME 90 tablet 2     azithromycin (ZITHROMAX) 250 MG tablet Take 1 tablet by mouth daily       Baclofen (LIORESAL) 5 MG tablet Take 1-2 tablets (5-10 mg) by mouth 3 times daily 30 tablet 0     prednisoLONE acetate (PRED FORTE) 1 % ophthalmic suspension prednisolone acetate 1 % eye drops,suspension       sulfamethoxazole-trimethoprim (BACTRIM DS) 800-160 MG tablet sulfamethoxazole 800 mg-trimethoprim 160 mg tablet   TAKE 1 TABLET BY MOUTH TWICE A DAY FOR 7 DAYS         Physical Exam:  /60 (BP Location: Left arm, Patient Position: Chair, Cuff Size: Adult Regular)   Pulse 64   Wt 60 kg (132 lb 4.8 oz)   SpO2 97%   BMI 22.71 kg/m    Gen: alert, oriented, no distress  HEENT: mild inspiratory stridor present, no cervical or supraclavicular  lymphadenopathy  CV: RRR, no M/G/R  Resp: CTAB, no focal crackles or wheezes  Skin: no apparent rashes  Ext: no cyanosis, clubbing or edema  Neuro: alert, nonfocal    Labs:  Normal BMP and LFTs  WBC 13.1 with no differential  Normal Hgb  ESR 54  1,25-dihydroxyvitamin D level 83 in November 2016    Imaging studies:  PET-CT (April 2022):  - images directly reviewed, formal interpretation follows:  FINDINGS:      HEAD/NECK:  Asymmetric activity at the left palatine tonsil, SUV max 8.19. No  corresponding lesion on CT.     The paranasal sinuses are clear. The mastoid air cells are clear.      The mucosal pharyngeal space, the , prevertebral and carotid  spaces are within normal limits.      No masses, mass effect or pathologically enlarged lymph nodes.   The  thyroid gland is within normal limits.      CHEST:  Hypermetabolic mediastinal and bilateral hilar partially calcified  lymphadenopathy, for example:  -subcarinal lymph node with SUV max 9.08, measuring 19x17 mm, image  185 series 3  -left hilar lymph node with SUV max 8.63, measuring 15 x 10 mm image  178 series 3  -right paratracheal lymph node with SUV max 6.36, image 170 series 3     Hiatal hernia and mild circumferential wall thickening of the distal  esophagus without associated hypermetabolism. Calcified paraesophageal  lymph node without hypermetabolism.      Multiple bilateral pulmonary nodules again demonstrated in all lobes  of the lungs, including:    -19 x 7 mm, along the left pulmonary fissure, image 50 series 8,  increased since 2019 (at which time measured 4 mm). SUV max 3.53.  -6 mm, right upper lobe, image 34 series 8, increased since 2019 (at  which time measured 3 mm)  These do not demonstrate hypermetabolism above liver and some only  slightly above mediastinal blood pool.      Scarring in the middle lobe inferiorly again seen, similar to 2019. No  pleural effusion.  No pericardial effusion. No large filling defect in  the central  pulmonary arteries.     ABDOMEN AND PELVIS:  No suspicious FDG uptake in the abdomen or pelvis. Low attenuating  nonenhancing hypodensity not hypermetabolic in or adjacent to the  pancreatic head, a 19 x 15 mm image 252 series 4.  There is no mass  effect on adjacent structures. The hepatic artery and portal vein pass  through it.  No abdominal/pelvic lymphadenopathy.     The liver has a somewhat mottled appearance although this may be  secondary to bolus timing as this is an arterial phase.  No suspicious  hepatic lesions. No splenomegaly.  No suspicious adrenal mass lesion.  No opaque gallbladder calculi.   There is symmetric nephrographic  renal phase without hydronephrosis.     No evidence for diverticulitis.  No bowel obstruction.  No free fluid.    Normal appendix. Scattered atherosclerotic calcifications of the  aorta.     LOWER EXTREMITIES:   No abnormal masses or hypermetabolic lesions.     BONES:   No suspicious lytic or blastic osseous lesions.   No suspicious FDG  uptake in the skeleton. Mild degenerative changes of the lumbar spine.                                                                         IMPRESSION:  In summary: constellation of findings (hypermetabolic  mediastinal lymphadenopathy, lung nodules, palatine tonsil) most  consistent with active sarcoidosis. Consider treating and rescanning  with PET/CT to see if hypermetabolism resolves.  Alternatively  biopsies could be obtained.     In detail:  1. Increased size of hypermetabolic mediastinal and hilar  lymphadenopathy since 2019.   2. Multiple enlarging hypermetabolic lung nodules. Most likely  secondary to sarcoidosis, upper lobe predominant and several in  perihepatic locations which would be typical of sarcoidosis. All but 2  in the upper lobes were present on CT 1/15/2019.  While this does not  exclude metastatic disease, it is very unlikely.     3. No active cardiac sarcoidosis (can be assessed because cardiac  uptake is suppressed).       4. Indeterminate uptake in the area of left palatine tonsil.  Differential sarcoidosis, inflammation, cancer. Consider direct  visualization.  Alternatively short-term follow-up after treatment  sarcoid.     5. Hypoenhancing structure in the pancreatic head region without  hypermetabolism.  Indeterminant but likely benign. Likely present on  1/15/2018 (noncontrast study limits evaluation).   If there is further  concern  abdominal MRI could be obtained.     6. Distal esophageal mild wall thickening does not demonstrate  hypermetabolism. Adjacent lymph node with calcification likely prior sarcoid.    TT$E (October 2017):    No previous study for comparison.    Left ventricle ejection fraction is normal. The calculated left ventricular ejection fraction is 69%.    Mild mitral regurgitation      Pulmonary Function Testing  November 2016:  FEV1/FVC is 72% and is normal.  FEV1 is 2.49L or 113% predicted and is normal.  FVC is 3.47L or 124% predicted and normal.  There was no improvement in spirometry after a single inhaled dose of bronchodilator.  TLC is 5.13L or 107% predicted and is normal.  RV is 1.54L or 77% predicted and is reduced.  DLCO is 17.61 ml/min/mmHg or 88% predicted and is normal when it   is corrected for hemoglobin.     October 2017:  FEV1/FVC is 68 and is normal (less than 0.7 but greater than the demographically adjusted lower limit of normal).  FEV1 is 122% predicted and is normal.  FVC is 140% predicted and normal.     DLCO is 80% predicted and is normal when it   is corrected for hemoglobin.     December 2018:  FEV1/FVC is 74% and is normal.  FEV1 is 1.72 L or 76% predicted and is reduced.  FVC is 2.33 L or 80% predicted and is normal.  There was no improvement in spirometry after a single inhaled dose of bronchodilator.  DLCO is 9.48 mL/min/mm Hg or 40% predicted and is reduced when it   is corrected for hemoglobin.     February 2019:  FEV1/FVC is 68% and is reduced.  FEV1 is 2.39L (108%)  predicted and is normal.  FVC is 3.52L (118%) predicted and normal.  There was no improvement in spirometry after a single inhaled dose of bronchodilator.  DLCO is 13.14ml/min/hg (55%) predicted and is reduced when it is corrected for hemoglobin.  Flow volume loops indicate severe scooping of the expiratory limb.     Impression:  Full Pulmonary Function Test is abnormal.  PFTs are consistent with mild obstructive disease.  Spirometry is not consistent with reversibility.  Diffusion capacity when corrected for hemoglobin is moderately reduced.     September 2020:  FEV1/FVC is 69 and is normal.  FEV1 is 106% predicted and is normal.  FVC is 119% predicted and is normal.  There was no improvement in spirometry after a single inhaled dose of bronchodilator.  DLCO is 78% predicted and is normal when it   is corrected for hemoglobin.  The flow volume loop is normal     October 2021:  FVC 3.28 L (106%)  FEV1 2.28 L (94%)  Ratio 0.69 (less than 0.7 but greater than the demographically adjusted lower limit of normal)  No significant bronchodilator response  DLCOc 70%  Loop with some expiratory concavity    April 2022:  FEV1 2.52 (106%)  FVC 3.95 (128%)  FEV1/FVC 0.64  DLCOc 58%  Flattened inspiratory limb

## 2022-05-06 DIAGNOSIS — F41.9 ANXIETY: ICD-10-CM

## 2022-05-06 RX ORDER — ALPRAZOLAM 1 MG
1 TABLET ORAL
Qty: 30 TABLET | Refills: 0 | Status: SHIPPED | OUTPATIENT
Start: 2022-05-06 | End: 2022-06-07

## 2022-05-09 DIAGNOSIS — R06.6 HICCUPS: ICD-10-CM

## 2022-05-09 DIAGNOSIS — I10 ESSENTIAL HYPERTENSION, BENIGN: ICD-10-CM

## 2022-05-09 DIAGNOSIS — E78.2 MIXED HYPERLIPIDEMIA: ICD-10-CM

## 2022-05-10 ENCOUNTER — TRANSFERRED RECORDS (OUTPATIENT)
Dept: HEALTH INFORMATION MANAGEMENT | Facility: CLINIC | Age: 72
End: 2022-05-10
Payer: COMMERCIAL

## 2022-05-11 RX ORDER — PANTOPRAZOLE SODIUM 40 MG/1
TABLET, DELAYED RELEASE ORAL
Qty: 90 TABLET | Refills: 3 | Status: SHIPPED | OUTPATIENT
Start: 2022-05-11 | End: 2023-04-24

## 2022-05-11 RX ORDER — LISINOPRIL 20 MG/1
TABLET ORAL
Qty: 90 TABLET | Refills: 2 | Status: SHIPPED | OUTPATIENT
Start: 2022-05-11 | End: 2023-01-14

## 2022-05-11 RX ORDER — ROSUVASTATIN CALCIUM 10 MG/1
10 TABLET, COATED ORAL DAILY
Qty: 90 TABLET | Refills: 3 | Status: SHIPPED | OUTPATIENT
Start: 2022-05-11 | End: 2023-05-04

## 2022-05-20 ENCOUNTER — TELEPHONE (OUTPATIENT)
Dept: INTERNAL MEDICINE | Facility: CLINIC | Age: 72
End: 2022-05-20
Payer: COMMERCIAL

## 2022-05-20 NOTE — TELEPHONE ENCOUNTER
Patient received diagnosis yesterday positive for prostate cancer on his right side.    He has appointment next week with Dr. Ibanez.  His shares the good news is he was told it is treatable.    Requesting message is relayed to Dr. Parada.

## 2022-05-26 ENCOUNTER — TELEPHONE (OUTPATIENT)
Dept: INTERNAL MEDICINE | Facility: CLINIC | Age: 72
End: 2022-05-26
Payer: COMMERCIAL

## 2022-05-26 NOTE — TELEPHONE ENCOUNTER
Patient calling to get a call back from provider or providers team to ask some questions.    One main question that could result in more questions is:    Should he be In-Network instead of Out of network with a Urologist?    Call Back  [phone]

## 2022-05-27 NOTE — TELEPHONE ENCOUNTER
Spoke with patient to let him know that out of network normally means insurance will not pay for visits and he will need to pay out of pocket. I stated in network means his insurance will cover the visits. He understood and has no further questions.

## 2022-06-03 ENCOUNTER — OFFICE VISIT (OUTPATIENT)
Dept: OTOLARYNGOLOGY | Facility: CLINIC | Age: 72
End: 2022-06-03
Attending: INTERNAL MEDICINE
Payer: COMMERCIAL

## 2022-06-03 DIAGNOSIS — D86.0 PULMONARY SARCOIDOSIS (H): ICD-10-CM

## 2022-06-03 PROCEDURE — 99203 OFFICE O/P NEW LOW 30 MIN: CPT | Mod: 25 | Performed by: OTOLARYNGOLOGY

## 2022-06-03 PROCEDURE — 31575 DIAGNOSTIC LARYNGOSCOPY: CPT | Performed by: OTOLARYNGOLOGY

## 2022-06-03 RX ORDER — LORAZEPAM 0.5 MG/1
0.5 TABLET ORAL EVERY 6 HOURS PRN
COMMUNITY
End: 2022-07-27

## 2022-06-03 RX ORDER — CEFTRIAXONE 1 G/1
1 INJECTION, POWDER, FOR SOLUTION INTRAMUSCULAR; INTRAVENOUS
COMMUNITY
End: 2022-07-27

## 2022-06-03 RX ORDER — PROPRANOLOL HYDROCHLORIDE 40 MG/1
40 TABLET ORAL 3 TIMES DAILY
COMMUNITY
End: 2022-07-27

## 2022-06-03 RX ORDER — CEFDINIR 300 MG/1
CAPSULE ORAL
COMMUNITY
Start: 2022-05-11 | End: 2022-07-27

## 2022-06-03 RX ORDER — HYDROXYZINE HYDROCHLORIDE 25 MG/1
25 TABLET, FILM COATED ORAL 3 TIMES DAILY PRN
COMMUNITY

## 2022-06-03 RX ORDER — GABAPENTIN 100 MG/1
100 CAPSULE ORAL 3 TIMES DAILY
COMMUNITY
End: 2022-07-27

## 2022-06-03 RX ORDER — CELECOXIB 200 MG/1
200 CAPSULE ORAL DAILY
COMMUNITY

## 2022-06-03 RX ORDER — FLUTICASONE PROPIONATE 50 MCG
SPRAY, SUSPENSION (ML) NASAL
COMMUNITY
Start: 2022-05-11 | End: 2023-01-27

## 2022-06-03 NOTE — LETTER
6/3/2022         RE: Denis Moreno  [address] [place] [address]        Dear Colleague,    Thank you for referring your patient, Denis Moreno, to the [facility]. Please see a copy of my visit note below.    CHIEF COMPLAINT:  Tonsil Concern      HISTORY OF PRESENT ILLNESS    Denis was seen at the behest of Vipin Duron MD for enlarged tonsil(s).  No hoarseness or throat pain.   No swallowing issues.  No pain with swallowing.  No breathing difficulties.        Referral note:    PET-CT with FDG-avid hilar and mediastinal nodes, though this could be c/w sarcoidosis. Left palatine tonsil FDG-avid. PFT with normal FEV1, mild obstruction, reduced DLCO but stable. However, had inspiratory stridor and flattened inspiratory limb. Called Denis to discuss. He is having prostate biopsy next month. I see him in just over a week in clinic. I would also like him to see ENT to evaluate the left palatine tonsil; DDx includes sarcoid but is broad. He is in agreement.          REVIEW OF SYSTEMS    Review of Systems as per HPI and PMHx, otherwise 10 system review system are negative.     Patient has no known allergies.     There were no vitals taken for this visit.    HEAD: Normal appearance and symmetry:  No cutaneous lesions.      NECK:  supple     EARS: normal TM, EACs    EYES:  EOMI    CN VII/XII:  intact     NOSE:     Dorsum:   straight  Septum:  midline  Mucosa:  moist        ORAL CAVITY/OROPHARYNX:     Lips:  Normal.  Tongue: normal, midline  Mucosa:   no lesions  Tonsils: 1+     NECK:  Trachea:  midline.              Thyroid:  normal              Adenopathy:  none        NEURO:   Alert and Oriented     GAIT AND STATION:  normal     RESPIRATORY:   Symmetry and Respiratory effort     PSYCH:  Normal mood and affect     SKIN:   warm and dry         FLEX LARYNGOSCOPY:    After obtaining consent, a flexible laryngoscope is used to examine both nasal cavities, the nasopharynx, pharnx, and larynx.       Nasal cavity: wnl  NP: wnl  Pharnx: wnl  Larynx: wnl    IMPRESSION:    Encounter Diagnosis   Name Primary?     Pulmonary sarcoidosis (H)           RECOMMENDATIONS:    No evidence of tonsil assymmetry or hypertrophy  Laryngoscopy normal  Return as needed        Again, thank you for allowing me to participate in the care of your patient.        Sincerely,        Macho Sargent MD

## 2022-06-03 NOTE — PROGRESS NOTES
CHIEF COMPLAINT:  Tonsil Concern      HISTORY OF PRESENT ILLNESS    Denis was seen at the behest of Vipin Duron MD for enlarged tonsil(s).  No hoarseness or throat pain.   No swallowing issues.  No pain with swallowing.  No breathing difficulties.        Referral note:    PET-CT with FDG-avid hilar and mediastinal nodes, though this could be c/w sarcoidosis. Left palatine tonsil FDG-avid. PFT with normal FEV1, mild obstruction, reduced DLCO but stable. However, had inspiratory stridor and flattened inspiratory limb. Called Denis to discuss. He is having prostate biopsy next month. I see him in just over a week in clinic. I would also like him to see ENT to evaluate the left palatine tonsil; DDx includes sarcoid but is broad. He is in agreement.          REVIEW OF SYSTEMS    Review of Systems as per HPI and PMHx, otherwise 10 system review system are negative.     Patient has no known allergies.     There were no vitals taken for this visit.    HEAD: Normal appearance and symmetry:  No cutaneous lesions.      NECK:  supple     EARS: normal TM, EACs    EYES:  EOMI    CN VII/XII:  intact     NOSE:     Dorsum:   straight  Septum:  midline  Mucosa:  moist        ORAL CAVITY/OROPHARYNX:     Lips:  Normal.  Tongue: normal, midline  Mucosa:   no lesions  Tonsils: 1+     NECK:  Trachea:  midline.              Thyroid:  normal              Adenopathy:  none        NEURO:   Alert and Oriented     GAIT AND STATION:  normal     RESPIRATORY:   Symmetry and Respiratory effort     PSYCH:  Normal mood and affect     SKIN:   warm and dry         FLEX LARYNGOSCOPY:    After obtaining consent, a flexible laryngoscope is used to examine both nasal cavities, the nasopharynx, pharnx, and larynx.      Nasal cavity: wnl  NP: wnl  Pharnx: wnl  Larynx: wnl    IMPRESSION:    Encounter Diagnosis   Name Primary?     Pulmonary sarcoidosis (H)           RECOMMENDATIONS:    No evidence of tonsil assymmetry or hypertrophy  Laryngoscopy  normal  Return as needed

## 2022-06-06 DIAGNOSIS — F41.9 ANXIETY: ICD-10-CM

## 2022-06-06 NOTE — TELEPHONE ENCOUNTER
Patient is requesting refill.  Alprazolam (XANAX) 1 mg    [facility] on [address]    Last office visit with PCP = 04/13/2022

## 2022-06-07 RX ORDER — ALPRAZOLAM 1 MG
1 TABLET ORAL
Qty: 30 TABLET | Refills: 0 | Status: SHIPPED | OUTPATIENT
Start: 2022-06-07 | End: 2022-07-07

## 2022-06-13 ENCOUNTER — TELEPHONE (OUTPATIENT)
Dept: INTERNAL MEDICINE | Facility: CLINIC | Age: 72
End: 2022-06-13
Payer: COMMERCIAL

## 2022-06-15 ENCOUNTER — TELEPHONE (OUTPATIENT)
Dept: INTERNAL MEDICINE | Facility: CLINIC | Age: 72
End: 2022-06-15
Payer: COMMERCIAL

## 2022-06-19 DIAGNOSIS — F41.9 ANXIETY: ICD-10-CM

## 2022-06-19 RX ORDER — CITALOPRAM HYDROBROMIDE 40 MG/1
TABLET ORAL
Qty: 90 TABLET | Refills: 1 | OUTPATIENT
Start: 2022-06-19

## 2022-07-15 ENCOUNTER — TELEPHONE (OUTPATIENT)
Dept: INTERNAL MEDICINE | Facility: CLINIC | Age: 72
End: 2022-07-15

## 2022-07-15 NOTE — TELEPHONE ENCOUNTER
7-15-22  Reason for Call: appointment    Detailed comments: pt called stated he is a former pt of DR Parada @ [facility] & provider now since left [employer] .  Pt called looking for an appt the week of 7-18-22, pt was recently DX with prostate cancer and now looking for primary care provider/med check .  Dr Faust has nothing till Nov, wondering if Dr Faust and work pt in?  Pt starts radiation in aug       Phone Number Patient can be reached at: Cell number on file:    Telephone Information:   Mobile [phone]       Best Time: anthime    Can we leave a detailed message on this number? YES    Call taken on 7/15/2022 at 2:44 PM by Chiqui Danielson

## 2022-07-17 NOTE — TELEPHONE ENCOUNTER
Can use a [facility] spot- do appear to have a Friday available at 1:30- could be video if too far to come for clinic. Would want to do medicare wellness visit at the same time.    Saurav Faust MD

## 2022-07-19 NOTE — TELEPHONE ENCOUNTER
Patient contacted to schedule appt with Dr. Faust  Pt scheduled for 7.27.22 @ 11 am     Britany GARCIA RN  [facility]

## 2022-07-27 ENCOUNTER — OFFICE VISIT (OUTPATIENT)
Dept: FAMILY MEDICINE | Facility: CLINIC | Age: 72
End: 2022-07-27
Payer: COMMERCIAL

## 2022-07-27 VITALS
DIASTOLIC BLOOD PRESSURE: 50 MMHG | HEART RATE: 56 BPM | BODY MASS INDEX: 23.64 KG/M2 | SYSTOLIC BLOOD PRESSURE: 102 MMHG | WEIGHT: 137.7 LBS

## 2022-07-27 DIAGNOSIS — I10 HYPERTENSION, UNSPECIFIED TYPE: ICD-10-CM

## 2022-07-27 DIAGNOSIS — F41.9 ANXIETY: Primary | ICD-10-CM

## 2022-07-27 DIAGNOSIS — G47.00 INSOMNIA, UNSPECIFIED TYPE: ICD-10-CM

## 2022-07-27 DIAGNOSIS — Z00.00 ENCOUNTER FOR MEDICARE ANNUAL WELLNESS EXAM: ICD-10-CM

## 2022-07-27 DIAGNOSIS — C61 PROSTATE CANCER (H): ICD-10-CM

## 2022-07-27 DIAGNOSIS — I10 ESSENTIAL HYPERTENSION, BENIGN: ICD-10-CM

## 2022-07-27 DIAGNOSIS — F33.0 MAJOR DEPRESSIVE DISORDER, RECURRENT EPISODE, MILD (H): ICD-10-CM

## 2022-07-27 PROCEDURE — G0439 PPPS, SUBSEQ VISIT: HCPCS | Performed by: INTERNAL MEDICINE

## 2022-07-27 PROCEDURE — 99214 OFFICE O/P EST MOD 30 MIN: CPT | Mod: 25 | Performed by: INTERNAL MEDICINE

## 2022-07-27 RX ORDER — ALPRAZOLAM 1 MG
1 TABLET ORAL
Qty: 30 TABLET | Refills: 3 | Status: SHIPPED | OUTPATIENT
Start: 2022-07-27 | End: 2022-11-10

## 2022-07-27 RX ORDER — TRAZODONE HYDROCHLORIDE 50 MG/1
TABLET, FILM COATED ORAL
Qty: 90 TABLET | Refills: 2 | Status: SHIPPED | OUTPATIENT
Start: 2022-07-27 | End: 2023-05-11

## 2022-07-27 RX ORDER — CITALOPRAM HYDROBROMIDE 40 MG/1
40 TABLET ORAL DAILY
Qty: 90 TABLET | Refills: 1 | Status: SHIPPED | OUTPATIENT
Start: 2022-07-27 | End: 2022-12-15

## 2022-07-27 RX ORDER — PROPRANOLOL HYDROCHLORIDE 20 MG/1
20 TABLET ORAL 2 TIMES DAILY
Qty: 180 TABLET | Refills: 3 | Status: SHIPPED | OUTPATIENT
Start: 2022-07-27 | End: 2023-07-31

## 2022-07-27 RX ORDER — AMLODIPINE BESYLATE 5 MG/1
10 TABLET ORAL DAILY
Qty: 90 TABLET | Refills: 11 | Status: SHIPPED | OUTPATIENT
Start: 2022-07-27 | End: 2022-12-09

## 2022-07-27 ASSESSMENT — ANXIETY QUESTIONNAIRES
4. TROUBLE RELAXING: NOT AT ALL
GAD7 TOTAL SCORE: 2
1. FEELING NERVOUS, ANXIOUS, OR ON EDGE: SEVERAL DAYS
6. BECOMING EASILY ANNOYED OR IRRITABLE: NOT AT ALL
5. BEING SO RESTLESS THAT IT IS HARD TO SIT STILL: NOT AT ALL
3. WORRYING TOO MUCH ABOUT DIFFERENT THINGS: SEVERAL DAYS
7. FEELING AFRAID AS IF SOMETHING AWFUL MIGHT HAPPEN: NOT AT ALL
2. NOT BEING ABLE TO STOP OR CONTROL WORRYING: NOT AT ALL

## 2022-07-27 ASSESSMENT — PATIENT HEALTH QUESTIONNAIRE - PHQ9: SUM OF ALL RESPONSES TO PHQ QUESTIONS 1-9: 1

## 2022-07-27 NOTE — PROGRESS NOTES
Assessment & Plan     Anxiety  Using xanax once at night, reviewed PDMP and has been stable on this medication, ok to continue right now, especially while going through cancer treatment.  - ALPRAZolam (XANAX) 1 MG tablet; Take 1 tablet (1 mg) by mouth nightly as needed for anxiety or sleep  - citalopram (CELEXA) 40 MG tablet; Take 1 tablet (40 mg) by mouth daily    Hypertension, unspecified type  - amLODIPine (NORVASC) 5 MG tablet; Take 2 tablets (10 mg) by mouth daily    Essential hypertension, benign  Discussed monitoring BLOOD PRESSURE, decrease amlodpine slightly top 5 mg per day, may need to pull back more if needed  - propranolol (INDERAL) 20 MG tablet; Take 1 tablet (20 mg) by mouth 2 times daily    Insomnia, unspecified type  Stable on current medication  - traZODone (DESYREL) 50 MG tablet; [TRAZODONE (DESYREL) 50 MG TABLET] TAKE 1 TABLET BY MOUTH EVERYDAY AT BEDTIME    Major depressive disorder, recurrent episode, mild (H)  Continue current medication as this is working well for patient.    Encounter for Medicare annual wellness exam  Discussed routine health screening    Prostate cancer- continue to follow with urology at [facility] and radiation therapy at [facility].    See patient instructions                 Return in about 1 year (around 7/27/2023).    Saurav Faust MD  [facility]   Denis is a 72 year old, presenting for the following health issues:  Establish Care (Previous patient of Dr. Parada, Beginning stages of treatment for prostate cancer treatment)    Prostate cancer- Has been on leuprolide for prostate cancer. Going to inject with the radiation pellets on 8/3, then starting radiation after that. Those will be every Wednesday.    HYPERTENSION- on amlodipine 10 mg per day, lisinopril 20 mg per day, propranolol 20 mg BID. Has been having slightly lower BLOOD PRESSURE at times when in clinic- no symptoms. No chest pain or shortness of breath.  "    Anxiety/depression  CINDI-7 SCORE 9/30/2019 2/17/2021 12/8/2021   Total Score - - 4 (minimal anxiety)   Total Score 3 8 4       PHQ 5/20/2021 10/28/2021 7/27/2022   PHQ-9 Total Score - 2 1   Q9: Thoughts of better off dead/self-harm past 2 weeks Not at all Not at all Not at all     Using xanax with help in anxiety and sleep at night. Using trazodone as well. On citalopram feels things are working well. 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     HPI     Annual Wellness Visit    Are you in the first 12 months of your Medicare Part B coverage?  No    Physical Health:    In general, how would you rate your overall physical health? good    Outside of work, how many days during the week do you exercise?2-3 days/week    Outside of work, approximately how many minutes a day do you exercise?not applicable    If you drink alcohol do you typically have >3 drinks per day or >7 drinks per week? No    Do you usually eat at least 4 servings of fruit and vegetables a day, include whole grains & fiber and avoid regularly eating high fat or \"junk\" foods? Yes    Do you have any problems taking medications regularly? No    Do you have any side effects from medications? none    Needs assistance for the following daily activities: no assistance needed    Which of the following safety concerns are present in your home?  none identified     Hearing impairment: No    In the past 6 months, have you been bothered by leaking of urine? no    Mental Health:    In general, how would you rate your overall mental or emotional health? good  PHQ-2 Score:      Do you feel safe in your environment? Yes    Have you ever done Advance Care Planning? (For example, a Health Directive, POLST, or a discussion with a medical provider or your loved ones about your wishes)? Yes, patient states has an Advance Care Planning document and will bring a copy to the clinic.    Fall risk:  Fallen 2 or more times in the past year?: No  Any fall with injury " in the past year?: No    Cognitive Screenin) Repeat 3 items (Leader, Season, Table)    2) Clock draw: NORMAL  3) 3 item recall: Recalls 3 objects  Results: 3 items recalled: COGNITIVE IMPAIRMENT LESS LIKELY    Mini-CogTM Copyright S Karsten. Licensed by the author for use in [facility] H?REL; reprinted with permission (chalino@[facility]). All rights reserved.      Do you have sleep apnea, excessive snoring or daytime drowsiness?: no    Current providers sharing in care for this patient include:   Patient Care Team:  Saurav Faust MD as PCP - General (Internal Medicine - Pediatrics)  Jed Parada MD as Assigned PCP  Macho Sargent MD as Assigned Surgical Provider    Patient has been advised of split billing requirements and indicates understanding: Yes      Review of Systems   Constitutional, HEENT, cardiovascular, pulmonary, GI, , musculoskeletal, neuro, skin, endocrine and psych systems are negative, except as otherwise noted.      Objective    /50 (BP Location: Left arm, Patient Position: Sitting, Cuff Size: Adult Regular)   Pulse 56   Wt 62.5 kg (137 lb 11.2 oz)   BMI 23.64 kg/m    Body mass index is 23.64 kg/m .  Physical Exam   GENERAL: healthy, alert and no distress  EYES: Eyes grossly normal to inspection, PERRL and conjunctivae and sclerae normal  HENT: ear canals and TM's normal, nose and mouth without ulcers or lesions  NECK: no adenopathy, no asymmetry, masses, or scars and thyroid normal to palpation  RESP: lungs clear to auscultation - no rales, rhonchi or wheezes  CV: regular rate and rhythm, normal S1 S2, no S3 or S4, no murmur, click or rub, no peripheral edema and peripheral pulses strong  ABDOMEN: soft, nontender, no hepatosplenomegaly, no masses and bowel sounds normal  MS: no gross musculoskeletal defects noted, no edema  SKIN: no suspicious lesions or rashes  NEURO: Normal strength and tone, mentation intact and speech normal  Psych- mentation normal.                     .  ..

## 2022-07-27 NOTE — PATIENT INSTRUCTIONS
Decrease the amlodipine down to 5 mg per day.     Refilled medications as we discussed.     Ok to continue with xanax at night as we discussed.    Let me know if issues come up.     Can check on shingles vaccine if interested at the pharmacy.    Saurav Faust MD  Patient Education   Personalized Prevention Plan  You are due for the preventive services outlined below.  Your care team is available to assist you in scheduling these services.  If you have already completed any of these items, please share that information with your care team to update in your medical record.  Health Maintenance Due   Topic Date Due     Depression Action Plan  Never done     Hepatitis C Screening  Never done     Zoster (Shingles) Vaccine (1 of 2) Never done

## 2022-08-05 ENCOUNTER — TELEPHONE (OUTPATIENT)
Dept: FAMILY MEDICINE | Facility: CLINIC | Age: 72
End: 2022-08-05

## 2022-08-05 NOTE — TELEPHONE ENCOUNTER
Medication Question or Refill    Contacts       Type Contact Phone/Fax    08/05/2022 09:24 AM CDT Phone (Incoming) Denis Moreno (Self) [phone] (H)          What medication are you calling about (include dose and sig)?:   propranolol  20mg   oneprazole  20 mg    rosuvastatin 10mg  malatonin  3 mg      Controlled Substance Agreement on file:   CSA -- Patient Level:    Controlled Substance Agreement - Non - Opioid - Scan on 1/2/2022  9:35 PM: NON-OPIOID CONTROLLED SUBSTANCE AGREEMENT  Controlled Substance Agreement - Non - Opioid - Scan on 3/6/2020: NON-OPIOID CONTROLLED SUBSTANCE AGREEMENT       Who prescribed the medication?: Dr. Faust    Do you need a refill? Yes:     When did you use the medication last? today    Patient offered an appointment? No    Do you have any questions or concerns?  Yes: Patient is out of   oneprazole  20 mg and continued to take, but is now out of it.  Patient said he is unclear if Dr. Faust wants him to continue taking it.  Please advise via call back requested by patient.    Preferred Pharmacy:   [facility]/pharmacy #[id number] - [place], [place] - [address] [place] [address]  Phone: [phone] Fax: [phone]      Could we send this information to you in [facility] or would you prefer to receive a phone call?:   Patient would prefer a phone call   Okay to leave a detailed message?: No at Cell number on file:    Telephone Information:   Mobile [phone]

## 2022-08-08 NOTE — TELEPHONE ENCOUNTER
-- DO NOT REPLY / DO NOT REPLY ALL --  -- Message is from Engagement Center Operations (ECO) --    General Patient Message  Patient is reporting to doctor that on his discharge paperwork from [facility], it was stated that he does not need to follow up with his PCP since he has a follow up appointment with his Oncologist which patient says is currently scheduled for tomorrow 8/9th.  Please call patient back IF there are any additional questions regarding this matter. Thank you    Caller Information       Type Contact Phone/Fax    08/08/2022 04:36 PM CDT Phone (Incoming) Malcolm Waldron (Self) [phone] (M)        Alternative phone number: none     Can a detailed message be left? Yes    Message Turnaround:     Did the caller agree that this message can wait until the office reopens in the morning? YES - The Message Can Wait - Send a message to the provider's clinical support pool     [place]:    Please give this turnaround time to the caller:   \"This message will be sent to [state Provider's name]. The clinical team will fulfill your request as soon as they review your message.\"                 RN triage  Call from pt   Pt states he has had a cough for the few days -- had to work last night - is a  -- in the cold -- and cough much worse   Pt states he has high blood pressure and sarcoidosis and wants to know what he can take   Pt denies diff breathing or chest pain   Has not checked temperature   Pt states he does have some wheezing   Reviewed home care advice   Per protocol = should be seen   Transferred to    Elana Yun RN [facility] RN triage      Reason for Disposition    Wheezing is present    Protocols used: COUGH-A-OH

## 2022-08-19 ENCOUNTER — TELEPHONE (OUTPATIENT)
Dept: FAMILY MEDICINE | Facility: CLINIC | Age: 72
End: 2022-08-19

## 2022-08-19 DIAGNOSIS — G47.00 INSOMNIA, UNSPECIFIED TYPE: ICD-10-CM

## 2022-08-19 RX ORDER — OMEPRAZOLE MAGNESIUM 20 MG
CAPSULE,DELAYED RELEASE (ENTERIC COATED) ORAL
Qty: 90 TABLET | Refills: 1 | Status: SHIPPED | OUTPATIENT
Start: 2022-08-19 | End: 2022-12-13

## 2022-08-19 NOTE — TELEPHONE ENCOUNTER
Reason for Call:  Medication or medication refill:    Do you use a [facility]?  Name of the pharmacy and phone number for the current request:      Name of the medication requested: propranolol; rosuvastatin; omeprazole    Other request:     Can we leave a detailed message on this number? YES    Phone number patient can be reached at: Cell number on file:    Telephone Information:   Mobile [phone]       Best Time: anytime    Call taken on 8/19/2022 at 11:51 AM by Khushbu Grimm

## 2022-08-19 NOTE — TELEPHONE ENCOUNTER
Outgoing Call:  No answer    Britany Vazquez RN contacted Denis on 08/19/22 and left a message. If patient calls back please relay the following message:  Patient has enough refills of the requested medications he needs to contact his pharmacy.    Britany GARCIA RN  [facility]

## 2022-08-19 NOTE — TELEPHONE ENCOUNTER
Routing refill request to provider for review/approval because:  Drug not on the [facility] refill protocol     Last Written Prescription Date:  4/13/22  Last Fill Quantity: 90,  # refills: 1   Last office visit provider:  7/27/22     Requested Prescriptions   Pending Prescriptions Disp Refills     [facility] MELATONIN 3 MG tablet [Pharmacy Med Name: [facility] MELATONIN 3 MG TABLET] 90 tablet 1     Sig: TAKE 1 TABLET (3 MG) BY MOUTH NIGHTLY AS NEEDED FOR SLEEP       There is no refill protocol information for this order          Marilyn Thompson, RN 08/19/22 3:01 PM

## 2022-10-01 ENCOUNTER — HEALTH MAINTENANCE LETTER (OUTPATIENT)
Age: 72
End: 2022-10-01

## 2022-10-04 PROBLEM — F52.8 OTHER SEXUAL DYSFUNCTION NOT DUE TO A SUBSTANCE OR KNOWN PHYSIOLOGICAL CONDITION: Status: ACTIVE | Noted: 2021-11-29

## 2022-11-10 DIAGNOSIS — F41.9 ANXIETY: ICD-10-CM

## 2022-11-10 RX ORDER — ALPRAZOLAM 1 MG
1 TABLET ORAL
Qty: 30 TABLET | Refills: 3 | Status: SHIPPED | OUTPATIENT
Start: 2022-11-10 | End: 2023-02-23

## 2022-11-10 NOTE — TELEPHONE ENCOUNTER
Patient calling to get a medication refill on medications attached.    Controlled Substance Refill Request for     ALPRAZolam (XANAX) 1 MG tablet     Last refill: 7/27/2022    Last clinic visit: 7/27/2022    Clinic visit frequency required:   Next appt: No Future

## 2022-12-04 DIAGNOSIS — I10 HYPERTENSION: ICD-10-CM

## 2022-12-05 RX ORDER — AMLODIPINE BESYLATE 10 MG/1
TABLET ORAL
Qty: 90 TABLET | Refills: 3 | OUTPATIENT
Start: 2022-12-05

## 2022-12-05 NOTE — TELEPHONE ENCOUNTER
amLODIPine (NORVASC) 5 MG tablet  10 mg, DAILY 11 ordered               Summary: Take 2 tablets (10 mg) by mouth daily, Disp-90 tablet, R-11, E-Prescribe  Profile Rx: patient will contact pharmacy when needed     Dose, Route, Frequency: 10 mg, Oral, DAILY  Start: 7/27/2022  Ord/Sold: 7/27/2022 (O)  Report  Adh:   Taking:   Long-term:   Pharmacy: [facility]/pharmacy #[id number] - [place], [place] - [address] [place] [address] Dose History  Change       Patient Sig: Take 2 tablets (10 mg) by mouth daily       Ordered on: 7/27/2022       Authorized by: NADIR MÁRQUEZ       Dispense: 90 tablet       Note to Pharmacy: Profile Rx: patient will contact pharmacy when needed

## 2022-12-08 ENCOUNTER — NURSE TRIAGE (OUTPATIENT)
Dept: FAMILY MEDICINE | Facility: CLINIC | Age: 72
End: 2022-12-08

## 2022-12-08 NOTE — TELEPHONE ENCOUNTER
Reason for call:  Patient reporting a symptom    Symptom or request: THROBBING PAIN IN FINGER        Have you been treated for this before? No    Additional comments: PLEASE CALL ABOUT SYMPTOMS    Phone Number patient can be reached at:  Cell number on file:    Telephone Information:   Mobile [phone]       Best Time:  ASAP    Can we leave a detailed message on this number:  YES    Call taken on 12/8/2022 at 1:51 PM by Meaghan Shirley

## 2022-12-08 NOTE — TELEPHONE ENCOUNTER
"S-(situation):   Pt reports a small bump on right hand middle finger with moderate pain. Wanting to know what to do.    B-(background):   Pain has been going on for a few weeks. Pt has not applied any interventions for pain management.  Pt has arthritis     A-(assessment):   Right hand, 3rd digit, medial aspect of distal phalanx  Bump, pea in size. Stable, no change in size since onset  Natural skin tone, denies swelling or redness  Pain, intermittent, moderate  Pressure makes pain worse, nothing makes it better  Denies recent injury or overuse    R-(recommendations):   Office visit within 3 days per protocol. Scheduling had pt scheduled for an appt on 12/09/2022 prior to sending message to RN pool. Writer reminded pt about appt. Care advice given. Pt denied further questions.    Karol Montoya RN    Reason for Disposition    MODERATE pain (e.g., interferes with normal activities) and present > 3 days    Additional Information    Negative: Followed an injury    Negative: Wound looks infected    Negative: Caused by an animal bite    Negative: Caused by frostbite    Negative: Hand or wrist pain is main symptom    Negative: Looks infected (spreading redness, red streak, pus) and severe pain with movement    Negative: Patient sounds very sick or weak to the triager    Negative: SEVERE pain (e.g., excruciating, unable to use hand at all)    Negative: Looks infected (e.g., spreading redness, red streak, pus)    Negative: Yellow pus under skin around fingernail (cuticle) or pus under fingernail    Negative: Painful blisters on fingertip    Negative: Numbness (i.e., loss of sensation) in hand or fingers of new-onset    Answer Assessment - Initial Assessment Questions  1. ONSET: \"When did the pain start?\"       A few weeks ago  2. LOCATION and RADIATION: \"Where is the pain located?\"  (e.g., fingertip, around nail, joint, entire   finger)       Right hand, middle finger, proximal aspect close to finger nail  3. " "SEVERITY: \"How bad is the pain?\" \"What does it keep you from doing?\"   (Scale 1-10; or mild, moderate, severe)   - MILD (1-3): doesn't interfere with normal activities.    - MODERATE (4-7): interferes with normal activities or awakens from sleep.   - SEVERE (8-10): excruciating pain, unable to hold a glass of water or bend finger even a little.      moderate  4. APPEARANCE: \"What does the finger look like?\" (e.g., redness, swelling, bruising, pallor)      Looks normal other than a bump  5. WORK OR EXERCISE: \"Has there been any recent work or exercise that involved this part (i.e., fingers or hand) of the body?\"      no  6. CAUSE: \"What do you think is causing the pain?\"      insure  7. AGGRAVATING FACTORS: \"What makes the pain worse?\" (e.g., using computer)      Sensitive with pressure  8. OTHER SYMPTOMS: \"Do you have any other symptoms?\" (e.g., fever, neck pain, numbness)      no  9. PREGNANCY: \"Is there any chance you are pregnant?\" \"When was your last menstrual period?\"      no    Protocols used: FINGER PAIN-A-OH      "

## 2022-12-09 ENCOUNTER — OFFICE VISIT (OUTPATIENT)
Dept: FAMILY MEDICINE | Facility: CLINIC | Age: 72
End: 2022-12-09
Payer: COMMERCIAL

## 2022-12-09 VITALS
WEIGHT: 146 LBS | DIASTOLIC BLOOD PRESSURE: 68 MMHG | HEIGHT: 63 IN | SYSTOLIC BLOOD PRESSURE: 134 MMHG | BODY MASS INDEX: 25.87 KG/M2 | OXYGEN SATURATION: 99 % | HEART RATE: 69 BPM | RESPIRATION RATE: 24 BRPM

## 2022-12-09 DIAGNOSIS — R23.2 HOT FLASHES: ICD-10-CM

## 2022-12-09 DIAGNOSIS — M71.30 SYNOVIAL CYST: ICD-10-CM

## 2022-12-09 DIAGNOSIS — I10 HYPERTENSION, UNSPECIFIED TYPE: Primary | ICD-10-CM

## 2022-12-09 PROCEDURE — 99203 OFFICE O/P NEW LOW 30 MIN: CPT | Performed by: FAMILY MEDICINE

## 2022-12-09 RX ORDER — AMLODIPINE BESYLATE 5 MG/1
10 TABLET ORAL DAILY
Qty: 90 TABLET | Refills: 11 | Status: SHIPPED | OUTPATIENT
Start: 2022-12-09 | End: 2023-05-04

## 2022-12-09 RX ORDER — GABAPENTIN 300 MG/1
300 CAPSULE ORAL 3 TIMES DAILY
Qty: 90 CAPSULE | Refills: 4 | Status: SHIPPED | OUTPATIENT
Start: 2022-12-09 | End: 2023-05-16

## 2022-12-09 NOTE — ASSESSMENT & PLAN NOTE
Blood pressure well controlled today.  On amlodipine 5 mg.  Requesting refill on this medication.  Also takes lisinopril 20 and propranolol 20 mg twice daily.  No changes.

## 2022-12-09 NOTE — PROGRESS NOTES
Assessment/ Plan  Hypertension, unspecified type  Blood pressure well controlled today.  On amlodipine 5 mg.  Requesting refill on this medication.  Also takes lisinopril 20 and propranolol 20 mg twice daily.  No changes.    Hot flashes  Frequent.  Likely due to androgen blockers which he has had for prostate cancer treatment.  Already on citalopram for mental health issues.  Urologist had suggested consideration of gabapentin which I recommended as well.  Is been on this in the past for other issues and tolerated it well  Start 300 mg, discussed gradual upping of dose.  Follow-up 3 months.  Discussed potential side effects.      Synovial cyst  Index finger, right hand.  Lump over DIP joint.  Suspect this is synovial cyst though not entirely clear.  Also at risk for nodules with sarcoid, currently no change in epithelium.  Follow     Subjective  CC:  chief complaint  HPI:  History of Present Illness       Reason for visit:  Bump on my right middle finger and hot flashes  Symptom onset:  1-2 weeks ago  Symptoms include:  Right finger having some pain hot flashes from cancer treatment  Symptom intensity:  Severe  Symptom progression:  Staying the same  Had these symptoms before:  No  What makes it worse:  No  What makes it better:  No    He eats 2-3 servings of fruits and vegetables daily.He consumes 1 sweetened beverage(s) daily.He exercises with enough effort to increase his heart rate 9 or less minutes per day.  He exercises with enough effort to increase his heart rate 3 or less days per week.   He is taking medications regularly.  PFSH:  Patient Active Problem List   Diagnosis     Anxiety     Hypertension, unspecified type     Hilar adenopathy     Insomnia     Male Erectile Disorder     Chronic headache     Mild Recurrent Major Depression     Mixed hyperlipidemia     Recurrent UTI     Pulmonary sarcoidosis (H)     Uveitis     Chronic maxillary sinusitis     Elevated prostate specific antigen (PSA)      "Diverticular disease of colon     Hiccups     Dyspepsia     Thickening of esophagus- seen 3/2022, referred to GI     Prostate cancer (H)     Hot flashes     Synovial cyst     ALPRAZolam (XANAX) 1 MG tablet, Take 1 tablet (1 mg) by mouth nightly as needed for anxiety or sleep  azelastine (ASTELIN) 0.1 % nasal spray, USE 2 SPRAYS IN EACH NOSTRIL TWICE A DAY  celecoxib (CELEBREX) 200 MG capsule, Take 200 mg by mouth daily  citalopram (CELEXA) 40 MG tablet, Take 1 tablet (40 mg) by mouth daily  CVS D3 125 MCG (5000 UT) CAPS, TAKE 1 CAPSULE (5,000 UNITS TOTAL) BY MOUTH DAILY.  CVS MELATONIN 3 MG tablet, TAKE 1 TABLET (3 MG) BY MOUTH NIGHTLY AS NEEDED FOR SLEEP  fluticasone (FLONASE) 50 MCG/ACT nasal spray, SPRAY 1 - 2 SPRAY(S) BY INTRANASAL ROUTE , 1 TIME PER DAY , FOR 30 DAYS  hydrOXYzine (ATARAX) 25 MG tablet, Take 25 mg by mouth 3 times daily as needed for itching  lisinopril (ZESTRIL) 20 MG tablet, TAKE 1 TABLET BY MOUTH EVERY DAY  pantoprazole (PROTONIX) 40 MG EC tablet, TAKE 1 TABLET BY MOUTH EVERY DAY  predniSONE (DELTASONE) 1 MG tablet, Take 3 tablets (3 mg) by mouth daily  propranolol (INDERAL) 20 MG tablet, Take 1 tablet (20 mg) by mouth 2 times daily  rosuvastatin (CRESTOR) 10 MG tablet, TAKE 1 TABLET (10 MG) BY MOUTH DAILY.  SUMAtriptan (IMITREX) 50 MG tablet, Take 50 mg by mouth at onset of headache  traZODone (DESYREL) 50 MG tablet, [TRAZODONE (DESYREL) 50 MG TABLET] TAKE 1 TABLET BY MOUTH EVERYDAY AT BEDTIME    No current facility-administered medications on file prior to visit.       History   Smoking Status     Never   Smokeless Tobacco     Never     Comment: N.A.     Social History     Social History Narrative    , 2 grown daughters, 2 granddaughters. 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  Previously working in administrative position.  Nonsmoker.         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 Moved back to [place] 9 years ago because parents had " "dementia, so moved back here to take care of them. He has an older sister and younger brother (John). He has a girlfriend. She lives in [place]. His ex-wife passed away from breast cancer. Had 2 girls in that marriage. Oldest girl has 2 granddaughters. Both live in [place]. 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 He will probably stop volunteering soon though. - Dr. Lunsford 12/10/21      Patient Care Team:  Saurav Faust MD as PCP - General (Internal Medicine - Pediatrics)  Macho Sargent MD as Assigned Surgical Provider  Saurav Faust MD as Assigned PCP  Vipin Duron MD as Assigned Pulmonology Provider        Objective  Physical Exam  Vitals:    12/09/22 1405   BP: 134/68   BP Location: Right arm   Patient Position: Sitting   Cuff Size: Adult Regular   Pulse: 69   Resp: 24   SpO2: 99%   Weight: 66.2 kg (146 lb)   Height: 1.6 m (5' 2.99\")     Body mass index is 25.87 kg/m .  Patient is alert, very well dressed, no acute distress.  Indistinct nodule over right index finger PIP joint.    Diagnostics:   None      Please note: Voice recognition software was used in this dictation.  It may therefore contain typographical errors.        Answers for HPI/ROS submitted by the patient on 12/9/2022  How many servings of fruits and vegetables do you eat daily?: 2-3  On average, how many sweetened beverages do you drink each day (Examples: soda, juice, sweet tea, etc.  Do NOT count diet or artificially sweetened beverages)?: 1  How many minutes a day do you exercise enough to make your heart beat faster?: 9 or less  How many days a week do you exercise enough to make your heart beat faster?: 3 or less  How many days per week do you miss taking your medication?: 0  What is the reason for your visit today?: bump on my right middle finger and hot flashes  When did your symptoms begin?: 1-2 weeks ago  What are your symptoms?: right finger having some pain hot flashes from cancer " treatment  How would you describe these symptoms?: Severe  Are your symptoms:: Staying the same  Have you had these symptoms before?: No  Is there anything that makes you feel worse?: no  Is there anything that makes you feel better?: no

## 2022-12-09 NOTE — ASSESSMENT & PLAN NOTE
Frequent.  Likely due to androgen blockers which he has had for prostate cancer treatment.  Already on citalopram for mental health issues.  Urologist had suggested consideration of gabapentin which I recommended as well.  Is been on this in the past for other issues and tolerated it well  Start 300 mg, discussed gradual upping of dose.  Follow-up 3 months.  Discussed potential side effects.

## 2022-12-09 NOTE — ASSESSMENT & PLAN NOTE
Index finger, right hand.  Lump over DIP joint.  Suspect this is synovial cyst though not entirely clear.  Also at risk for nodules with sarcoid, currently no change in epithelium.  Follow

## 2022-12-13 DIAGNOSIS — G47.00 INSOMNIA, UNSPECIFIED TYPE: ICD-10-CM

## 2022-12-13 DIAGNOSIS — I10 HYPERTENSION: ICD-10-CM

## 2022-12-13 RX ORDER — AMLODIPINE BESYLATE 10 MG/1
TABLET ORAL
Qty: 90 TABLET | Refills: 3 | Status: SHIPPED | OUTPATIENT
Start: 2022-12-13 | End: 2023-11-20

## 2022-12-13 RX ORDER — LANOLIN ALCOHOL/MO/W.PET/CERES
CREAM (GRAM) TOPICAL
Qty: 90 TABLET | Refills: 1 | Status: SHIPPED | OUTPATIENT
Start: 2022-12-13 | End: 2023-07-28

## 2022-12-14 DIAGNOSIS — F41.9 ANXIETY: ICD-10-CM

## 2022-12-15 RX ORDER — CITALOPRAM HYDROBROMIDE 40 MG/1
40 TABLET ORAL DAILY
Qty: 90 TABLET | Refills: 1 | Status: SHIPPED | OUTPATIENT
Start: 2022-12-15 | End: 2023-06-20

## 2022-12-15 NOTE — TELEPHONE ENCOUNTER
"Last Written Prescription Date:  7/27/22  Last Fill Quantity: 90,  # refills: 1   Last office visit provider:   12/9/22    Requested Prescriptions   Pending Prescriptions Disp Refills     citalopram (CELEXA) 40 MG tablet 90 tablet 1     Sig: Take 1 tablet (40 mg) by mouth daily       SSRIs Protocol Passed - 12/14/2022  4:31 PM        Passed - Recent (12 mo) or future (30 days) visit within the authorizing provider's specialty     Patient has had an office visit with the authorizing provider or a provider within the authorizing providers department within the previous 12 mos or has a future within next 30 days. See \"Patient Info\" tab in inbasket, or \"Choose Columns\" in Meds & Orders section of the refill encounter.              Passed - Medication is active on med list        Passed - Patient is age 18 or older             Bella Wright RN 12/15/22 3:38 PM  "

## 2023-01-12 DIAGNOSIS — I10 ESSENTIAL HYPERTENSION, BENIGN: ICD-10-CM

## 2023-01-14 RX ORDER — LISINOPRIL 20 MG/1
TABLET ORAL
Qty: 90 TABLET | Refills: 3 | Status: SHIPPED | OUTPATIENT
Start: 2023-01-14 | End: 2024-02-06

## 2023-01-15 NOTE — TELEPHONE ENCOUNTER
"Last Written Prescription Date:  5/11/2022  Last Fill Quantity: 90,  # refills: 2   Last office visit provider: 12/9/2022 with Dr ANAM Tillman      Requested Prescriptions   Pending Prescriptions Disp Refills     lisinopril (ZESTRIL) 20 MG tablet [Pharmacy Med Name: LISINOPRIL 20 MG TABLET] 90 tablet 2     Sig: TAKE 1 TABLET BY MOUTH EVERY DAY       ACE Inhibitors (Including Combos) Protocol Passed - 1/12/2023 12:16 PM        Passed - Blood pressure under 140/90 in past 12 months     BP Readings from Last 3 Encounters:   12/09/22 134/68   07/27/22 102/50   05/03/22 110/60                 Passed - Recent (12 mo) or future (30 days) visit within the authorizing provider's specialty     Patient has had an office visit with the authorizing provider or a provider within the authorizing providers department within the previous 12 mos or has a future within next 30 days. See \"Patient Info\" tab in inbasket, or \"Choose Columns\" in Meds & Orders section of the refill encounter.              Passed - Medication is active on med list        Passed - Patient is age 18 or older        Passed - Normal serum creatinine on file in past 12 months     Recent Labs   Lab Test 03/21/22  1602   CR 1.23       Ok to refill medication if creatinine is low          Passed - Normal serum potassium on file in past 12 months     Recent Labs   Lab Test 03/21/22  1602   POTASSIUM 4.0                  Sarah Florez RN 01/14/23 10:36 PM  "

## 2023-01-17 ENCOUNTER — VIRTUAL VISIT (OUTPATIENT)
Dept: FAMILY MEDICINE | Facility: CLINIC | Age: 73
End: 2023-01-17
Payer: COMMERCIAL

## 2023-01-17 ENCOUNTER — TELEPHONE (OUTPATIENT)
Dept: FAMILY MEDICINE | Facility: CLINIC | Age: 73
End: 2023-01-17
Payer: MEDICAID

## 2023-01-17 ENCOUNTER — NURSE TRIAGE (OUTPATIENT)
Dept: NURSING | Facility: CLINIC | Age: 73
End: 2023-01-17

## 2023-01-17 DIAGNOSIS — G47.00 INSOMNIA, UNSPECIFIED TYPE: ICD-10-CM

## 2023-01-17 DIAGNOSIS — U07.1 INFECTION DUE TO 2019 NOVEL CORONAVIRUS: Primary | ICD-10-CM

## 2023-01-17 DIAGNOSIS — F33.0 MAJOR DEPRESSIVE DISORDER, RECURRENT EPISODE, MILD (H): ICD-10-CM

## 2023-01-17 DIAGNOSIS — D86.0 PULMONARY SARCOIDOSIS (H): ICD-10-CM

## 2023-01-17 DIAGNOSIS — C61 PROSTATE CANCER (H): ICD-10-CM

## 2023-01-17 PROCEDURE — 99442 PR PHYSICIAN TELEPHONE EVALUATION 11-20 MIN: CPT | Mod: CS | Performed by: PHYSICIAN ASSISTANT

## 2023-01-17 RX ORDER — ZOLPIDEM TARTRATE 5 MG/1
5 TABLET ORAL
Qty: 5 TABLET | Refills: 0 | Status: SHIPPED | OUTPATIENT
Start: 2023-01-17 | End: 2024-04-18

## 2023-01-17 NOTE — TELEPHONE ENCOUNTER
"Gather patient reported symptoms   Assessment   Current Symptoms at time of phone call, reported by patient: (if no symptoms, document: No symptoms] Sore throat, \"head cold\"   Date of symptom(s) onset (if applicable) 01/15     If at time of call, Patients symptoms have worsened, the Patient should contact 911 or have someone drive them to Emergency Dept promptly:      If Patient calling 911, inform 911 personal that you have tested positive for the Coronavirus (COVID-19).  Place mask on and await 911 to arrive.    If Emergency Dept, If possible, please have another adult drive you to the Emergency Dept but you need to wear mask when in contact with other people.      Treatment Options:   Patient classified as COVID treatment eligible by Epic high risk algorithm: Yes  Is the patient symptomatic at the time of result notification? Yes. Was the onset of symptoms within the last 5 days? Yes.   There are now oral medications available for the treatment of COVID-19.  Taking one of these medications within the first five days of symptoms (when people may not yet feel severely ill) has been shown to make people feel better, prevent them from getting sicker, and preventing hospitalization and death.   Does the patient agree to have a visit with a provider to discuss treatment options? Yes. Is the patient seen at [facility]?  No: Warm transfer caller to [phone] to be scheduled with a virtual urgent provider.  During transfer, instruct  on appropriate time frame for visit     Review information with Patient    Your result was positive. This means you have COVID-19 (coronavirus).    How can I protect others?    These guidelines are for isolating before returning to work, school or .    If you DO have symptoms    Stay home and away from others     For at least 5 days after your symptoms started, AND    You are fever free for 24 hours (with no medicine that reduces fever), AND    Your other " symptoms are better    Wear a mask for 10 full days anytime you are around others    If you DON'T have symptoms    Stay home and away from others for at least 5 days after your positive test    Wear a mask for 10 full days anytime you are around others    There may be different guidelines for healthcare facilities.  Please check with the specific sites before arriving.    If you have been told by a doctor that you were severely ill with COVID-19 or are immunocompromised, you should isolate for at least 10 days.    You should not go back to work until you meet the guidelines above for ending your home isolation. You don't need to be retested for COVID-19 before going back to work--studies show that you won't spread the virus if it's been at least 10 days since your symptoms started (or 20 days, if you have a weak immune system).    Employers, schools, and daycares: This is an official notice for this person's medical guidelines for returning in-person.  They must meet the above guidelines before going back to work, school or  in person.    You will receive a positive COVID-19 letter via [employer] or the mail soon with additional self-care information.    Would you like me to review some of that information with you now?  Yes    How can I take care of myself?      Get lots of rest. Drink extra fluids (unless a doctor has told you not to).      Take Tylenol (acetaminophen) for fever or pain. If you have liver or kidney problems, ask your family doctor if it's okay to take Tylenol.     Take either:     650 mg (two 325 mg pills) every 4 to 6 hours, or     1,000 mg (two 500 mg pills) every 8 hours as needed.     Note: Do not take more than 3,000 mg in one day. Acetaminophen is found in many medicines (both prescribed and over-the-counter medicines). Read all labels to be sure you don't take too much.    For children, check the Tylenol bottle for the right dose (based on their age or weight).      If you have other  health problems (like cancer, heart failure, an organ transplant or severe kidney disease): Call your specialty clinic if you don't feel better in the next 2 days.      Know when to call 911: Emergency warning signs include:    Trouble breathing or shortness of breath    Pain or pressure in the chest that doesn't go away    Feeling confused like you haven't felt before, or not being able to wake up    Bluish-colored lips or face        If you were tested for an upcoming procedure, please contact your provider for next steps.    Denis Kraft RN  Reason for Disposition    HIGH RISK for severe COVID complications (e.g., weak immune system, age > 64 years, obesity with BMI > 25, pregnant, chronic lung disease or other chronic medical condition) (Exception: Already seen by PCP and no new or worsening symptoms.)    Additional Information    Negative: SEVERE difficulty breathing (e.g., struggling for each breath, speaks in single words)    Negative: Difficult to awaken or acting confused (e.g., disoriented, slurred speech)    Negative: Bluish (or gray) lips or face now    Negative: Shock suspected (e.g., cold/pale/clammy skin, too weak to stand, low BP, rapid pulse)    Negative: Sounds like a life-threatening emergency to the triager    Negative: SEVERE or constant chest pain or pressure  (Exception: Mild central chest pain, present only when coughing.)    Negative: MODERATE difficulty breathing (e.g., speaks in phrases, SOB even at rest, pulse 100-120)    Negative: Headache and stiff neck (can't touch chin to chest)    Negative: Oxygen level (e.g., pulse oximetry) 90 percent or lower    Negative: Chest pain or pressure    Negative: Patient sounds very sick or weak to the triager    Negative: MILD difficulty breathing (e.g., minimal/no SOB at rest, SOB with walking, pulse <100)    Negative: Fever > 103 F (39.4 C)    Negative: [1] Fever > 101 F (38.3 C) AND [2] over 60 years of age    Negative: [1] Fever > 100.0 F (37.8  C) AND [2] bedridden (e.g., nursing home patient, CVA, chronic illness, recovering from surgery)    Protocols used: CORONAVIRUS (COVID-19) DIAGNOSED OR [id number] 1.18.2022

## 2023-01-17 NOTE — TELEPHONE ENCOUNTER
Medication Question or Refill    Contacts       Type Contact Phone/Fax    01/17/2023 01:25 PM CST Phone (Incoming) Denis Moreno (Self) [phone] (H)          What medication are you calling about (include dose and sig)?:     ALPRAZolam (XANAX) 1 MG tablet [[id number]]     Order Details  Dose: 1 mg Route: Oral Frequency: AT BEDTIME PRN for anxiety, sleep   Dispense Quantity: 30 tablet Refills: 3          Sig: Take 1 tablet (1 mg) by mouth nightly as needed for anxiety or sleep           Controlled Substance Agreement on file:   CSA -- Patient Level:     [Media Unavailable] Controlled Substance Agreement - Non - Opioid - Scan on 1/2/2022  9:35 PM: NON-OPIOID CONTROLLED SUBSTANCE AGREEMENT   [Media Unavailable] Controlled Substance Agreement - Non - Opioid - Scan on 3/6/2020: NON-OPIOID CONTROLLED SUBSTANCE AGREEMENT       Who prescribed the medication?: Dr Saurav Faust    Do you need a refill? Yes: 2 left    When did you use the medication last? Last night    Patient offered an appointment? Yes: will schedule an appointment    Do you have any questions or concerns?  No    Preferred Pharmacy:     [id number] - [place], [place] - [address] [facility]  [address] [place] [address]  Phone: [phone] Fax: [phone]    Could we send this information to you in [facility] or would you prefer to receive a phone call?:   Patient would prefer a phone call   Okay to leave a detailed message?: Yes at Cell number on file:    Telephone Information:   Mobile [phone]

## 2023-01-17 NOTE — PATIENT INSTRUCTIONS
HOLD the rosuvastatin and alprazolam while taking the covid antiviral.  You can try the zolpidem at bedtime if necessary.        At [facility], we strive to deliver an exceptional experience to you, every time we see you. If you receive a survey, please complete it as we do value your feedback.  If you have MyChart, you can expect to receive results automatically within 24 hours of their completion.  Your provider will send a note interpreting your results as well.   If you do not have MyChart, you should receive your results in about a week by mail.    Your care team:                            Family Medicine Internal Medicine   MD Jose Dunn, MD Andrés Cruz MD Katya Belousova, SANDRA JuradoHillNADER Lindsey CNP, MD Pediatrics   SANDRA Penny MD Paula Brito, MD Amelia Massimini APRN CNP   Violetta Wells APRN MD Marissa Gerber MD          Clinic hours: Monday - Thursday 7 am-6 pm; Fridays 7 am-5 pm.   Urgent care: Monday - Friday 10 am- 8 pm; Saturday and Sunday 9 am-5 pm.    Clinic: [phone]       [facility]: Monday - Thursday 8 am - 7 pm; Friday 8 am - 6 pm  [facility]: [phone]     COVID-19 Outpatient Treatments  Your care team can help you find the best treatments for COVID-19. Talk to a health care provider or refer to the FDA medicine fact sheets below.  Important: You can't have Paxlovid or molnupiravir if you're starting the medicine more than 5 days after your symptoms have started.  Paxlovid: https://www.fda.gov/media/858982/download  Molnupiravir (Lagevrio): https://www.fda.gov/media/002669/download  Paxlovid (nimatrelvir and ritonavir)  How it works  Two medicines (nirmatrelvir and ritonavir) are taken together. They stop the virus from growing. Less amount of virus is easier for your  body to fight.  Benefits  Lowers risk of a hospital stay or death from COVID-19.  How to take  Medicine comes in a daily container with both medicine tablets. Take by mouth twice daily (once in the morning, once at night) for 5 days.  The number of tablets to take varies by patient.  Don't chew or break capsules. Swallow whole.  When to take  Take as soon as possible after positive COVID-19 test result, and within 5 days of your first symptoms.  Who can take it  Patients must be 12 years or older, weigh at least 88 pounds, and have tested positive for COVID-19. Paxlovid is the preferred treatment for pregnant patients.  Possible side effects  Can cause altered sense of taste, diarrhea (loose, watery stools), high blood pressure, muscle aches.  Medicine conflicts  Some medicines may conflict with Paxlovid and may cause serious side effects.  Tell your care team about all the medicines you take, including prescription and over-the-counter medicines, vitamins, and herbal supplements.  Your care team will review your medicines to make sure that you can safely take Paxlovid.  Cautions  Paxlovid is not advised for patients with severe kidney or liver disease. If you have kidney or liver problems, the dose may need to be changed.  If you're pregnant or breastfeeding, talk to your care team about your options.  If you take hormonal birth control (such as the Pill), then you or your partner should also use a non-hormonal form of birth control (such as a condom). Keep doing this for 1 menstrual cycle after your last dose of Paxlovid.  Molnupiravir (Lagevrio)  How it works  Stops the virus from growing. Less amount of virus is easier for your body to fight.  Benefits  Lowers risk of a hospital stay or death from COVID-19.  How to take  Take 4 capsules by mouth every 12 hours (4 in the morning and 4 at night) for 5 days. Don't chew or break capsules. Swallow whole.  When to take  Take as soon as possible after positive COVID-19  test result, and within 5 days of your first symptoms.  Who can take it  Patients must be 18 years or older and have tested positive for COVID-19.  Possible side effects  Diarrhea (loose, watery stools), nausea (feeling sick to your stomach), dizziness, headaches.  Medicine conflicts  Right now, there are no known conflicts with other drugs. But tell your care team about all medicines you take.  Cautions  This medicine is not advised for patients who are pregnant.  If you are someone who could become pregnant, use trusted birth control until 4 days after your last dose of molnupiravir.  If your partner could become pregnant, you should use trusted birth control until 3 months after your last dose of molnupiravir.  For informational purposes only. Not to replace the advice of your health care provider. Copyright   2022 [facility]. All rights reserved. Clinically reviewed by Belinda Costello, PharmD, BCACP. [employer] 546176 - REV 12/22.  At [facility], we strive to deliver an exceptional experience to you, every time we see you. If you receive a survey, please complete it as we do value your feedback.  If you have [employer], you can expect to receive results automatically within 24 hours of their completion.  Your provider will send a note interpreting your results as well.   If you do not have MyChart, you should receive your results in about a week by mail.    Your care team:                            Family Medicine Internal Medicine   MD Jose Dunn MD Shantel Branch-Fleming, MD Katya Georgiev PA-C Megan Hill, APRN CNP Nam Ho, MD Pediatrics   SANDRA Penny CNP Paula Brito, MD Amelia Massimini APRN CNP   MD Geri Coombs MD Deborah Mielke, MD Kim Thein, APRN [facility]      Clinic hours: Monday - Thursday 7 am-7 pm; Fridays 7 am-5 pm.   Urgent care: Monday - Friday 11 am-9 pm; Saturday and Sunday 9 am-5  pm.    Clinic: [phone]       [facility]: Monday - Thursday 8 am - 7 pm; Friday 8 am - 6 pm  [facility]: [phone]     Use www.[employer] for 24/7 diagnosis and treatment of dozens of conditions.  Patient Education     Coronavirus Disease 2019 (COVID-19): Caring for Yourself or Others  If you or a household member have symptoms of COVID-19, follow the guidelines below. This will help you manage symptoms and keep the virus from spreading.  If you have symptoms of COVID-19  Stay home and contact your care team. They will tell you what to do.  Don t panic. Keep in mind that other illnesses can cause similar symptoms.  Stay away from work, school, and public places.  Limit physical contact with others in your home. Limit visitors. No kissing.  Clean surfaces you touch with disinfectant.  If you need to cough or sneeze, do it into a tissue. Then, throw the tissue into the trash. If you don't have tissues, cough or sneeze into the bend of your elbow  Don t share food or personal items with people in your household. This includes items like eating and drinking utensils, towels, and bedding.  Wear a cloth face mask around other people. It should cover your nose and mouth. You may need to make your own mask using a bandana, T-shirt, or other cloth. See the CDC s instructions on how to make a mask.  If you need to go to a hospital or clinic, call ahead to let them know. Expect the care team to wear masks, gowns, gloves, and eye protection. You may be put in a separate room.  Follow all instructions from your care team.  If you ve been diagnosed with COVID-19  Stay home and away from others, including other people in your home. (This is called self-isolation.) Don t leave home unless you need to get medical care. Don't go to work, school, or public places. Don't use buses, taxis, or other public transportation.  Follow all instructions from your care team.  If you need to go to  a hospital or clinic, call ahead to let them know. Expect the care team to wear masks, gowns, gloves, and eye protection. You may be put in a separate room.  Wear a face mask over your nose and mouth. This is to protect others from your germs. If you can t wear a mask, your caregivers should wear one. You may need to make your own mask using a bandana, T-shirt, or other cloth. See the CDC s instructions on how to make a mask.  Have no contact with pets and other animals.  Don t share food or personal items with people in your household. This includes items like eating and drinking utensils, towels, and bedding.  If you need to cough or sneeze, do it into a tissue. Then, throw the tissue into the trash. If you don't have tissues, cough or sneeze into the bend of your elbow.  Wash your hands often.  Self-care at home  At this time, there is no medicine approved to prevent or treat COVID-19. Experts are testing different medicines, trying to find one that works.  So far, the most proven treatment is to support your body while it fights the virus.  Get plenty of rest.  Drink extra fluids (6 to 8 glasses of liquids each day), unless a doctor has told you not to. Ask your care team which fluids are best for you. Avoid fluids that contain caffeine or alcohol.  Take over-the-counter (OTC) medicine to help reduce pain and fever. Your care team will tell you which OTC medicine to use.  If you ve been in the hospital for COVID-19, follow your care team s instructions. This may include changing positions to help your breathing (such as lying on your belly).  If a test showed that you have COVID-19, you may be asked to donate plasma after you ve recovered. (This is called COVID-19 convalescent plasma donation.) The plasma may contain antibodies to help fight the virus in others. Scientists are testing whether this might be a treatment in the future. For more information, talk to your care team.  Caring for a sick person  Follow  all instructions from the care team.  Wash your hands often.  Wear protective clothing as advised.  Make sure the sick person wears a mask. If they can't wear a mask, don't stay in the same room with them. If you must be in the same room, wear a face mask. Make sure the mask covers both the nose and mouth.  Keep track of the sick person s symptoms.  Clean surfaces often with disinfectant. This includes phones, kitchen counters, fridge door handles, bathroom surfaces, and others.  Don t let anyone share household items with the sick person. This includes eating and drinking tools, towels, sheets, and blankets.  Clean fabrics and laundry well.  Keep other people and pets away from the sick person.  When you can stop self-isolation  When you are sick with COVID-19, you should stay away from other people. This is called self-isolation. The rules for ending self-isolation depend on your health in general.  If you are normally healthy  You can stop self-isolation when all 3 of these are true:  You ve had no fever--and no medicine that reduces fever--for 3 full days (72 hours). And   Your symptoms are better, such as cough or trouble breathing. And   At least 10 days have passed since your symptoms first started.  Talk with your care team before you leave home. They may tell you it s okay to leave, or they may give you different advice.  If you have a weak immune system  If you re being treated for cancer, have an immune disorder (such as HIV), or have had a transplant (organ or bone marrow), follow your care team s instructions. You may be able to end self-isolation when all 3 of these are true:  You ve had no fever--and no medicine that reduces fever--for 3 full days (72 hours). And   Your symptoms are better, such as cough or trouble breathing. And   You ve had 2 tests for COVID-19. The tests happened at least 24 hours apart, and both show that you don t have the virus. (If no tests are available, your care team may  tell you to follow the rules for normally healthy people above.)  When to call your care team  Call your care team right away if a sick person has any of these:  Trouble breathing  Pain or pressure in the chest  If a sick person has any of these, call 911:  Trouble breathing that gets worse  Pain or pressure in the chest that gets worse  Blue tint to lips or face  Fast or irregular heartbeat  Confusion or trouble waking  Fainting or loss of consciousness  Coughing up blood  Going home from the hospital  If you have COVID-19 and were recently in the hospital:  Follow the instructions above for self-care and isolation.  Follow the hospital care team s instructions.  Ask questions if anything is unclear to you. Write down answers so you remember them.  Last modified date: 5/8/2020  This information has been modified by your health care provider with permission from the publisher.      [id number] [facility], [address] [place] [address], [place] [address]. All rights reserved. This information is not intended as a substitute for professional medical care. Always follow your healthcare professional's instructions. This information has been modified by your health care provider with permission from the publisher.

## 2023-01-17 NOTE — TELEPHONE ENCOUNTER
Per chart - 2 refills still available at Pt's pharmacy.   Outbound call to pharmacy to confirm - spoke with Rosita at the pharmacy, they have 2 refills on file for him. They will fill one for him to .    Called patient to let him know - no answer.   Unable to leave VM due to full mailbox.     If patient returns call, please let him know his prescription is available at his pharmacy for him.     Emilia Major RN, BSN  [facility]

## 2023-01-17 NOTE — PROGRESS NOTES
Denis is a 72 year old who is being evaluated via a billable telephone visit.      What phone number would you like to be contacted at? [phone]   How would you like to obtain your AVS? Porsche  Distant Location (provider location):  On-site    Assessment & Plan  sounds well, hold statinand benzo while on paxlovid, ambien to replace bedtime alprazolam.    Problem List Items Addressed This Visit     Insomnia    Relevant Medications    zolpidem (AMBIEN) 5 MG tablet    Mild Recurrent Major Depression    Pulmonary sarcoidosis (H)    Prostate cancer (H)   Other Visit Diagnoses     Infection due to 2019 novel coronavirus    -  Primary    Relevant Medications    nirmatrelvir and ritonavir (PAXLOVID) therapy pack            15 minutes spent on the date of the encounter doing chart review, history and exam, documentation and further activities per the note  {   Return in about 1 week (around 1/24/2023), or if symptoms worsen or fail to improve, for PCP Follow-up.    GILBERT Lucio  [facility]   Denis is a 72 year old , presenting for the following health issues:  Covid Concern      HPI       COVID-19 Symptom Review  How many days ago did these symptoms start? 2 days ago     Are any of the following symptoms significant for you?    New or worsening difficulty breathing? No    Worsening cough? Yes, it's a dry cough.     Fever or chills? Yes, I felt feverish or had chills.    Headache: No    Sore throat: YES    Chest pain: No    Diarrhea: No    Body aches? YES    What treatments has patient tried? None    Does patient live in a nursing home, group home, or shelter? No  Does patient have a way to get food/medications during quarantined? Yes, I have a friend or family member who can help me.    His two most recent GFRs ( last march and April) were in the 50s,  Prior to that generally in the 60s.    Completed prostate cancer treatments about 3 months ago, also hx pulm  sarcodiosos and MDD on citalopram -       Vaccinated and boosted.    Alprazolam nightly for sleep.  Would prefer to take paxlovid.   [place] stake trazadone.             Review of Systems   RESP cough sa above      Objective           Vitals:  No vitals were obtained today due to virtual visit.    Physical Exam   healthy, alert and no distress  PSYCH: Alert and oriented times 3; coherent speech, normal   rate and volume, able to articulate logical thoughts, able   to abstract reason, no tangential thoughts, no hallucinations   or delusions  His affect is normal  RESP: No cough, no audible wheezing, able to talk in full sentences  Remainder of exam unable to be completed due to telephone visits         Phone call duration: 15 minutes

## 2023-01-23 ENCOUNTER — TELEPHONE (OUTPATIENT)
Dept: FAMILY MEDICINE | Facility: CLINIC | Age: 73
End: 2023-01-23
Payer: MEDICAID

## 2023-01-23 NOTE — TELEPHONE ENCOUNTER
Patient calling in to clarify if he should retake COVID test after completing Paxlovid course. RN recommended not testing recommended the [facility] website for more information.   Patient clarified medication holds with Alprazolam, and rosuvastatin.    ALFONZO Lauren  [facility]

## 2023-01-27 ENCOUNTER — VIRTUAL VISIT (OUTPATIENT)
Dept: PULMONOLOGY | Facility: CLINIC | Age: 73
End: 2023-01-27
Payer: COMMERCIAL

## 2023-01-27 DIAGNOSIS — J32.9 CHRONIC RHINOSINUSITIS: ICD-10-CM

## 2023-01-27 DIAGNOSIS — D86.0 PULMONARY SARCOIDOSIS (H): Primary | ICD-10-CM

## 2023-01-27 PROCEDURE — 99443 PR PHYSICIAN TELEPHONE EVALUATION 21-30 MIN: CPT | Performed by: INTERNAL MEDICINE

## 2023-01-27 RX ORDER — FLUTICASONE PROPIONATE 50 MCG
SPRAY, SUSPENSION (ML) NASAL
Qty: 16 G | Refills: 11 | Status: SHIPPED | OUTPATIENT
Start: 2023-01-27

## 2023-01-27 NOTE — PATIENT INSTRUCTIONS
It was good to talk to you today, Denis. This is what we discussed:    Continue prednisone 3 mg daily.  Use the Flonase as needed for sinus congestion.  We will get blood tests done at [facility] looking at your kidney and liver function.  I will see you in about 6 months.  Contact me with questions or concerns.    Vipin Duron MD  Pulmonary and Critical Care Medicine  [facility]  Office [phone]

## 2023-01-27 NOTE — PROGRESS NOTES
Denis is a 72 year old who is being evaluated via a billable telephone visit.      What phone number would you like to be contacted at? [phone]  How would you like to obtain your AVS? Ritchiehart    Distant Location (provider location):  On-site  Phone call duration: 24 minutes    [place] [facility]

## 2023-01-27 NOTE — PROGRESS NOTES
"Pulmonary Clinic Follow-up Telephone Visit    Assessment and Plan:  72 year old male never smoker with a history of dyslipidemia, low-grade chronic bilateral uveitis, intermittent sinusitis, mild SAPNA, pulmonary sarcoidosis, prostate cancer s/p treatment, presenting for follow-up.      Sarcoidosis: Recently had COVID-19 infection, treated with Paxlovid. Had symptoms of sinus congestion, no fevers or chills. Still feels some head congestion, but feeling better. 1-3 times per day has a little bit of a cough. No dyspnea. Energy level is \"not what it used to be,\" currently at \"75%.\" Still has sweats, during the day and at night. Having difficulty reading and has ophthalmology visit with Dr. Chery at [facility] on February 3. After changing from prednisone 1 mg daily to 3 mg daily in early May 2022, no major changes. Intermittent sinus infections, 2-3 times per year which responds to Z-archie. Recall that Denis has a history of pulmonary and ocular involvement with sarcoidosis, with chronic bilateral uveitis, and mediastinal/hilar lymphadenopathy with pulmonary nodules but previously normal functional status and normal PFTs. He previously developed worsening fatigue, dyspnea, and cough, with significant peripheral interstitial changes and worsening pulmonary function tests requiring initiation of prednisone. This led to improvement in symptoms, pulmonary function, and radiographic findings, and we were able to slowly titrate off the prednisone down to maintenance of 0.5 mg daily, which we have since had to increase to 1 mg and now 3 mg daily. Normal renal and hepatic function, last checked about one year ago. Prior ECG with possible LVH, otherwise unremarkable.     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)  - ongoing ophthalmology follow-up at [facility]  - annual high-dose " "influenza vaccination  - up to date on pneumococcal vaccination  - COVID-19 vaccination: Pfizer-BioNTech  - follow up in 6 months  - encouraged him to contact me with questions or concerning symptoms    Vipin Duron MD  Pulmonary and Critical Care Medicine  [facility]  Office [phone]  Pager [phone]  he/him    CCx: sarcoidosis    HPI: 72 year old male never smoker with a history of dyslipidemia, low-grade chronic bilateral uveitis, intermittent sinusitis, mild SAPNA, pulmonary sarcoidosis, prostate cancer s/p treatment, presenting for follow-up. Recently had COVID-19 infection, treated with Paxlovid. Had symptoms of sinus congestion, no fevers or chills. Still feels some head congestion, but feeling better. 1-3 times per day has a little bit of a cough. No dyspnea. Energy level is \"not what it used to be,\" currently at \"75%.\" Still has sweats, during the day and at night. Having difficulty reading and has ophthalmology visit with Dr. Chery at [facility] on February 3. After changing from prednisone 1 mg daily to 3 mg daily in early May 2022, no major changes. Intermittent sinus infections, 2-3 times per year which responds to Z-archie. Recall that Denis has a history of pulmonary and ocular involvement with sarcoidosis, with chronic bilateral uveitis, and mediastinal/hilar lymphadenopathy with pulmonary nodules but previously normal functional status and normal PFTs. He previously developed worsening fatigue, dyspnea, and cough, with significant peripheral interstitial changes and worsening pulmonary function tests requiring initiation of prednisone. This led to improvement in symptoms, pulmonary function, and radiographic findings, and we were able to slowly titrate off the prednisone down to maintenance of 0.5 mg daily, which we have since had to increase to 1 mg and now 3 mg daily. Normal renal and hepatic function, last checked about one year ago. Prior ECG with possible LVH, " "otherwise unremarkable.    ROS:  A 12-system review was obtained and was negative with the exception of the symptoms endorsed in the history of present illness.    PMH:  Pulmonary and ocular sarcoidosis  Deviated septum  HLD  Depression  HTN  Insomnia  Chronic headaches  Mild SAPNA with AHI 10.4    PSH:  Past Surgical History:   Procedure Laterality Date     BRONCHOSCOPY FLEXIBLE AND RIGID  12/20/2016    ebus     COLONOSCOPY  2014    Nothing found, another scheduled in 2023       Allergies:  No Known Allergies    Family HX:  Family History   Problem Relation Age of Onset     Dementia Mother         d 96     Mental Illness Mother         Alzheimer's     Heart Disease Father         d 92     Hypertension Other         Its normal in the [place]     Substance Abuse Brother         Alcohol, numerous Makes in my Family abused Alcohol       Social Hx:  Social History     Socioeconomic History     Marital status: Single     Spouse name: Not on file     Number of children: Not on file     Years of education: Not on file     Highest education level: Not on file   Occupational History     Not on file   Tobacco Use     Smoking status: Never     Smokeless tobacco: Never     Tobacco comments:     N.A.   Substance and Sexual Activity     Alcohol use: Yes     Comment: Socially     Drug use: No     Sexual activity: Not Currently     Partners: Female     Birth control/protection: Pull-out method, None     Comment: My Lady Friend lives outside my state   Other Topics Concern     Parent/sibling w/ CABG, MI or angioplasty before 65F 55M? No   Social History Narrative    , 2 grown daughters, 2 granddaughters. 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  Previously working in administrative position.  Nonsmoker.         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 Moved back to [place] 9 years ago because parents had dementia, so moved back here to take care of them. He has an older sister and " younger brother (John). He has a girlfriend. She lives in [place]. His ex-wife passed away from breast cancer. Had 2 girls in that marriage. Oldest girl has 2 granddaughters. Both live in [place]. 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 He will probably stop volunteering soon though. - Dr. Lunsford 12/10/21      Social Determinants of Health     Financial Resource Strain: Not on file   Food Insecurity: Not on file   Transportation Needs: Not on file   Physical Activity: Not on file   Stress: Not on file   Social Connections: Not on file   Intimate Partner Violence: Not on file   Housing Stability: Not on file       Current Meds:  Current Outpatient Medications   Medication Sig Dispense Refill     ALPRAZolam (XANAX) 1 MG tablet Take 1 tablet (1 mg) by mouth nightly as needed for anxiety or sleep 30 tablet 3     amLODIPine (NORVASC) 10 MG tablet TAKE 1 TABLET BY MOUTH EVERY DAY 90 tablet 3     amLODIPine (NORVASC) 5 MG tablet Take 2 tablets (10 mg) by mouth daily 90 tablet 11     azelastine (ASTELIN) 0.1 % nasal spray USE 2 SPRAYS IN EACH NOSTRIL TWICE A DAY (Patient not taking: Reported on 1/17/2023)       celecoxib (CELEBREX) 200 MG capsule Take 200 mg by mouth daily       citalopram (CELEXA) 40 MG tablet Take 1 tablet (40 mg) by mouth daily 90 tablet 1     [employer] D3 125 MCG (5000 UT) CAPS TAKE 1 CAPSULE (5,000 UNITS TOTAL) BY MOUTH DAILY. (Patient not taking: Reported on 1/17/2023) 90 capsule 3     fluticasone (FLONASE) 50 MCG/ACT nasal spray SPRAY 1 - 2 SPRAY(S) BY INTRANASAL ROUTE , 1 TIME PER DAY , FOR 30 DAYS (Patient not taking: Reported on 1/17/2023)       gabapentin (NEURONTIN) 300 MG capsule Take 1 capsule (300 mg) by mouth 3 times daily 90 capsule 4     hydrOXYzine (ATARAX) 25 MG tablet Take 25 mg by mouth 3 times daily as needed for itching       lisinopril (ZESTRIL) 20 MG tablet TAKE 1 TABLET BY MOUTH EVERY DAY 90 tablet 3     melatonin (CVS MELATONIN) 3 MG tablet TAKE 1 TABLET (3  MG) BY MOUTH NIGHTLY AS NEEDED FOR SLEEP Strength: 3 mg 90 tablet 1     pantoprazole (PROTONIX) 40 MG EC tablet TAKE 1 TABLET BY MOUTH EVERY DAY 90 tablet 3     predniSONE (DELTASONE) 1 MG tablet Take 3 tablets (3 mg) by mouth daily 90 tablet 11     propranolol (INDERAL) 20 MG tablet Take 1 tablet (20 mg) by mouth 2 times daily 180 tablet 3     rosuvastatin (CRESTOR) 10 MG tablet TAKE 1 TABLET (10 MG) BY MOUTH DAILY. 90 tablet 3     SUMAtriptan (IMITREX) 50 MG tablet Take 50 mg by mouth at onset of headache       traZODone (DESYREL) 50 MG tablet [TRAZODONE (DESYREL) 50 MG TABLET] TAKE 1 TABLET BY MOUTH EVERYDAY AT BEDTIME 90 tablet 2     zolpidem (AMBIEN) 5 MG tablet Take 1 tablet (5 mg) by mouth nightly as needed for sleep 5 tablet 0       Physical Exam:  no exam due to virtual visit    Labs:  Normal BMP and LFTs  WBC 13.1 with no differential  Normal Hgb  ESR 54  1,25-dihydroxyvitamin D level 83 in November 2016     Imaging studies:  PET-CT (April 2022):  - images directly reviewed, formal interpretation follows:  FINDINGS:      HEAD/NECK:  Asymmetric activity at the left palatine tonsil, SUV max 8.19. No  corresponding lesion on CT.     The paranasal sinuses are clear. The mastoid air cells are clear.      The mucosal pharyngeal space, the , prevertebral and carotid  spaces are within normal limits.      No masses, mass effect or pathologically enlarged lymph nodes.   The  thyroid gland is within normal limits.      CHEST:  Hypermetabolic mediastinal and bilateral hilar partially calcified  lymphadenopathy, for example:  -subcarinal lymph node with SUV max 9.08, measuring 19x17 mm, image  185 series 3  -left hilar lymph node with SUV max 8.63, measuring 15 x 10 mm image  178 series 3  -right paratracheal lymph node with SUV max 6.36, image 170 series 3     Hiatal hernia and mild circumferential wall thickening of the distal  esophagus without associated hypermetabolism. Calcified paraesophageal  lymph  node without hypermetabolism.      Multiple bilateral pulmonary nodules again demonstrated in all lobes  of the lungs, including:    -19 x 7 mm, along the left pulmonary fissure, image 50 series 8,  increased since 2019 (at which time measured 4 mm). SUV max 3.53.  -6 mm, right upper lobe, image 34 series 8, increased since 2019 (at  which time measured 3 mm)  These do not demonstrate hypermetabolism above liver and some only  slightly above mediastinal blood pool.      Scarring in the middle lobe inferiorly again seen, similar to 2019. No  pleural effusion.  No pericardial effusion. No large filling defect in  the central pulmonary arteries.     ABDOMEN AND PELVIS:  No suspicious FDG uptake in the abdomen or pelvis. Low attenuating  nonenhancing hypodensity not hypermetabolic in or adjacent to the  pancreatic head, a 19 x 15 mm image 252 series 4.  There is no mass  effect on adjacent structures. The hepatic artery and portal vein pass  through it.  No abdominal/pelvic lymphadenopathy.     The liver has a somewhat mottled appearance although this may be  secondary to bolus timing as this is an arterial phase.  No suspicious  hepatic lesions. No splenomegaly.  No suspicious adrenal mass lesion.  No opaque gallbladder calculi.   There is symmetric nephrographic  renal phase without hydronephrosis.     No evidence for diverticulitis.  No bowel obstruction.  No free fluid.    Normal appendix. Scattered atherosclerotic calcifications of the  aorta.     LOWER EXTREMITIES:   No abnormal masses or hypermetabolic lesions.     BONES:   No suspicious lytic or blastic osseous lesions.   No suspicious FDG  uptake in the skeleton. Mild degenerative changes of the lumbar spine.                                                                         IMPRESSION:  In summary: constellation of findings (hypermetabolic  mediastinal lymphadenopathy, lung nodules, palatine tonsil) most  consistent with active sarcoidosis. Consider  treating and rescanning  with PET/CT to see if hypermetabolism resolves.  Alternatively  biopsies could be obtained.     In detail:  1. Increased size of hypermetabolic mediastinal and hilar  lymphadenopathy since 2019.   2. Multiple enlarging hypermetabolic lung nodules. Most likely  secondary to sarcoidosis, upper lobe predominant and several in  perihepatic locations which would be typical of sarcoidosis. All but 2  in the upper lobes were present on CT 1/15/2019.  While this does not  exclude metastatic disease, it is very unlikely.     3. No active cardiac sarcoidosis (can be assessed because cardiac  uptake is suppressed).      4. Indeterminate uptake in the area of left palatine tonsil.  Differential sarcoidosis, inflammation, cancer. Consider direct  visualization.  Alternatively short-term follow-up after treatment  sarcoid.     5. Hypoenhancing structure in the pancreatic head region without  hypermetabolism.  Indeterminant but likely benign. Likely present on  1/15/2018 (noncontrast study limits evaluation).   If there is further  concern  abdominal MRI could be obtained.     6. Distal esophageal mild wall thickening does not demonstrate  hypermetabolism. Adjacent lymph node with calcification likely prior sarcoid.     TT$E (October 2017):    No previous study for comparison.    Left ventricle ejection fraction is normal. The calculated left ventricular ejection fraction is 69%.    Mild mitral regurgitation      Pulmonary Function Testing  November 2016:  FEV1/FVC is 72% and is normal.  FEV1 is 2.49L or 113% predicted and is normal.  FVC is 3.47L or 124% predicted and normal.  There was no improvement in spirometry after a single inhaled dose of bronchodilator.  TLC is 5.13L or 107% predicted and is normal.  RV is 1.54L or 77% predicted and is reduced.  DLCO is 17.61 ml/min/mmHg or 88% predicted and is normal when it   is corrected for hemoglobin.     October 2017:  FEV1/FVC is 68 and is normal (less than  0.7 but greater than the demographically adjusted lower limit of normal).  FEV1 is 122% predicted and is normal.  FVC is 140% predicted and normal.     DLCO is 80% predicted and is normal when it   is corrected for hemoglobin.     December 2018:  FEV1/FVC is 74% and is normal.  FEV1 is 1.72 L or 76% predicted and is reduced.  FVC is 2.33 L or 80% predicted and is normal.  There was no improvement in spirometry after a single inhaled dose of bronchodilator.  DLCO is 9.48 mL/min/mm Hg or 40% predicted and is reduced when it   is corrected for hemoglobin.     February 2019:  FEV1/FVC is 68% and is reduced.  FEV1 is 2.39L (108%) predicted and is normal.  FVC is 3.52L (118%) predicted and normal.  There was no improvement in spirometry after a single inhaled dose of bronchodilator.  DLCO is 13.14ml/min/hg (55%) predicted and is reduced when it is corrected for hemoglobin.  Flow volume loops indicate severe scooping of the expiratory limb.     Impression:  Full Pulmonary Function Test is abnormal.  PFTs are consistent with mild obstructive disease.  Spirometry is not consistent with reversibility.  Diffusion capacity when corrected for hemoglobin is moderately reduced.     September 2020:  FEV1/FVC is 69 and is normal.  FEV1 is 106% predicted and is normal.  FVC is 119% predicted and is normal.  There was no improvement in spirometry after a single inhaled dose of bronchodilator.  DLCO is 78% predicted and is normal when it   is corrected for hemoglobin.  The flow volume loop is normal     October 2021:  FVC 3.28 L (106%)  FEV1 2.28 L (94%)  Ratio 0.69 (less than 0.7 but greater than the demographically adjusted lower limit of normal)  No significant bronchodilator response  DLCOc 70%  Loop with some expiratory concavity     April 2022:  FEV1 2.52 (106%)  FVC 3.95 (128%)  FEV1/FVC 0.64  DLCOc 58%  Flattened inspiratory limb

## 2023-01-27 NOTE — LETTER
"    1/27/2023         RE: Denis Moreno  [address] [place] [address]        Dear Colleague,    Thank you for referring your patient, Denis Moreno, to the [facility]. Please see a copy of my visit note below.    Pulmonary Clinic Follow-up Telephone Visit    Assessment and Plan:  72 year old male never smoker with a history of dyslipidemia, low-grade chronic bilateral uveitis, intermittent sinusitis, mild SAPNA, pulmonary sarcoidosis, prostate cancer s/p treatment, presenting for follow-up.      Sarcoidosis: Recently had COVID-19 infection, treated with Paxlovid. Had symptoms of sinus congestion, no fevers or chills. Still feels some head congestion, but feeling better. 1-3 times per day has a little bit of a cough. No dyspnea. Energy level is \"not what it used to be,\" currently at \"75%.\" Still has sweats, during the day and at night. Having difficulty reading and has ophthalmology visit with Dr. Chery at [facility] on February 3. After changing from prednisone 1 mg daily to 3 mg daily in early May 2022, no major changes. Intermittent sinus infections, 2-3 times per year which responds to Z-archie. Recall that Denis has a history of pulmonary and ocular involvement with sarcoidosis, with chronic bilateral uveitis, and mediastinal/hilar lymphadenopathy with pulmonary nodules but previously normal functional status and normal PFTs. He previously developed worsening fatigue, dyspnea, and cough, with significant peripheral interstitial changes and worsening pulmonary function tests requiring initiation of prednisone. This led to improvement in symptoms, pulmonary function, and radiographic findings, and we were able to slowly titrate off the prednisone down to maintenance of 0.5 mg daily, which we have since had to increase to 1 mg and now 3 mg daily. Normal renal and hepatic function, last checked about one year ago. Prior ECG with possible LVH, otherwise unremarkable.     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)  - ongoing ophthalmology follow-up at [facility]  - annual high-dose influenza vaccination  - up to date on pneumococcal vaccination  - COVID-19 vaccination: Pfizer-BioNTech  - follow up in 6 months  - encouraged him to contact me with questions or concerning symptoms    Vipin Duron MD  Pulmonary and Critical Care Medicine  [facility]  Office [phone]  Pager [phone]  he/him    CCx: sarcoidosis    HPI: 72 year old male never smoker with a history of dyslipidemia, low-grade chronic bilateral uveitis, intermittent sinusitis, mild SAPNA, pulmonary sarcoidosis, prostate cancer s/p treatment, presenting for follow-up. Recently had COVID-19 infection, treated with Paxlovid. Had symptoms of sinus congestion, no fevers or chills. Still feels some head congestion, but feeling better. 1-3 times per day has a little bit of a cough. No dyspnea. Energy level is \"not what it used to be,\" currently at \"75%.\" Still has sweats, during the day and at night. Having difficulty reading and has ophthalmology visit with Dr. Chery at [facility] on February 3. After changing from prednisone 1 mg daily to 3 mg daily in early May 2022, no major changes. Intermittent sinus infections, 2-3 times per year which responds to Z-archie. Recall that Denis has a history of pulmonary and ocular involvement with sarcoidosis, with chronic bilateral uveitis, and mediastinal/hilar lymphadenopathy with pulmonary nodules but previously normal functional status and normal PFTs. He previously developed worsening fatigue, dyspnea, and cough, with significant peripheral interstitial changes and worsening pulmonary function tests requiring initiation of prednisone. This led to improvement in symptoms, pulmonary function, and radiographic findings, and we were " able to slowly titrate off the prednisone down to maintenance of 0.5 mg daily, which we have since had to increase to 1 mg and now 3 mg daily. Normal renal and hepatic function, last checked about one year ago. Prior ECG with possible LVH, otherwise unremarkable.    ROS:  A 12-system review was obtained and was negative with the exception of the symptoms endorsed in the history of present illness.    PMH:  Pulmonary and ocular sarcoidosis  Deviated septum  HLD  Depression  HTN  Insomnia  Chronic headaches  Mild SAPNA with AHI 10.4    PSH:  Past Surgical History:   Procedure Laterality Date     BRONCHOSCOPY FLEXIBLE AND RIGID  12/20/2016    [facility]us     COLONOSCOPY  2014    Nothing found, another scheduled in 2023       Allergies:  No Known Allergies    Family HX:  Family History   Problem Relation Age of Onset     Dementia Mother         d 96     Mental Illness Mother         Alzheimer's     Heart Disease Father         d 92     Hypertension Other         Its normal in the [place]     Substance Abuse Brother         Alcohol, numerous Makes in my Family abused Alcohol       Social Hx:  Social History     Socioeconomic History     Marital status: Single     Spouse name: Not on file     Number of children: Not on file     Years of education: Not on file     Highest education level: Not on file   Occupational History     Not on file   Tobacco Use     Smoking status: Never     Smokeless tobacco: Never     Tobacco comments:     N.A.   Substance and Sexual Activity     Alcohol use: Yes     Comment: Socially     Drug use: No     Sexual activity: Not Currently     Partners: Female     Birth control/protection: Pull-out method, None     Comment: My Lady Friend lives outside my state   Other Topics Concern     Parent/sibling w/ CABG, MI or angioplasty before 65F 55M? No   Social History Narrative    , 2 grown daughters, 2 granddaughters. 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  Previously working in  "administrative position.  Nonsmoker.         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 Moved back to [place] 9 years ago because parents had dementia, so moved back here to take care of them. He has an older sister and younger brother (John). He has a girlfriend. She lives in [place]. His ex-wife passed away from breast cancer. Had 2 girls in that marriage. Oldest girl has 2 granddaughters. Both live in [place]. 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 He will probably stop volunteering soon though. - Dr. Lunsford 12/10/21      Social Determinants of Health     Financial Resource Strain: Not on file   Food Insecurity: Not on file   Transportation Needs: Not on file   Physical Activity: Not on file   Stress: Not on file   Social Connections: Not on file   Intimate Partner Violence: Not on file   Housing Stability: Not on file       Current Meds:  Current Outpatient Medications   Medication Sig Dispense Refill     ALPRAZolam (XANAX) 1 MG tablet Take 1 tablet (1 mg) by mouth nightly as needed for anxiety or sleep 30 tablet 3     amLODIPine (NORVASC) 10 MG tablet TAKE 1 TABLET BY MOUTH EVERY DAY 90 tablet 3     amLODIPine (NORVASC) 5 MG tablet Take 2 tablets (10 mg) by mouth daily 90 tablet 11     azelastine (ASTELIN) 0.1 % nasal spray USE 2 SPRAYS IN EACH NOSTRIL TWICE A DAY (Patient not taking: Reported on 1/17/2023)       celecoxib (CELEBREX) 200 MG capsule Take 200 mg by mouth daily       citalopram (CELEXA) 40 MG tablet Take 1 tablet (40 mg) by mouth daily 90 tablet 1     [employer] D3 125 MCG (5000 UT) CAPS TAKE 1 CAPSULE (5,000 UNITS TOTAL) BY MOUTH DAILY. (Patient not taking: Reported on 1/17/2023) 90 capsule 3     fluticasone (FLONASE) 50 MCG/ACT nasal spray SPRAY 1 - 2 SPRAY(S) BY INTRANASAL ROUTE , 1 TIME PER DAY , FOR 30 DAYS (Patient not taking: Reported on 1/17/2023)       gabapentin (NEURONTIN) 300 MG capsule Take 1 capsule (300 mg) by mouth 3 times daily 90 capsule " 4     hydrOXYzine (ATARAX) 25 MG tablet Take 25 mg by mouth 3 times daily as needed for itching       lisinopril (ZESTRIL) 20 MG tablet TAKE 1 TABLET BY MOUTH EVERY DAY 90 tablet 3     melatonin ([employer]S MELATONIN) 3 MG tablet TAKE 1 TABLET (3 MG) BY MOUTH NIGHTLY AS NEEDED FOR SLEEP Strength: 3 mg 90 tablet 1     pantoprazole (PROTONIX) 40 MG EC tablet TAKE 1 TABLET BY MOUTH EVERY DAY 90 tablet 3     predniSONE (DELTASONE) 1 MG tablet Take 3 tablets (3 mg) by mouth daily 90 tablet 11     propranolol (INDERAL) 20 MG tablet Take 1 tablet (20 mg) by mouth 2 times daily 180 tablet 3     rosuvastatin (CRESTOR) 10 MG tablet TAKE 1 TABLET (10 MG) BY MOUTH DAILY. 90 tablet 3     SUMAtriptan (IMITREX) 50 MG tablet Take 50 mg by mouth at onset of headache       traZODone (DESYREL) 50 MG tablet [TRAZODONE (DESYREL) 50 MG TABLET] TAKE 1 TABLET BY MOUTH EVERYDAY AT BEDTIME 90 tablet 2     zolpidem (AMBIEN) 5 MG tablet Take 1 tablet (5 mg) by mouth nightly as needed for sleep 5 tablet 0       Physical Exam:  no exam due to virtual visit    Labs:  Normal BMP and LFTs  WBC 13.1 with no differential  Normal Hgb  ESR 54  1,25-dihydroxyvitamin D level 83 in November 2016     Imaging studies:  PET-CT (April 2022):  - images directly reviewed, formal interpretation follows:  FINDINGS:      HEAD/NECK:  Asymmetric activity at the left palatine tonsil, SUV max 8.19. No  corresponding lesion on CT.     The paranasal sinuses are clear. The mastoid air cells are clear.      The mucosal pharyngeal space, the , prevertebral and carotid  spaces are within normal limits.      No masses, mass effect or pathologically enlarged lymph nodes.   The  thyroid gland is within normal limits.      CHEST:  Hypermetabolic mediastinal and bilateral hilar partially calcified  lymphadenopathy, for example:  -subcarinal lymph node with SUV max 9.08, measuring 19x17 mm, image  185 series 3  -left hilar lymph node with SUV max 8.63, measuring 15 x 10 mm  image  178 series 3  -right paratracheal lymph node with SUV max 6.36, image 170 series 3     Hiatal hernia and mild circumferential wall thickening of the distal  esophagus without associated hypermetabolism. Calcified paraesophageal  lymph node without hypermetabolism.      Multiple bilateral pulmonary nodules again demonstrated in all lobes  of the lungs, including:    -19 x 7 mm, along the left pulmonary fissure, image 50 series 8,  increased since 2019 (at which time measured 4 mm). SUV max 3.53.  -6 mm, right upper lobe, image 34 series 8, increased since 2019 (at  which time measured 3 mm)  These do not demonstrate hypermetabolism above liver and some only  slightly above mediastinal blood pool.      Scarring in the middle lobe inferiorly again seen, similar to 2019. No  pleural effusion.  No pericardial effusion. No large filling defect in  the central pulmonary arteries.     ABDOMEN AND PELVIS:  No suspicious FDG uptake in the abdomen or pelvis. Low attenuating  nonenhancing hypodensity not hypermetabolic in or adjacent to the  pancreatic head, a 19 x 15 mm image 252 series 4.  There is no mass  effect on adjacent structures. The hepatic artery and portal vein pass  through it.  No abdominal/pelvic lymphadenopathy.     The liver has a somewhat mottled appearance although this may be  secondary to bolus timing as this is an arterial phase.  No suspicious  hepatic lesions. No splenomegaly.  No suspicious adrenal mass lesion.  No opaque gallbladder calculi.   There is symmetric nephrographic  renal phase without hydronephrosis.     No evidence for diverticulitis.  No bowel obstruction.  No free fluid.    Normal appendix. Scattered atherosclerotic calcifications of the  aorta.     LOWER EXTREMITIES:   No abnormal masses or hypermetabolic lesions.     BONES:   No suspicious lytic or blastic osseous lesions.   No suspicious FDG  uptake in the skeleton. Mild degenerative changes of the lumbar  spine.                                                                         IMPRESSION:  In summary: constellation of findings (hypermetabolic  mediastinal lymphadenopathy, lung nodules, palatine tonsil) most  consistent with active sarcoidosis. Consider treating and rescanning  with PET/CT to see if hypermetabolism resolves.  Alternatively  biopsies could be obtained.     In detail:  1. Increased size of hypermetabolic mediastinal and hilar  lymphadenopathy since 2019.   2. Multiple enlarging hypermetabolic lung nodules. Most likely  secondary to sarcoidosis, upper lobe predominant and several in  perihepatic locations which would be typical of sarcoidosis. All but 2  in the upper lobes were present on CT 1/15/2019.  While this does not  exclude metastatic disease, it is very unlikely.     3. No active cardiac sarcoidosis (can be assessed because cardiac  uptake is suppressed).      4. Indeterminate uptake in the area of left palatine tonsil.  Differential sarcoidosis, inflammation, cancer. Consider direct  visualization.  Alternatively short-term follow-up after treatment  sarcoid.     5. Hypoenhancing structure in the pancreatic head region without  hypermetabolism.  Indeterminant but likely benign. Likely present on  1/15/2018 (noncontrast study limits evaluation).   If there is further  concern  abdominal MRI could be obtained.     6. Distal esophageal mild wall thickening does not demonstrate  hypermetabolism. Adjacent lymph node with calcification likely prior sarcoid.     TT$E (October 2017):    No previous study for comparison.    Left ventricle ejection fraction is normal. The calculated left ventricular ejection fraction is 69%.    Mild mitral regurgitation      Pulmonary Function Testing  November 2016:  FEV1/FVC is 72% and is normal.  FEV1 is 2.49L or 113% predicted and is normal.  FVC is 3.47L or 124% predicted and normal.  There was no improvement in spirometry after a single inhaled dose of  bronchodilator.  TLC is 5.13L or 107% predicted and is normal.  RV is 1.54L or 77% predicted and is reduced.  DLCO is 17.61 ml/min/mmHg or 88% predicted and is normal when it   is corrected for hemoglobin.     October 2017:  FEV1/FVC is 68 and is normal (less than 0.7 but greater than the demographically adjusted lower limit of normal).  FEV1 is 122% predicted and is normal.  FVC is 140% predicted and normal.     DLCO is 80% predicted and is normal when it   is corrected for hemoglobin.     December 2018:  FEV1/FVC is 74% and is normal.  FEV1 is 1.72 L or 76% predicted and is reduced.  FVC is 2.33 L or 80% predicted and is normal.  There was no improvement in spirometry after a single inhaled dose of bronchodilator.  DLCO is 9.48 mL/min/mm Hg or 40% predicted and is reduced when it   is corrected for hemoglobin.     February 2019:  FEV1/FVC is 68% and is reduced.  FEV1 is 2.39L (108%) predicted and is normal.  FVC is 3.52L (118%) predicted and normal.  There was no improvement in spirometry after a single inhaled dose of bronchodilator.  DLCO is 13.14ml/min/hg (55%) predicted and is reduced when it is corrected for hemoglobin.  Flow volume loops indicate severe scooping of the expiratory limb.     Impression:  Full Pulmonary Function Test is abnormal.  PFTs are consistent with mild obstructive disease.  Spirometry is not consistent with reversibility.  Diffusion capacity when corrected for hemoglobin is moderately reduced.     September 2020:  FEV1/FVC is 69 and is normal.  FEV1 is 106% predicted and is normal.  FVC is 119% predicted and is normal.  There was no improvement in spirometry after a single inhaled dose of bronchodilator.  DLCO is 78% predicted and is normal when it   is corrected for hemoglobin.  The flow volume loop is normal     October 2021:  FVC 3.28 L (106%)  FEV1 2.28 L (94%)  Ratio 0.69 (less than 0.7 but greater than the demographically adjusted lower limit of normal)  No significant  bronchodilator response  DLCOc 70%  Loop with some expiratory concavity     April 2022:  FEV1 2.52 (106%)  FVC 3.95 (128%)  FEV1/FVC 0.64  DLCOc 58%  Flattened inspiratory limb    Denis is a 72 year old who is being evaluated via a billable telephone visit.      What phone number would you like to be contacted at? [phone]  How would you like to obtain your AVS? MyChart    Distant Location (provider location):  On-site  Phone call duration: 24 minutes    Tierra PEREZ      Per Patient,  Lab work orders was faxed to [facility]. Due to patient not wanting to do Labs at  [facility]     Deny Monique on 1/27/2023 at 2:42 PM          Again, thank you for allowing me to participate in the care of your patient.        Sincerely,        Vipin Duron MD

## 2023-01-27 NOTE — PROGRESS NOTES
Per Patient,  Lab work orders was faxed to [facility]. Due to patient not wanting to do Labs at  [facility]     Deny Monique on 1/27/2023 at 2:42 PM

## 2023-02-03 ENCOUNTER — TRANSFERRED RECORDS (OUTPATIENT)
Dept: HEALTH INFORMATION MANAGEMENT | Facility: CLINIC | Age: 73
End: 2023-02-03

## 2023-02-09 ENCOUNTER — E-VISIT (OUTPATIENT)
Dept: PEDIATRICS | Facility: CLINIC | Age: 73
End: 2023-02-09
Payer: COMMERCIAL

## 2023-02-09 ENCOUNTER — NURSE TRIAGE (OUTPATIENT)
Dept: FAMILY MEDICINE | Facility: CLINIC | Age: 73
End: 2023-02-09
Payer: MEDICAID

## 2023-02-09 DIAGNOSIS — J01.90 ACUTE SINUSITIS WITH SYMPTOMS > 10 DAYS: Primary | ICD-10-CM

## 2023-02-09 DIAGNOSIS — J01.90 ACUTE BACTERIAL SINUSITIS: ICD-10-CM

## 2023-02-09 DIAGNOSIS — B96.89 ACUTE BACTERIAL SINUSITIS: ICD-10-CM

## 2023-02-09 PROCEDURE — 99421 OL DIG E/M SVC 5-10 MIN: CPT | Performed by: INTERNAL MEDICINE

## 2023-02-09 RX ORDER — DOXYCYCLINE 100 MG/1
100 CAPSULE ORAL 2 TIMES DAILY
Qty: 20 CAPSULE | Refills: 0 | Status: SHIPPED | OUTPATIENT
Start: 2023-02-09 | End: 2023-02-19

## 2023-02-09 NOTE — PATIENT INSTRUCTIONS
Good afternoon,    I sent in for an antibiotic called doxycycline that works very well for sinuses. Take this twice per day for 10 days. Take a probiotic twice per day to prevent diarrhea while on the antibiotic. Florastor and culturelle are common brands, but generics work just as well.     Its not unusual to get a sinus infection after covid. Let me know if not helping.     Sincerely,    Saurav Faust MD  Good afternoon,    I sent in for an antibiotic called doxycycline that works very well for sinuses. Take this twice per day for 10 days. Take a probiotic twice per day to prevent diarrhea while on the antibiotic. Florastor and culturelle are common brands, but generics work just as well.     Its not unusual to get a sinus infection after covid. Let me know if not helping.     Sincerely,    Saurav Faust MD

## 2023-02-09 NOTE — TELEPHONE ENCOUNTER
Patient called in wanting to talk with Dr. Faust about a sinus issue that he is having and needing some information about possible medications to take.    Please call him to discuss- [phone]

## 2023-02-09 NOTE — TELEPHONE ENCOUNTER
"Nurse Triage SBAR    Is this a 2nd Level Triage? YES, LICENSED PRACTITIONER REVIEW IS REQUIRED    Situation:   Pt reports they have a sinus infection and are requesting antibiotics.    Background:   Pt reports they get 3-4 sinus infections per year  Onset of pressure, Sunday, 02/05/2023    Assessment:   Pressure post and archana left eye, moderate, \"progressively getting worse.\"   Denies nasal congestion  Mild runny nose  Pt states sinus washes do not help symptoms, however, they \"might\" start doing them regularly to help prevent sinus infection.    Protocol Recommended Disposition:   See in Office Today or Tomorrow    Recommendation:   Pt would prefer to avoid an office visit. Would an e-visit be appropriate to be considered for antibiotics?     Routed to provider    Karol Montoya RN    Reason for Disposition    Patient wants to be seen    Additional Information    Negative: Sounds like a life-threatening emergency to the triager    Negative: Difficulty breathing, and not from stuffy nose (e.g., not relieved by cleaning out the nose)    Negative: SEVERE headache and has fever    Negative: Patient sounds very sick or weak to the triager    Negative: SEVERE sinus pain    Negative: Severe headache    Negative: Redness or swelling on the cheek, forehead, or around the eye    Negative: Fever > 103 F (39.4 C)    Negative: Fever > 101 F (38.3 C) and over 60 years of age    Negative: Fever > 100.0 F (37.8 C) and has diabetes mellitus or a weak immune system (e.g., HIV positive, cancer chemotherapy, organ transplant, splenectomy, chronic steroids)    Negative: Fever > 100.0 F (37.8 C) and bedridden (e.g., nursing home patient, stroke, chronic illness, recovering from surgery)    Negative: Fever present > 3 days (72 hours)    Negative: Fever returns after gone for over 24 hours and symptoms worse or not improved    Negative: Sinus pain (not just congestion) and fever    Negative: Earache    Negative: Lots of coughing    " "Negative: Using nasal washes and pain medicine > 24 hours and sinus pain (lower forehead, cheekbone, or eye) persists    Negative: Nasal discharge present > 10 days    Negative: Sinus congestion (pressure, fullness) present > 10 days    Answer Assessment - Initial Assessment Questions  1. LOCATION: \"Where does it hurt?\"       Over left eye  2. ONSET: \"When did the sinus pain start?\"  (e.g., hours, days)       02/05/2023, Sunday. Progressively getting worse  3. SEVERITY: \"How bad is the pain?\"   (Scale 1-10; mild, moderate or severe)    - MILD (1-3): doesn't interfere with normal activities     - MODERATE (4-7): interferes with normal activities (e.g., work or school) or awakens from sleep    - SEVERE (8-10): excruciating pain and patient unable to do any normal activities         Moderate  4. RECURRENT SYMPTOM: \"Have you ever had sinus problems before?\" If Yes, ask: \"When was the last time?\" and \"What happened that time?\"       3-4 times per year  5. NASAL CONGESTION: \"Is the nose blocked?\" If Yes, ask: \"Can you open it or must you breathe through your mouth?\"      no  6. NASAL DISCHARGE: \"Do you have discharge from your nose?\" If so ask, \"What color?\"      denies  7. FEVER: \"Do you have a fever?\" If Yes, ask: \"What is it, how was it measured, and when did it start?\"       denies  8. OTHER SYMPTOMS: \"Do you have any other symptoms?\" (e.g., sore throat, cough, earache, difficulty breathing)      denies  9. PREGNANCY: \"Is there any chance you are pregnant?\" \"When was your last menstrual period?\"      NA    Protocols used: SINUS PAIN OR CONGESTION-A-OH      "

## 2023-02-09 NOTE — TELEPHONE ENCOUNTER
Writer returned call to pt. Communicated pt can initiate [employer]Visit on [employer]. Writer walked pt though how to get to eVisit. Pt will complete eVisit today. Answered pt's questions.     Karol Montoya RN

## 2023-02-23 DIAGNOSIS — F41.9 ANXIETY: ICD-10-CM

## 2023-02-23 RX ORDER — ALPRAZOLAM 1 MG
1 TABLET ORAL
Qty: 30 TABLET | Refills: 3 | Status: SHIPPED | OUTPATIENT
Start: 2023-02-23 | End: 2023-03-24

## 2023-02-24 DIAGNOSIS — I10 HYPERTENSION, UNSPECIFIED TYPE: ICD-10-CM

## 2023-02-25 RX ORDER — AMLODIPINE BESYLATE 5 MG/1
TABLET ORAL
Qty: 90 TABLET | Refills: 11 | OUTPATIENT
Start: 2023-02-25

## 2023-03-24 DIAGNOSIS — D86.9 SARCOIDOSIS: ICD-10-CM

## 2023-03-24 DIAGNOSIS — F41.9 ANXIETY: ICD-10-CM

## 2023-03-24 RX ORDER — PREDNISONE 1 MG/1
3 TABLET ORAL DAILY
Qty: 90 TABLET | Refills: 11 | Status: SHIPPED | OUTPATIENT
Start: 2023-03-24 | End: 2024-04-11

## 2023-03-24 RX ORDER — ALPRAZOLAM 1 MG
1 TABLET ORAL
Qty: 30 TABLET | Refills: 3 | Status: SHIPPED | OUTPATIENT
Start: 2023-03-24 | End: 2023-06-23

## 2023-04-18 PROBLEM — I73.9 PERIPHERAL ARTERY DISEASE (H): Status: ACTIVE | Noted: 2023-04-18

## 2023-04-22 DIAGNOSIS — R06.6 HICCUPS: ICD-10-CM

## 2023-04-23 NOTE — TELEPHONE ENCOUNTER
"Routing refill request to provider for review/approval because:  Failed PPI protocol    Last Written Prescription Date:  5/11/2022  Last Fill Quantity: 90,  # refills: 3   Last office visit provider:  12/9/2022   Future visit 5/4/2023  Requested Prescriptions   Pending Prescriptions Disp Refills     pantoprazole (PROTONIX) 40 MG EC tablet [Pharmacy Med Name: PANTOPRAZOLE SOD DR 40 MG TAB] 90 tablet 3     Sig: TAKE 1 TABLET BY MOUTH EVERY DAY       PPI Protocol Failed - 4/23/2023 12:13 PM        Failed - Recent (12 mo) or future (30 days) visit within the authorizing provider's specialty     Patient has had an office visit with the authorizing provider or a provider within the authorizing providers department within the previous 12 mos or has a future within next 30 days. See \"Patient Info\" tab in inbasket, or \"Choose Columns\" in Meds & Orders section of the refill encounter.              Passed - Not on Clopidogrel (unless Pantoprazole ordered)        Passed - No diagnosis of osteoporosis on record        Passed - Medication is active on med list        Passed - Patient is age 18 or older             Marylou Hidalgo RN 04/23/23 12:13 PM  "

## 2023-04-24 RX ORDER — PANTOPRAZOLE SODIUM 40 MG/1
TABLET, DELAYED RELEASE ORAL
Qty: 90 TABLET | Refills: 3 | Status: SHIPPED | OUTPATIENT
Start: 2023-04-24 | End: 2024-04-18

## 2023-05-04 ENCOUNTER — OFFICE VISIT (OUTPATIENT)
Dept: FAMILY MEDICINE | Facility: CLINIC | Age: 73
End: 2023-05-04
Payer: COMMERCIAL

## 2023-05-04 VITALS
HEART RATE: 81 BPM | SYSTOLIC BLOOD PRESSURE: 118 MMHG | WEIGHT: 153 LBS | DIASTOLIC BLOOD PRESSURE: 62 MMHG | BODY MASS INDEX: 27.11 KG/M2 | TEMPERATURE: 97.5 F | OXYGEN SATURATION: 96 %

## 2023-05-04 DIAGNOSIS — Z13.220 SCREENING FOR HYPERLIPIDEMIA: ICD-10-CM

## 2023-05-04 DIAGNOSIS — C61 PROSTATE CANCER (H): ICD-10-CM

## 2023-05-04 DIAGNOSIS — Z13.0 SCREENING FOR DEFICIENCY ANEMIA: ICD-10-CM

## 2023-05-04 DIAGNOSIS — M79.644 PAIN OF FINGER OF RIGHT HAND: ICD-10-CM

## 2023-05-04 DIAGNOSIS — I73.9 PERIPHERAL ARTERY DISEASE (H): ICD-10-CM

## 2023-05-04 DIAGNOSIS — Z13.89 SCREENING FOR GOUT: ICD-10-CM

## 2023-05-04 DIAGNOSIS — R79.9 ABNORMAL FINDING OF BLOOD CHEMISTRY: ICD-10-CM

## 2023-05-04 DIAGNOSIS — R29.2 ABNORMAL REFLEX: ICD-10-CM

## 2023-05-04 DIAGNOSIS — D86.0 PULMONARY SARCOIDOSIS (H): Primary | ICD-10-CM

## 2023-05-04 DIAGNOSIS — Z12.11 SCREENING FOR COLON CANCER: ICD-10-CM

## 2023-05-04 DIAGNOSIS — Z13.1 SCREENING FOR DIABETES MELLITUS: ICD-10-CM

## 2023-05-04 DIAGNOSIS — E78.2 MIXED HYPERLIPIDEMIA: ICD-10-CM

## 2023-05-04 PROBLEM — R73.03 PREDIABETES: Status: ACTIVE | Noted: 2023-05-04

## 2023-05-04 LAB
ALBUMIN SERPL BCG-MCNC: 4.6 G/DL (ref 3.5–5.2)
ALP SERPL-CCNC: 53 U/L (ref 40–129)
ALT SERPL W P-5'-P-CCNC: 11 U/L (ref 10–50)
ANION GAP SERPL CALCULATED.3IONS-SCNC: 13 MMOL/L (ref 7–15)
AST SERPL W P-5'-P-CCNC: 21 U/L (ref 10–50)
BASOPHILS # BLD AUTO: 0 10E3/UL (ref 0–0.2)
BASOPHILS NFR BLD AUTO: 1 %
BILIRUB SERPL-MCNC: 0.6 MG/DL
BUN SERPL-MCNC: 16.3 MG/DL (ref 8–23)
CALCIUM SERPL-MCNC: 10.3 MG/DL (ref 8.8–10.2)
CHLORIDE SERPL-SCNC: 103 MMOL/L (ref 98–107)
CHOLEST SERPL-MCNC: 250 MG/DL
CREAT SERPL-MCNC: 1.11 MG/DL (ref 0.67–1.17)
DEPRECATED HCO3 PLAS-SCNC: 25 MMOL/L (ref 22–29)
EOSINOPHIL # BLD AUTO: 0.1 10E3/UL (ref 0–0.7)
EOSINOPHIL NFR BLD AUTO: 1 %
ERYTHROCYTE [DISTWIDTH] IN BLOOD BY AUTOMATED COUNT: 14.3 % (ref 10–15)
GFR SERPL CREATININE-BSD FRML MDRD: 70 ML/MIN/1.73M2
GLUCOSE SERPL-MCNC: 88 MG/DL (ref 70–99)
HBA1C MFR BLD: 5.8 % (ref 0–5.6)
HCT VFR BLD AUTO: 41.9 % (ref 40–53)
HDLC SERPL-MCNC: 68 MG/DL
HGB BLD-MCNC: 13.8 G/DL (ref 13.3–17.7)
IMM GRANULOCYTES # BLD: 0 10E3/UL
IMM GRANULOCYTES NFR BLD: 0 %
LDLC SERPL CALC-MCNC: 158 MG/DL
LYMPHOCYTES # BLD AUTO: 0.9 10E3/UL (ref 0.8–5.3)
LYMPHOCYTES NFR BLD AUTO: 14 %
MCH RBC QN AUTO: 29.9 PG (ref 26.5–33)
MCHC RBC AUTO-ENTMCNC: 32.9 G/DL (ref 31.5–36.5)
MCV RBC AUTO: 91 FL (ref 78–100)
MONOCYTES # BLD AUTO: 0.7 10E3/UL (ref 0–1.3)
MONOCYTES NFR BLD AUTO: 12 %
NEUTROPHILS # BLD AUTO: 4.4 10E3/UL (ref 1.6–8.3)
NEUTROPHILS NFR BLD AUTO: 72 %
NONHDLC SERPL-MCNC: 182 MG/DL
PLATELET # BLD AUTO: 283 10E3/UL (ref 150–450)
POTASSIUM SERPL-SCNC: 4.2 MMOL/L (ref 3.4–5.3)
PROT SERPL-MCNC: 8 G/DL (ref 6.4–8.3)
RBC # BLD AUTO: 4.61 10E6/UL (ref 4.4–5.9)
SODIUM SERPL-SCNC: 141 MMOL/L (ref 136–145)
TRIGL SERPL-MCNC: 121 MG/DL
TSH SERPL DL<=0.005 MIU/L-ACNC: 1.62 UIU/ML (ref 0.3–4.2)
URATE SERPL-MCNC: 4.4 MG/DL (ref 3.4–7)
WBC # BLD AUTO: 6.1 10E3/UL (ref 4–11)

## 2023-05-04 PROCEDURE — 80061 LIPID PANEL: CPT | Performed by: INTERNAL MEDICINE

## 2023-05-04 PROCEDURE — 83036 HEMOGLOBIN GLYCOSYLATED A1C: CPT | Performed by: INTERNAL MEDICINE

## 2023-05-04 PROCEDURE — 84443 ASSAY THYROID STIM HORMONE: CPT | Performed by: INTERNAL MEDICINE

## 2023-05-04 PROCEDURE — 85025 COMPLETE CBC W/AUTO DIFF WBC: CPT | Performed by: INTERNAL MEDICINE

## 2023-05-04 PROCEDURE — 36415 COLL VENOUS BLD VENIPUNCTURE: CPT | Performed by: INTERNAL MEDICINE

## 2023-05-04 PROCEDURE — 84550 ASSAY OF BLOOD/URIC ACID: CPT | Performed by: INTERNAL MEDICINE

## 2023-05-04 PROCEDURE — 80053 COMPREHEN METABOLIC PANEL: CPT | Performed by: INTERNAL MEDICINE

## 2023-05-04 PROCEDURE — 96127 BRIEF EMOTIONAL/BEHAV ASSMT: CPT | Performed by: INTERNAL MEDICINE

## 2023-05-04 PROCEDURE — 99214 OFFICE O/P EST MOD 30 MIN: CPT | Performed by: INTERNAL MEDICINE

## 2023-05-04 RX ORDER — ROSUVASTATIN CALCIUM 10 MG/1
10 TABLET, COATED ORAL DAILY
Qty: 90 TABLET | Refills: 3 | Status: SHIPPED | OUTPATIENT
Start: 2023-05-04 | End: 2024-04-16

## 2023-05-04 ASSESSMENT — PATIENT HEALTH QUESTIONNAIRE - PHQ9
SUM OF ALL RESPONSES TO PHQ QUESTIONS 1-9: 6
SUM OF ALL RESPONSES TO PHQ QUESTIONS 1-9: 6
10. IF YOU CHECKED OFF ANY PROBLEMS, HOW DIFFICULT HAVE THESE PROBLEMS MADE IT FOR YOU TO DO YOUR WORK, TAKE CARE OF THINGS AT HOME, OR GET ALONG WITH OTHER PEOPLE: SOMEWHAT DIFFICULT

## 2023-05-04 NOTE — PATIENT INSTRUCTIONS
Labs today as we discussed.     They should call for the colonoscopy.    We will check for gout as possible cause for the finger pain.   - can use voltaren gel over the hand 3 to 4 times per day to see if this helps the inflammation.   - can set up with hand if needed    Let me know if issues come up.       Saurav Faust MD

## 2023-05-04 NOTE — PROGRESS NOTES
Assessment & Plan     Pulmonary sarcoidosis (H)  Patient has been stable following with pulmonology no acute concerns.  He is on chronic suppression therapy with prednisone.  - Comprehensive metabolic panel    Peripheral artery disease (H)  Noted on outside imaging mild in severity.  Screening lipids today, continue rosuvastatin.  No symptoms currently.    Prostate cancer (H)  Status post treatment with radiation on Lupron as well.    Screening for colon cancer  - Colonoscopy Screening  Referral; Future    Abnormal finding of blood chemistry  - Hemoglobin A1c; Future  - Hemoglobin A1c    Screening for gout  - Uric acid; Future  - Uric acid    Screening for hyperlipidemia  - Lipid panel reflex to direct LDL Fasting; Future  - Lipid panel reflex to direct LDL Fasting    Screening for diabetes mellitus  - Comprehensive metabolic panel; Future    Abnormal reflex  - TSH with free T4 reflex; Future  - TSH with free T4 reflex    Screening for deficiency anemia  - CBC with platelets and differential; Future  - CBC with platelets and differential    Mixed hyperlipidemia  Continue on rosuvastatin therapy may need to increase based off results.  - rosuvastatin (CRESTOR) 10 MG tablet; Take 1 tablet (10 mg) by mouth daily    Pain of finger of right hand  Will trial treatment with Voltaren gel over the area.  If not improving will have patient see a hand specialist referral placed for this today.  - Orthopedic  Referral; Future  - diclofenac (VOLTAREN) 1 % topical gel; Apply 4 g topically 4 times daily as needed for moderate pain    Patient Instructions   Labs today as we discussed.     They should call for the colonoscopy.    We will check for gout as possible cause for the finger pain.   - can use voltaren gel over the hand 3 to 4 times per day to see if this helps the inflammation.   - can set up with hand if needed    Let me know if issues come up.       Saurav Faust MD    If patient calls back and can get  "in for an eye surgery in the next 30 days.  Okay to update this for preop please ask the preop questionnaires.       BMI:   Estimated body mass index is 27.11 kg/m  as calculated from the following:    Height as of 12/9/22: 5' 2.99\" (1.6 m).    Weight as of this encounter: 153 lb (69.4 kg).   Weight management plan: Discussed healthy diet and exercise guidelines    See Patient Instructions    Saurav Faust MD  [facility]    Monica Barrios is a 73 year old, presenting for the following health issues:  Follow Up (Had eye exam & was informed needed cataract surgery ; needs referral to colonoscopy, bump on right index finger that's bothersome at night )        5/4/2023     9:32 AM   Additional Questions   Roomed by Geoffrey DONATO     History of Present Illness       Reason for visit:  One surgery coming up and a Colonoscopy is due    He eats 0-1 servings of fruits and vegetables daily.He consumes 1 sweetened beverage(s) daily.He exercises with enough effort to increase his heart rate 20 to 29 minutes per day.  He exercises with enough effort to increase his heart rate 3 or less days per week.   He is taking medications regularly.    Today's PHQ-9         PHQ-9 Total Score: 6    PHQ-9 Q9 Thoughts of better off dead/self-harm past 2 weeks :   Not at all    How difficult have these problems made it for you to do your work, take care of things at home, or get along with other people: Somewhat difficult     Patient been having tenderness over his right middle finger over the DIP joint.  Notices a bump over the area that is tender at times.  Is using both hands.  No other areas of tenderness.  No known family history of gout.  Feels that it is ongoing and not improving.    Patient is status post radiation therapy for prostate cancer.  Notes that the radiation has a lot of fatigue.  Following with urology and feels that things have been going well.  Urination has been going okay.  Is now working " part-time helping people find housing.    He is due to have eye surgery done.  He is also going to be having a colonoscopy as he is due.    Patient has sarcoidosis and follows with pulmonology this has been stable he is on chronic steroid therapy for this.    Review of Systems   Constitutional, HEENT, cardiovascular, pulmonary, gi and gu systems are negative, except as otherwise noted.      Objective    /62 (BP Location: Left arm, Patient Position: Sitting, Cuff Size: Adult Regular)   Pulse 81   Temp 97.5  F (36.4  C) (Temporal)   Wt 69.4 kg (153 lb)   SpO2 96%   BMI 27.11 kg/m    Body mass index is 27.11 kg/m .  Physical Exam   GENERAL: healthy, alert and no distress  EYES: Eyes grossly normal to inspection, PERRL and conjunctivae and sclerae normal  NECK: no adenopathy, no asymmetry, masses, or scars and thyroid normal to palpation  RESP: lungs clear to auscultation - no rales, rhonchi or wheezes  CV: regular rate and rhythm, normal S1 S2, no S3 or S4, no murmur, click or rub, no peripheral edema and peripheral pulses strong  MS: Tender over middle DIP joint with nodule noted over area.

## 2023-05-11 DIAGNOSIS — G47.00 INSOMNIA, UNSPECIFIED TYPE: ICD-10-CM

## 2023-05-11 RX ORDER — TRAZODONE HYDROCHLORIDE 50 MG/1
TABLET, FILM COATED ORAL
Qty: 90 TABLET | Refills: 2 | Status: SHIPPED | OUTPATIENT
Start: 2023-05-11

## 2023-05-16 DIAGNOSIS — R23.2 HOT FLASHES: ICD-10-CM

## 2023-05-16 DIAGNOSIS — F41.9 ANXIETY: ICD-10-CM

## 2023-05-16 RX ORDER — GABAPENTIN 300 MG/1
CAPSULE ORAL
Qty: 90 CAPSULE | Refills: 4 | Status: SHIPPED | OUTPATIENT
Start: 2023-05-16 | End: 2023-12-05

## 2023-05-17 RX ORDER — CITALOPRAM HYDROBROMIDE 40 MG/1
40 TABLET ORAL DAILY
Qty: 90 TABLET | Refills: 1 | OUTPATIENT
Start: 2023-05-17

## 2023-06-19 DIAGNOSIS — F41.9 ANXIETY: ICD-10-CM

## 2023-06-20 RX ORDER — CITALOPRAM HYDROBROMIDE 40 MG/1
40 TABLET ORAL DAILY
Qty: 90 TABLET | Refills: 3 | Status: SHIPPED | OUTPATIENT
Start: 2023-06-20 | End: 2024-04-18

## 2023-06-20 NOTE — TELEPHONE ENCOUNTER
"Last Written Prescription Date:  12/15/2022  Last Fill Quantity: 90,  # refills: 1   Last office visit provider:  5/4/2023     Requested Prescriptions   Pending Prescriptions Disp Refills     citalopram (CELEXA) 40 MG tablet 90 tablet 1     Sig: Take 1 tablet (40 mg) by mouth daily       SSRIs Protocol Passed - 6/20/2023 10:49 AM        Passed - Recent (12 mo) or future (30 days) visit within the authorizing provider's specialty     Patient has had an office visit with the authorizing provider or a provider within the authorizing providers department within the previous 12 mos or has a future within next 30 days. See \"Patient Info\" tab in inbasket, or \"Choose Columns\" in Meds & Orders section of the refill encounter.              Passed - Medication is active on med list        Passed - Patient is age 18 or older             Marylou Hidalgo RN 06/20/23 10:49 AM  "

## 2023-06-23 ENCOUNTER — TELEPHONE (OUTPATIENT)
Dept: FAMILY MEDICINE | Facility: CLINIC | Age: 73
End: 2023-06-23
Payer: MEDICAID

## 2023-06-23 DIAGNOSIS — F41.9 ANXIETY: ICD-10-CM

## 2023-06-23 RX ORDER — ALPRAZOLAM 1 MG
1 TABLET ORAL
Qty: 30 TABLET | Refills: 3 | Status: SHIPPED | OUTPATIENT
Start: 2023-06-23 | End: 2023-09-11

## 2023-07-21 ENCOUNTER — E-VISIT (OUTPATIENT)
Dept: URGENT CARE | Facility: CLINIC | Age: 73
End: 2023-07-21
Payer: COMMERCIAL

## 2023-07-21 ENCOUNTER — TELEPHONE (OUTPATIENT)
Dept: FAMILY MEDICINE | Facility: CLINIC | Age: 73
End: 2023-07-21
Payer: MEDICAID

## 2023-07-21 DIAGNOSIS — J01.90 ACUTE NON-RECURRENT SINUSITIS, UNSPECIFIED LOCATION: Primary | ICD-10-CM

## 2023-07-21 PROCEDURE — 99207 PR NON-BILLABLE SERV PER CHARTING: CPT | Performed by: PHYSICIAN ASSISTANT

## 2023-07-21 NOTE — PATIENT INSTRUCTIONS
Dear Denis Moreno,    After reviewing your responses, I've been able to diagnose you with?a sinus infection caused by a virus.? It sounds like you may be experiencing a migraine headache as well. I see that you have been prescribed sumatriptan. I recommend taking this medication to help treat migraines.    The symptoms you describe suggest a viral cause, which is much more common than a bacterial cause. Antibiotics will treat bacterial infections, but have no effect on viral infections.     Symptomatic care will help you feel better while your body is fighting the virus. It is important to stay well hydrated and get lots of rest. Unless you have been told not to take these medications by your provider, I recommend taking these to help ease your symptoms:    - Flonase (fluticasone) nasal spray, 2 sprays in each nostril daily, to reduce swelling in your nasal passages  - A decongestant like Sudafed (pseudoephedrine) which is available behind the pharmacist counter and helps to relieve congestion  - Tylenol or an NSAID such as ibuprofen or naproxen as needed for pain/fever  - A nasal rinse such as the Netti pot with bottled or distilled water and saline packets helps to flush sinuses  - Mucinex (guiafenesin) which thins mucus and may help it to loosen more quickly    You can also sit in the bathroom with the door closed and hot shower running to loosen mucus.    If your symptoms significantly worsen, you develop a severe headache, vomiting, high fever (>102F) or have not improved by day 10, please contact your primary care provider for an appointment or visit any of our convenient Walk-in Care or Urgent Care Centers to be seen which can be found on our website?here.?     Thanks again for choosing?us?as your health care partner,?   ?  Carlene Clark PA-C?

## 2023-07-21 NOTE — TELEPHONE ENCOUNTER
Medication Question or Refill    Contacts       Type Contact Phone/Fax    07/21/2023 03:44 PM CDT Phone (Incoming) Denis Moreno (Self) [phone] (H)          What medication are you calling about (include dose and sig)?:     doxycycline hyclate (VIBRAMYCIN) 100 MG capsule 20 capsule 0 2/9/2023 2/19/2023 --   Sig - Route: Take 1 capsule (100 mg) by mouth 2 times daily for 10 days - Oral       Preferred Pharmacy:    [facility]/pharmacy #[id number] - [place], [place] - [address] [place] [address]  [address] [place] [address]  Phone: [phone] Fax: [phone]    Controlled Substance Agreement on file:   CSA -- Patient Level:     [Media Unavailable] Controlled Substance Agreement - Non - Opioid - Scan on 1/2/2022  9:35 PM: NON-OPIOID CONTROLLED SUBSTANCE AGREEMENT   [Media Unavailable] Controlled Substance Agreement - Non - Opioid - Scan on 3/6/2020: NON-OPIOID CONTROLLED SUBSTANCE AGREEMENT       Who prescribed the medication?: Vipin Duron MD    Do you need a refill? Pt needs a new Rx written.    When did you use the medication last? 02/09/2023    Patient offered an appointment? Yes: Pt states he was hoping he could just get a Rx sent to his pharmacy. Pt states he has sinus headaches often.    Do you have any questions or concerns?  No    Could we send this information to you in [facility] or would you prefer to receive a phone call?:   Patient would prefer a phone call   Okay to leave a detailed message?: Yes at Cell number on file:    Telephone Information:   Mobile [phone]     Diana Cardona

## 2023-07-21 NOTE — TELEPHONE ENCOUNTER
Please call.    Patient will need an office visit to discuss if antibiotics are appropriate.    Oneida Fonseca NP

## 2023-07-21 NOTE — TELEPHONE ENCOUNTER
"07/21/23  Pt called back and stated that he is \"suffering\" and stated he doesn't want to wait until Monday. Writer suggested Walk in Clinic, but pt stated he cannot drive anywhere at this time due to sinus infection. Writer suggested evisit and pt stated he will try that.  Osiris  "

## 2023-07-21 NOTE — TELEPHONE ENCOUNTER
07/21/23  Attempted to call pt. No answer and VM full. castaclip message sent to schedule an appt.  Osiris

## 2023-07-28 DIAGNOSIS — G47.00 INSOMNIA, UNSPECIFIED TYPE: ICD-10-CM

## 2023-07-28 RX ORDER — LANOLIN ALCOHOL/MO/W.PET/CERES
CREAM (GRAM) TOPICAL
Qty: 90 TABLET | Refills: 0 | Status: SHIPPED | OUTPATIENT
Start: 2023-07-28 | End: 2023-08-21

## 2023-07-28 NOTE — TELEPHONE ENCOUNTER
Routing refill request to provider for review/approval because:  Drug not on the [facility] refill protocol     Last Written Prescription Date:  12/13/2022  Last Fill Quantity: 90,  # refills: 1   Last office visit provider:  5/4/2023     Requested Prescriptions   Pending Prescriptions Disp Refills    melatonin ([employer] MELATONIN) 3 MG tablet 90 tablet 1     Sig: TAKE 1 TABLET (3 MG) BY MOUTH NIGHTLY AS NEEDED FOR SLEEP Strength: 3 mg       There is no refill protocol information for this order          Veronique Steen RN 07/28/23 10:03 AM

## 2023-07-28 NOTE — TELEPHONE ENCOUNTER
Medication Question or Refill    Contacts         Type Contact Phone/Fax    07/28/2023 10:00 AM CDT Phone (Incoming) Denis Moreno (Self) [phone] (H)            What medication are you calling about (include dose and sig)?:     Preferred Pharmacy:   [facility]/pharmacy #[id number] - [place], [place] - [address]  [address] [place] [address]  Phone: [phone] Fax: [phone]      Controlled Substance Agreement on file:   CSA -- Patient Level:     [Media Unavailable] Controlled Substance Agreement - Non - Opioid - Scan on 1/2/2022  9:35 PM: NON-OPIOID CONTROLLED SUBSTANCE AGREEMENT   [Media Unavailable] Controlled Substance Agreement - Non - Opioid - Scan on 3/6/2020: NON-OPIOID CONTROLLED SUBSTANCE AGREEMENT       Who prescribed the medication?: Dr. Faust    Do you need a refill? Yes    When did you use the medication last? Pt reports to be out and sleeps better with it.      Could we send this information to you in [employer] or would you prefer to receive a phone call?:   No preference   Okay to leave a detailed message?: Yes at Home number on file [phone] (home)

## 2023-07-31 DIAGNOSIS — I10 ESSENTIAL HYPERTENSION, BENIGN: ICD-10-CM

## 2023-07-31 RX ORDER — PROPRANOLOL HYDROCHLORIDE 20 MG/1
20 TABLET ORAL 2 TIMES DAILY
Qty: 180 TABLET | Refills: 3 | Status: SHIPPED | OUTPATIENT
Start: 2023-07-31 | End: 2024-08-07

## 2023-07-31 NOTE — TELEPHONE ENCOUNTER
"Last Written Prescription Date:  7/27/2022  Last Fill Quantity: 180,  # refills: 3   Last office visit provider:  5/4/2023     Requested Prescriptions   Pending Prescriptions Disp Refills    propranolol (INDERAL) 20 MG tablet 180 tablet 3     Sig: Take 1 tablet (20 mg) by mouth 2 times daily       Beta-Blockers Protocol Passed - 7/31/2023  3:16 PM        Passed - Blood pressure under 140/90 in past 12 months     BP Readings from Last 3 Encounters:   05/04/23 118/62   12/09/22 134/68   07/27/22 102/50                 Passed - Patient is age 6 or older        Passed - Recent (12 mo) or future (30 days) visit within the authorizing provider's specialty     Patient has had an office visit with the authorizing provider or a provider within the authorizing providers department within the previous 12 mos or has a future within next 30 days. See \"Patient Info\" tab in inbasket, or \"Choose Columns\" in Meds & Orders section of the refill encounter.              Passed - Medication is active on med list             RIMA CERVANTES RN 07/31/23 3:17 PM  " FREE:[LAST:[[facility]],PHONE:[(   )    -],FAX:[(   )    -],ADDRESS:[[address] [place] [address]]]

## 2023-08-16 ENCOUNTER — TELEPHONE (OUTPATIENT)
Dept: FAMILY MEDICINE | Facility: CLINIC | Age: 73
End: 2023-08-16

## 2023-08-16 DIAGNOSIS — G43.809 OTHER MIGRAINE WITHOUT STATUS MIGRAINOSUS, NOT INTRACTABLE: Primary | ICD-10-CM

## 2023-08-16 RX ORDER — SUMATRIPTAN 50 MG/1
50 TABLET, FILM COATED ORAL
Qty: 6 TABLET | Refills: 1 | Status: SHIPPED | OUTPATIENT
Start: 2023-08-16 | End: 2023-12-27

## 2023-08-16 NOTE — TELEPHONE ENCOUNTER
Symptoms    Describe your symptoms: Headaches    Any pain: No    How long have you been having symptoms: Headaches  unknown duration    Have you been seen for this:  Yes    Appointment offered?: No    Triage offered?: No    Home remedies tried: Pt called to request a renewal on the below medication.    SUMAtriptan (IMITREX) 50 MG tablet     --   Sig - Route: Take 50 mg by mouth at onset of headache - Oral     Preferred Pharmacy:   [facility]/pharmacy #[id number] - [place], [place] - [address] [facility] [facility]  [address] [place] [address]  Phone: [phone] Fax: [phone]      Could we send this information to you in [employer] or would you prefer to receive a phone call?:   Patient would prefer a phone call     Okay to leave a detailed message?: Yes at Home number on file [phone] (home)    Diana Cardona

## 2023-08-16 NOTE — TELEPHONE ENCOUNTER
Routing refill request to provider for review/approval because:  Medication is reported/historical    Last office visit provider: 5/4/2023    Requested Prescriptions   Pending Prescriptions Disp Refills    SUMAtriptan (IMITREX) 50 MG tablet       Sig: Take 1 tablet (50 mg) by mouth at onset of headache       There is no refill protocol information for this order          Ronak Messina RN 08/16/23 4:27 PM

## 2023-08-21 DIAGNOSIS — G47.00 INSOMNIA, UNSPECIFIED TYPE: ICD-10-CM

## 2023-08-22 RX ORDER — LANOLIN ALCOHOL/MO/W.PET/CERES
CREAM (GRAM) TOPICAL
Qty: 90 TABLET | Refills: 0 | Status: SHIPPED | OUTPATIENT
Start: 2023-08-22 | End: 2023-12-13

## 2023-09-11 DIAGNOSIS — F41.9 ANXIETY: ICD-10-CM

## 2023-09-11 RX ORDER — ALPRAZOLAM 1 MG
1 TABLET ORAL
Qty: 30 TABLET | Refills: 3 | Status: SHIPPED | OUTPATIENT
Start: 2023-09-11 | End: 2023-10-10

## 2023-10-10 DIAGNOSIS — F41.9 ANXIETY: ICD-10-CM

## 2023-10-10 RX ORDER — ALPRAZOLAM 1 MG
1 TABLET ORAL
Qty: 30 TABLET | Refills: 3 | Status: SHIPPED | OUTPATIENT
Start: 2023-10-10 | End: 2023-12-05

## 2023-10-10 NOTE — TELEPHONE ENCOUNTER
10/10/23  Attempted to call pt. No answer and VM full. Sent [employer] message. Per amaury Faust to use [employer] for annual wellness.  Osiris

## 2023-10-21 ENCOUNTER — HEALTH MAINTENANCE LETTER (OUTPATIENT)
Age: 73
End: 2023-10-21

## 2023-11-10 ENCOUNTER — NURSE TRIAGE (OUTPATIENT)
Dept: FAMILY MEDICINE | Facility: CLINIC | Age: 73
End: 2023-11-10
Payer: MEDICAID

## 2023-11-10 DIAGNOSIS — R06.6 HICCUP: Primary | ICD-10-CM

## 2023-11-10 RX ORDER — BACLOFEN 10 MG/1
5-10 TABLET ORAL 2 TIMES DAILY PRN
Qty: 60 TABLET | Refills: 1 | Status: SHIPPED | OUTPATIENT
Start: 2023-11-10 | End: 2023-12-04

## 2023-11-10 NOTE — TELEPHONE ENCOUNTER
"Nurse Triage SBAR    Is this a 2nd Level Triage? YES, LICENSED PRACTITIONER REVIEW IS REQUIRED    Situation:   Pt reporting ongoing hiccups, requesting medication to help them    Background:   11/09/2023: onset of hiccups    03/16/2022: ED visit, Dx: intractable hiccups  03/21/2022: follow up appt with PCP. Prescribed Baclofen. Pt reported this medication helped them immediately.     Assessment:   Hiccups: frequency every few minutes, nothing makes them better or worse, pt able to eat and drink    Pt reports trying \"everything\" they looked up hiccup treatments on google and tried them, no change in hiccups after trying them    Denies difficulty breathing, SOB, chest pain, abdominal pain, nausea, vomiting    Protocol Recommended Disposition:   Home care    Recommendation:   Care advice given. Pt wanting medication to be called in without an appointment, baclofen.      Please advise.    Karol Montoya RN    Reason for Disposition   Hiccups present < 24 hours    Additional Information   Negative: Chest pain   Negative: Difficulty breathing   Negative: [1] Abdomen pain is main symptom AND [2] male   Negative: [1] Abdomen pain is main symptom AND [2] adult female   Negative: Vomiting   Negative: Patient sounds very sick or weak to the triager   Negative: [1] Hiccups present > 3 hours AND [2] severe AND [3] no improvement using hiccup treatment per protocol   Negative: [1] Hiccups present > 24 hours AND [2] nearly continuous   Negative: Hiccups interfere with work, school, or sleep   Negative: Recent abdominal, chest or brain surgery   Negative: Onset after starting new prescription medication   Negative: Hiccups are a chronic symptom (recurrent or ongoing AND present > 4 weeks)   Negative: Hiccups occur while asleep (must be witnessed by someone else)   Negative: [1] Weight loss > 10 pounds (5 kg) AND [2] not dieting    Answer Assessment - Initial Assessment Questions  1. ONSET: \"When did the hiccups begin?\"       " "Last night  2. SEVERITY: \"How bad are the hiccups now?\"  (Severe: unable to eat, drink or sleep because of hiccups)      Able to eat and drink  3. TREATMENT: \"What have you done so far to treat the hiccups?\"      Holding breath, drinking water, googled ideas  4. RECURRENT SYMPTOM: \"Have you ever had severe hiccups before?\" If Yes, ask: \"When was the last time?\" and \"What happened that time?\"       Yes, a few years ago, does not remember what they did  5. OTHER SYMPTOMS: \"Do you have any other symptoms? (e.g., chest pain, difficulty breathing,  abdomen pain, vomiting)      denies  6. PREGNANCY: \"Is there any chance you are pregnant?\" \"When was your last menstrual period?\"      NA    Protocols used: [id number]-DI    "

## 2023-11-10 NOTE — TELEPHONE ENCOUNTER
"Patient notified of provider message as written. Patient verified they understood the plan, agreed with the plan, and had no further questions.    Educated that baclofen can cause some drowsiness or dizziness and to use caution when taking. Pt denies breathing issues or chest pain, \"just hiccups\"    Pt will inform clinic if having breathing issues or chest pain.     No further questions at this time.          "

## 2023-11-10 NOTE — TELEPHONE ENCOUNTER
Sent in baclofen, if having any other symptoms such as breathing issues, chest pain etc, we should see to consider imaging of the chest.

## 2023-11-20 DIAGNOSIS — I10 HYPERTENSION: ICD-10-CM

## 2023-11-20 RX ORDER — AMLODIPINE BESYLATE 10 MG/1
10 TABLET ORAL DAILY
Qty: 90 TABLET | Refills: 1 | Status: SHIPPED | OUTPATIENT
Start: 2023-11-20 | End: 2024-04-18

## 2023-12-04 DIAGNOSIS — R06.6 HICCUP: ICD-10-CM

## 2023-12-04 DIAGNOSIS — G43.809 OTHER MIGRAINE WITHOUT STATUS MIGRAINOSUS, NOT INTRACTABLE: ICD-10-CM

## 2023-12-04 RX ORDER — BACLOFEN 10 MG/1
5-10 TABLET ORAL 2 TIMES DAILY PRN
Qty: 60 TABLET | Refills: 1 | Status: SHIPPED | OUTPATIENT
Start: 2023-12-04 | End: 2024-01-02

## 2023-12-04 RX ORDER — SUMATRIPTAN 50 MG/1
50 TABLET, FILM COATED ORAL
Qty: 6 TABLET | Refills: 1 | Status: CANCELLED | OUTPATIENT
Start: 2023-12-04

## 2023-12-04 NOTE — TELEPHONE ENCOUNTER
Patient Request    Patient is requesting a 90 Day Prescription.    Quantity: 180.0    Medication: baclofen (LIORESAL) 10 MG tablet     Pharmacy: [facility]/PHARMACY #[id number] - [place] [id number]      Please advise. Thank you!

## 2023-12-04 NOTE — TELEPHONE ENCOUNTER
Pt calling back after 3pm to provide list of Rx's he is requesting early release due to holiday travel.     Please complete full list when pt calls back.

## 2023-12-05 DIAGNOSIS — F41.9 ANXIETY: ICD-10-CM

## 2023-12-05 DIAGNOSIS — R23.2 HOT FLASHES: ICD-10-CM

## 2023-12-05 RX ORDER — ALPRAZOLAM 1 MG
1 TABLET ORAL
Qty: 30 TABLET | Refills: 3 | Status: SHIPPED | OUTPATIENT
Start: 2023-12-05 | End: 2023-12-18

## 2023-12-05 RX ORDER — GABAPENTIN 300 MG/1
300 CAPSULE ORAL 3 TIMES DAILY
Qty: 90 CAPSULE | Refills: 4 | Status: SHIPPED | OUTPATIENT
Start: 2023-12-05 | End: 2024-04-18

## 2023-12-12 DIAGNOSIS — I10 HYPERTENSION, UNSPECIFIED TYPE: ICD-10-CM

## 2023-12-12 DIAGNOSIS — G47.00 INSOMNIA, UNSPECIFIED TYPE: ICD-10-CM

## 2023-12-13 RX ORDER — AMLODIPINE BESYLATE 5 MG/1
10 TABLET ORAL DAILY
Qty: 90 TABLET | Refills: 11 | Status: SHIPPED | OUTPATIENT
Start: 2023-12-13 | End: 2024-04-15 | Stop reason: DRUGHIGH

## 2023-12-13 RX ORDER — LANOLIN ALCOHOL/MO/W.PET/CERES
CREAM (GRAM) TOPICAL
Qty: 90 TABLET | Refills: 0 | Status: SHIPPED | OUTPATIENT
Start: 2023-12-13 | End: 2024-04-01

## 2023-12-18 DIAGNOSIS — F41.9 ANXIETY: ICD-10-CM

## 2023-12-18 NOTE — TELEPHONE ENCOUNTER
Patient is asking for a prescription of ALPRAZolam (XANAX) 1 MG tablet to be sent to [facility] in [facility] since he is down there visiting his family. Pt had a refill sent 12/05 to [facility] in [facility] but he did not  that prescription given he was already in [place] already. Pt is asking for a 30 day supply to be sent to [facility] in [facility]. Can this be done?

## 2023-12-19 RX ORDER — ALPRAZOLAM 1 MG
1 TABLET ORAL
Qty: 30 TABLET | Refills: 3 | Status: SHIPPED | OUTPATIENT
Start: 2023-12-19 | End: 2024-01-16

## 2023-12-27 DIAGNOSIS — G43.809 OTHER MIGRAINE WITHOUT STATUS MIGRAINOSUS, NOT INTRACTABLE: ICD-10-CM

## 2023-12-27 RX ORDER — SUMATRIPTAN 50 MG/1
50 TABLET, FILM COATED ORAL
Qty: 6 TABLET | Refills: 1 | Status: SHIPPED | OUTPATIENT
Start: 2023-12-27

## 2023-12-28 DIAGNOSIS — R06.6 HICCUP: ICD-10-CM

## 2023-12-29 RX ORDER — BACLOFEN 10 MG/1
5-10 TABLET ORAL 2 TIMES DAILY PRN
Qty: 60 TABLET | Refills: 1 | OUTPATIENT
Start: 2023-12-29

## 2023-12-29 NOTE — TELEPHONE ENCOUNTER
90 day prescription request:    Medication: baclofen (LIORESAL) 10 MG tablet    Rx for Cialis sent to pharmacy in other encounter.

## 2024-01-02 DIAGNOSIS — R06.6 HICCUP: ICD-10-CM

## 2024-01-02 RX ORDER — BACLOFEN 10 MG/1
5-10 TABLET ORAL 2 TIMES DAILY PRN
Qty: 60 TABLET | Refills: 1 | Status: SHIPPED | OUTPATIENT
Start: 2024-01-02 | End: 2024-03-03

## 2024-01-04 DIAGNOSIS — G47.00 INSOMNIA, UNSPECIFIED TYPE: ICD-10-CM

## 2024-01-04 RX ORDER — LANOLIN ALCOHOL/MO/W.PET/CERES
CREAM (GRAM) TOPICAL
Qty: 90 TABLET | Refills: 0 | OUTPATIENT
Start: 2024-01-04

## 2024-01-16 DIAGNOSIS — F41.9 ANXIETY: ICD-10-CM

## 2024-01-16 RX ORDER — ALPRAZOLAM 1 MG
1 TABLET ORAL
Qty: 30 TABLET | Refills: 3 | Status: SHIPPED | OUTPATIENT
Start: 2024-01-16 | End: 2024-04-18

## 2024-02-06 DIAGNOSIS — I10 ESSENTIAL HYPERTENSION, BENIGN: ICD-10-CM

## 2024-02-06 RX ORDER — LISINOPRIL 20 MG/1
20 TABLET ORAL DAILY
Qty: 90 TABLET | Refills: 0 | Status: SHIPPED | OUTPATIENT
Start: 2024-02-06 | End: 2024-04-18

## 2024-02-06 NOTE — TELEPHONE ENCOUNTER
Refill Request  Medication name: Pending Prescriptions:                       Disp   Refills    lisinopril (ZESTRIL) 20 MG tablet         90 tab*3            Sig: Take 1 tablet (20 mg) by mouth daily    Requested Pharmacy:  [facility]/PHARMACY #[id number] - [place], [place] - [address]

## 2024-02-07 DIAGNOSIS — G47.00 INSOMNIA, UNSPECIFIED TYPE: ICD-10-CM

## 2024-02-08 RX ORDER — LANOLIN ALCOHOL/MO/W.PET/CERES
CREAM (GRAM) TOPICAL
Qty: 90 TABLET | Refills: 0 | OUTPATIENT
Start: 2024-02-08

## 2024-03-03 DIAGNOSIS — R06.6 HICCUP: ICD-10-CM

## 2024-03-04 RX ORDER — BACLOFEN 10 MG/1
5-10 TABLET ORAL 2 TIMES DAILY PRN
Qty: 60 TABLET | Refills: 1 | Status: SHIPPED | OUTPATIENT
Start: 2024-03-04 | End: 2024-04-29

## 2024-03-22 ENCOUNTER — TELEPHONE (OUTPATIENT)
Dept: PULMONOLOGY | Facility: CLINIC | Age: 74
End: 2024-03-22
Payer: COMMERCIAL

## 2024-03-22 NOTE — TELEPHONE ENCOUNTER
"Phone call from patient.  Had some hot coffee this morning and does not feel like it \"went down quite right\".  States it was kind of scary.  Has been sipping on some hot tea since and things seem to be going well.  Has appointment with dr. Duron on April 15 and thinks he can wait until then to see him.  No other issues with his breathing going on.  Has been feeling well.    He will see us on April 15 or call sooner if any more strange symptoms arise.  "

## 2024-04-01 DIAGNOSIS — G47.00 INSOMNIA, UNSPECIFIED TYPE: ICD-10-CM

## 2024-04-02 RX ORDER — LANOLIN ALCOHOL/MO/W.PET/CERES
CREAM (GRAM) TOPICAL
Qty: 90 TABLET | Refills: 0 | Status: SHIPPED | OUTPATIENT
Start: 2024-04-02 | End: 2024-04-18

## 2024-04-10 DIAGNOSIS — F41.9 ANXIETY: ICD-10-CM

## 2024-04-10 RX ORDER — ALPRAZOLAM 1 MG
1 TABLET ORAL
Qty: 30 TABLET | Refills: 3 | OUTPATIENT
Start: 2024-04-10

## 2024-04-11 DIAGNOSIS — D86.9 SARCOIDOSIS: ICD-10-CM

## 2024-04-12 DIAGNOSIS — R06.6 HICCUP: ICD-10-CM

## 2024-04-12 RX ORDER — PREDNISONE 1 MG/1
3 TABLET ORAL DAILY
Qty: 90 TABLET | Refills: 11 | Status: SHIPPED | OUTPATIENT
Start: 2024-04-12 | End: 2024-04-15

## 2024-04-15 ENCOUNTER — OFFICE VISIT (OUTPATIENT)
Dept: PULMONOLOGY | Facility: CLINIC | Age: 74
End: 2024-04-15
Payer: COMMERCIAL

## 2024-04-15 VITALS
SYSTOLIC BLOOD PRESSURE: 150 MMHG | OXYGEN SATURATION: 98 % | HEART RATE: 59 BPM | BODY MASS INDEX: 24.77 KG/M2 | WEIGHT: 139.8 LBS | DIASTOLIC BLOOD PRESSURE: 74 MMHG

## 2024-04-15 DIAGNOSIS — K21.9 GASTROESOPHAGEAL REFLUX DISEASE WITHOUT ESOPHAGITIS: Primary | ICD-10-CM

## 2024-04-15 DIAGNOSIS — D86.0 PULMONARY SARCOIDOSIS (H): ICD-10-CM

## 2024-04-15 DIAGNOSIS — D86.9 SARCOIDOSIS: ICD-10-CM

## 2024-04-15 LAB
ATRIAL RATE - MUSE: 51 BPM
DIASTOLIC BLOOD PRESSURE - MUSE: NORMAL MMHG
INTERPRETATION ECG - MUSE: NORMAL
P AXIS - MUSE: -7 DEGREES
PR INTERVAL - MUSE: 160 MS
QRS DURATION - MUSE: 78 MS
QT - MUSE: 482 MS
QTC - MUSE: 444 MS
R AXIS - MUSE: 13 DEGREES
SYSTOLIC BLOOD PRESSURE - MUSE: NORMAL MMHG
T AXIS - MUSE: 37 DEGREES
VENTRICULAR RATE- MUSE: 51 BPM

## 2024-04-15 PROCEDURE — 93000 ELECTROCARDIOGRAM COMPLETE: CPT | Performed by: INTERNAL MEDICINE

## 2024-04-15 PROCEDURE — 93010 ELECTROCARDIOGRAM REPORT: CPT | Mod: 77 | Performed by: INTERNAL MEDICINE

## 2024-04-15 PROCEDURE — 99214 OFFICE O/P EST MOD 30 MIN: CPT | Mod: 25 | Performed by: INTERNAL MEDICINE

## 2024-04-15 RX ORDER — OMEPRAZOLE 40 MG/1
CAPSULE, DELAYED RELEASE ORAL
Qty: 30 CAPSULE | Refills: 11 | Status: SHIPPED | OUTPATIENT
Start: 2024-04-15 | End: 2024-04-15

## 2024-04-15 RX ORDER — PREDNISONE 1 MG/1
2 TABLET ORAL DAILY
COMMUNITY
Start: 2024-04-15

## 2024-04-15 RX ORDER — BACLOFEN 10 MG/1
5-10 TABLET ORAL 2 TIMES DAILY PRN
Qty: 60 TABLET | Refills: 1 | OUTPATIENT
Start: 2024-04-15

## 2024-04-15 NOTE — PATIENT INSTRUCTIONS
It was good to see you today, Denis! This is what we discussed:    Continue prednisone 2 mg daily.  We will get an ECG to evaluate the heart.  Get blood tests done to evaluate liver and kidney function and check cell counts.  I will see you in 6 months with lung function tests.  Contact me with questions or concerns.    Vipin Duron MD  Pulmonary and Critical Care Medicine  [facility]  Office [phone]

## 2024-04-15 NOTE — LETTER
4/15/2024         RE: Denis Moreno  [address] [place] [address]        Dear Colleague,    Thank you for referring your patient, Denis Moreno, to the [facility]. Please see a copy of my visit note below.    Pulmonary Clinic Follow-up Visit    Assessment and Plan:  74 year old male never smoker with a history of dyslipidemia, low-grade chronic bilateral uveitis, intermittent sinusitis, mild SAPNA, pulmonary sarcoidosis, prostate cancer s/p treatment, presenting for follow-up.      Sarcoidosis: Denis has done well over the last year on prednisone 2 mg daily. Previously had pulmonary worsening with weaning off of prednisone, so now on a low maintenance dose. Has occasional wheezing and cough about 30 minutes after eating, resolves on its own and remains on PPI. Otherwise denies any new or concerning breathing issues, numbness, chest pain, or vision changes. Had some fatigue when he was in [place] but no dyspnea, resolved. Recall that Denis has a history of pulmonary and ocular involvement with sarcoidosis, with chronic bilateral uveitis, and mediastinal/hilar lymphadenopathy with pulmonary nodules but previously normal functional status and normal PFTs. Last spirometry in April 2022 showed mild obstruction, moderated DLCO reduction. He previously developed worsening fatigue, dyspnea, and cough, with significant peripheral interstitial changes and worsening pulmonary function tests requiring initiation of prednisone. This led to improvement in symptoms, pulmonary function, and radiographic findings, and we were able to slowly titrate off the prednisone down to maintenance of 0.5 mg daily, which we have since had to increase to 1 mg and now 3 mg daily. Normal renal and hepatic function, last checked about one year ago. Prior ECG with possible LVH, otherwise unremarkable.     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)  - ECG today in clinic  - ongoing ophthalmology follow-up at [facility]  - recommend remaining up to date with respiratory vaccinations  - follow up in 6 months with repeat spirometry/DLCO  - encouraged him to contact us with questions or concerning symptoms    Vipin Duron MD  Pulmonary and Critical Care Medicine  [facility]  Office [phone]  Pager [phone]  he/him    CCx: sarcoidosis    HPI: 74 year old male never smoker with a history of dyslipidemia, low-grade chronic bilateral uveitis, intermittent sinusitis, mild SAPNA, pulmonary sarcoidosis, prostate cancer s/p treatment, presenting for follow-up. Denis has done well over the last year on prednisone 2 mg daily. Previously had pulmonary worsening with weaning off of prednisone, so now on a low maintenance dose. Has occasional wheezing and cough about 30 minutes after eating, resolves on its own and remains on PPI. Otherwise denies any new or concerning breathing issues, numbness, chest pain, or vision changes. Had some fatigue when he was in [place] but no dyspnea, resolved. Recall that Denis has a history of pulmonary and ocular involvement with sarcoidosis, with chronic bilateral uveitis, and mediastinal/hilar lymphadenopathy with pulmonary nodules but previously normal functional status and normal PFTs. Last spirometry in April 2022 showed mild obstruction, moderated DLCO reduction. He previously developed worsening fatigue, dyspnea, and cough, with significant peripheral interstitial changes and worsening pulmonary function tests requiring initiation of prednisone. This led to improvement in symptoms, pulmonary function, and radiographic findings, and we were able to slowly titrate off the prednisone down to maintenance of 0.5 mg daily, which we have since had to increase to 1 mg and now 3 mg daily. Normal renal and hepatic function, last checked about  "one year ago. Prior ECG with possible LVH, otherwise unremarkable.    ROS:  A 12-system review was obtained and was negative with the exception of the symptoms endorsed in the history of present illness.    PMH:  never smoker with a history of dyslipidemia, low-grade chronic bilateral uveitis, intermittent sinusitis, mild SAPNA, pulmonary sarcoidosis, prostate cancer s/p treatment    PSH:  Past Surgical History:   Procedure Laterality Date     BRONCHOSCOPY FLEXIBLE AND RIGID  12/20/2016    ebus     COLONOSCOPY  2014    Nothing found, another scheduled in 2023       Allergies:  No Known Allergies    Family HX:  Family History   Problem Relation Age of Onset     Dementia Mother         d 96     Mental Illness Mother         Alzheimer's     Heart Disease Father         d 92     Hypertension Other         Its normal in the [place]     Substance Abuse Brother         Alcohol, numerous Makes in my Family abused Alcohol       Social Hx:  Social History     Socioeconomic History     Marital status: Single     Spouse name: Not on file     Number of children: Not on file     Years of education: Not on file     Highest education level: Not on file   Occupational History     Not on file   Tobacco Use     Smoking status: Never     Smokeless tobacco: Never     Tobacco comments:     N.A.   Substance and Sexual Activity     Alcohol use: Yes     Comment: Socially     Drug use: No     Sexual activity: Not Currently     Partners: Female     Birth control/protection: Pull-out method, None     Comment: My Lady Friend lives outside my state   Other Topics Concern     Parent/sibling w/ CABG, MI or angioplasty before 65F 55M? No   Social History Narrative    , 2 grown daughters, 2 granddaughters. 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  Previously working in administrative position.  Nonsmoker.         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 Moved back to [place] 9 years ago because " parents had dementia, so moved back here to take care of them. He has an older sister and younger brother (John). He has a girlfriend. She lives in [place]. His ex-wife passed away from breast cancer. Had 2 girls in that marriage. Oldest girl has 2 granddaughters. Both live in [place]. 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 He will probably stop volunteering soon though. - Dr. Lunsford 12/10/21      Social Determinants of Health     Financial Resource Strain: Medium Risk (4/2/2024)    [facility], [facility]    Financial Resource Strain      Difficulty of Paying Living Expenses: 1      Difficulty of Paying Living Expenses: 2   Food Insecurity: No Food Insecurity (4/2/2024)    [facility], [facility]    Food Insecurity      Worried About Running Out of Food in the Last Year: 1   Transportation Needs: No Transportation Needs (4/2/2024)    Received from [facility], [facility]    Transportation Needs      Lack of Transportation (Medical): 1   Physical Activity: Not on file   Stress: Not on file   Social Connections: Socially Integrated (4/2/2024)    [facility], [facility]    Social Connections      Frequency of Communication with Friends and Family: 0   Interpersonal Safety: Not on file   Housing Stability: Low Risk  (4/2/2024)    [facility], [facility]    Housing Stability      Unable to Pay for Housing in the Last Year: 1       Current Meds:  Current Outpatient Medications   Medication Sig Dispense Refill     ALPRAZolam (XANAX) 1 MG tablet Take 1 tablet (1 mg) by mouth nightly as needed for anxiety or  sleep 30 tablet 3     amLODIPine (NORVASC) 10 MG tablet Take 1 tablet (10 mg) by mouth daily 90 tablet 1     azelastine (ASTELIN) 0.1 % nasal spray        baclofen (LIORESAL) 10 MG tablet Take 0.5-1 tablets (5-10 mg) by mouth 2 times daily as needed for muscle spasms (hiccups) 60 tablet 1     citalopram (CELEXA) 40 MG tablet Take 1 tablet (40 mg) by mouth daily 90 tablet 3     diclofenac (VOLTAREN) 1 % topical gel Apply 4 g topically 4 times daily as needed for moderate pain 100 g 3     fluticasone (FLONASE) 50 MCG/ACT nasal spray One spray in each nostril daily as needed for sinus congestion. 16 g 11     gabapentin (NEURONTIN) 300 MG capsule Take 1 capsule (300 mg) by mouth 3 times daily 90 capsule 4     hydrOXYzine (ATARAX) 25 MG tablet Take 25 mg by mouth 3 times daily as needed for itching       lisinopril (ZESTRIL) 20 MG tablet Take 1 tablet (20 mg) by mouth daily 90 tablet 0     pantoprazole (PROTONIX) 40 MG EC tablet TAKE 1 TABLET BY MOUTH EVERY DAY 90 tablet 3     predniSONE (DELTASONE) 1 MG tablet Take 2 tablets (2 mg) by mouth daily       propranolol (INDERAL) 20 MG tablet Take 1 tablet (20 mg) by mouth 2 times daily 180 tablet 3     rosuvastatin (CRESTOR) 10 MG tablet Take 1 tablet (10 mg) by mouth daily 90 tablet 3     SUMAtriptan (IMITREX) 50 MG tablet Take 1 tablet (50 mg) by mouth at onset of headache for migraine 6 tablet 1     traZODone (DESYREL) 50 MG tablet [TRAZODONE (DESYREL) 50 MG TABLET] TAKE 1 TABLET BY MOUTH EVERYDAY AT BEDTIME 90 tablet 2     amLODIPine (NORVASC) 5 MG tablet TAKE 2 TABLETS BY MOUTH EVERY DAY 90 tablet 11     celecoxib (CELEBREX) 200 MG capsule Take 200 mg by mouth daily       [employer] D3 125 MCG (5000 UT) CAPS TAKE 1 CAPSULE (5,000 UNITS TOTAL) BY MOUTH DAILY. (Patient not taking: Reported on 1/17/2023) 90 capsule 3     melatonin ([employer]S MELATONIN) 3 MG tablet TAKE 1 TABLET (3 MG) BY MOUTH NIGHTLY AS NEEDED FOR SLEEP Strength: 3 mg 90 tablet 0     zolpidem (AMBIEN) 5 MG tablet Take  1 tablet (5 mg) by mouth nightly as needed for sleep 5 tablet 0       Physical Exam:  BP (!) 150/74 (BP Location: Left arm, Patient Position: Sitting, Cuff Size: Adult Regular)   Pulse 59   Wt 63.4 kg (139 lb 12.8 oz)   SpO2 98%   BMI 24.77 kg/m    Gen: alert, oriented, no distress  HEENT: no cervical or supraclavicular lymphadenopathy  CV: RRR, no M/G/R  Resp: CTAB, no focal crackles or wheezes  Skin: no apparent rashes  Ext: no cyanosis, clubbing or edema  Neuro: alert, nonfocal    Labs:  reviewed    Imaging studies:  PET-CT (April 2022):  - images directly reviewed, formal interpretation follows:  FINDINGS:      HEAD/NECK:  Asymmetric activity at the left palatine tonsil, SUV max 8.19. No  corresponding lesion on CT.     The paranasal sinuses are clear. The mastoid air cells are clear.      The mucosal pharyngeal space, the , prevertebral and carotid  spaces are within normal limits.      No masses, mass effect or pathologically enlarged lymph nodes.   The  thyroid gland is within normal limits.      CHEST:  Hypermetabolic mediastinal and bilateral hilar partially calcified  lymphadenopathy, for example:  -subcarinal lymph node with SUV max 9.08, measuring 19x17 mm, image  185 series 3  -left hilar lymph node with SUV max 8.63, measuring 15 x 10 mm image  178 series 3  -right paratracheal lymph node with SUV max 6.36, image 170 series 3     Hiatal hernia and mild circumferential wall thickening of the distal  esophagus without associated hypermetabolism. Calcified paraesophageal  lymph node without hypermetabolism.      Multiple bilateral pulmonary nodules again demonstrated in all lobes  of the lungs, including:    -19 x 7 mm, along the left pulmonary fissure, image 50 series 8,  increased since 2019 (at which time measured 4 mm). SUV max 3.53.  -6 mm, right upper lobe, image 34 series 8, increased since 2019 (at  which time measured 3 mm)  These do not demonstrate hypermetabolism above liver and  some only  slightly above mediastinal blood pool.      Scarring in the middle lobe inferiorly again seen, similar to 2019. No  pleural effusion.  No pericardial effusion. No large filling defect in  the central pulmonary arteries.     ABDOMEN AND PELVIS:  No suspicious FDG uptake in the abdomen or pelvis. Low attenuating  nonenhancing hypodensity not hypermetabolic in or adjacent to the  pancreatic head, a 19 x 15 mm image 252 series 4.  There is no mass  effect on adjacent structures. The hepatic artery and portal vein pass  through it.  No abdominal/pelvic lymphadenopathy.     The liver has a somewhat mottled appearance although this may be  secondary to bolus timing as this is an arterial phase.  No suspicious  hepatic lesions. No splenomegaly.  No suspicious adrenal mass lesion.  No opaque gallbladder calculi.   There is symmetric nephrographic  renal phase without hydronephrosis.     No evidence for diverticulitis.  No bowel obstruction.  No free fluid.    Normal appendix. Scattered atherosclerotic calcifications of the  aorta.     LOWER EXTREMITIES:   No abnormal masses or hypermetabolic lesions.     BONES:   No suspicious lytic or blastic osseous lesions.   No suspicious FDG  uptake in the skeleton. Mild degenerative changes of the lumbar spine.                                                                         IMPRESSION:  In summary: constellation of findings (hypermetabolic  mediastinal lymphadenopathy, lung nodules, palatine tonsil) most  consistent with active sarcoidosis. Consider treating and rescanning  with PET/CT to see if hypermetabolism resolves.  Alternatively  biopsies could be obtained.     In detail:  1. Increased size of hypermetabolic mediastinal and hilar  lymphadenopathy since 2019.   2. Multiple enlarging hypermetabolic lung nodules. Most likely  secondary to sarcoidosis, upper lobe predominant and several in  perihepatic locations which would be typical of sarcoidosis. All but 2  in  the upper lobes were present on CT 1/15/2019.  While this does not  exclude metastatic disease, it is very unlikely.     3. No active cardiac sarcoidosis (can be assessed because cardiac  uptake is suppressed).      4. Indeterminate uptake in the area of left palatine tonsil.  Differential sarcoidosis, inflammation, cancer. Consider direct  visualization.  Alternatively short-term follow-up after treatment  sarcoid.     5. Hypoenhancing structure in the pancreatic head region without  hypermetabolism.  Indeterminant but likely benign. Likely present on  1/15/2018 (noncontrast study limits evaluation).   If there is further  concern  abdominal MRI could be obtained.     6. Distal esophageal mild wall thickening does not demonstrate  hypermetabolism. Adjacent lymph node with calcification likely prior sarcoid.     TT$E (October 2017):    No previous study for comparison.    Left ventricle ejection fraction is normal. The calculated left ventricular ejection fraction is 69%.    Mild mitral regurgitation      Pulmonary Function Testing  November 2016:  FEV1/FVC is 72% and is normal.  FEV1 is 2.49L or 113% predicted and is normal.  FVC is 3.47L or 124% predicted and normal.  There was no improvement in spirometry after a single inhaled dose of bronchodilator.  TLC is 5.13L or 107% predicted and is normal.  RV is 1.54L or 77% predicted and is reduced.  DLCO is 17.61 ml/min/mmHg or 88% predicted and is normal when it   is corrected for hemoglobin.     October 2017:  FEV1/FVC is 68 and is normal (less than 0.7 but greater than the demographically adjusted lower limit of normal).  FEV1 is 122% predicted and is normal.  FVC is 140% predicted and normal.     DLCO is 80% predicted and is normal when it   is corrected for hemoglobin.     December 2018:  FEV1/FVC is 74% and is normal.  FEV1 is 1.72 L or 76% predicted and is reduced.  FVC is 2.33 L or 80% predicted and is normal.  There was no improvement in spirometry after a  single inhaled dose of bronchodilator.  DLCO is 9.48 mL/min/mm Hg or 40% predicted and is reduced when it   is corrected for hemoglobin.     February 2019:  FEV1/FVC is 68% and is reduced.  FEV1 is 2.39L (108%) predicted and is normal.  FVC is 3.52L (118%) predicted and normal.  There was no improvement in spirometry after a single inhaled dose of bronchodilator.  DLCO is 13.14ml/min/hg (55%) predicted and is reduced when it is corrected for hemoglobin.  Flow volume loops indicate severe scooping of the expiratory limb.     Impression:  Full Pulmonary Function Test is abnormal.  PFTs are consistent with mild obstructive disease.  Spirometry is not consistent with reversibility.  Diffusion capacity when corrected for hemoglobin is moderately reduced.     September 2020:  FEV1/FVC is 69 and is normal.  FEV1 is 106% predicted and is normal.  FVC is 119% predicted and is normal.  There was no improvement in spirometry after a single inhaled dose of bronchodilator.  DLCO is 78% predicted and is normal when it   is corrected for hemoglobin.  The flow volume loop is normal     October 2021:  FVC 3.28 L (106%)  FEV1 2.28 L (94%)  Ratio 0.69 (less than 0.7 but greater than the demographically adjusted lower limit of normal)  No significant bronchodilator response  DLCOc 70%  Loop with some expiratory concavity     April 2022:  FEV1 2.52 (106%)  FVC 3.95 (128%)  FEV1/FVC 0.64  DLCOc 58%  Flattened inspiratory limb    Time spent on chart and image review, meeting with the patient to obtain history, perform physical exam, discuss test results, diagnostic possibilities, further testing options, treatment plan options, and care coordination: 39 minutes      Again, thank you for allowing me to participate in the care of your patient.        Sincerely,        Vipin Duron MD

## 2024-04-15 NOTE — PROGRESS NOTES
Pulmonary Clinic Follow-up Visit    Assessment and Plan:  74 year old male never smoker with a history of dyslipidemia, low-grade chronic bilateral uveitis, intermittent sinusitis, mild SAPNA, pulmonary sarcoidosis, prostate cancer s/p treatment, presenting for follow-up.      Sarcoidosis: Denis has done well over the last year on prednisone 2 mg daily. Previously had pulmonary worsening with weaning off of prednisone, so now on a low maintenance dose. Has occasional wheezing and cough about 30 minutes after eating, resolves on its own and remains on PPI. Otherwise denies any new or concerning breathing issues, numbness, chest pain, or vision changes. Had some fatigue when he was in [place] but no dyspnea, resolved. Recall that Denis has a history of pulmonary and ocular involvement with sarcoidosis, with chronic bilateral uveitis, and mediastinal/hilar lymphadenopathy with pulmonary nodules but previously normal functional status and normal PFTs. Last spirometry in April 2022 showed mild obstruction, moderated DLCO reduction. He previously developed worsening fatigue, dyspnea, and cough, with significant peripheral interstitial changes and worsening pulmonary function tests requiring initiation of prednisone. This led to improvement in symptoms, pulmonary function, and radiographic findings, and we were able to slowly titrate off the prednisone down to maintenance of 0.5 mg daily, which we have since had to increase to 1 mg and now 3 mg daily. Normal renal and hepatic function, last checked about one year ago. Prior ECG with possible LVH, otherwise unremarkable.     Plan:  - continue prednisone 2 mg daily  - check comprehensive metabolic panel and CBC (he will have this done at [facility] due to difficult veins and an experience phlebotomist there)  - ECG today in clinic - sinus bradycardia, otherwise normal  - ongoing ophthalmology follow-up at [facility]  - recommend remaining up to date with respiratory  vaccinations  - follow up in 6 months with repeat spirometry/DLCO  - encouraged him to contact us with questions or concerning symptoms    Vipin Duron MD  Pulmonary and Critical Care Medicine  [facility]  Office [phone]  Pager [phone]  he/him    CCx: sarcoidosis    HPI: 74 year old male never smoker with a history of dyslipidemia, low-grade chronic bilateral uveitis, intermittent sinusitis, mild SAPNA, pulmonary sarcoidosis, prostate cancer s/p treatment, presenting for follow-up. Denis has done well over the last year on prednisone 2 mg daily. Previously had pulmonary worsening with weaning off of prednisone, so now on a low maintenance dose. Has occasional wheezing and cough about 30 minutes after eating, resolves on its own and remains on PPI. Otherwise denies any new or concerning breathing issues, numbness, chest pain, or vision changes. Had some fatigue when he was in [place] but no dyspnea, resolved. Recall that Denis has a history of pulmonary and ocular involvement with sarcoidosis, with chronic bilateral uveitis, and mediastinal/hilar lymphadenopathy with pulmonary nodules but previously normal functional status and normal PFTs. Last spirometry in April 2022 showed mild obstruction, moderated DLCO reduction. He previously developed worsening fatigue, dyspnea, and cough, with significant peripheral interstitial changes and worsening pulmonary function tests requiring initiation of prednisone. This led to improvement in symptoms, pulmonary function, and radiographic findings, and we were able to slowly titrate off the prednisone down to maintenance of 0.5 mg daily, which we have since had to increase to 1 mg and now 3 mg daily. Normal renal and hepatic function, last checked about one year ago. Prior ECG with possible LVH, otherwise unremarkable.    ROS:  A 12-system review was obtained and was negative with the exception of the symptoms endorsed in the history of present  "illness.    PMH:  never smoker with a history of dyslipidemia, low-grade chronic bilateral uveitis, intermittent sinusitis, mild SAPNA, pulmonary sarcoidosis, prostate cancer s/p treatment    PSH:  Past Surgical History:   Procedure Laterality Date    BRONCHOSCOPY FLEXIBLE AND RIGID  12/20/2016    ebus    COLONOSCOPY  2014    Nothing found, another scheduled in 2023       Allergies:  No Known Allergies    Family HX:  Family History   Problem Relation Age of Onset    Dementia Mother         d 96    Mental Illness Mother         Alzheimer's    Heart Disease Father         d 92    Hypertension Other         Its normal in the [place]    Substance Abuse Brother         Alcohol, numerous Makes in my Family abused Alcohol       Social Hx:  Social History     Socioeconomic History    Marital status: Single     Spouse name: Not on file    Number of children: Not on file    Years of education: Not on file    Highest education level: Not on file   Occupational History    Not on file   Tobacco Use    Smoking status: Never    Smokeless tobacco: Never    Tobacco comments:     N.A.   Substance and Sexual Activity    Alcohol use: Yes     Comment: Socially    Drug use: No    Sexual activity: Not Currently     Partners: Female     Birth control/protection: Pull-out method, None     Comment: My Lady Friend lives outside my state   Other Topics Concern    Parent/sibling w/ CABG, MI or angioplasty before 65F 55M? No   Social History Narrative    , 2 grown daughters, 2 granddaughters. 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  Previously working in administrative position.  Nonsmoker.         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 Moved back to [place] 9 years ago because parents had dementia, so moved back here to take care of them. He has an older sister and younger brother (John). He has a girlfriend. She lives in [place]. His ex-wife passed away from breast cancer. Had 2 " girls in that marriage. Oldest girl has 2 granddaughters. Both live in [place]. 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 He will probably stop volunteering soon though. - Dr. Lunsford 12/10/21      Social Determinants of Health     Financial Resource Strain: Medium Risk (4/2/2024)    Received from [facility] [employer] [facility], [facility]    Financial Resource Strain     Difficulty of Paying Living Expenses: 1     Difficulty of Paying Living Expenses: 2   Food Insecurity: No Food Insecurity (4/2/2024)    Received from [facility] [employer] [facility] [employer] [facility], [facility]    Food Insecurity     Worried About Running Out of Food in the Last Year: 1   Transportation Needs: No Transportation Needs (4/2/2024)    Received from [facility] [employer] [facility], [facility]    Transportation Needs     Lack of Transportation (Medical): 1   Physical Activity: Not on file   Stress: Not on file   Social Connections: Socially Integrated (4/2/2024)    Received from [facility] [employer] [facility] [employer] [facility], [facility]    Social Connections     Frequency of Communication with Friends and Family: 0   Interpersonal Safety: Not on file   Housing Stability: Low Risk  (4/2/2024)    Received from [facility] [employer] [facility] [employer] [facility]    Housing Stability     Unable to Pay for Housing in the Last Year: 1       Current Meds:  Current Outpatient Medications   Medication Sig Dispense Refill    ALPRAZolam (XANAX) 1 MG tablet Take 1 tablet (1 mg) by mouth nightly as needed for anxiety or sleep 30 tablet 3    amLODIPine (NORVASC) 10 MG tablet Take 1 tablet (10 mg) by mouth daily 90 tablet 1    azelastine (ASTELIN) 0.1 % nasal spray       baclofen (LIORESAL) 10 MG tablet Take 0.5-1 tablets (5-10 mg) by mouth 2  times daily as needed for muscle spasms (hiccups) 60 tablet 1    citalopram (CELEXA) 40 MG tablet Take 1 tablet (40 mg) by mouth daily 90 tablet 3    diclofenac (VOLTAREN) 1 % topical gel Apply 4 g topically 4 times daily as needed for moderate pain 100 g 3    fluticasone (FLONASE) 50 MCG/ACT nasal spray One spray in each nostril daily as needed for sinus congestion. 16 g 11    gabapentin (NEURONTIN) 300 MG capsule Take 1 capsule (300 mg) by mouth 3 times daily 90 capsule 4    hydrOXYzine (ATARAX) 25 MG tablet Take 25 mg by mouth 3 times daily as needed for itching      lisinopril (ZESTRIL) 20 MG tablet Take 1 tablet (20 mg) by mouth daily 90 tablet 0    pantoprazole (PROTONIX) 40 MG EC tablet TAKE 1 TABLET BY MOUTH EVERY DAY 90 tablet 3    predniSONE (DELTASONE) 1 MG tablet Take 2 tablets (2 mg) by mouth daily      propranolol (INDERAL) 20 MG tablet Take 1 tablet (20 mg) by mouth 2 times daily 180 tablet 3    rosuvastatin (CRESTOR) 10 MG tablet Take 1 tablet (10 mg) by mouth daily 90 tablet 3    SUMAtriptan (IMITREX) 50 MG tablet Take 1 tablet (50 mg) by mouth at onset of headache for migraine 6 tablet 1    traZODone (DESYREL) 50 MG tablet [TRAZODONE (DESYREL) 50 MG TABLET] TAKE 1 TABLET BY MOUTH EVERYDAY AT BEDTIME 90 tablet 2    amLODIPine (NORVASC) 5 MG tablet TAKE 2 TABLETS BY MOUTH EVERY DAY 90 tablet 11    celecoxib (CELEBREX) 200 MG capsule Take 200 mg by mouth daily      [employer] D3 125 MCG (5000 UT) CAPS TAKE 1 CAPSULE (5,000 UNITS TOTAL) BY MOUTH DAILY. (Patient not taking: Reported on 1/17/2023) 90 capsule 3    melatonin ([employer] MELATONIN) 3 MG tablet TAKE 1 TABLET (3 MG) BY MOUTH NIGHTLY AS NEEDED FOR SLEEP Strength: 3 mg 90 tablet 0    zolpidem (AMBIEN) 5 MG tablet Take 1 tablet (5 mg) by mouth nightly as needed for sleep 5 tablet 0       Physical Exam:  BP (!) 150/74 (BP Location: Left arm, Patient Position: Sitting, Cuff Size: Adult Regular)   Pulse 59   Wt 63.4 kg (139 lb 12.8 oz)   SpO2 98%   BMI 24.77  kg/m    Gen: alert, oriented, no distress  HEENT: no cervical or supraclavicular lymphadenopathy  CV: RRR, no M/G/R  Resp: CTAB, no focal crackles or wheezes  Skin: no apparent rashes  Ext: no cyanosis, clubbing or edema  Neuro: alert, nonfocal    Labs:  reviewed    Imaging studies:  PET-CT (April 2022):  - images directly reviewed, formal interpretation follows:  FINDINGS:      HEAD/NECK:  Asymmetric activity at the left palatine tonsil, SUV max 8.19. No  corresponding lesion on CT.     The paranasal sinuses are clear. The mastoid air cells are clear.      The mucosal pharyngeal space, the , prevertebral and carotid  spaces are within normal limits.      No masses, mass effect or pathologically enlarged lymph nodes.   The  thyroid gland is within normal limits.      CHEST:  Hypermetabolic mediastinal and bilateral hilar partially calcified  lymphadenopathy, for example:  -subcarinal lymph node with SUV max 9.08, measuring 19x17 mm, image  185 series 3  -left hilar lymph node with SUV max 8.63, measuring 15 x 10 mm image  178 series 3  -right paratracheal lymph node with SUV max 6.36, image 170 series 3     Hiatal hernia and mild circumferential wall thickening of the distal  esophagus without associated hypermetabolism. Calcified paraesophageal  lymph node without hypermetabolism.      Multiple bilateral pulmonary nodules again demonstrated in all lobes  of the lungs, including:    -19 x 7 mm, along the left pulmonary fissure, image 50 series 8,  increased since 2019 (at which time measured 4 mm). SUV max 3.53.  -6 mm, right upper lobe, image 34 series 8, increased since 2019 (at  which time measured 3 mm)  These do not demonstrate hypermetabolism above liver and some only  slightly above mediastinal blood pool.      Scarring in the middle lobe inferiorly again seen, similar to 2019. No  pleural effusion.  No pericardial effusion. No large filling defect in  the central pulmonary arteries.     ABDOMEN AND  PELVIS:  No suspicious FDG uptake in the abdomen or pelvis. Low attenuating  nonenhancing hypodensity not hypermetabolic in or adjacent to the  pancreatic head, a 19 x 15 mm image 252 series 4.  There is no mass  effect on adjacent structures. The hepatic artery and portal vein pass  through it.  No abdominal/pelvic lymphadenopathy.     The liver has a somewhat mottled appearance although this may be  secondary to bolus timing as this is an arterial phase.  No suspicious  hepatic lesions. No splenomegaly.  No suspicious adrenal mass lesion.  No opaque gallbladder calculi.   There is symmetric nephrographic  renal phase without hydronephrosis.     No evidence for diverticulitis.  No bowel obstruction.  No free fluid.    Normal appendix. Scattered atherosclerotic calcifications of the  aorta.     LOWER EXTREMITIES:   No abnormal masses or hypermetabolic lesions.     BONES:   No suspicious lytic or blastic osseous lesions.   No suspicious FDG  uptake in the skeleton. Mild degenerative changes of the lumbar spine.                                                                         IMPRESSION:  In summary: constellation of findings (hypermetabolic  mediastinal lymphadenopathy, lung nodules, palatine tonsil) most  consistent with active sarcoidosis. Consider treating and rescanning  with PET/CT to see if hypermetabolism resolves.  Alternatively  biopsies could be obtained.     In detail:  1. Increased size of hypermetabolic mediastinal and hilar  lymphadenopathy since 2019.   2. Multiple enlarging hypermetabolic lung nodules. Most likely  secondary to sarcoidosis, upper lobe predominant and several in  perihepatic locations which would be typical of sarcoidosis. All but 2  in the upper lobes were present on CT 1/15/2019.  While this does not  exclude metastatic disease, it is very unlikely.     3. No active cardiac sarcoidosis (can be assessed because cardiac  uptake is suppressed).      4. Indeterminate uptake in the  area of left palatine tonsil.  Differential sarcoidosis, inflammation, cancer. Consider direct  visualization.  Alternatively short-term follow-up after treatment  sarcoid.     5. Hypoenhancing structure in the pancreatic head region without  hypermetabolism.  Indeterminant but likely benign. Likely present on  1/15/2018 (noncontrast study limits evaluation).   If there is further  concern  abdominal MRI could be obtained.     6. Distal esophageal mild wall thickening does not demonstrate  hypermetabolism. Adjacent lymph node with calcification likely prior sarcoid.     TT$E (October 2017):    No previous study for comparison.    Left ventricle ejection fraction is normal. The calculated left ventricular ejection fraction is 69%.    Mild mitral regurgitation      Pulmonary Function Testing  November 2016:  FEV1/FVC is 72% and is normal.  FEV1 is 2.49L or 113% predicted and is normal.  FVC is 3.47L or 124% predicted and normal.  There was no improvement in spirometry after a single inhaled dose of bronchodilator.  TLC is 5.13L or 107% predicted and is normal.  RV is 1.54L or 77% predicted and is reduced.  DLCO is 17.61 ml/min/mmHg or 88% predicted and is normal when it   is corrected for hemoglobin.     October 2017:  FEV1/FVC is 68 and is normal (less than 0.7 but greater than the demographically adjusted lower limit of normal).  FEV1 is 122% predicted and is normal.  FVC is 140% predicted and normal.     DLCO is 80% predicted and is normal when it   is corrected for hemoglobin.     December 2018:  FEV1/FVC is 74% and is normal.  FEV1 is 1.72 L or 76% predicted and is reduced.  FVC is 2.33 L or 80% predicted and is normal.  There was no improvement in spirometry after a single inhaled dose of bronchodilator.  DLCO is 9.48 mL/min/mm Hg or 40% predicted and is reduced when it   is corrected for hemoglobin.     February 2019:  FEV1/FVC is 68% and is reduced.  FEV1 is 2.39L (108%) predicted and is normal.  FVC is 3.52L  (118%) predicted and normal.  There was no improvement in spirometry after a single inhaled dose of bronchodilator.  DLCO is 13.14ml/min/hg (55%) predicted and is reduced when it is corrected for hemoglobin.  Flow volume loops indicate severe scooping of the expiratory limb.     Impression:  Full Pulmonary Function Test is abnormal.  PFTs are consistent with mild obstructive disease.  Spirometry is not consistent with reversibility.  Diffusion capacity when corrected for hemoglobin is moderately reduced.     September 2020:  FEV1/FVC is 69 and is normal.  FEV1 is 106% predicted and is normal.  FVC is 119% predicted and is normal.  There was no improvement in spirometry after a single inhaled dose of bronchodilator.  DLCO is 78% predicted and is normal when it   is corrected for hemoglobin.  The flow volume loop is normal     October 2021:  FVC 3.28 L (106%)  FEV1 2.28 L (94%)  Ratio 0.69 (less than 0.7 but greater than the demographically adjusted lower limit of normal)  No significant bronchodilator response  DLCOc 70%  Loop with some expiratory concavity     April 2022:  FEV1 2.52 (106%)  FVC 3.95 (128%)  FEV1/FVC 0.64  DLCOc 58%  Flattened inspiratory limb    Time spent on chart and image review, meeting with the patient to obtain history, perform physical exam, discuss test results, diagnostic possibilities, further testing options, treatment plan options, and care coordination: 39 minutes

## 2024-04-16 ENCOUNTER — MYC REFILL (OUTPATIENT)
Dept: FAMILY MEDICINE | Facility: CLINIC | Age: 74
End: 2024-04-16
Payer: COMMERCIAL

## 2024-04-16 ENCOUNTER — TELEPHONE (OUTPATIENT)
Dept: PULMONOLOGY | Facility: CLINIC | Age: 74
End: 2024-04-16
Payer: COMMERCIAL

## 2024-04-16 DIAGNOSIS — E78.2 MIXED HYPERLIPIDEMIA: ICD-10-CM

## 2024-04-17 RX ORDER — ROSUVASTATIN CALCIUM 10 MG/1
10 TABLET, COATED ORAL DAILY
Qty: 90 TABLET | Refills: 2 | Status: SHIPPED | OUTPATIENT
Start: 2024-04-17 | End: 2024-08-21

## 2024-04-17 SDOH — HEALTH STABILITY: PHYSICAL HEALTH: ON AVERAGE, HOW MANY MINUTES DO YOU ENGAGE IN EXERCISE AT THIS LEVEL?: 30 MIN

## 2024-04-17 SDOH — HEALTH STABILITY: PHYSICAL HEALTH: ON AVERAGE, HOW MANY DAYS PER WEEK DO YOU ENGAGE IN MODERATE TO STRENUOUS EXERCISE (LIKE A BRISK WALK)?: 0 DAYS

## 2024-04-17 ASSESSMENT — PATIENT HEALTH QUESTIONNAIRE - PHQ9
SUM OF ALL RESPONSES TO PHQ QUESTIONS 1-9: 5
10. IF YOU CHECKED OFF ANY PROBLEMS, HOW DIFFICULT HAVE THESE PROBLEMS MADE IT FOR YOU TO DO YOUR WORK, TAKE CARE OF THINGS AT HOME, OR GET ALONG WITH OTHER PEOPLE: SOMEWHAT DIFFICULT
SUM OF ALL RESPONSES TO PHQ QUESTIONS 1-9: 5

## 2024-04-17 ASSESSMENT — SOCIAL DETERMINANTS OF HEALTH (SDOH): HOW OFTEN DO YOU GET TOGETHER WITH FRIENDS OR RELATIVES?: ONCE A WEEK

## 2024-04-17 NOTE — COMMUNITY RESOURCES LIST (ENGLISH)
April 17, 2024           YOUR PERSONALIZED LIST OF SERVICES & PROGRAMS           NAVIGATION    Eligibility Screening      Action Partnership (CAP) [facility] application assistance  [address] [place] [address] (Distance: 0.9 miles)  Phone: [phone]  Language: English  Fee: Free  Accessibility: Translation services      [facility] Services  [address] [place] [address] (Distance: 1.5 miles)  Phone: [phone]  Website: https://[employer]/seniors/  Language: English, Kyrgyz  Fee: Free, Self pay, Sliding scale  Accessibility: Translation services      [employer]  Phone: [phone]  Website: https://www.mnsure.org/about-us/assister-program/navigators/index.jsp  Language: English  Hours: Mon 8:00 AM - 4:00 PM Tue 8:00 AM - 4:00 PM Wed 8:00 AM - 4:00 PM Thu 8:00 AM - 4:00 PM        ASSISTANCE    Nutrition Benefits      Action Partnership (CAP) [facility] application assistance  [address] [place] [address] (Distance: 0.9 miles)  Phone: [phone]  Language: English  Fee: Free  Accessibility: Translation services      [facility] application assistance  [address] [place] [address] (Distance: 2.1 miles)  Phone: [phone]  Language: English, Kyrgyz, Lao, Hmong  Fee: Free  Accessibility: Ada accessible, Translation services      [employer] [place] - SNAP (formerly food stamps) Screening and Application help  Phone: [phone]  Website: https://www.hungersolutions.org/programs/[place]-food-helpline/  Language: English  Hours: Mon 10:00 AM - 5:00 PM Tue 10:00 AM - 5:00 PM Wed 10:00 AM - 5:00 PM Thu 10:00 AM - 5:00 PM Fri 10:00 AM - 5:00 PM  Fee: Free  Accessibility: Ada accessible, Blind accommodation, Deaf or hard of hearing, Translation services    Pantry      [facility] - Food pantry  [address]  [place] [address] (Distance: 4.3 miles)  Phone: [phone]  Website: http://www.ClearAccess/  Language: English  Fee: Free      in the [place] - Food in the [place] at [facility]  [address] [place] [address] (Distance: 9.5 miles)  Phone: [phone]  Website: https://www.goodinthood.org/our-programs/feeding-the-future/food-in-the-bazan/  Language: English  Fee: Free  Accessibility: Ada accessible      [facility] [employer] - [facility]  Phone: [phone]) [phone]  Website: https://www.advocateforOne True Media.[employer]  Language: English, Portuguese  Hours: Mon 8:00 AM - 5:00 PM Tue 8:00 AM - 5:00 PM Wed 8:00 AM - 5:00 PM Thu 8:00 AM - 5:00 PM Fri 8:00 AM - 5:00 PM  Fee: Free  Accessibility: Ada accessible        & SHELTER    Case Management      [employer] - Housing search assistance  [address] [place] [address] (Distance: 0.7 miles)  Phone: [phone]  Language: English, German, Estiven, Hmong  Fee: Free  Accessibility: Ada accessible, Blind accommodation, Deaf or hard of hearing, Translation services      [facility] Access  [address] [place] [address] (Distance: 0.9 miles)  Phone: [phone]  Language: English  Fee: Free  Accessibility: Translation services, Ada accessible      [employer] - Housing Stabilization Services  Phone: [phone]  Website: https://homebasemn.com/  Language: English  Hours: Mon 8:00 AM - 4:00 PM Tue 8:00 AM - 4:00 PM Wed 8:00 AM - 4:00 PM Thu 8:00 AM - 4:00 PM Fri 8:00 AM - 4:00 PM  Fee: Free  Accessibility: Blind accommodation, Deaf or hard of hearing  Transportation Options: Free transportation    Payment Assistance      [facility] deposit assistance  [address] [place] [address] (Distance: 2.1 miles)  Phone: [phone]  Language: English, Portuguese, Citizen of [place], Hmong  Fee: Free  Accessibility: Ada accessible, Translation services      [facility]  Deposit Assistance  [address] [place] [address] (Distance: 2.1 miles)  Phone: [phone]  Language: English  Fee: Free      [employer] - Keep the Key  Phone: [phone]) [phone]  Website: https://www.tvo29-kkzzawlwbcvqbt.org/programs.html  Language: English  Hours: Mon 7:00 AM - 7:00 PM Tue 7:00 AM - 7:00 PM Wed 7:00 AM - 7:00 PM Thu 7:00 AM - 7:00 PM Fri 7:00 AM - 7:00 PM  Fee: Free    Mediation & Eviction Prevention      [facility] for [facility] - Mortgage Foreclosure Prevention  [address] [place] [address] (Distance: 2.3 miles)  Phone: [phone]  Language: English  Fee: Free  Accessibility: Ada accessible      [employer] - Foreclosure Prevention  [address] [place] [address] (Distance: 0.5 miles)  Phone: [phone]  Website: http://www.[facility].org  Language: English, Kenyan, Hmong  Fee: Free, Sliding scale  Accessibility: Blind accommodation, Ada accessible      [employer] - Tenant Rights / Eviction Prevention  Website: https://homelinemn.org/c-ogfp-bx-/  Language: English, Kenyan        & RECREATION    Sports      [facility]ness  [address] [place] [address] (Distance: 1.0 miles)  Phone: [phone]  Website: https://www.[employer].org/fitness/  Language: English      Community Services - [facility] Services  [address] [place] [address] (Distance: 1.5 miles)  Phone: [phone]  Website: https://Rapt.org/seniors/  Language: English, Kenyan  Fee: Free, Self pay, Sliding scale  Accessibility: Translation services      [facility] - Adult Enrichment  Phone: [phone]  Website: https://FIGS/adults-seniors/adult-enrichment/  Language: English  Hours: Mon 7:30 AM - 4:00 PM Tue 7:30 AM - 4:00 PM Wed 7:30 AM - 4:00 PM Thu 7:30 AM - 4:00 PM Fri 7:30 AM - 4:00 PM    Classes/Groups      [facility]  [address] [place] [address] (Distance: 8.8 miles)  Phone: [phone]  Website: https://www.LeanKit.org/locations/[place]/classes_programs  Language: English  Fee: Insurance      [facility] - Personal training  [address] [place] [address] (Distance: 19.0 miles)  Phone: [phone]  Website: https://PostedIn/  Language: English, Andorran  Fee: Sliding scale, Self pay, Insurance      [facility] - Adult Enrichment  Phone: [phone]  Website: https://Thingies/adults-seniors/adult-enrichment/  Language: English  Hours: Mon 7:30 AM - 4:00 PM Tue 7:30 AM - 4:00 PM Wed 7:30 AM - 4:00 PM Thu 7:30 AM - 4:00 PM Fri 7:30 AM - 4:00 PM               IMPORTANT NUMBERS & WEBSITES        Emergency Services  911  .   [facility]  211 http://211unitedway.org  .   Poison Control  (898) 937-5003 http://[place]poison.org http://wisconsinpoison.org  .     Suicide and Crisis Lifeline  988 http://988Xenetaline.org  .   Childhelp National Child Abuse Hotline  454.786.4743 http://Childhelphotline.org   .   National Sexual Assault Hotline  (889) 451-8127 (HOPE) http://Rainn.org   .     National Runaway Safeline  (191) 336-3475 (RUNAWAY) http://1800runaTrendKite.org  .   Pregnancy & Postpartum Support  Call/text 906-735-0959  [place]: http://ppsupportmn.org  [place]: http://Boundless Geo.com/wi  .   Substance Abuse National Helpline ([facility])  138-785-HELP (6143) http://Findtreatment.gov   .                DISCLAIMER: These resources have been generated via the [employer] Platform. [employer] does not endorse any service providers mentioned in this resource list. [employer] does not guarantee that the services mentioned in this resource list will be available to you or will improve your health or wellness.    [facility]

## 2024-04-18 ENCOUNTER — OFFICE VISIT (OUTPATIENT)
Dept: FAMILY MEDICINE | Facility: CLINIC | Age: 74
End: 2024-04-18
Payer: COMMERCIAL

## 2024-04-18 VITALS
OXYGEN SATURATION: 100 % | BODY MASS INDEX: 23.04 KG/M2 | DIASTOLIC BLOOD PRESSURE: 56 MMHG | WEIGHT: 130 LBS | HEIGHT: 63 IN | TEMPERATURE: 98.2 F | HEART RATE: 62 BPM | SYSTOLIC BLOOD PRESSURE: 120 MMHG | RESPIRATION RATE: 16 BRPM

## 2024-04-18 DIAGNOSIS — F33.0 MAJOR DEPRESSIVE DISORDER, RECURRENT EPISODE, MILD (H): ICD-10-CM

## 2024-04-18 DIAGNOSIS — Z11.59 NEED FOR HEPATITIS C SCREENING TEST: ICD-10-CM

## 2024-04-18 DIAGNOSIS — I10 ESSENTIAL HYPERTENSION, BENIGN: ICD-10-CM

## 2024-04-18 DIAGNOSIS — E78.2 MIXED HYPERLIPIDEMIA: ICD-10-CM

## 2024-04-18 DIAGNOSIS — R29.2 ABNORMAL REFLEX: ICD-10-CM

## 2024-04-18 DIAGNOSIS — Z13.9 ENCOUNTER FOR SCREENING INVOLVING SOCIAL DETERMINANTS OF HEALTH (SDOH): Primary | ICD-10-CM

## 2024-04-18 DIAGNOSIS — F41.9 ANXIETY: ICD-10-CM

## 2024-04-18 DIAGNOSIS — R22.31 NODULE OF FINGER OF RIGHT HAND: ICD-10-CM

## 2024-04-18 DIAGNOSIS — K21.9 GASTROESOPHAGEAL REFLUX DISEASE WITHOUT ESOPHAGITIS: ICD-10-CM

## 2024-04-18 DIAGNOSIS — Z13.0 SCREENING FOR DEFICIENCY ANEMIA: ICD-10-CM

## 2024-04-18 DIAGNOSIS — R23.2 HOT FLASHES: ICD-10-CM

## 2024-04-18 DIAGNOSIS — R53.83 OTHER FATIGUE: ICD-10-CM

## 2024-04-18 DIAGNOSIS — D86.0 PULMONARY SARCOIDOSIS (H): ICD-10-CM

## 2024-04-18 DIAGNOSIS — C61 PROSTATE CANCER (H): ICD-10-CM

## 2024-04-18 DIAGNOSIS — I15.8 OTHER SECONDARY HYPERTENSION: ICD-10-CM

## 2024-04-18 DIAGNOSIS — R73.03 PREDIABETES: ICD-10-CM

## 2024-04-18 DIAGNOSIS — Z00.00 ENCOUNTER FOR MEDICARE ANNUAL WELLNESS EXAM: ICD-10-CM

## 2024-04-18 DIAGNOSIS — R07.9 ACUTE CHEST PAIN: ICD-10-CM

## 2024-04-18 DIAGNOSIS — G47.00 INSOMNIA, UNSPECIFIED TYPE: ICD-10-CM

## 2024-04-18 DIAGNOSIS — I73.9 PERIPHERAL ARTERY DISEASE (H): ICD-10-CM

## 2024-04-18 LAB
BASOPHILS # BLD AUTO: 0 10E3/UL (ref 0–0.2)
BASOPHILS NFR BLD AUTO: 1 %
EOSINOPHIL # BLD AUTO: 0.1 10E3/UL (ref 0–0.7)
EOSINOPHIL NFR BLD AUTO: 1 %
ERYTHROCYTE [DISTWIDTH] IN BLOOD BY AUTOMATED COUNT: 13.9 % (ref 10–15)
HBA1C MFR BLD: 5.7 % (ref 0–5.6)
HCT VFR BLD AUTO: 41.3 % (ref 40–53)
HGB BLD-MCNC: 13.6 G/DL (ref 13.3–17.7)
IMM GRANULOCYTES # BLD: 0 10E3/UL
IMM GRANULOCYTES NFR BLD: 0 %
LYMPHOCYTES # BLD AUTO: 0.9 10E3/UL (ref 0.8–5.3)
LYMPHOCYTES NFR BLD AUTO: 15 %
MCH RBC QN AUTO: 29.4 PG (ref 26.5–33)
MCHC RBC AUTO-ENTMCNC: 32.9 G/DL (ref 31.5–36.5)
MCV RBC AUTO: 89 FL (ref 78–100)
MONOCYTES # BLD AUTO: 0.6 10E3/UL (ref 0–1.3)
MONOCYTES NFR BLD AUTO: 9 %
NEUTROPHILS # BLD AUTO: 4.7 10E3/UL (ref 1.6–8.3)
NEUTROPHILS NFR BLD AUTO: 74 %
PLATELET # BLD AUTO: 270 10E3/UL (ref 150–450)
RBC # BLD AUTO: 4.62 10E6/UL (ref 4.4–5.9)
WBC # BLD AUTO: 6.3 10E3/UL (ref 4–11)

## 2024-04-18 PROCEDURE — 86803 HEPATITIS C AB TEST: CPT | Performed by: INTERNAL MEDICINE

## 2024-04-18 PROCEDURE — 99214 OFFICE O/P EST MOD 30 MIN: CPT | Mod: 25 | Performed by: INTERNAL MEDICINE

## 2024-04-18 PROCEDURE — 84153 ASSAY OF PSA TOTAL: CPT | Performed by: INTERNAL MEDICINE

## 2024-04-18 PROCEDURE — 36415 COLL VENOUS BLD VENIPUNCTURE: CPT | Performed by: INTERNAL MEDICINE

## 2024-04-18 PROCEDURE — G0439 PPPS, SUBSEQ VISIT: HCPCS | Performed by: INTERNAL MEDICINE

## 2024-04-18 PROCEDURE — 99207 E-CONSULT TO SLEEP MEDICINE (ADULT OUTPT PROVIDER TO SPECIALIST WRITTEN QUESTION & RESPONSE): CPT | Performed by: INTERNAL MEDICINE

## 2024-04-18 PROCEDURE — 85025 COMPLETE CBC W/AUTO DIFF WBC: CPT | Performed by: INTERNAL MEDICINE

## 2024-04-18 PROCEDURE — 80053 COMPREHEN METABOLIC PANEL: CPT | Performed by: INTERNAL MEDICINE

## 2024-04-18 PROCEDURE — 83036 HEMOGLOBIN GLYCOSYLATED A1C: CPT | Performed by: INTERNAL MEDICINE

## 2024-04-18 PROCEDURE — 82607 VITAMIN B-12: CPT | Performed by: INTERNAL MEDICINE

## 2024-04-18 PROCEDURE — 84443 ASSAY THYROID STIM HORMONE: CPT | Performed by: INTERNAL MEDICINE

## 2024-04-18 PROCEDURE — 80061 LIPID PANEL: CPT | Performed by: INTERNAL MEDICINE

## 2024-04-18 RX ORDER — TAMSULOSIN HYDROCHLORIDE 0.4 MG/1
CAPSULE ORAL
COMMUNITY
Start: 2024-01-21 | End: 2024-07-11

## 2024-04-18 RX ORDER — TADALAFIL 5 MG/1
TABLET ORAL
COMMUNITY
Start: 2024-03-01

## 2024-04-18 RX ORDER — AMLODIPINE BESYLATE 10 MG/1
10 TABLET ORAL DAILY
Qty: 90 TABLET | Refills: 1 | Status: SHIPPED | OUTPATIENT
Start: 2024-04-18

## 2024-04-18 RX ORDER — ALPRAZOLAM 1 MG
1 TABLET ORAL
Qty: 30 TABLET | Refills: 3 | Status: SHIPPED | OUTPATIENT
Start: 2024-04-18 | End: 2024-05-02

## 2024-04-18 RX ORDER — PANTOPRAZOLE SODIUM 40 MG/1
40 TABLET, DELAYED RELEASE ORAL DAILY
Qty: 90 TABLET | Refills: 3 | Status: SHIPPED | OUTPATIENT
Start: 2024-04-18

## 2024-04-18 RX ORDER — GABAPENTIN 300 MG/1
300 CAPSULE ORAL 3 TIMES DAILY
Qty: 90 CAPSULE | Refills: 4 | Status: SHIPPED | OUTPATIENT
Start: 2024-04-18

## 2024-04-18 RX ORDER — LANOLIN ALCOHOL/MO/W.PET/CERES
CREAM (GRAM) TOPICAL
Qty: 90 TABLET | Refills: 11 | Status: SHIPPED | OUTPATIENT
Start: 2024-04-18

## 2024-04-18 RX ORDER — LISINOPRIL 20 MG/1
20 TABLET ORAL DAILY
Qty: 90 TABLET | Refills: 11 | Status: SHIPPED | OUTPATIENT
Start: 2024-04-18

## 2024-04-18 RX ORDER — ESCITALOPRAM OXALATE 10 MG/1
10 TABLET ORAL DAILY
Qty: 90 TABLET | Refills: 3 | Status: SHIPPED | OUTPATIENT
Start: 2024-04-18

## 2024-04-18 NOTE — PROGRESS NOTES
Preventive Care Visit  [facility]  Saurav Faust MD, Internal Medicine - Pediatrics  Apr 18, 2024      Assessment & Plan     Major depressive disorder, recurrent episode, mild (H24)  Will change from citalopram to lexapro to see if helps with mood and energy more. Could consider addition of wellbutrin as well versus change to SNRI as well. Discussed that this can contribute to memory problems as well.   - escitalopram (LEXAPRO) 10 MG tablet; Take 1 tablet (10 mg) by mouth daily    Pulmonary sarcoidosis (H24)  Following with pulmonology, no acute concerns today    Encounter for Medicare annual wellness exam  Discussed routine health guidance    Prostate cancer (H)  S/p radiation therapy, needs PSA checked today, needs to establish with new urologist  - PSA, tumor marker; Future  - PSA, tumor marker    Encounter for screening involving social determinants of health (SDoH)  Patient wondering if he qualifies for PCA type services at home and wondering what other resources available. Will have care coordination reach out  - Primary Care - Care Coordination Referral; Future    Need for hepatitis C screening test  - Hepatitis C Screen Reflex to HCV RNA Quant and Genotype; Future  - Hepatitis C Screen Reflex to HCV RNA Quant and Genotype    Mixed hyperlipidemia  LDL Cholesterol Calculated   Date Value Ref Range Status   05/04/2023 158 (H) <=100 mg/dL Final   Continue rosuvastatin therapy- tolerating without issues  - Lipid panel reflex to direct LDL Non-fasting; Future  - Lipid panel reflex to direct LDL Non-fasting    Anxiety  Discussed ongoing use, using nightly prn for help with anxiety and sleep, may be able to come down with better selective serotonin reuptake inhibitor treatment  - ALPRAZolam (XANAX) 1 MG tablet; Take 1 tablet (1 mg) by mouth nightly as needed for anxiety or sleep    Abnormal reflex  - TSH with free T4 reflex; Future  - Vitamin B12; Future  - TSH with free T4 reflex  -  Vitamin B12    Screening for deficiency anemia  - CBC with platelets and differential; Future  - CBC with platelets and differential    Prediabetes  Lab Results   Component Value Date    A1C 5.7 04/18/2024    A1C 5.8 05/04/2023   Repeat labs today, discussed importance of being active and walking  - Comprehensive metabolic panel; Future  - Hemoglobin A1c; Future  - Comprehensive metabolic panel  - Hemoglobin A1c    Other fatigue  Will consider sleep as cause of fatigue- E consult pending, would like to do home testing if possible  - Adult E-Consult to Sleep Medicine (Outpt Provider to Specialist Written Question & Response)    Peripheral artery disease (H24)  Mild on US findings in 2023 done by [facility], continue statin therapy    Nodule of finger of right hand  Will follow with orthopedics for further evaluation  - Orthopedic  Referral; Future    GERD  Will continue protonix therapy  - pantoprazole (PROTONIX) 40 MG EC tablet; Take 1 tablet (40 mg) by mouth daily    Insomnia, unspecified type  Using melatonin to help for this as well.   - melatonin ([employer] MELATONIN) 3 MG tablet; TAKE 1 TABLET (3 MG) BY MOUTH NIGHTLY AS NEEDED FOR SLEEP Strength: 3 mg    Essential hypertension, benign  Appears well controlled today, continue current therapy, on propranolol for anxiety  - lisinopril (ZESTRIL) 20 MG tablet; Take 1 tablet (20 mg) by mouth daily    Hot flashes  With lupron and prostate cancer, reports controlled  - gabapentin (NEURONTIN) 300 MG capsule; Take 1 capsule (300 mg) by mouth 3 times daily    Other secondary hypertension  - amLODIPine (NORVASC) 10 MG tablet; Take 1 tablet (10 mg) by mouth daily    Acute chest pain  Will evaluate if fatigue is related to CAD- EKG reviewed from recent pulmonology visit.   - [place] Lexiscan stress test; Future              Counseling  Appropriate preventive services were discussed with this patient, including applicable screening as appropriate for fall prevention, nutrition,  physical activity, Tobacco-use cessation, weight loss and cognition.  Checklist reviewing preventive services available has been given to the patient.  Reviewed patient's diet, addressing concerns and/or questions.   The patient was instructed to see the dentist every 6 months.   Discussed possible causes of fatigue. Updated plan of care.  Patient reported difficulty with activities of daily living were addressed today.Addressed any concerns about safety while driving.  The patient was provided with written information regarding signs of hearing loss.   The patient's PHQ-9 score is consistent with mild depression. He was provided with information regarding depression.           Monica Barrios is a 74 year old, presenting for the following:  Annual Visit (Fasting ; 2 different doses of amlodipine (which to take))        4/18/2024     8:43 AM   Additional Questions   Roomed by Geoffrey X         Health Care Directive  Patient does not have a Health Care Directive or Living Will: Discussed advance care planning with patient; information given to patient to review.    HPI        7/27/2022     3:00 PM 5/4/2023     9:35 AM 4/17/2024    10:52 AM   PHQ   PHQ-9 Total Score 1 6 5   Q9: Thoughts of better off dead/self-harm past 2 weeks Not at all Not at all Not at all         9/30/2019     9:00 AM 2/17/2021    11:00 AM 12/8/2021     3:02 PM   CINDI-7 SCORE   Total Score   4 (minimal anxiety)   Total Score 3 8 4     Patient presents for recheck today.  He notes that overall he has been feeling slightly more fatigued and sluggish feels like memory is less clear.  He has not had issues with getting lost with driving or issues with driving overall.  He has not missed appointments.  Feels that mood has been somewhat okay but slightly more down feels less interest in doing things.    He notes that he has been to see a pulmonologist for sarcoidosis breathing feels okay most of the time.  He has been getting treatment for prostate cancer  and is status postradiation treatment he needs a PSA done today.  Urination has been okay.    He denies palpitations no leg swelling or edema.  He did have an EKG that was done by pulmonology.  He has not had recent heart stress testing done.    No lightheadedness or dizziness.  He has not been very active and has not been walking much he notes that he lives in a safe neighborhood and would feel okay with starting walking.    He had mild peripheral artery disease that was noted on imaging from [facility].  He has not noted any claudication exam.  He was started on rosuvastatin.    Feels that he would like he has CPAP at home to help him with tasks during the day.      4/17/2024   General Health   How would you rate your overall physical health? (!) FAIR   Feel stress (tense, anxious, or unable to sleep) To some extent   (!) STRESS CONCERN      4/17/2024   Nutrition   Diet: Regular (no restrictions)    Low salt    Breakfast skipped         4/17/2024   Exercise   Days per week of moderate/strenous exercise 0 days   Average minutes spent exercising at this level 30 min   (!) EXERCISE CONCERN      4/17/2024   Social Factors   Frequency of gathering with friends or relatives Once a week   Worry food won't last until get money to buy more Yes   Food not last or not have enough money for food? No   Do you have housing?  Yes   Are you worried about losing your housing? Yes   Lack of transportation? No   Unable to get utilities (heat,electricity)? No   Want help with housing or utility concern? (!) YES   (!) FOOD SECURITY CONCERN PRESENT(!) HOUSING CONCERN PRESENT      4/18/2024   Fall Risk   Gait Speed Test (Document in seconds) 4.1   Gait Speed Test Interpretation Less than or equal to 5.00 seconds - PASS          4/17/2024   Activities of Daily Living- Home Safety   Needs help with the following daily activites Transportation    Housework    Laundry   Safety concerns in the home None of the above         4/17/2024    Dental   Dentist two times every year? (!) NO         4/17/2024   Hearing Screening   Hearing concerns? (!) TROUBLE UNDESTANDING A SPEAKER IN A PUBLIC MEETING OR Congregation SERVICE.         4/17/2024   Driving Risk Screening   Patient/family members have concerns about driving (!) YES patient declines concerns right now         4/17/2024   General Alertness/Fatigue Screening   Have you been more tired than usual lately? (!) YES         4/17/2024   Urinary Incontinence Screening   Bothered by leaking urine in past 6 months No         4/17/2024   TB Screening   Were you born outside of the [place]? No       Today's PHQ-9 Score:       4/17/2024    10:52 AM   PHQ-9 SCORE   PHQ-9 Total Score MyChart 5 (Mild depression)   PHQ-9 Total Score 5         4/17/2024   Substance Use   Alcohol more than 3/day or more than 7/wk No   Do you have a current opioid prescription? No   How severe/bad is pain from 1 to 10? 7/10   Do you use any other substances recreationally? (!) PRESCRIPTION DRUGS     Social History     Tobacco Use    Smoking status: Never    Smokeless tobacco: Never    Tobacco comments:     N.A.   Vaping Use    Vaping status: Never Used   Substance Use Topics    Alcohol use: Yes     Comment: Socially    Drug use: No           4/17/2024   AAA Screening   Family history of Abdominal Aortic Aneurysm (AAA)? No   ASCVD Risk   The 10-year ASCVD risk score (Andrew BORGES, et al., 2019) is: 19.3%    Values used to calculate the score:      Age: 74 years      Sex: Male      Is Non- : Yes      Diabetic: No      Tobacco smoker: No      Systolic Blood Pressure: 120 mmHg      Is BP treated: Yes      HDL Cholesterol: 68 mg/dL      Total Cholesterol: 250 mg/dL            Reviewed and updated as needed this visit by Provider                      Current providers sharing in care for this patient include:  Patient Care Team:  Saurav Faust MD as PCP - General (Internal Medicine - Pediatrics)  Oliverio  "Vipin Haile MD as Assigned Pulmonology Provider  Saurav Faust MD as Assigned PCP  Vipin Duron MD as MD (Critical Care)    The following health maintenance items are reviewed in Epic and correct as of today:  Health Maintenance   Topic Date Due    DEPRESSION ACTION PLAN  Never done    HEPATITIS C SCREENING  Never done    ZOSTER IMMUNIZATION (1 of 2) Never done    MEDICARE ANNUAL WELLNESS VISIT  07/27/2023    INFLUENZA VACCINE (1) 09/01/2023    ANNUAL REVIEW OF HM ORDERS  12/09/2023    LIPID  05/04/2024    DTAP/TDAP/TD IMMUNIZATION (1 - Tdap) 04/21/2035 (Originally 2/18/1975)    PHQ-9  10/18/2024    FALL RISK ASSESSMENT  04/18/2025    COLORECTAL CANCER SCREENING  02/24/2026    GLUCOSE  05/04/2026    ADVANCE CARE PLANNING  07/27/2027    Pneumococcal Vaccine: 65+ Years  Completed    RSV VACCINE (Pregnancy & 60+)  Completed    COVID-19 Vaccine  Completed    IPV IMMUNIZATION  Aged Out    HPV IMMUNIZATION  Aged Out    MENINGITIS IMMUNIZATION  Aged Out    RSV MONOCLONAL ANTIBODY  Aged Out         Review of Systems  Constitutional, HEENT, cardiovascular, pulmonary, gi and gu systems are negative, except as otherwise noted.     Objective    Exam  /56 (BP Location: Right arm, Patient Position: Sitting, Cuff Size: Adult Regular)   Pulse 62   Temp 98.2  F (36.8  C) (Temporal)   Resp 16   Ht 1.6 m (5' 3\")   Wt 59 kg (130 lb)   SpO2 100%   BMI 23.03 kg/m     Estimated body mass index is 23.03 kg/m  as calculated from the following:    Height as of this encounter: 1.6 m (5' 3\").    Weight as of this encounter: 59 kg (130 lb).    Physical Exam  GENERAL: alert and no distress  EYES: Eyes grossly normal to inspection, PERRL and conjunctivae and sclerae normal  HENT: ear canals and TM's normal, nose and mouth without ulcers or lesions  NECK: no adenopathy, no asymmetry, masses, or scars  RESP: lungs clear to auscultation - no rales, rhonchi or wheezes  CV: regular rate and rhythm, normal S1 S2, no S3 " or S4, no murmur, click or rub, no peripheral edema  ABDOMEN: soft, nontender, no hepatosplenomegaly, no masses and bowel sounds normal  MS: no gross musculoskeletal defects noted, no edema  SKIN: no suspicious lesions or rashes  NEURO: Normal strength and tone, mentation intact and speech normal  PSYCH: mentation appears normal, affect normal/bright         4/18/2024   Mini Cog   Clock Draw Score 2 Normal   3 Item Recall 3 objects recalled   Mini Cog Total Score 5              Signed Electronically by: Saurav Faust MD

## 2024-04-18 NOTE — PATIENT INSTRUCTIONS
We will start lexapro to help with mood at 10 mg per day. Take this in the morning. This should replace the citalopram that you are on.    We will check labs for contributing causes of the memory not being as clear.    We will look at your sleep as well- I will let you know what the sleep doctors say.     They should call set up the heart testing as we discussed    Refilled medications as we discussed.     Work on increasing physical activity as we discussed.    Let me know if issues are coming up.     Saurav Faust MD          Preventive Care Advice   This is general advice given by our system to help you stay healthy. However, your care team may have specific advice just for you. Please talk to your care team about your preventive care needs.  Nutrition  Eat 5 or more servings of fruits and vegetables each day.  Try wheat bread, brown rice and whole grain pasta (instead of white bread, rice, and pasta).  Get enough calcium and vitamin D. Check the label on foods and aim for 100% of the RDA (recommended daily allowance).  Lifestyle  Exercise at least 150 minutes each week   (30 minutes a day, 5 days a week).  Do muscle strengthening activities 2 days a week. These help control your weight and prevent disease.  No smoking.  Wear sunscreen to prevent skin cancer.  Have a dental exam and cleaning every 6 months.  Yearly exams  See your health care team every year to talk about:  Any changes in your health.  Any medicines your care team has prescribed.  Preventive care, family planning, and ways to prevent chronic diseases.  Shots (vaccines)   HPV shots (up to age 26), if you've never had them before.  Hepatitis B shots (up to age 59), if you've never had them before.  COVID-19 shot: Get this shot when it's due.  Flu shot: Get a flu shot every year.  Tetanus shot: Get a tetanus shot every 10 years.  Pneumococcal, hepatitis A, and RSV shots: Ask your care team if you need these based on your risk.  Shingles shot (for age  50 and up).  General health tests  Diabetes screening:  Starting at age 35, Get screened for diabetes at least every 3 years.  If you are younger than age 35, ask your care team if you should be screened for diabetes.  Cholesterol test: At age 39, start having a cholesterol test every 5 years, or more often if advised.  Bone density scan (DEXA): At age 50, ask your care team if you should have this scan for osteoporosis (brittle bones).  Hepatitis C: Get tested at least once in your life.  STIs (sexually transmitted infections)  Before age 24: Ask your care team if you should be screened for STIs.  After age 24: Get screened for STIs if you're at risk. You are at risk for STIs (including HIV) if:  You are sexually active with more than one person.  You don't use condoms every time.  You or a partner was diagnosed with a sexually transmitted infection.  If you are at risk for HIV, ask about PrEP medicine to prevent HIV.  Get tested for HIV at least once in your life, whether you are at risk for HIV or not.  Cancer screening tests  Cervical cancer screening: If you have a cervix, begin getting regular cervical cancer screening tests at age 21. Most people who have regular screenings with normal results can stop after age 65. Talk about this with your provider.  Breast cancer scan (mammogram): If you've ever had breasts, begin having regular mammograms starting at age 40. This is a scan to check for breast cancer.  Colon cancer screening: It is important to start screening for colon cancer at age 45.  Have a colonoscopy test every 10 years (or more often if you're at risk) Or, ask your provider about stool tests like a FIT test every year or Cologuard test every 3 years.  To learn more about your testing options, visit: [email or web address].  For help making a decision, visit: https://bit.ly/hm02027.  Prostate cancer screening test: If you have a prostate and are age 55 to 69, ask your provider if you  would benefit from a yearly prostate cancer screening test.  Lung cancer screening: If you are a current or former smoker age 50 to 80, ask your care team if ongoing lung cancer screenings are right for you.  For informational purposes only. Not to replace the advice of your health care provider. Copyright   2023 [employer]. All rights reserved. Clinically reviewed by the [facility] Transitions Program. [employer] 775667 - REV 01/24.    Learning About Activities of Daily Living  What are activities of daily living?     Activities of daily living (ADLs) are the basic self-care tasks you do every day. These include eating, bathing, dressing, and moving around.  As you age, and if you have health problems, you may find that it's harder to do some of these tasks. If so, your doctor can suggest ideas that may help.  To measure what kind of help you may need, your doctor will ask how well you are able to do ADLs. Let your doctor know if there are any tasks that you are having trouble doing. This is an important first step to getting help. And when you have the help you need, you can stay as independent as possible.  How will a doctor assess your ADLs?  Asking about ADLs is part of a routine health checkup your doctor will likely do as you age. Your health check might be done in a doctor's office, in your home, or at a hospital. The goal is to find out if you are having any problems that could make it hard to care for yourself or that make it unsafe for you to be on your own.  To measure your ADLs, your doctor will ask how hard it is for you to do routine tasks. Your doctor may also want to know if you have changed the way you do a task because of a health problem. Your doctor may watch how you:  Walk back and forth.  Keep your balance while you stand or walk.  Move from sitting to standing or from a bed to a chair.  Button or unbutton a shirt or sweater.  Remove and put on your shoes.  It's common to  feel a little worried or anxious if you find you can't do all the things you used to be able to do. Talking with your doctor about ADLs is a way to make sure you're as safe as possible and able to care for yourself as well as you can. You may want to bring a caregiver, friend, or family member to your checkup. They can help you talk to your doctor.  Follow-up care is a key part of your treatment and safety. Be sure to make and go to all appointments, and call your doctor if you are having problems. It's also a good idea to know your test results and keep a list of the medicines you take.  Current as of: October 24, 2023               Content Version: 14.0    0205-9194 [employer].   Care instructions adapted under license by your healthcare professional. If you have questions about a medical condition or this instruction, always ask your healthcare professional. [employer] disclaims any warranty or liability for your use of this information.      Preventing Falls: Care Instructions  Injuries and health problems such as trouble walking or poor eyesight can increase your risk of falling. So can some medicines. But there are things you can do to help prevent falls. You can exercise to get stronger. You can also arrange your home to make it safer.    Talk to your doctor about the medicines you take. Ask if any of them increase the risk of falls and whether they can be changed or stopped.   Try to exercise regularly. It can help improve your strength and balance. This can help lower your risk of falling.     Practice fall safety and prevention.    Wear low-heeled shoes that fit well and give your feet good support. Talk to your doctor if you have foot problems that make this hard.  Carry a cellphone or wear a medical alert device that you can use to call for help.  Use stepladders instead of chairs to reach high objects. Don't climb if you're at risk for falls. Ask for help, if needed.  Wear  "the correct eyeglasses, if you need them.    Make your home safer.    Remove rugs, cords, clutter, and furniture from walkways.  Keep your house well lit. Use night-lights in hallways and bathrooms.  Install and use sturdy handrails on stairways.  Wear nonskid footwear, even inside. Don't walk barefoot or in socks without shoes.    Be safe outside.    Use handrails, curb cuts, and ramps whenever possible.  Keep your hands free by using a shoulder bag or backpack.  Try to walk in well-lit areas. Watch out for uneven ground, changes in pavement, and debris.  Be careful in the winter. Walk on the grass or gravel when sidewalks are slippery. Use de-icer on steps and walkways. Add non-slip devices to shoes.    Put grab bars and nonskid mats in your shower or tub and near the toilet. Try to use a shower chair or bath bench when bathing.   Get into a tub or shower by putting in your weaker leg first. Get out with your strong side first. Have a phone or medical alert device in the bathroom with you.   Where can you learn more?  Go to https://www.[employer].net/patiented  Enter G117 in the search box to learn more about \"Preventing Falls: Care Instructions.\"  Current as of: July 17, 2023               Content Version: 14.0    8856-1583 [employer].   Care instructions adapted under license by your healthcare professional. If you have questions about a medical condition or this instruction, always ask your healthcare professional. [employer] disclaims any warranty or liability for your use of this information.      Hearing Loss: Care Instructions  Overview     Hearing loss is a sudden or slow decrease in how well you hear. It can range from slight to profound. Permanent hearing loss can occur with aging. It also can happen when you are exposed long-term to loud noise. Examples include listening to loud music, riding motorcycles, or being around other loud machines.  Hearing loss can affect your work " and home life. It can make you feel lonely or depressed. You may feel that you have lost your independence. But hearing aids and other devices can help you hear better and feel connected to others.  Follow-up care is a key part of your treatment and safety. Be sure to make and go to all appointments, and call your doctor if you are having problems. It's also a good idea to know your test results and keep a list of the medicines you take.  How can you care for yourself at home?  Avoid loud noises whenever possible. This helps keep your hearing from getting worse.  Always wear hearing protection around loud noises.  Wear a hearing aid as directed.  A professional can help you pick a hearing aid that will work best for you.  You can also get hearing aids over the counter for mild to moderate hearing loss.  Have hearing tests as your doctor suggests. They can show whether your hearing has changed. Your hearing aid may need to be adjusted.  Use other devices as needed. These may include:  Telephone amplifiers and hearing aids that can connect to a television, stereo, radio, or microphone.  Devices that use lights or vibrations. These alert you to the doorbell, a ringing telephone, or a baby monitor.  Television closed-captioning. This shows the words at the bottom of the screen. Most new TVs can do this.  TTY (text telephone). This lets you type messages back and forth on the telephone instead of talking or listening. These devices are also called TDD. When messages are typed on the keyboard, they are sent over the phone line to a receiving TTY. The message is shown on a monitor.  Use text messaging, social media, and email if it is hard for you to communicate by telephone.  Try to learn a listening technique called speechreading. It is not lipreading. You pay attention to people's gestures, expressions, posture, and tone of voice. These clues can help you understand what a person is saying. Face the person you are  "talking to, and have them face you. Make sure the lighting is good. You need to see the other person's face clearly.  Think about counseling if you need help to adjust to your hearing loss.  When should you call for help?  Watch closely for changes in your health, and be sure to contact your doctor if:    You think your hearing is getting worse.     You have new symptoms, such as dizziness or nausea.   Where can you learn more?  Go to https://www.[employer].net/patiented  Enter R798 in the search box to learn more about \"Hearing Loss: Care Instructions.\"  Current as of: September 27, 2023               Content Version: 14.0    6137-1885 [employer].   Care instructions adapted under license by your healthcare professional. If you have questions about a medical condition or this instruction, always ask your healthcare professional. [employer] disclaims any warranty or liability for your use of this information.      Learning About Stress  What is stress?     Stress is your body's response to a hard situation. Your body can have a physical, emotional, or mental response. Stress is a fact of life for most people, and it affects everyone differently. What causes stress for you may not be stressful for someone else.  A lot of things can cause stress. You may feel stress when you go on a job interview, take a test, or run a race. This kind of short-term stress is normal and even useful. It can help you if you need to work hard or react quickly. For example, stress can help you finish an important job on time.  Long-term stress is caused by ongoing stressful situations or events. Examples of long-term stress include long-term health problems, ongoing problems at work, or conflicts in your family. Long-term stress can harm your health.  How does stress affect your health?  When you are stressed, your body responds as though you are in danger. It makes hormones that speed up your heart, make you " breathe faster, and give you a burst of energy. This is called the fight-or-flight stress response. If the stress is over quickly, your body goes back to normal and no harm is done.  But if stress happens too often or lasts too long, it can have bad effects. Long-term stress can make you more likely to get sick, and it can make symptoms of some diseases worse. If you tense up when you are stressed, you may develop neck, shoulder, or low back pain. Stress is linked to high blood pressure and heart disease.  Stress also harms your emotional health. It can make you bocanegra, tense, or depressed. Your relationships may suffer, and you may not do well at work or school.  What can you do to manage stress?  You can try these things to help manage stress:   Do something active. Exercise or activity can help reduce stress. Walking is a great way to get started. Even everyday activities such as housecleaning or yard work can help.  Try yoga or chantelle chi. These techniques combine exercise and meditation. You may need some training at first to learn them.  Do something you enjoy. For example, listen to music or go to a movie. Practice your hobby or do volunteer work.  Meditate. This can help you relax, because you are not worrying about what happened before or what may happen in the future.  Do guided imagery. Imagine yourself in any setting that helps you feel calm. You can use online videos, books, or a teacher to guide you.  Do breathing exercises. For example:  From a standing position, bend forward from the waist with your knees slightly bent. Let your arms dangle close to the floor.  Breathe in slowly and deeply as you return to a standing position. Roll up slowly and lift your head last.  Hold your breath for just a few seconds in the standing position.  Breathe out slowly and bend forward from the waist.  Let your feelings out. Talk, laugh, cry, and express anger when you need to. Talking with supportive friends or family, a  "counselor, or a ko leader about your feelings is a healthy way to relieve stress. Avoid discussing your feelings with people who make you feel worse.  Write. It may help to write about things that are bothering you. This helps you find out how much stress you feel and what is causing it. When you know this, you can find better ways to cope.  What can you do to prevent stress?  You might try some of these things to help prevent stress:  Manage your time. This helps you find time to do the things you want and need to do.  Get enough sleep. Your body recovers from the stresses of the day while you are sleeping.  Get support. Your family, friends, and community can make a difference in how you experience stress.  Limit your news feed. Avoid or limit time on social media or news that may make you feel stressed.  Do something active. Exercise or activity can help reduce stress. Walking is a great way to get started.  Where can you learn more?  Go to https://www.[employer].Astley Clarke/patiented  Enter N032 in the search box to learn more about \"Learning About Stress.\"  Current as of: October 24, 2023               Content Version: 14.0    6730-5185 [employer].   Care instructions adapted under license by your healthcare professional. If you have questions about a medical condition or this instruction, always ask your healthcare professional. [employer] disclaims any warranty or liability for your use of this information.      Learning About Sleeping Well  What does sleeping well mean?     Sleeping well means getting enough sleep to feel good and stay healthy. How much sleep is enough varies among people.  The number of hours you sleep and how you feel when you wake up are both important. If you do not feel refreshed, you probably need more sleep. Another sign of not getting enough sleep is feeling tired during the day.  Experts recommend that adults get at least 7 or more hours of sleep per day. " "Children and older adults need more sleep.  Why is getting enough sleep important?  Getting enough quality sleep is a basic part of good health. When your sleep suffers, your physical health, mood, and your thoughts can suffer too. You may find yourself feeling more grumpy or stressed. Not getting enough sleep also can lead to serious problems, including injury, accidents, anxiety, and depression.  What might cause poor sleeping?  Many things can cause sleep problems, including:  Changes to your sleep schedule.  Stress. Stress can be caused by fear about a single event, such as giving a speech. Or you may have ongoing stress, such as worry about work or school.  Depression, anxiety, and other mental or emotional conditions.  Changes in your sleep habits or surroundings. This includes changes that happen where you sleep, such as noise, light, or sleeping in a different bed. It also includes changes in your sleep pattern, such as having jet lag or working a late shift.  Health problems, such as pain, breathing problems, and restless legs syndrome.  Lack of regular exercise.  Using alcohol, nicotine, or caffeine before bed.  How can you help yourself?  Here are some tips that may help you sleep more soundly and wake up feeling more refreshed.  Your sleeping area   Use your bedroom only for sleeping and sex. A bit of light reading may help you fall asleep. But if it doesn't, do your reading elsewhere in the house. Try not to use your TV, computer, smartphone, or tablet while you are in bed.  Be sure your bed is big enough to stretch out comfortably, especially if you have a sleep partner.  Keep your bedroom quiet, dark, and cool. Use curtains, blinds, or a sleep mask to block out light. To block out noise, use earplugs, soothing music, or a \"white noise\" machine.  Your evening and bedtime routine   Create a relaxing bedtime routine. You might want to take a warm shower or bath, or listen to soothing music.  Go to bed at " "the same time every night. And get up at the same time every morning, even if you feel tired.  What to avoid   Limit caffeine (coffee, tea, caffeinated sodas) during the day, and don't have any for at least 6 hours before bedtime.  Avoid drinking alcohol before bedtime. Alcohol can cause you to wake up more often during the night.  Try not to smoke or use tobacco, especially in the evening. Nicotine can keep you awake.  Limit naps during the day, especially close to bedtime.  Avoid lying in bed awake for too long. If you can't fall asleep or if you wake up in the middle of the night and can't get back to sleep within about 20 minutes, get out of bed and go to another room until you feel sleepy.  Avoid taking medicine right before bed that may keep you awake or make you feel hyper or energized. Your doctor can tell you if your medicine may do this and if you can take it earlier in the day.  If you can't sleep   Imagine yourself in a peaceful, pleasant scene. Focus on the details and feelings of being in a place that is relaxing.  Get up and do a quiet or boring activity until you feel sleepy.  Avoid drinking any liquids before going to bed to help prevent waking up often to use the bathroom.  Where can you learn more?  Go to https://www.[employer].net/patiented  Enter J942 in the search box to learn more about \"Learning About Sleeping Well.\"  Current as of: July 10, 2023               Content Version: 14.0    6736-8033 [employer].   Care instructions adapted under license by your healthcare professional. If you have questions about a medical condition or this instruction, always ask your healthcare professional. [employer] disclaims any warranty or liability for your use of this information.      Learning About Depression Screening  What is depression screening?  Depression screening is a way to see if you have depression symptoms. It may be done by a doctor or counselor. It's often part " "of a routine checkup. That's because your mental health is just as important as your physical health.  Depression is a mental health condition that affects how you feel, think, and act. You may:  Have less energy.  Lose interest in your daily activities.  Feel sad and grouchy for a long time.  Depression is very common. It affects people of all ages.  Many things can lead to depression. Some people become depressed after they have a stroke or find out they have a major illness like cancer or heart disease. The death of a loved one or a breakup may lead to depression. It can run in families. Most experts believe that a combination of inherited genes and stressful life events can cause it.  What happens during screening?  You may be asked to fill out a form about your depression symptoms. You and the doctor will discuss your answers. The doctor may ask you more questions to learn more about how you think, act, and feel.  What happens after screening?  If you have symptoms of depression, your doctor will talk to you about your options.  Doctors usually treat depression with medicines or counseling. Often, combining the two works best. Many people don't get help because they think that they'll get over the depression on their own. But people with depression may not get better unless they get treatment.  The cause of depression is not well understood. There may be many factors involved. But if you have depression, it's not your fault.  A serious symptom of depression is thinking about death or suicide. If you or someone you care about talks about this or about feeling hopeless, get help right away.  It's important to know that depression can be treated. Medicine, counseling, and self-care may help.  Where can you learn more?  Go to https://www.healthwise.net/patiented  Enter T185 in the search box to learn more about \"Learning About Depression Screening.\"  Current as of: June 24, 2023               Content Version: " 14.0    6599-8453 [employer].   Care instructions adapted under license by your healthcare professional. If you have questions about a medical condition or this instruction, always ask your healthcare professional. [employer] disclaims any warranty or liability for your use of this information.      Substance Use Disorder: Care Instructions  Overview     You can improve your life and health by stopping your use of alcohol or drugs. When you don't drink or use drugs, you may feel and sleep better. You may get along better with your family, friends, and coworkers. There are medicines and programs that can help with substance use disorder.  How can you care for yourself at home?  Here are some ways to help you stay sober and prevent relapse.  If you have been given medicine to help keep you sober or reduce your cravings, be sure to take it exactly as prescribed.  Talk to your doctor about programs that can help you stop using drugs or drinking alcohol.  Do not keep alcohol or drugs in your home.  Plan ahead. Think about what you'll say if other people ask you to drink or use drugs. Try not to spend time with people who drink or use drugs.  Use the time and money spent on drinking or drugs to do something that's important to you.  Preventing a relapse  Have a plan to deal with relapse. Learn to recognize changes in your thinking that lead you to drink or use drugs. Get help before you start to drink or use drugs again.  Try to stay away from situations, friends, or places that may lead you to drink or use drugs.  If you feel the need to drink alcohol or use drugs again, seek help right away. Call a trusted friend or family member. Some people get support from organizations such as Narcotics Anonymous or [employer] or from treatment facilities.  If you relapse, get help as soon as you can. Some people make a plan with another person that outlines what they want that person to do for them  if they relapse. The plan usually includes how to handle the relapse and who to notify in case of relapse.  Don't give up. Remember that a relapse doesn't mean that you have failed. Use the experience to learn the triggers that lead you to drink or use drugs. Then quit again. Recovery is a lifelong process. Many people have several relapses before they are able to quit for good.  Follow-up care is a key part of your treatment and safety. Be sure to make and go to all appointments, and call your doctor if you are having problems. It's also a good idea to know your test results and keep a list of the medicines you take.  When should you call for help?   Call 911  anytime you think you may need emergency care. For example, call if you or someone else:    Has overdosed or has withdrawal signs. Be sure to tell the emergency workers that you are or someone else is using or trying to quit using drugs. Overdose or withdrawal signs may include:  Losing consciousness.  Seizure.  Seeing or hearing things that aren't there (hallucinations).     Is thinking or talking about suicide or harming others.   Where to get help 24 hours a day, 7 days a week   If you or someone you know talks about suicide, self-harm, a mental health crisis, a substance use crisis, or any other kind of emotional distress, get help right away. You can:    Call the Suicide and Crisis Lifeline at 983.     Call 1-913-798-TALK (1-780.300.3690).     Text HOME to 982857 to access the Crisis Text Line.   Consider saving these numbers in your phone.  Go to WebLayersline.org for more information or to chat online.  Call your doctor now or seek immediate medical care if:    You are having withdrawal symptoms. These may include nausea or vomiting, sweating, shakiness, and anxiety.   Watch closely for changes in your health, and be sure to contact your doctor if:    You have a relapse.     You need more help or support to stop.   Where can you learn more?  Go to  "https://www.healthINNOBI.net/patiented  Enter H573 in the search box to learn more about \"Substance Use Disorder: Care Instructions.\"  Current as of: November 15, 2023               Content Version: 14.0    9167-5827 [employer].   Care instructions adapted under license by your healthcare professional. If you have questions about a medical condition or this instruction, always ask your healthcare professional. [employer] disclaims any warranty or liability for your use of this information.      "

## 2024-04-19 LAB
ALBUMIN SERPL BCG-MCNC: 4.4 G/DL (ref 3.5–5.2)
ALP SERPL-CCNC: 74 U/L (ref 40–150)
ALT SERPL W P-5'-P-CCNC: 10 U/L (ref 0–70)
ANION GAP SERPL CALCULATED.3IONS-SCNC: 10 MMOL/L (ref 7–15)
AST SERPL W P-5'-P-CCNC: 18 U/L (ref 0–45)
BILIRUB SERPL-MCNC: 0.3 MG/DL
BUN SERPL-MCNC: 21.1 MG/DL (ref 8–23)
CALCIUM SERPL-MCNC: 10.1 MG/DL (ref 8.8–10.2)
CHLORIDE SERPL-SCNC: 105 MMOL/L (ref 98–107)
CHOLEST SERPL-MCNC: 220 MG/DL
CREAT SERPL-MCNC: 1.14 MG/DL (ref 0.67–1.17)
DEPRECATED HCO3 PLAS-SCNC: 27 MMOL/L (ref 22–29)
EGFRCR SERPLBLD CKD-EPI 2021: 67 ML/MIN/1.73M2
FASTING STATUS PATIENT QL REPORTED: YES
GLUCOSE SERPL-MCNC: 89 MG/DL (ref 70–99)
HDLC SERPL-MCNC: 82 MG/DL
LDLC SERPL CALC-MCNC: 128 MG/DL
NONHDLC SERPL-MCNC: 138 MG/DL
POTASSIUM SERPL-SCNC: 4.6 MMOL/L (ref 3.4–5.3)
PROT SERPL-MCNC: 7.6 G/DL (ref 6.4–8.3)
SODIUM SERPL-SCNC: 142 MMOL/L (ref 135–145)
TRIGL SERPL-MCNC: 51 MG/DL
TSH SERPL DL<=0.005 MIU/L-ACNC: 1.45 UIU/ML (ref 0.3–4.2)
VIT B12 SERPL-MCNC: 326 PG/ML (ref 232–1245)

## 2024-04-20 LAB
HCV AB SERPL QL IA: NONREACTIVE
PSA SERPL DL<=0.01 NG/ML-MCNC: 0.11 NG/ML (ref 0–6.5)

## 2024-04-21 ENCOUNTER — E-CONSULT (OUTPATIENT)
Dept: SLEEP MEDICINE | Facility: CLINIC | Age: 74
End: 2024-04-21
Payer: COMMERCIAL

## 2024-04-21 NOTE — PROGRESS NOTES
4/21/2024     E-Consult has been denied due to: Doesn't meet criteria for E-Consult - Missing key data provided in clinical prompts.    Interprofessional consultation requested by:  Saurav Faust MD      Clinical Question/Purpose: MY CLINICAL QUESTION IS: Daytime fatigue. Evaluate with home sleep study for evaluation. Occasional snoring, having morning fatigue. No parasomnias. No known apnea.     Patient assessment and information reviewed: 74-year-old male with fatigue, insomnia and anxiety and occasional snoring without accompanying obesity.  There is insufficient pretest probability to achieve high probability to warrant home sleep testing for sleep apnea.  Home sleep testing is not optimal technique in patients with significant insomnia.    Recommendations: If sleep health evaluation for insomnia and possible sleep apnea is needed, recommend consultation for this patient.      The recommendations provided in this E-Consult are based on a review of clinical data pertinent to the clinical question presented, without a review of the patient's complete medical record or, the benefit of a comprehensive in-person or virtual patient evaluation. This consultation should not replace the clinical judgement and evaluation of the provider ordering this E-Consult. Any new clinical issues, or changes in patient status since the filing of this E-Consult will need to be taken into account when assessing these recommendations. Please contact me if you have further questions.    My total time spent reviewing clinical information and formulating assessment was 5 minutes.        OPAL CEDENO MD

## 2024-04-22 ENCOUNTER — PATIENT OUTREACH (OUTPATIENT)
Dept: CARE COORDINATION | Facility: CLINIC | Age: 74
End: 2024-04-22
Payer: COMMERCIAL

## 2024-04-22 NOTE — PROGRESS NOTES
Clinic Care Coordination Contact    The CHW was able to speak to the patient, but he was currently at work. The CHW was asked to call back on 4/23/24 around 2:00 pm.     MICHAEL Joseph  [phone]  [facility]

## 2024-04-23 NOTE — TELEPHONE ENCOUNTER
DIAGNOSIS: DIP joint pain on the right hand   APPOINTMENT DATE: 4/29/2024   NOTES STATUS DETAILS   OFFICE NOTE from referring provider Internal  Christianne @ [facility]    MEDICATION LIST Internal

## 2024-04-23 NOTE — PROGRESS NOTES
Clinic Care Coordination Contact  Community Health Worker Initial Outreach    CHW Initial Information Gathering:  Referral Source: PCP  Preferred Hospital: [facility]  [phone]  Preferred Urgent Care: [facility] [phone]  Type of residence:: Private home - no stairs  Community Resources: None  Supplies Currently Used at Home: None  Equipment Currently Used at Home: none  No PCP office visit in Past Year: No  Transportation means:: Regular car       Patient accepts CC: Yes. Patient scheduled for assessment with the SW on 4/30/24 at 3:00 pm. Patient noted desire to discuss needing assistance with housing fixes, pluming issues. The patient is also wanting to know about ways that he would be able to fix his credit score.     EKTA JosephW  [phone]  [facility]  [facility]

## 2024-04-25 DIAGNOSIS — M79.641 RIGHT HAND PAIN: Primary | ICD-10-CM

## 2024-04-29 ENCOUNTER — PRE VISIT (OUTPATIENT)
Dept: ORTHOPEDICS | Facility: CLINIC | Age: 74
End: 2024-04-29

## 2024-04-29 DIAGNOSIS — R06.6 HICCUP: ICD-10-CM

## 2024-04-29 RX ORDER — BACLOFEN 10 MG/1
5-10 TABLET ORAL 2 TIMES DAILY PRN
Qty: 60 TABLET | Refills: 1 | Status: SHIPPED | OUTPATIENT
Start: 2024-04-29 | End: 2024-05-22

## 2024-04-30 ENCOUNTER — PATIENT OUTREACH (OUTPATIENT)
Dept: CARE COORDINATION | Facility: CLINIC | Age: 74
End: 2024-04-30

## 2024-04-30 NOTE — PROGRESS NOTES
Clinic Care Coordination Contact  [facility]/Voicemail    Clinical Data: Care Coordinator Outreach    Outreach Documentation Number of Outreach Attempt   4/30/2024   3:07 PM 2   5/1/2024  11:58 AM 3       Left message on patient's voicemail with call back information and requested return call.    Plan: Care Coordinator sent care coordination introduction letter on 4/30/24 via [employer]. Care Coordinator will try to reach patient again in 1-2 business days.    5/1/24 - SWCC spoke with pt - pt noted they are at work; requested SWCC call them back 5/2/24 at 8:30am

## 2024-04-30 NOTE — LETTER
[facility] CARE COORDINATION  [address] [place] [address]   April 30, 2024    Denis Moreno  [address] [place] [address]      Dear Denis,    I am a clinic care coordinator who works with Saurav Faust MD with the [facility]. I recently tried to call and was unable to reach you. Below is a description of clinic care coordination and how I can further assist you.       The clinic care coordination team is made up of a registered nurse, , financial resource worker and community health worker who understand the health care system. The goal of clinic care coordination is to help you manage your health and improve access to the health care system. Our team works alongside your provider to assist you in determining your health and social needs. We can help you obtain health care and community resources, providing you with necessary information and education. We can work with you through any barriers and develop a care plan that helps coordinate and strengthen the communication between you and your care team.  Our services are voluntary and are offered without charge to you personally.    Please feel free to contact me with any questions or concerns regarding care coordination and what we can offer.      We are focused on providing you with the highest-quality healthcare experience possible.    Sincerely,     Ciara Rose, [facility] Clinical Care Coordinator  [facility], [place], [place], [place], [facility], and [facility]   [phone]

## 2024-05-02 ENCOUNTER — NURSE TRIAGE (OUTPATIENT)
Dept: NURSING | Facility: CLINIC | Age: 74
End: 2024-05-02
Payer: COMMERCIAL

## 2024-05-02 ENCOUNTER — PATIENT OUTREACH (OUTPATIENT)
Dept: CARE COORDINATION | Facility: CLINIC | Age: 74
End: 2024-05-02
Payer: COMMERCIAL

## 2024-05-02 DIAGNOSIS — F41.9 ANXIETY: ICD-10-CM

## 2024-05-02 NOTE — TELEPHONE ENCOUNTER
Nurse Triage SBAR    Is this a 2nd Level Triage? NO    Situation:  Additional medication.    Background:  Per pt, he is traveling out of the [place] on 05/03/2024, will be back on 05/14/2024, has 12 Xanax pills and would like to see if he can get 2 additional pills.    Assessment: Pt has medication related question only at this time.    Protocol Recommended Disposition:   Home Care Pt was prescribed Xanax 1 MG #30 with 3 refills on 04/18/2024. Pt is informed to call his pharmacy on file regarding available refills and to call back during clinic hours for any further concerns or questions regarding above medication. Pt verbalized understanding and agreement with plan of care and no further questions at this time.     Recommendation:           Does the patient meet one of the following criteria for ADS visit consideration? 16+ years old, with an [facility] PCP     TIP  Providers, please consider if this condition is appropriate for management at one of our Acute and Diagnostic Services sites.     If patient is a good candidate, please use dotphrase <dot>triageresponse and select Refer to ADS to document.      Reason for Disposition   Patient has refills remaining on their prescription    Protocols used: Medication Refill and Renewal Call-A-OH

## 2024-05-02 NOTE — PROGRESS NOTES
Clinic Care Coordination Contact    Situation: Patient chart reviewed by care coordinator.    Background: SWCC reached out to pt for initial assessment. Pt and SWCC talked through Pt's concerns and needs.     Assessment: Pt noted they have concerns with plumbing and chuck of house. Pt would like to connect with local resources to assist. Pt accepted resources and will reach out to providers to see if they are eligible to work with any of the providers.     Plan/Recommendations: CC sent resources via my chart. Pt will reach out on their own - CC will do no further outreach. Pt will reach out to this CC if they have other questions or needs. [facility] program closed.     [employer]: https://rtmn.org/apply-for-help/  You can apply for [employer]'s 'Home Repair Program'. This program offers assistance for plumbing, electrical, as well as roof replacement. The link above is to apply for the program.     [employer]: https://[email or web address].org/homeowners/[facility]-homeowner-application/  [employer] offers exterior work and related repairs only (painting, weather stripping, siding, stair/walkway, windows/doors, gutters, deck/porch, landscaping). The link above is to apply for the program.     [facility] Assistance Programs: https://www.[facility]./residents/property-home/pgaatnfyu-qnnuqcgdr-wskxqgwp/home-improvement-programs  [facility] has many home repair/improvement programs. They include the Critical Repair Program ($5,000 for health/safety/code repairs), the Energy conservation deferred loan program (up to $10,000 for heating/insulation/weatherization).     [facility] Assistance Programs: https://www.[facility]/departments/planning-and-economic-development/housing/ttnxcbhok-ordqe-hyhgr  Qualified homeowners can receive up to $40,000 for rehab and homeowners eligible for the [employer] Fund ([facility] can receive up to $80,000 for rehab. Once  funding is secured, the City [place] disburses the payments to the contractors when the project is complete. Funds can be used for lead based paint hazards, plumbing/electrical/mechanical repairs,  debi repairs/replacement, accessibility ramps, and/or other basic and necessary improvements for health, safety, habitability, energy efficiency or accessibility. The link above is to apply for the program.

## 2024-05-02 NOTE — TELEPHONE ENCOUNTER
Patient will be out of town when his alprazolam is due to be filled so he asked that it be sent to a pharmacy near the location of his [employer] graduation, which will be in [place], [place].  I have included that pharmacy on the pended order.      Medication Question or Refill  What medication are you calling about (include dose and sig)?:       Preferred Pharmacy:  [facility]/pharmacy #[id number] - [place], [place] - [address]  [address] [address]  Phone: [phone] Fax: [phone]      Controlled Substance Agreement on file:   CSA -- Patient Level:     [Media Unavailable] Controlled Substance Agreement - Non - Opioid - Scan on 1/2/2022  9:35 PM: NON-OPIOID CONTROLLED SUBSTANCE AGREEMENT   [Media Unavailable] Controlled Substance Agreement - Non - Opioid - Scan on 3/6/2020: NON-OPIOID CONTROLLED SUBSTANCE AGREEMENT       Who prescribed the medication?: PCP    Do you need a refill? Yes    Could we send this information to you in [facility] or would you prefer to receive a phone call?:     Patient would prefer a phone call     Okay to leave a detailed message?: Yes at Cell number on file:    Telephone Information:   Mobile [phone]

## 2024-05-03 RX ORDER — ALPRAZOLAM 1 MG
1 TABLET ORAL
Qty: 30 TABLET | Refills: 3 | Status: SHIPPED | OUTPATIENT
Start: 2024-05-03 | End: 2024-07-08

## 2024-05-16 ENCOUNTER — TELEPHONE (OUTPATIENT)
Dept: FAMILY MEDICINE | Facility: CLINIC | Age: 74
End: 2024-05-16

## 2024-05-16 NOTE — TELEPHONE ENCOUNTER
Writer attempted to contact patient to re-schedule his appointment.  Writer had to leave a voice message and will call back on 5/20/24.

## 2024-05-20 NOTE — TELEPHONE ENCOUNTER
Called patient. No answer. Left voice message to call clinic at [phone] for re-scheduling his appointment.

## 2024-05-20 NOTE — TELEPHONE ENCOUNTER
Called patient. Left VM to call back for re-scheduling appointment with Dr. Faust.  Several appointment available.  See Dr. Faust' note below.

## 2024-05-22 DIAGNOSIS — R06.6 HICCUP: ICD-10-CM

## 2024-05-22 RX ORDER — BACLOFEN 10 MG/1
5-10 TABLET ORAL 2 TIMES DAILY PRN
Qty: 60 TABLET | Refills: 4 | Status: SHIPPED | OUTPATIENT
Start: 2024-05-22 | End: 2024-08-30

## 2024-05-22 NOTE — TELEPHONE ENCOUNTER
Pending Prescriptions:                       Disp   Refills    baclofen (LIORESAL) 10 MG tablet          60 tab*1            Sig: Take 0.5-1 tablets (5-10 mg) by mouth 2 times           daily as needed for muscle spasms (hiccups)    Pharmacy:    [facility]/PHARMACY #[id number] - [place] - [address]

## 2024-06-28 ENCOUNTER — TELEPHONE (OUTPATIENT)
Dept: FAMILY MEDICINE | Facility: CLINIC | Age: 74
End: 2024-06-28
Payer: COMMERCIAL

## 2024-06-28 NOTE — TELEPHONE ENCOUNTER
Patient was changed to escitalopram in April 2024, patient has refills available at pharmacy.    RN called pharmacy and asked that citalopram be deactivated.    And that they refill Lexapro for patient.      ALFONZO aLuren  [facility]  [phone]    [facility]   Monday  - Thursday 7 AM - 6 PM    Friday  7 AM - 5 PM     -Please call your clinic for assistance from a nurse after hours. '

## 2024-06-28 NOTE — TELEPHONE ENCOUNTER
Medication: CITALOPRAM HBR 40 MG TABLET    Pt requesting refill for this medication and cannot find in pt's chart.    Pharmacy:    [facility]/PHARMACY #[id number] - [place], [place] - [address]

## 2024-07-07 ASSESSMENT — ANXIETY QUESTIONNAIRES
IF YOU CHECKED OFF ANY PROBLEMS ON THIS QUESTIONNAIRE, HOW DIFFICULT HAVE THESE PROBLEMS MADE IT FOR YOU TO DO YOUR WORK, TAKE CARE OF THINGS AT HOME, OR GET ALONG WITH OTHER PEOPLE: SOMEWHAT DIFFICULT
7. FEELING AFRAID AS IF SOMETHING AWFUL MIGHT HAPPEN: SEVERAL DAYS
7. FEELING AFRAID AS IF SOMETHING AWFUL MIGHT HAPPEN: SEVERAL DAYS
6. BECOMING EASILY ANNOYED OR IRRITABLE: NOT AT ALL
8. IF YOU CHECKED OFF ANY PROBLEMS, HOW DIFFICULT HAVE THESE MADE IT FOR YOU TO DO YOUR WORK, TAKE CARE OF THINGS AT HOME, OR GET ALONG WITH OTHER PEOPLE?: SOMEWHAT DIFFICULT
3. WORRYING TOO MUCH ABOUT DIFFERENT THINGS: SEVERAL DAYS
4. TROUBLE RELAXING: MORE THAN HALF THE DAYS
5. BEING SO RESTLESS THAT IT IS HARD TO SIT STILL: MORE THAN HALF THE DAYS
GAD7 TOTAL SCORE: 12
2. NOT BEING ABLE TO STOP OR CONTROL WORRYING: NEARLY EVERY DAY
1. FEELING NERVOUS, ANXIOUS, OR ON EDGE: NEARLY EVERY DAY

## 2024-07-08 DIAGNOSIS — F41.9 ANXIETY: ICD-10-CM

## 2024-07-08 RX ORDER — ALPRAZOLAM 1 MG
1 TABLET ORAL
Qty: 30 TABLET | Refills: 3 | Status: SHIPPED | OUTPATIENT
Start: 2024-07-08

## 2024-07-08 NOTE — TELEPHONE ENCOUNTER
Medication Question or Refill    Contacts       Contact Date/Time Type Contact Phone/Fax    07/08/2024 11:38 AM CDT Phone (Incoming) Denis Moreno DAVE (Self) [phone] (H)            What medication are you calling about (include dose and sig)?: ALPRAZolam (XANAX) 1 MG tablet     Preferred Pharmacy:   [facility]/pharmacy #[id number] - [place], [place] - [address] [place] [address]  [address] [place] [address]  Phone: [phone] Fax: [phone]      Controlled Substance Agreement on file:   CSA -- Patient Level:     [Media Unavailable] Controlled Substance Agreement - Non - Opioid - Scan on 1/2/2022  9:35 PM: NON-OPIOID CONTROLLED SUBSTANCE AGREEMENT   [Media Unavailable] Controlled Substance Agreement - Non - Opioid - Scan on 3/6/2020: NON-OPIOID CONTROLLED SUBSTANCE AGREEMENT       Who prescribed the medication?: Dr. Faust    Do you need a refill? Yes      Could we send this information to you in [facility] or would you prefer to receive a phone call?:   Patient would prefer a phone call   Okay to leave a detailed message?: Yes at Home number on file [phone] (home)

## 2024-07-11 ENCOUNTER — VIRTUAL VISIT (OUTPATIENT)
Dept: FAMILY MEDICINE | Facility: CLINIC | Age: 74
End: 2024-07-11
Payer: COMMERCIAL

## 2024-07-11 DIAGNOSIS — G43.809 OTHER MIGRAINE WITHOUT STATUS MIGRAINOSUS, NOT INTRACTABLE: ICD-10-CM

## 2024-07-11 DIAGNOSIS — F33.0 MAJOR DEPRESSIVE DISORDER, RECURRENT EPISODE, MILD (H): ICD-10-CM

## 2024-07-11 DIAGNOSIS — F41.9 ANXIETY: Primary | ICD-10-CM

## 2024-07-11 PROCEDURE — 99442 PR PHYSICIAN TELEPHONE EVALUATION 11-20 MIN: CPT | Mod: 93 | Performed by: NURSE PRACTITIONER

## 2024-07-11 RX ORDER — RIZATRIPTAN BENZOATE 5 MG/1
5 TABLET ORAL
COMMUNITY
Start: 2024-07-01

## 2024-07-11 RX ORDER — NAPROXEN 500 MG/1
500 TABLET ORAL 2 TIMES DAILY PRN
Qty: 90 TABLET | Refills: 0 | Status: SHIPPED | OUTPATIENT
Start: 2024-07-11 | End: 2024-08-21

## 2024-07-11 RX ORDER — BUPROPION HYDROCHLORIDE 150 MG/1
150 TABLET ORAL EVERY MORNING
Qty: 30 TABLET | Refills: 1 | Status: SHIPPED | OUTPATIENT
Start: 2024-07-11 | End: 2024-08-02

## 2024-07-11 ASSESSMENT — PATIENT HEALTH QUESTIONNAIRE - PHQ9
SUM OF ALL RESPONSES TO PHQ QUESTIONS 1-9: 8
SUM OF ALL RESPONSES TO PHQ QUESTIONS 1-9: 8
10. IF YOU CHECKED OFF ANY PROBLEMS, HOW DIFFICULT HAVE THESE PROBLEMS MADE IT FOR YOU TO DO YOUR WORK, TAKE CARE OF THINGS AT HOME, OR GET ALONG WITH OTHER PEOPLE: SOMEWHAT DIFFICULT

## 2024-07-11 ASSESSMENT — ANXIETY QUESTIONNAIRES: GAD7 TOTAL SCORE: 12

## 2024-07-11 NOTE — PROGRESS NOTES
Denis is a 74 year old who is being evaluated via a billable telephone visit.    What phone number would you like to be contacted at? [phone]   How would you like to obtain your AVS? [employer]  Originating Location (pt. Location): Home    Distant Location (provider location):  On-site    Assessment & Plan     Anxiety  Continue lexapro as prescribed. Would recommend continuing current 10 mg dose due to age and not increase to 20 mg.     Mild Recurrent Major Depression  Reviewed his PCPs note regarding treatment Ideas for depression including Wellbutrin add on or switch to SNRI. Patient reports biggest concern is getting motivated and feeling ready to do things. I think he would benefit from addition of wellbutrin to lexapro. Patient instructed to reach out via [employer] in 3-4 weeks to discuss how things are going .Discussed we could increase wellbutrin.    - buPROPion (WELLBUTRIN XL) 150 MG 24 hr tablet; Take 1 tablet (150 mg) by mouth every morning    Other migraine without status migrainosus, not intractable  Patient on propranolol for migraine prophylaxis. Discussed we could possibly increase this to TID instead of BID however pulse runs low and I worry an increase would cause this to decrease too much.    Patient has not tried naproxen-I think it is reasonable to trial that with headaches-recommend not overdoing as to not cause rebound headaches. If continuing persistent headaches we could refer to neurology for further recommendations.    - naproxen (NAPROSYN) 500 MG tablet; Take 1 tablet (500 mg) by mouth 2 times daily as needed for headaches                Subjective   Denis is a 74 year old, presenting for the following health issues:  Depression (Follow up) and Headache (Migraines have been happening more often lately.)        7/11/2024    11:49 AM   Additional Questions   Roomed by Rohini ESPINOZA     Patient reports more frequent migraines-worse than previously. In the past was 2-4 per year. Now he is  having them more frequently (about  once a month-(more frequently than normal for patient) can last anywhere from 24-48 ) Family still treating like he is much younger and able to more things than he physically could do previously.     Patient reports migraines and depression seem to be feeing off of each other. Patient currently taking escitalopram. Doesn't seem to be working as well as it once did. 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 Patient feels like this is high pressure/stressful job-wondering if should do something different.     Patient reports history of cancer (in remission). Prostate cancer-treated with radiation-PSA done in April and was normal.    Patient was given rizatriptan in the ER-didn't feel was more helpful than sumatriptan. Patient has not been told previously take tylenol or ibuprofen.                  Objective           Vitals:  No vitals were obtained today due to virtual visit.    Physical Exam   General: Alert and no distress //Respiratory: No audible wheeze, cough, or shortness of breath // Psychiatric:  Appropriate affect, tone, and pace of words            Phone call duration: 20 minutes  Signed Electronically by: NADER ZAPIEN CNP

## 2024-08-02 DIAGNOSIS — F33.0 MAJOR DEPRESSIVE DISORDER, RECURRENT EPISODE, MILD (H): ICD-10-CM

## 2024-08-02 RX ORDER — BUPROPION HYDROCHLORIDE 150 MG/1
150 TABLET ORAL EVERY MORNING
Qty: 90 TABLET | Refills: 1 | Status: SHIPPED | OUTPATIENT
Start: 2024-08-02

## 2024-08-02 NOTE — TELEPHONE ENCOUNTER
"Last Written Prescription Date:  12/13/21  Last Fill Quantity: 90,  # refills: 1   Last office visit provider:  4/13/22     Requested Prescriptions   Pending Prescriptions Disp Refills     rosuvastatin (CRESTOR) 10 MG tablet [Pharmacy Med Name: ROSUVASTATIN CALCIUM 10 MG TAB] 90 tablet 1     Sig: TAKE 1 TABLET (10 MG) BY MOUTH DAILY.       Statins Protocol Passed - 5/9/2022  4:22 PM        Passed - LDL on file in past 12 months     Recent Labs   Lab Test 03/21/22  1602   LDL 60             Passed - No abnormal creatine kinase in past 12 months     No lab results found.             Passed - Recent (12 mo) or future (30 days) visit within the authorizing provider's specialty     Patient has had an office visit with the authorizing provider or a provider within the authorizing providers department within the previous 12 mos or has a future within next 30 days. See \"Patient Info\" tab in inbasket, or \"Choose Columns\" in Meds & Orders section of the refill encounter.              Passed - Medication is active on med list        Passed - Patient is age 18 or older           pantoprazole (PROTONIX) 40 MG EC tablet [Pharmacy Med Name: PANTOPRAZOLE SOD DR 40 MG TAB] 90 tablet 0     Sig: TAKE 1 TABLET BY MOUTH EVERY DAY       PPI Protocol Passed - 5/9/2022  4:22 PM        Passed - Not on Clopidogrel (unless Pantoprazole ordered)        Passed - No diagnosis of osteoporosis on record        Passed - Recent (12 mo) or future (30 days) visit within the authorizing provider's specialty     Patient has had an office visit with the authorizing provider or a provider within the authorizing providers department within the previous 12 mos or has a future within next 30 days. See \"Patient Info\" tab in inbasket, or \"Choose Columns\" in Meds & Orders section of the refill encounter.              Passed - Medication is active on med list        Passed - Patient is age 18 or older           lisinopril (ZESTRIL) 20 MG tablet [Pharmacy Med " Date of Service:  8/5/2024    Chief Complaint:  Right renal mass, adrenal adenoma     Independent Historian:  {NO YES:660255}    History of Present Illness:    Murtaza Solis is a 80 year old male who is referred by Dr. Norris for further management on renal and adrenal mass. Patient presented to ER on 1/13/23 for lower abdominal pain, UA was negative. CT scan incidentally found 4.2 cm RUP renal mass consistent with presumed RCC, 14 mm right adrenal nodule, small hiatal hernia.      Murtaza presents with his son Anita and daughter Jarod to discuss incidental renal mass above. He reports pressure like right lower abdominal pain that is worsen post prandially. He denies any voiding symptoms, hematuria, renals stone and bladder infection. He denies history of renal surgery. He had a history of bilateral inguinal hernioplasty and ventral herniorrhaphy many years ago with mesh. He is on Warfarin + Plavix for CVA. PSA 5.11 from 4/21/22. He is a former smoker, quit 40 years ago, no family history of  malignancy. Today, UA negative.       1/27/23- Patient returns for follow-up. CT chest showed incidental indeterminate 6 punctate nodules, moderate COPD and 49 mm ascending aortic aneurysmal. MAG3 renal scan revealed right renal uptake 58% and left 42%. Patient has left TKA next Friday and right TKA in April.       6/23/23 - patient returns for 5 month follow-up.  He successfully underwent left knee replacement on February 3, 2023.  He is holding off on his right knee for now.  Repeat CT scan from May 25, 2023 shows stable 6 cm right upper pole mass consistent with renal cell carcinoma.  Previous differential function of 50% in that kidney, baseline GFR 84.  Prior or adrenal workup in January, 2023 was negative from a functional standpoint, stable at 1.3cm without enhancement.     12/7/23 - Patient returns with a CT renal mass from 10/30/23 which revealed stable 5.9 cm enhancing renal mass suspicious for renal cell  "Name: LISINOPRIL 20 MG TABLET] 90 tablet 1     Sig: TAKE 1 TABLET BY MOUTH EVERY DAY       ACE Inhibitors (Including Combos) Protocol Passed - 5/9/2022  4:22 PM        Passed - Blood pressure under 140/90 in past 12 months     BP Readings from Last 3 Encounters:   05/03/22 110/60   04/13/22 118/58   03/21/22 121/60                 Passed - Recent (12 mo) or future (30 days) visit within the authorizing provider's specialty     Patient has had an office visit with the authorizing provider or a provider within the authorizing providers department within the previous 12 mos or has a future within next 30 days. See \"Patient Info\" tab in inbasket, or \"Choose Columns\" in Meds & Orders section of the refill encounter.              Passed - Medication is active on med list        Passed - Patient is age 18 or older        Passed - Normal serum creatinine on file in past 12 months     Recent Labs   Lab Test 03/21/22  1602   CR 1.23       Ok to refill medication if creatinine is low          Passed - Normal serum potassium on file in past 12 months     Recent Labs   Lab Test 03/21/22  1602   POTASSIUM 4.0                  Marilyn Thompson, RN 05/11/22 5:04 PM    " carcinoma. CT abd pelvis from 11/30/23 revealed 10 mm right adrenal adenoma unchanged. No change in known solid right renal mass. He is status post open heart surgery on 8/23/23.          5/31/24 - Patient returns with a CT chest abdomen from 5/17/24 which revealed interval increase size of heterogeneously enhancing exophytic mass arising from the upper pole of the right kidney measuring 6.2 x 4.8 cm (previously 5.9 x 4.3 cm). No other mass or adenopathy. Denies flank pain or gross hematuria. Had CABG on 8/23/23 with improved energy since then.               8/5/24 - Patient returns status post right partial nephrectomy on 7/24/24 which revealed \"clear-cell renal cell carcinoma\". ***    PSA Test Results:  Lab Results   Component Value Date    PSA 3.66 07/18/2023    PSA 5.11 04/21/2022       Past Medical History:   Past Medical History:   Diagnosis Date    Anemia     Anxiety disorder     Arthritis     BPH (benign prostatic hyperplasia)     CAD (coronary artery disease)     Chronic kidney disease     mass on kidney    Chronic pain of both knees     Closed fracture of right wrist     CVA (cerebral vascular accident)  (CMD)     2015    GERD (gastroesophageal reflux disease)     GI bleeding 03/08/2023    Hard of hearing     Heart block     stent placement    Hernia, inguinal     x4    HTN (hypertension)     Hypercholesteremia     S/P CABG x 4 8/23/2023    S/P left atrial appendage ligation 8/23/2023    Wears dentures     Wears hearing aid     Bilateral       Surgical History:  Past Surgical History:   Procedure Laterality Date    Cardiac catherization      ~12/2020    Coronary artery bypass graft      x4    Eye surgery      metal removed    Hernia repair      4 hernias repaired, Inguinal and umbilical    Joint replacement Left 02/03/2023    left total knee arthroplasty. Dr MANISH Diaz    Left atrial appendage occlusion     :    Family History:  Family History   Problem Relation Age of Onset    Cancer Mother         breast     Cancer, Lung Father         smoker    Thyroid Maternal Grandmother         goitre    Leukemia Maternal Grandfather         \"blood disease\"    Other Half-Sister         on dad's side -  of cancer    Cancer, Skin Half-Brother        Social History:  Social History     Socioeconomic History    Marital status:      Spouse name: Not on file    Number of children: Not on file    Years of education: Not on file    Highest education level: Not on file   Occupational History    Not on file   Tobacco Use    Smoking status: Former     Current packs/day: 0.00     Average packs/day: 2.0 packs/day for 35.0 years (70.0 ttl pk-yrs)     Types: Cigarettes     Start date: 1/3/1948     Quit date: 1/3/1983     Years since quittin.6    Smokeless tobacco: Never   Vaping Use    Vaping status: never used   Substance and Sexual Activity    Alcohol use: Yes     Alcohol/week: 1.0 standard drink of alcohol     Types: 1 Standard drinks or equivalent per week     Comment: very seldom    Drug use: Never    Sexual activity: Not on file   Other Topics Concern    Not on file   Social History Narrative    Not on file     Social Determinants of Health     Financial Resource Strain: Low Risk  (2024)    Financial Resource Strain     Unable to Get: None   Food Insecurity: Low Risk  (2024)    Food Insecurity     Worried about Food: Never true     Food is Gone: Never true   Transportation Needs: Not At Risk (2024)    Transportation Needs     Lack of Reliable Transportation: No   Physical Activity: Medium Risk (3/14/2023)    Exercise Vital Sign     Days of Exercise per Week: 2 days     Minutes of Exercise per Session: 30 min   Stress: Not on file   Social Connections: Low Risk  (2024)    Social Connections     Social Connectivity: 5 or more times a week   Interpersonal Safety: Low Risk  (2024)    Interpersonal Safety     How often physically hurt: Never     How often insulted or talked down to: Never     How often  threatened with harm: Never     How often scream or curse at: Never       Allergies:  ALLERGIES:   Allergen Reactions    Albumin Human HEADACHES     Feeling chill and flush     Perflutren Lipid Microsphere Other (See Comments)     (Definity) Lower back pain. Patient denies        Medications:  Current Outpatient Medications   Medication Sig Dispense Refill    oxyCODONE, IMM REL, (ROXICODONE) 5 MG immediate release tablet Take 1 tablet by mouth every 6 hours as needed for Pain. 14 tablet 0    docusate sodium (COLACE) 100 MG capsule Take 1 capsule by mouth in the morning and 1 capsule in the evening. 60 capsule 0    citalopram (CeleXA) 20 MG tablet Take 1 tablet by mouth daily.      atorvastatin (LIPITOR) 40 MG tablet Take 1 tablet by mouth daily. 90 tablet 1    amLODIPine (NORVASC) 5 MG tablet       metoPROLOL succinate (TOPROL-XL) 25 MG 24 hr tablet TAKE 1 TABLET BY MOUTH EVERY DAY 90 tablet 0    aspirin (Aspirin 81) 81 MG chewable tablet Chew 81 mg by mouth daily.      tamsulosin (FLOMAX) 0.4 MG Cap Take 1 capsule by mouth every evening. TAKE 1 CAPSULE EVERY DAY 90 capsule 3     No current facility-administered medications for this visit.       Allergies, Medications, Past Medical History, Past Surgical History, Family History, and Social History were reviewed today and changes are as noted above.    Physical Exam:  Constitutional:  There were no vitals taken for this visit.  Well developed, well nourished, and afebrile.    Eyes:  Conjunctivae and lids are normal.  Pupils and irises are equal and reactive to light.    Gastrointestinal:  Abdomen soft, nontender, nondistended.  No hepatosplenomegaly.  No hernias.    Skin:  Warm and dry with no lesions or induration.    Genitourinary:  {UROLOGY  exam:318488}  Surgical Pathology: [id number]  Order: [id number]  Collected 7/24/2024 15:28       Status: Final result       Visible to patient: No (scheduled for 8/5/2024  1:29 PM)       Dx: Renal mass, right    0 Result  Notes      Component    Pathologic Diagnosis   Kidney, right renal mass, partial nephrectomy:   - Clear-cell renal cell carcinoma, margin uninvolved       Electronically signed by Vickie Aldridge MD on 7/29/2024 at 132        Record Review:    I met with patient, Murtaza Solis, and obtained pertinent history and exam.    Tests Reviewed:  { Uro Tests:319080}  Tests Ordered:  { Uro Tests:852745}    The records were reviewed, as above.     Assessment and Plan:    Murtaza Solis is a 80 year old male with incidental 6 cm right upper pole enhancing lesion concerning for renal cell carcinoma, fortunately stable over the past 4 months without evidence of metastatic disease.  Baseline GFR 84, incidental adrenal nodule stable in size without evidence of functionality.  Known cardiovascular risks on warfarin and aspirin, prior abdominal hernioplasty. CT chest abdomen revealed interval increase size of heterogeneously enhancing exophytic mass arising from the upper pole of the right kidney measuring 6.2 x 4.8 cm (previously 5.9 x 4.3 cm). S/p CABG 8/2023. Report prior abdominal hernia repair. S/p right partial nephrectomy on 7/24/24 revealed \"clear-cell renal cell carcinoma\". ***    ***    MDM/Complexity Statement:  {JR MODERATE MDM (Level 4):801464}    {JR 10-19 (Level 2); 20-29 (Level 3); 30-39 (Level 4); 40+ (Level 5):243784::\" \"}   On 8/5/2024, Marquita DELONG scribed the services personally performed by David Vargas MD.

## 2024-08-07 DIAGNOSIS — I10 ESSENTIAL HYPERTENSION, BENIGN: ICD-10-CM

## 2024-08-07 RX ORDER — PROPRANOLOL HYDROCHLORIDE 20 MG/1
20 TABLET ORAL 2 TIMES DAILY
Qty: 180 TABLET | Refills: 1 | Status: SHIPPED | OUTPATIENT
Start: 2024-08-07

## 2024-08-21 ENCOUNTER — OFFICE VISIT (OUTPATIENT)
Dept: FAMILY MEDICINE | Facility: CLINIC | Age: 74
End: 2024-08-21
Payer: COMMERCIAL

## 2024-08-21 VITALS
BODY MASS INDEX: 25.48 KG/M2 | SYSTOLIC BLOOD PRESSURE: 116 MMHG | OXYGEN SATURATION: 98 % | RESPIRATION RATE: 12 BRPM | DIASTOLIC BLOOD PRESSURE: 68 MMHG | HEIGHT: 63 IN | HEART RATE: 54 BPM | TEMPERATURE: 98.1 F | WEIGHT: 143.8 LBS

## 2024-08-21 DIAGNOSIS — F41.9 ANXIETY: ICD-10-CM

## 2024-08-21 DIAGNOSIS — I10 PRIMARY HYPERTENSION: ICD-10-CM

## 2024-08-21 DIAGNOSIS — Z01.818 PREOPERATIVE EXAMINATION: Primary | ICD-10-CM

## 2024-08-21 DIAGNOSIS — G43.809 OTHER MIGRAINE WITHOUT STATUS MIGRAINOSUS, NOT INTRACTABLE: ICD-10-CM

## 2024-08-21 DIAGNOSIS — E78.2 MIXED HYPERLIPIDEMIA: ICD-10-CM

## 2024-08-21 DIAGNOSIS — H25.013 CORTICAL AGE-RELATED CATARACT OF BOTH EYES: ICD-10-CM

## 2024-08-21 PROCEDURE — 99214 OFFICE O/P EST MOD 30 MIN: CPT | Performed by: FAMILY MEDICINE

## 2024-08-21 RX ORDER — NAPROXEN 500 MG/1
500 TABLET ORAL 2 TIMES DAILY PRN
Qty: 90 TABLET | Refills: 0 | Status: SHIPPED | OUTPATIENT
Start: 2024-08-21 | End: 2024-10-02

## 2024-08-21 RX ORDER — ROSUVASTATIN CALCIUM 10 MG/1
10 TABLET, COATED ORAL DAILY
Qty: 90 TABLET | Refills: 2 | Status: SHIPPED | OUTPATIENT
Start: 2024-08-21

## 2024-08-21 ASSESSMENT — PATIENT HEALTH QUESTIONNAIRE - PHQ9
SUM OF ALL RESPONSES TO PHQ QUESTIONS 1-9: 4
10. IF YOU CHECKED OFF ANY PROBLEMS, HOW DIFFICULT HAVE THESE PROBLEMS MADE IT FOR YOU TO DO YOUR WORK, TAKE CARE OF THINGS AT HOME, OR GET ALONG WITH OTHER PEOPLE: SOMEWHAT DIFFICULT
SUM OF ALL RESPONSES TO PHQ QUESTIONS 1-9: 4

## 2024-08-21 ASSESSMENT — ANXIETY QUESTIONNAIRES
3. WORRYING TOO MUCH ABOUT DIFFERENT THINGS: SEVERAL DAYS
2. NOT BEING ABLE TO STOP OR CONTROL WORRYING: SEVERAL DAYS
5. BEING SO RESTLESS THAT IT IS HARD TO SIT STILL: NOT AT ALL
GAD7 TOTAL SCORE: 3
8. IF YOU CHECKED OFF ANY PROBLEMS, HOW DIFFICULT HAVE THESE MADE IT FOR YOU TO DO YOUR WORK, TAKE CARE OF THINGS AT HOME, OR GET ALONG WITH OTHER PEOPLE?: SOMEWHAT DIFFICULT
IF YOU CHECKED OFF ANY PROBLEMS ON THIS QUESTIONNAIRE, HOW DIFFICULT HAVE THESE PROBLEMS MADE IT FOR YOU TO DO YOUR WORK, TAKE CARE OF THINGS AT HOME, OR GET ALONG WITH OTHER PEOPLE: SOMEWHAT DIFFICULT
GAD7 TOTAL SCORE: 3
4. TROUBLE RELAXING: NOT AT ALL
6. BECOMING EASILY ANNOYED OR IRRITABLE: NOT AT ALL
7. FEELING AFRAID AS IF SOMETHING AWFUL MIGHT HAPPEN: NOT AT ALL
7. FEELING AFRAID AS IF SOMETHING AWFUL MIGHT HAPPEN: NOT AT ALL
1. FEELING NERVOUS, ANXIOUS, OR ON EDGE: SEVERAL DAYS
GAD7 TOTAL SCORE: 3

## 2024-08-21 ASSESSMENT — PAIN SCALES - GENERAL: PAINLEVEL: NO PAIN (0)

## 2024-08-21 NOTE — PROGRESS NOTES
Preoperative Evaluation  [address] [address]  Phone: [phone]  Primary Provider: Saurav Faust MD  Pre-op Performing Provider: Kojo Gates MD  Aug 21, 2024             8/16/2024   Surgical Information   What procedure is being done? Left and right eye Cataract Surgery   Facility or Hospital where procedure/surgery will be performed: [facility]    Who is doing the procedure / surgery?    Date of surgery / procedure: 8/23/2024   Time of surgery / procedure: 9:00   Where do you plan to recover after surgery? at home with family        Fax number for surgical facility: [phone]    Assessment & Plan     The proposed surgical procedure is considered LOW risk.    Preoperative examination  Nothing to eat or drink after midnight the morning of surgery.    Cortical age-related cataract of both eyes  Surgery, left eye first, then right eye.    Other migraine without status migrainosus, not intractable    - naproxen (NAPROSYN) 500 MG tablet; Take 1 tablet (500 mg) by mouth 2 times daily as needed for headaches.    Mixed hyperlipidemia    - rosuvastatin (CRESTOR) 10 MG tablet; Take 1 tablet (10 mg) by mouth daily.    Anxiety  Stable, continue with current medicine.    Primary hypertension  Stable, continue with current medicine.    History of Prostate cancer  S/P radiations therapy, in full remission.       Risks and Recommendations  The patient has the following additional risks and recommendations for perioperative complications:   - No identified additional risk factors other than previously addressed    Antiplatelet or Anticoagulation Medication Instructions   - Patient is on no antiplatelet or anticoagulation medications.    Additional Medication Instructions  Take all scheduled medications on the day of surgery EXCEPT for modifications listed below:    Recommendation  Approval given to proceed with proposed procedure,  without further diagnostic evaluation.    Monica Barrios is a 74 year old, presenting for the following:  Pre-Op Exam (DOS: 8/23/24)          8/21/2024     8:58 AM   Additional Questions   Roomed by Allie HOLLEY   Accompanied by self         8/21/2024     8:58 AM   Patient Reported Additional Medications   Patient reports taking the following new medications none     HPI related to upcoming procedure:   History of cataract, scheduled to have cataract surgery.        8/16/2024   Pre-Op Questionnaire   Have you ever had a heart attack or stroke? No   Have you ever had surgery on your heart or blood vessels, such as a stent placement, a coronary artery bypass, or surgery on an artery in your head, neck, heart, or legs? No   Do you have chest pain with activity? No   Do you have a history of heart failure? No   Do you currently have a cold, bronchitis or symptoms of other infection? No   Do you have a cough, shortness of breath, or wheezing? No   Do you or anyone in your family have previous history of blood clots? No   Do you or does anyone in your family have a serious bleeding problem such as prolonged bleeding following surgeries or cuts? No   Have you ever had problems with anemia or been told to take iron pills? No   Have you had any abnormal blood loss such as black, tarry or bloody stools? No   Have you ever had a blood transfusion? No   Are you willing to have a blood transfusion if it is medically needed before, during, or after your surgery? Yes   Have you or any of your relatives ever had problems with anesthesia? No   Do you have sleep apnea, excessive snoring or daytime drowsiness? No   Do you have any artifical heart valves or other implanted medical devices like a pacemaker, defibrillator, or continuous glucose monitor? No   Do you have artificial joints? No   Are you allergic to latex? No        Health Care Directive  Patient does not have a Health Care Directive or Living Will: Patient states has Advance  Directive and will bring in a copy to clinic.    Preoperative Review of    reviewed - controlled substances reflected in medication list.      Status of Chronic Conditions:  HYPERLIPIDEMIA - Patient has a long history of significant Hyperlipidemia requiring medication for treatment with recent good control. Patient reports no problems or side effects with the medication.     HYPERTENSION - Patient has longstanding history of HTN , currently denies any symptoms referable to elevated blood pressure. Specifically denies chest pain, palpitations, dyspnea, orthopnea, PND or peripheral edema. Blood pressure readings have been in normal range. Current medication regimen is as listed below. Patient denies any side effects of medication.     Patient Active Problem List    Diagnosis Date Noted    Prediabetes 05/04/2023     Priority: Medium    Peripheral artery disease (H24) 04/18/2023     Priority: Medium     Mild on outside testing 2023      Hot flashes 12/09/2022     Priority: Medium    Synovial cyst 12/09/2022     Priority: Medium    Prostate cancer (H) 07/27/2022     Priority: Medium    Dyspepsia 04/13/2022     Priority: Medium    Thickening of esophagus- seen 3/2022, referred to GI 04/13/2022     Priority: Medium    Hiccups 03/21/2022     Priority: Medium    Elevated prostate specific antigen (PSA) 12/01/2021     Priority: Medium    Hypertension, unspecified type 11/29/2021     Priority: Medium    Hilar adenopathy 11/29/2021     Priority: Medium    Insomnia 11/29/2021     Priority: Medium    Male Erectile Disorder 11/29/2021     Priority: Medium    Chronic headache 11/29/2021     Priority: Medium     Migraines versus chronic sinus problem      Mild Recurrent Major Depression 11/29/2021     Priority: Medium    Mixed hyperlipidemia 11/29/2021     Priority: Medium    Recurrent UTI 11/29/2021     Priority: Medium    Uveitis 11/29/2021     Priority: Medium    Chronic maxillary sinusitis 11/29/2021     Priority: Medium      Followed by ENT, Dr. Palomino      Pulmonary sarcoidosis (H24) 01/09/2017     Priority: Medium     Pulmonary and eye involvement    Formatting of this note might be different from the original.  Formatting of this note might be different from the original.  Pulmonary and eye involvement    Last Assessment & Plan:   Formatting of this note might be different from the original.  Was initially diagnosed by ophthalmology. Sees pulmonology. He is on 1mg daily of prednisone. Very minimal respiratory symptoms. Reports he takes tessalon as needed for intermittent cough.      Anxiety 07/07/2015     Priority: Medium    Diverticular disease of colon 03/01/2013     Priority: Medium      Past Medical History:   Diagnosis Date    Anxiety 07/07/2015    Benign Essential Hypertension     Cancer (H) 02/25/2021    Chronic headaches     Migraines versus chronic sinus problem    Chronic maxillary sinusitis     Followed by ENT, Dr. Palomino    Depressive disorder 2000    Deviated nasal septum 11/16/2015    Elevated prostate specific antigen (PSA)     Hilar adenopathy     Insomnia     Male Erectile Disorder     Mild Recurrent Major Depression     Mixed hyperlipidemia     Recurrent UTI 11/29/2021    Sarcoidosis of lung (H24) 01/09/2017    Pulmonary and eye involvement.  Followed by Dr. Duron    Uveitis      Past Surgical History:   Procedure Laterality Date    BIOPSY  2021    BRONCHOSCOPY FLEXIBLE AND RIGID  12/20/2016    ebus    COLONOSCOPY  2014    Nothing found, another scheduled in 2023     Current Outpatient Medications   Medication Sig Dispense Refill    ALPRAZolam (XANAX) 1 MG tablet Take 1 tablet (1 mg) by mouth nightly as needed for anxiety or sleep 30 tablet 3    amLODIPine (NORVASC) 10 MG tablet Take 1 tablet (10 mg) by mouth daily 90 tablet 1    azelastine (ASTELIN) 0.1 % nasal spray       baclofen (LIORESAL) 10 MG tablet Take 0.5-1 tablets (5-10 mg) by mouth 2 times daily as needed for muscle spasms (hiccups) 60 tablet 4     buPROPion (WELLBUTRIN XL) 150 MG 24 hr tablet TAKE 1 TABLET BY MOUTH EVERY MORNING 90 tablet 1    celecoxib (CELEBREX) 200 MG capsule Take 200 mg by mouth daily      diclofenac (VOLTAREN) 1 % topical gel Apply 4 g topically 4 times daily as needed for moderate pain 100 g 3    escitalopram (LEXAPRO) 10 MG tablet Take 1 tablet (10 mg) by mouth daily 90 tablet 3    fluticasone (FLONASE) 50 MCG/ACT nasal spray One spray in each nostril daily as needed for sinus congestion. 16 g 11    gabapentin (NEURONTIN) 300 MG capsule Take 1 capsule (300 mg) by mouth 3 times daily 90 capsule 4    hydrOXYzine (ATARAX) 25 MG tablet Take 25 mg by mouth 3 times daily as needed for itching      lisinopril (ZESTRIL) 20 MG tablet Take 1 tablet (20 mg) by mouth daily 90 tablet 11    melatonin ([employer] MELATONIN) 3 MG tablet TAKE 1 TABLET (3 MG) BY MOUTH NIGHTLY AS NEEDED FOR SLEEP Strength: 3 mg 90 tablet 11    naproxen (NAPROSYN) 500 MG tablet Take 1 tablet (500 mg) by mouth 2 times daily as needed for headaches 90 tablet 0    pantoprazole (PROTONIX) 40 MG EC tablet Take 1 tablet (40 mg) by mouth daily 90 tablet 3    predniSONE (DELTASONE) 1 MG tablet Take 2 tablets (2 mg) by mouth daily      propranolol (INDERAL) 20 MG tablet TAKE 1 TABLET BY MOUTH TWICE A  tablet 1    rizatriptan (MAXALT) 5 MG tablet Take 5 mg by mouth at onset of headache for migraine      rosuvastatin (CRESTOR) 10 MG tablet Take 1 tablet (10 mg) by mouth daily 90 tablet 2    SUMAtriptan (IMITREX) 50 MG tablet Take 1 tablet (50 mg) by mouth at onset of headache for migraine 6 tablet 1    tadalafil (CIALIS) 5 MG tablet PLEASE SEE ATTACHED FOR DETAILED DIRECTIONS      traZODone (DESYREL) 50 MG tablet [TRAZODONE (DESYREL) 50 MG TABLET] TAKE 1 TABLET BY MOUTH EVERYDAY AT BEDTIME 90 tablet 2       No Known Allergies     Social History     Tobacco Use    Smoking status: Never     Passive exposure: Never    Smokeless tobacco: Never    Tobacco comments:     N.A.   Substance  "Use Topics    Alcohol use: Yes     Comment: Socially     Family History   Problem Relation Age of Onset    Dementia Mother         d 96    Mental Illness Mother         Alzheimer's    Heart Disease Father         d 92    Prostate Cancer Father     Substance Abuse Brother         Alcohol, numerous individuals in my Family abused Alcohol    Hypertension Other         Its normal in the [place]    Hyperlipidemia No family hx of     Depression No family hx of     Anxiety Disorder No family hx of      History   Drug Use No             Review of Systems  Constitutional, HEENT, cardiovascular, pulmonary, GI, , musculoskeletal, neuro, skin, endocrine and psych systems are negative, except as otherwise noted.    Objective    /68 (BP Location: Right arm, Patient Position: Sitting, Cuff Size: Adult Regular)   Pulse 54   Temp 98.1  F (36.7  C) (Oral)   Resp 12   Ht 1.6 m (5' 3\")   Wt 65.2 kg (143 lb 12.8 oz)   SpO2 98%   BMI 25.47 kg/m     Estimated body mass index is 25.47 kg/m  as calculated from the following:    Height as of this encounter: 1.6 m (5' 3\").    Weight as of this encounter: 65.2 kg (143 lb 12.8 oz).  Physical Exam  GENERAL: alert and no distress  EYES: Eyes grossly normal to inspection, PERRL and conjunctivae and sclerae normal  HENT: ear canals and TM's normal, nose and mouth without ulcers or lesions  NECK: no adenopathy, no asymmetry, masses, or scars  RESP: lungs clear to auscultation - no rales, rhonchi or wheezes  CV: regular rate and rhythm, normal S1 S2, no S3 or S4, no murmur, click or rub, no peripheral edema  ABDOMEN: soft, nontender, no hepatosplenomegaly, no masses and bowel sounds normal  MS: no gross musculoskeletal defects noted, no edema  SKIN: no suspicious lesions or rashes  NEURO: Normal strength and tone, mentation intact and speech normal  PSYCH: mentation appears normal, affect normal/bright    Recent Labs   Lab Test 04/18/24  1007   HGB 13.6       "   POTASSIUM 4.6   CR 1.14   A1C 5.7*        Diagnostics  No labs were ordered during this visit.   No EKG required for low risk surgery (cataract, skin procedure, breast biopsy, etc).    Revised Cardiac Risk Index (RCRI)  The patient has the following serious cardiovascular risks for perioperative complications:   - No serious cardiac risks = 0 points     RCRI Interpretation: 0 points: Class I (very low risk - 0.4% complication rate)         Signed Electronically by: Kojo Gates MD  A copy of this evaluation report is provided to the requesting physician.

## 2024-08-30 DIAGNOSIS — R06.6 HICCUP: ICD-10-CM

## 2024-08-30 RX ORDER — BACLOFEN 10 MG/1
TABLET ORAL
Qty: 180 TABLET | Refills: 1 | Status: SHIPPED | OUTPATIENT
Start: 2024-08-30

## 2024-10-02 DIAGNOSIS — G43.809 OTHER MIGRAINE WITHOUT STATUS MIGRAINOSUS, NOT INTRACTABLE: ICD-10-CM

## 2024-10-02 RX ORDER — TADALAFIL 5 MG/1
TABLET ORAL
OUTPATIENT
Start: 2024-10-02

## 2024-10-02 RX ORDER — NAPROXEN 500 MG/1
500 TABLET ORAL 2 TIMES DAILY PRN
Qty: 90 TABLET | Refills: 0 | Status: SHIPPED | OUTPATIENT
Start: 2024-10-02

## 2024-10-02 NOTE — TELEPHONE ENCOUNTER
Medication Question or Refill    Contacts       Contact Date/Time Type Contact Phone/Fax    10/02/2024 12:29 AM CDT Interface (Incoming) [facility]pharmacy #[id number] - [place], [place] - [address] [phone]    10/02/2024 09:11 AM CDT Phone (Incoming) Moreno Denis ACOSTA (Self) [phone] (H)            What medication are you calling about (include dose and sig)?: naproxen (NAPROSYN) 500 MG tablet     tadalafil (CIALIS) 5 MG tablet     Preferred Pharmacy:   [facility]/pharmacy #[id number] - [place], [place] - [address]  [address] [place] [address]  Phone: [phone] Fax: [phone]      Controlled Substance Agreement on file:   CSA -- Patient Level:     [Media Unavailable] Controlled Substance Agreement - Non - Opioid - Scan on 1/2/2022  9:35 PM: NON-OPIOID CONTROLLED SUBSTANCE AGREEMENT   [Media Unavailable] Controlled Substance Agreement - Non - Opioid - Scan on 3/6/2020: NON-OPIOID CONTROLLED SUBSTANCE AGREEMENT       Who prescribed the medication?: Dr. Faust    Do you need a refill? Yes    When did you use the medication last? unknown    Patient offered an appointment? No    Do you have any questions or concerns?  No      Could we send this information to you in [facility] or would you prefer to receive a phone call?:   Patient would prefer a phone call   Okay to leave a detailed message?: Yes at Home number on file [phone] (home)

## 2024-10-02 NOTE — TELEPHONE ENCOUNTER
Please have patient do E visit for medication follow up to let me know how he is taking the cialis- not sure if once per day every day for urine symptoms or for erections- he should let me know how this is working for him as well.

## 2024-10-09 RX ORDER — TADALAFIL 5 MG/1
TABLET ORAL
Status: CANCELLED | OUTPATIENT
Start: 2024-10-09

## 2024-10-10 ENCOUNTER — E-VISIT (OUTPATIENT)
Dept: FAMILY MEDICINE | Facility: CLINIC | Age: 74
End: 2024-10-10
Payer: COMMERCIAL

## 2024-10-10 DIAGNOSIS — N40.1 BENIGN PROSTATIC HYPERPLASIA WITH LOWER URINARY TRACT SYMPTOMS, SYMPTOM DETAILS UNSPECIFIED: Primary | ICD-10-CM

## 2024-10-10 PROCEDURE — 99421 OL DIG E/M SVC 5-10 MIN: CPT | Performed by: INTERNAL MEDICINE

## 2024-10-10 NOTE — TELEPHONE ENCOUNTER
Please call patient sent [email or web address] on 10/2      Encounter Date: 10/2/2024       Please have patient do E visit for medication follow up to let me know how he is taking the cialis- not sure if once per day every day for urine symptoms or for erections- he should let me know how this is working for him as well.

## 2024-10-11 RX ORDER — TADALAFIL 5 MG/1
5 TABLET ORAL DAILY
Qty: 90 TABLET | Refills: 3 | Status: SHIPPED | OUTPATIENT
Start: 2024-10-11 | End: 2024-11-11

## 2024-10-11 NOTE — PATIENT INSTRUCTIONS
Good morning,    I sent in the tadalafil for you to the pharmacy. Verify that the dose looks the same as from the urologist. I sent in 5 mg once per day. Let me know if any issues.    Sincerely,    Saurav Faust MD

## 2024-10-18 DIAGNOSIS — I15.8 OTHER SECONDARY HYPERTENSION: ICD-10-CM

## 2024-10-18 RX ORDER — AMLODIPINE BESYLATE 10 MG/1
10 TABLET ORAL DAILY
Qty: 90 TABLET | Refills: 2 | Status: SHIPPED | OUTPATIENT
Start: 2024-10-18

## 2024-10-21 ENCOUNTER — OFFICE VISIT (OUTPATIENT)
Dept: PULMONOLOGY | Facility: CLINIC | Age: 74
End: 2024-10-21
Payer: COMMERCIAL

## 2024-10-21 VITALS
HEIGHT: 64 IN | SYSTOLIC BLOOD PRESSURE: 112 MMHG | DIASTOLIC BLOOD PRESSURE: 64 MMHG | WEIGHT: 146 LBS | OXYGEN SATURATION: 97 % | BODY MASS INDEX: 24.92 KG/M2 | HEART RATE: 54 BPM

## 2024-10-21 DIAGNOSIS — D86.0 PULMONARY SARCOIDOSIS (H): Primary | ICD-10-CM

## 2024-10-21 DIAGNOSIS — D86.0 PULMONARY SARCOIDOSIS (H): ICD-10-CM

## 2024-10-21 LAB — HGB BLD-MCNC: 11.3 G/DL

## 2024-10-21 PROCEDURE — 94729 DIFFUSING CAPACITY: CPT | Mod: 26 | Performed by: INTERNAL MEDICINE

## 2024-10-21 PROCEDURE — 85018 HEMOGLOBIN: CPT | Mod: QW | Performed by: INTERNAL MEDICINE

## 2024-10-21 PROCEDURE — 94375 RESPIRATORY FLOW VOLUME LOOP: CPT | Mod: 26 | Performed by: INTERNAL MEDICINE

## 2024-10-21 PROCEDURE — 99207 PR NO BILLABLE SERVICE THIS VISIT: CPT | Performed by: INTERNAL MEDICINE

## 2024-10-21 PROCEDURE — 99214 OFFICE O/P EST MOD 30 MIN: CPT | Performed by: INTERNAL MEDICINE

## 2024-10-21 PROCEDURE — G2211 COMPLEX E/M VISIT ADD ON: HCPCS | Performed by: INTERNAL MEDICINE

## 2024-10-21 RX ORDER — ALBUTEROL SULFATE 90 UG/1
2 INHALANT RESPIRATORY (INHALATION) EVERY 4 HOURS PRN
Qty: 18 G | Refills: 11 | Status: SHIPPED | OUTPATIENT
Start: 2024-10-21

## 2024-10-21 NOTE — LETTER
10/21/2024      Denis Moreno  [address] [place] [address]      Dear Colleague,    Thank you for referring your patient, Denis Moreno, to the [facility]. Please see a copy of my visit note below.    Pulmonary Clinic Follow-up Visit    Assessment and Plan:  74 year old male never smoker with a history of dyslipidemia, low-grade chronic bilateral uveitis, intermittent sinusitis, mild SAPNA, pulmonary sarcoidosis, prostate cancer s/p treatment, presenting for follow-up.      Sarcoidosis: Doing well overall. Has a dry cough about 1-1.5 hours after eating dinner. Is on a PPI. With the cough has some wheezing. Other than that, no dyspnea, no nocturnal symptoms. No palpitations or chest pain. Had cataract surgery recently, follows with an ophthalmologist, previously had bilateral uveitis. No eye pain. Spirometry shows mild obstruction, FEV1 down about 400 mL over 2.5 years but still 92% predicted. DLCOc mildly reduced but actually increased from 2.5 years ago. Normal CMP, CBC, and ECG in April 2024. Recall that Denis has a history of pulmonary and ocular involvement with sarcoidosis, with chronic bilateral uveitis, and mediastinal/hilar lymphadenopathy with pulmonary nodules but previously normal functional status and normal PFTs. Last spirometry in April 2022 showed mild obstruction, moderated DLCO reduction. He previously developed worsening fatigue, dyspnea, and cough, with significant peripheral interstitial changes and worsening pulmonary function tests requiring initiation of prednisone. This led to improvement in symptoms, pulmonary function, and radiographic findings, and we were able to slowly titrate off the prednisone down to maintenance of 0.5 mg daily, which we have since had to increase to 2 mg daily.     Plan:  - continue prednisone 2 mg daily  - trial of as-needed albuterol  - next CMP, CBC and ECG due in April 2025  - ongoing ophthalmology follow-up at [facility]  -  recommend remaining up to date with respiratory vaccinations  - follow up in 6 months with repeat spirometry/DLCO; if decline in pulmonary function or worsening symptoms, consider repeat high-resolution chest CT  - encouraged him to contact us with questions or concerning symptoms    Vipin Duron MD  Pulmonary and Critical Care Medicine  [facility]  Office [phone]  he/him    CCx: sarcoidosis    HPI: 74 year old male never smoker with a history of dyslipidemia, low-grade chronic bilateral uveitis, intermittent sinusitis, mild SAPNA, pulmonary sarcoidosis, prostate cancer s/p treatment, presenting for follow-up. Doing well overall. Has a dry cough about 1-1.5 hours after eating dinner. Is on a PPI. With the cough has some wheezing. Other than that, no dyspnea, no nocturnal symptoms. No palpitations or chest pain. Had cataract surgery recently, follows with an ophthalmologist, previously had bilateral uveitis. No eye pain. Spirometry is normal, though FEV1 down about 400 mL over 2.5 years. DLCOc mildly reduced but actually increased from 2.5 years ago. Normal CMP, CBC, and ECG in April 2024. Recall that Denis has a history of pulmonary and ocular involvement with sarcoidosis, with chronic bilateral uveitis, and mediastinal/hilar lymphadenopathy with pulmonary nodules but previously normal functional status and normal PFTs. Last spirometry in April 2022 showed mild obstruction, moderated DLCO reduction. He previously developed worsening fatigue, dyspnea, and cough, with significant peripheral interstitial changes and worsening pulmonary function tests requiring initiation of prednisone. This led to improvement in symptoms, pulmonary function, and radiographic findings, and we were able to slowly titrate off the prednisone down to maintenance of 0.5 mg daily, which we have since had to increase to 2 mg daily.    ROS:  A 12-system review was obtained and was negative with the exception of the  symptoms endorsed in the history of present illness.    PMH:  never smoker with a history of dyslipidemia, low-grade chronic bilateral uveitis, intermittent sinusitis, mild SAPNA, pulmonary sarcoidosis, prostate cancer s/p treatment    PSH:  Past Surgical History:   Procedure Laterality Date     BIOPSY  2021     BRONCHOSCOPY FLEXIBLE AND RIGID  12/20/2016    ebus     COLONOSCOPY  2014    Nothing found, another scheduled in 2023       Allergies:  No Known Allergies    Family HX:  Family History   Problem Relation Age of Onset     Dementia Mother         d 96     Mental Illness Mother         Alzheimer's     Heart Disease Father         d 92     Prostate Cancer Father      Substance Abuse Brother         Alcohol, numerous individuals in my Family abused Alcohol     Hypertension Other         Its normal in the [place]     Hyperlipidemia No family hx of      Depression No family hx of      Anxiety Disorder No family hx of        Social Hx:  Social History     Socioeconomic History     Marital status: Single     Spouse name: Not on file     Number of children: Not on file     Years of education: Not on file     Highest education level: Not on file   Occupational History     Not on file   Tobacco Use     Smoking status: Never     Passive exposure: Never     Smokeless tobacco: Never     Tobacco comments:     N.A.   Vaping Use     Vaping status: Never Used   Substance and Sexual Activity     Alcohol use: Yes     Comment: Socially     Drug use: No     Sexual activity: Not Currently     Partners: Female     Birth control/protection: Pull-out method, Pill, None     Comment: My Lady Friend lives outside my state   Other Topics Concern     Parent/sibling w/ CABG, MI or angioplasty before 65F 55M? No   Social History Narrative    , 2 grown daughters, 2 granddaughters. 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  Previously working in administrative position.  Nonsmoker.         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 Moved back to [place] 9 years ago because parents had dementia, so moved back here to take care of them. He has an older sister and younger brother (John). He has a girlfriend. She lives in [place]. His ex-wife passed away from breast cancer. Had 2 girls in that marriage. Oldest girl has 2 granddaughters. Both live in [place]. 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 He will probably stop volunteering soon though. - Dr. Lunsford 12/10/21      Social Determinants of Health     Financial Resource Strain: Low Risk  (4/17/2024)    Financial Resource Strain      Within the past 12 months, have you or your family members you live with been unable to get utilities (heat, electricity) when it was really needed?: No   Recent Concern: Financial Resource Strain - Medium Risk (4/2/2024)    Received from [facility]    Financial Resource Strain      Difficulty of Paying Living Expenses: 1      Difficulty of Paying Living Expenses: 2   Food Insecurity: High Risk (4/17/2024)    Food Insecurity      Within the past 12 months, did you worry that your food would run out before you got money to buy more?: No      Within the past 12 months, did the food you bought just not last and you didn t have money to get more?: Yes   Transportation Needs: Low Risk  (4/17/2024)    Transportation Needs      Within the past 12 months, has lack of transportation kept you from medical appointments, getting your medicines, non-medical meetings or appointments, work, or from getting things that you need?: No   Physical Activity: Inactive (4/17/2024)    Exercise Vital Sign      Days of Exercise per Week: 0 days      Minutes of Exercise per Session: 30 min   Stress: Stress Concern Present (4/17/2024)    [employer] [place] [employer] - Occupational Stress Questionnaire      Feeling of Stress : To some " extent   Social Connections: Unknown (4/17/2024)    Social Connection and Isolation Panel [NHANES]      Frequency of Communication with Friends and Family: Not on file      Frequency of Social Gatherings with Friends and Family: Once a week      Attends [employer] Services: Not on file      Active Member of Clubs or Organizations: Not on file      Attends Club or Organization Meetings: Not on file      Marital Status: Not on file   Interpersonal Safety: Low Risk  (4/18/2024)    Interpersonal Safety      Do you feel physically and emotionally safe where you currently live?: Yes      Within the past 12 months, have you been hit, slapped, kicked or otherwise physically hurt by someone?: No      Within the past 12 months, have you been humiliated or emotionally abused in other ways by your partner or ex-partner?: No   Housing Stability: High Risk (4/17/2024)    Housing Stability      Do you have housing? : Yes      Are you worried about losing your housing?: Yes       Current Meds:  Current Outpatient Medications   Medication Sig Dispense Refill     albuterol (PROAIR HFA/PROVENTIL HFA/VENTOLIN HFA) 108 (90 Base) MCG/ACT inhaler Inhale 2 puffs into the lungs every 4 hours as needed. 18 g 11     ALPRAZolam (XANAX) 1 MG tablet Take 1 tablet (1 mg) by mouth nightly as needed for anxiety or sleep 30 tablet 3     amLODIPine (NORVASC) 10 MG tablet TAKE 1 TABLET (10 MG) BY MOUTH DAILY. 90 tablet 2     azelastine (ASTELIN) 0.1 % nasal spray Spray 1 spray into both nostrils. As needed       baclofen (LIORESAL) 10 MG tablet TAKE 0.5-1 TABLET (5-10 MG) BY MOUTH 2 TIMES DAILY AS NEEDED FOR MUSCLE SPASMS (HICCUPS) 180 tablet 1     buPROPion (WELLBUTRIN XL) 150 MG 24 hr tablet TAKE 1 TABLET BY MOUTH EVERY MORNING 90 tablet 1     celecoxib (CELEBREX) 200 MG capsule Take 200 mg by mouth daily       diclofenac (VOLTAREN) 1 % topical gel Apply 4 g topically 4 times daily as needed for moderate pain 100 g 3     escitalopram (LEXAPRO) 10 MG  "tablet Take 1 tablet (10 mg) by mouth daily 90 tablet 3     fluticasone (FLONASE) 50 MCG/ACT nasal spray One spray in each nostril daily as needed for sinus congestion. 16 g 11     gabapentin (NEURONTIN) 300 MG capsule Take 1 capsule (300 mg) by mouth 3 times daily 90 capsule 4     hydrOXYzine (ATARAX) 25 MG tablet Take 25 mg by mouth 3 times daily as needed for itching       lisinopril (ZESTRIL) 20 MG tablet Take 1 tablet (20 mg) by mouth daily 90 tablet 11     naproxen (NAPROSYN) 500 MG tablet TAKE 1 TABLET (500 MG) BY MOUTH 2 TIMES DAILY AS NEEDED FOR HEADACHES. 90 tablet 0     pantoprazole (PROTONIX) 40 MG EC tablet Take 1 tablet (40 mg) by mouth daily 90 tablet 3     predniSONE (DELTASONE) 1 MG tablet Take 2 tablets (2 mg) by mouth daily       propranolol (INDERAL) 20 MG tablet TAKE 1 TABLET BY MOUTH TWICE A  tablet 1     rizatriptan (MAXALT) 5 MG tablet Take 5 mg by mouth at onset of headache for migraine       rosuvastatin (CRESTOR) 10 MG tablet Take 1 tablet (10 mg) by mouth daily. 90 tablet 2     SUMAtriptan (IMITREX) 50 MG tablet Take 1 tablet (50 mg) by mouth at onset of headache for migraine 6 tablet 1     tadalafil (CIALIS) 5 MG tablet Take 1 tablet (5 mg) by mouth daily. 90 tablet 3     traZODone (DESYREL) 50 MG tablet [TRAZODONE (DESYREL) 50 MG TABLET] TAKE 1 TABLET BY MOUTH EVERYDAY AT BEDTIME 90 tablet 2     melatonin ([employer] MELATONIN) 3 MG tablet TAKE 1 TABLET (3 MG) BY MOUTH NIGHTLY AS NEEDED FOR SLEEP Strength: 3 mg (Patient not taking: Reported on 10/21/2024) 90 tablet 11       Physical Exam:  /64   Pulse 54   Ht 1.626 m (5' 4\")   Wt 66.2 kg (146 lb)   SpO2 97%   BMI 25.06 kg/m    Gen: alert, oriented, no distress  HEENT: nasal mucosa is unremarkable, no oropharyngeal lesions, no cervical or supraclavicular lymphadenopathy  CV: dulce, regular, no M/G/R  Resp: CTAB, no focal crackles or wheezes  Skin: no apparent rashes  Ext: no cyanosis, clubbing or edema  Neuro: alert, " nonfocal    Labs:  reviewed    Imaging studies:  PET-CT (April 2022):  - images directly reviewed, formal interpretation follows:  FINDINGS:      HEAD/NECK:  Asymmetric activity at the left palatine tonsil, SUV max 8.19. No  corresponding lesion on CT.     The paranasal sinuses are clear. The mastoid air cells are clear.      The mucosal pharyngeal space, the , prevertebral and carotid  spaces are within normal limits.      No masses, mass effect or pathologically enlarged lymph nodes.   The  thyroid gland is within normal limits.      CHEST:  Hypermetabolic mediastinal and bilateral hilar partially calcified  lymphadenopathy, for example:  -subcarinal lymph node with SUV max 9.08, measuring 19x17 mm, image  185 series 3  -left hilar lymph node with SUV max 8.63, measuring 15 x 10 mm image  178 series 3  -right paratracheal lymph node with SUV max 6.36, image 170 series 3     Hiatal hernia and mild circumferential wall thickening of the distal  esophagus without associated hypermetabolism. Calcified paraesophageal  lymph node without hypermetabolism.      Multiple bilateral pulmonary nodules again demonstrated in all lobes  of the lungs, including:    -19 x 7 mm, along the left pulmonary fissure, image 50 series 8,  increased since 2019 (at which time measured 4 mm). SUV max 3.53.  -6 mm, right upper lobe, image 34 series 8, increased since 2019 (at  which time measured 3 mm)  These do not demonstrate hypermetabolism above liver and some only  slightly above mediastinal blood pool.      Scarring in the middle lobe inferiorly again seen, similar to 2019. No  pleural effusion.  No pericardial effusion. No large filling defect in  the central pulmonary arteries.     ABDOMEN AND PELVIS:  No suspicious FDG uptake in the abdomen or pelvis. Low attenuating  nonenhancing hypodensity not hypermetabolic in or adjacent to the  pancreatic head, a 19 x 15 mm image 252 series 4.  There is no mass  effect on adjacent  structures. The hepatic artery and portal vein pass  through it.  No abdominal/pelvic lymphadenopathy.     The liver has a somewhat mottled appearance although this may be  secondary to bolus timing as this is an arterial phase.  No suspicious  hepatic lesions. No splenomegaly.  No suspicious adrenal mass lesion.  No opaque gallbladder calculi.   There is symmetric nephrographic  renal phase without hydronephrosis.     No evidence for diverticulitis.  No bowel obstruction.  No free fluid.    Normal appendix. Scattered atherosclerotic calcifications of the  aorta.     LOWER EXTREMITIES:   No abnormal masses or hypermetabolic lesions.     BONES:   No suspicious lytic or blastic osseous lesions.   No suspicious FDG  uptake in the skeleton. Mild degenerative changes of the lumbar spine.                                                                         IMPRESSION:  In summary: constellation of findings (hypermetabolic  mediastinal lymphadenopathy, lung nodules, palatine tonsil) most  consistent with active sarcoidosis. Consider treating and rescanning  with PET/CT to see if hypermetabolism resolves.  Alternatively  biopsies could be obtained.     In detail:  1. Increased size of hypermetabolic mediastinal and hilar  lymphadenopathy since 2019.   2. Multiple enlarging hypermetabolic lung nodules. Most likely  secondary to sarcoidosis, upper lobe predominant and several in  perihepatic locations which would be typical of sarcoidosis. All but 2  in the upper lobes were present on CT 1/15/2019.  While this does not  exclude metastatic disease, it is very unlikely.     3. No active cardiac sarcoidosis (can be assessed because cardiac  uptake is suppressed).      4. Indeterminate uptake in the area of left palatine tonsil.  Differential sarcoidosis, inflammation, cancer. Consider direct  visualization.  Alternatively short-term follow-up after treatment  sarcoid.     5. Hypoenhancing structure in the pancreatic head  region without  hypermetabolism.  Indeterminant but likely benign. Likely present on  1/15/2018 (noncontrast study limits evaluation).   If there is further  concern  abdominal MRI could be obtained.     6. Distal esophageal mild wall thickening does not demonstrate  hypermetabolism. Adjacent lymph node with calcification likely prior sarcoid.     TT$E (October 2017):    No previous study for comparison.    Left ventricle ejection fraction is normal. The calculated left ventricular ejection fraction is 69%.    Mild mitral regurgitation      Pulmonary Function Testing  November 2016:  FEV1/FVC is 72% and is normal.  FEV1 is 2.49L or 113% predicted and is normal.  FVC is 3.47L or 124% predicted and normal.  There was no improvement in spirometry after a single inhaled dose of bronchodilator.  TLC is 5.13L or 107% predicted and is normal.  RV is 1.54L or 77% predicted and is reduced.  DLCO is 17.61 ml/min/mmHg or 88% predicted and is normal when it   is corrected for hemoglobin.     October 2017:  FEV1/FVC is 68 and is normal (less than 0.7 but greater than the demographically adjusted lower limit of normal).  FEV1 is 122% predicted and is normal.  FVC is 140% predicted and normal.     DLCO is 80% predicted and is normal when it   is corrected for hemoglobin.     December 2018:  FEV1/FVC is 74% and is normal.  FEV1 is 1.72 L or 76% predicted and is reduced.  FVC is 2.33 L or 80% predicted and is normal.  There was no improvement in spirometry after a single inhaled dose of bronchodilator.  DLCO is 9.48 mL/min/mm Hg or 40% predicted and is reduced when it   is corrected for hemoglobin.     February 2019:  FEV1/FVC is 68% and is reduced.  FEV1 is 2.39L (108%) predicted and is normal.  FVC is 3.52L (118%) predicted and normal.  There was no improvement in spirometry after a single inhaled dose of bronchodilator.  DLCO is 13.14ml/min/hg (55%) predicted and is reduced when it is corrected for hemoglobin.  Flow volume loops  indicate severe scooping of the expiratory limb.     Impression:  Full Pulmonary Function Test is abnormal.  PFTs are consistent with mild obstructive disease.  Spirometry is not consistent with reversibility.  Diffusion capacity when corrected for hemoglobin is moderately reduced.     September 2020:  FEV1/FVC is 69 and is normal.  FEV1 is 106% predicted and is normal.  FVC is 119% predicted and is normal.  There was no improvement in spirometry after a single inhaled dose of bronchodilator.  DLCO is 78% predicted and is normal when it   is corrected for hemoglobin.  The flow volume loop is normal     October 2021:  FVC 3.28 L (106%)  FEV1 2.28 L (94%)  Ratio 0.69 (less than 0.7 but greater than the demographically adjusted lower limit of normal)  No significant bronchodilator response  DLCOc 70%  Loop with some expiratory concavity     April 2022:  FEV1 2.52 (106%)  FVC 3.95 (128%)  FEV1/FVC 0.64  DLCOc 58%  Flattened inspiratory limb    October 2024:  FVC 3.35 (111%)  FEV1 2.13 (92%)  FEV1/FVC 0.64  DLCOc 66%    Time spent on chart and image review, meeting with the patient to obtain history, perform physical exam, discuss test results, diagnostic possibilities, further testing options, treatment plan options, and care coordination: 32 minutes    The longitudinal plan of care for the diagnosis(es)/condition(s) as documented were addressed during this visit. Due to the added complexity in care, I will continue to support Denis in the subsequent management and with ongoing continuity of care.        Again, thank you for allowing me to participate in the care of your patient.        Sincerely,        Vipin Duron MD

## 2024-10-21 NOTE — PROGRESS NOTES
Pulmonary Clinic Follow-up Visit    Assessment and Plan:  74 year old male never smoker with a history of dyslipidemia, low-grade chronic bilateral uveitis, intermittent sinusitis, mild SAPNA, pulmonary sarcoidosis, prostate cancer s/p treatment, presenting for follow-up.      Sarcoidosis: Doing well overall. Has a dry cough about 1-1.5 hours after eating dinner. Is on a PPI. With the cough has some wheezing. Other than that, no dyspnea, no nocturnal symptoms. No palpitations or chest pain. Had cataract surgery recently, follows with an ophthalmologist, previously had bilateral uveitis. No eye pain. Spirometry shows mild obstruction, FEV1 down about 400 mL over 2.5 years but still 92% predicted. DLCOc mildly reduced but actually increased from 2.5 years ago. Normal CMP, CBC, and ECG in April 2024. Recall that Denis has a history of pulmonary and ocular involvement with sarcoidosis, with chronic bilateral uveitis, and mediastinal/hilar lymphadenopathy with pulmonary nodules but previously normal functional status and normal PFTs. Last spirometry in April 2022 showed mild obstruction, moderated DLCO reduction. He previously developed worsening fatigue, dyspnea, and cough, with significant peripheral interstitial changes and worsening pulmonary function tests requiring initiation of prednisone. This led to improvement in symptoms, pulmonary function, and radiographic findings, and we were able to slowly titrate off the prednisone down to maintenance of 0.5 mg daily, which we have since had to increase to 2 mg daily.     Plan:  - continue prednisone 2 mg daily  - trial of as-needed albuterol  - next CMP, CBC and ECG due in April 2025  - ongoing ophthalmology follow-up at [facility]  - recommend remaining up to date with respiratory vaccinations  - follow up in 6 months with repeat spirometry/DLCO; if decline in pulmonary function or worsening symptoms, consider repeat high-resolution chest CT  - encouraged him to  contact us with questions or concerning symptoms    Vipin uDron MD  Pulmonary and Critical Care Medicine  [facility]  Office [phone]  he/him    CCx: sarcoidosis    HPI: 74 year old male never smoker with a history of dyslipidemia, low-grade chronic bilateral uveitis, intermittent sinusitis, mild SAPNA, pulmonary sarcoidosis, prostate cancer s/p treatment, presenting for follow-up. Doing well overall. Has a dry cough about 1-1.5 hours after eating dinner. Is on a PPI. With the cough has some wheezing. Other than that, no dyspnea, no nocturnal symptoms. No palpitations or chest pain. Had cataract surgery recently, follows with an ophthalmologist, previously had bilateral uveitis. No eye pain. Spirometry is normal, though FEV1 down about 400 mL over 2.5 years. DLCOc mildly reduced but actually increased from 2.5 years ago. Normal CMP, CBC, and ECG in April 2024. Recall that Denis has a history of pulmonary and ocular involvement with sarcoidosis, with chronic bilateral uveitis, and mediastinal/hilar lymphadenopathy with pulmonary nodules but previously normal functional status and normal PFTs. Last spirometry in April 2022 showed mild obstruction, moderated DLCO reduction. He previously developed worsening fatigue, dyspnea, and cough, with significant peripheral interstitial changes and worsening pulmonary function tests requiring initiation of prednisone. This led to improvement in symptoms, pulmonary function, and radiographic findings, and we were able to slowly titrate off the prednisone down to maintenance of 0.5 mg daily, which we have since had to increase to 2 mg daily.    ROS:  A 12-system review was obtained and was negative with the exception of the symptoms endorsed in the history of present illness.    PMH:  never smoker with a history of dyslipidemia, low-grade chronic bilateral uveitis, intermittent sinusitis, mild SAPNA, pulmonary sarcoidosis, prostate cancer s/p  "treatment    PSH:  Past Surgical History:   Procedure Laterality Date    BIOPSY  2021    BRONCHOSCOPY FLEXIBLE AND RIGID  12/20/2016    ebus    COLONOSCOPY  2014    Nothing found, another scheduled in 2023       Allergies:  No Known Allergies    Family HX:  Family History   Problem Relation Age of Onset    Dementia Mother         d 96    Mental Illness Mother         Alzheimer's    Heart Disease Father         d 92    Prostate Cancer Father     Substance Abuse Brother         Alcohol, numerous individuals in my Family abused Alcohol    Hypertension Other         Its normal in the [place]    Hyperlipidemia No family hx of     Depression No family hx of     Anxiety Disorder No family hx of        Social Hx:  Social History     Socioeconomic History    Marital status: Single     Spouse name: Not on file    Number of children: Not on file    Years of education: Not on file    Highest education level: Not on file   Occupational History    Not on file   Tobacco Use    Smoking status: Never     Passive exposure: Never    Smokeless tobacco: Never    Tobacco comments:     N.A.   Vaping Use    Vaping status: Never Used   Substance and Sexual Activity    Alcohol use: Yes     Comment: Socially    Drug use: No    Sexual activity: Not Currently     Partners: Female     Birth control/protection: Pull-out method, Pill, None     Comment: My Lady Friend lives outside my state   Other Topics Concern    Parent/sibling w/ CABG, MI or angioplasty before 65F 55M? No   Social History Narrative    , 2 grown daughters, 2 granddaughters. 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  Previously working in administrative position.  Nonsmoker.         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 Moved back to [place] 9 years ago because parents had dementia, so moved back here to take care of them. He has an older sister and younger brother (John). He has a girlfriend. She lives in [place]. " His ex-wife passed away from breast cancer. Had 2 girls in that marriage. Oldest girl has 2 granddaughters. Both live in [place]. 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 He will probably stop volunteering soon though. - Dr. Lunsford 12/10/21      Social Determinants of Health     Financial Resource Strain: Low Risk  (4/17/2024)    Financial Resource Strain     Within the past 12 months, have you or your family members you live with been unable to get utilities (heat, electricity) when it was really needed?: No   Recent Concern: Financial Resource Strain - Medium Risk (4/2/2024)    [facility]    Financial Resource Strain     Difficulty of Paying Living Expenses: 1     Difficulty of Paying Living Expenses: 2   Food Insecurity: High Risk (4/17/2024)    Food Insecurity     Within the past 12 months, did you worry that your food would run out before you got money to buy more?: No     Within the past 12 months, did the food you bought just not last and you didn t have money to get more?: Yes   Transportation Needs: Low Risk  (4/17/2024)    Transportation Needs     Within the past 12 months, has lack of transportation kept you from medical appointments, getting your medicines, non-medical meetings or appointments, work, or from getting things that you need?: No   Physical Activity: Inactive (4/17/2024)    Exercise Vital Sign     Days of Exercise per Week: 0 days     Minutes of Exercise per Session: 30 min   Stress: Stress Concern Present (4/17/2024)    [employer] - Occupational Stress Questionnaire     Feeling of Stress : To some extent   Social Connections: Unknown (4/17/2024)    Social Connection and Isolation Panel [NHANES]     Frequency of Communication with Friends and Family: Not on file     Frequency of Social Gatherings with Friends and Family: Once a week     Attends  Latter day Services: Not on file     Active Member of Clubs or Organizations: Not on file     Attends Club or Organization Meetings: Not on file     Marital Status: Not on file   Interpersonal Safety: Low Risk  (4/18/2024)    Interpersonal Safety     Do you feel physically and emotionally safe where you currently live?: Yes     Within the past 12 months, have you been hit, slapped, kicked or otherwise physically hurt by someone?: No     Within the past 12 months, have you been humiliated or emotionally abused in other ways by your partner or ex-partner?: No   Housing Stability: High Risk (4/17/2024)    Housing Stability     Do you have housing? : Yes     Are you worried about losing your housing?: Yes       Current Meds:  Current Outpatient Medications   Medication Sig Dispense Refill    albuterol (PROAIR HFA/PROVENTIL HFA/VENTOLIN HFA) 108 (90 Base) MCG/ACT inhaler Inhale 2 puffs into the lungs every 4 hours as needed. 18 g 11    ALPRAZolam (XANAX) 1 MG tablet Take 1 tablet (1 mg) by mouth nightly as needed for anxiety or sleep 30 tablet 3    amLODIPine (NORVASC) 10 MG tablet TAKE 1 TABLET (10 MG) BY MOUTH DAILY. 90 tablet 2    azelastine (ASTELIN) 0.1 % nasal spray Spray 1 spray into both nostrils. As needed      baclofen (LIORESAL) 10 MG tablet TAKE 0.5-1 TABLET (5-10 MG) BY MOUTH 2 TIMES DAILY AS NEEDED FOR MUSCLE SPASMS (HICCUPS) 180 tablet 1    buPROPion (WELLBUTRIN XL) 150 MG 24 hr tablet TAKE 1 TABLET BY MOUTH EVERY MORNING 90 tablet 1    celecoxib (CELEBREX) 200 MG capsule Take 200 mg by mouth daily      diclofenac (VOLTAREN) 1 % topical gel Apply 4 g topically 4 times daily as needed for moderate pain 100 g 3    escitalopram (LEXAPRO) 10 MG tablet Take 1 tablet (10 mg) by mouth daily 90 tablet 3    fluticasone (FLONASE) 50 MCG/ACT nasal spray One spray in each nostril daily as needed for sinus congestion. 16 g 11    gabapentin (NEURONTIN) 300 MG capsule Take 1 capsule (300 mg) by mouth 3 times daily 90  "capsule 4    hydrOXYzine (ATARAX) 25 MG tablet Take 25 mg by mouth 3 times daily as needed for itching      lisinopril (ZESTRIL) 20 MG tablet Take 1 tablet (20 mg) by mouth daily 90 tablet 11    naproxen (NAPROSYN) 500 MG tablet TAKE 1 TABLET (500 MG) BY MOUTH 2 TIMES DAILY AS NEEDED FOR HEADACHES. 90 tablet 0    pantoprazole (PROTONIX) 40 MG EC tablet Take 1 tablet (40 mg) by mouth daily 90 tablet 3    predniSONE (DELTASONE) 1 MG tablet Take 2 tablets (2 mg) by mouth daily      propranolol (INDERAL) 20 MG tablet TAKE 1 TABLET BY MOUTH TWICE A  tablet 1    rizatriptan (MAXALT) 5 MG tablet Take 5 mg by mouth at onset of headache for migraine      rosuvastatin (CRESTOR) 10 MG tablet Take 1 tablet (10 mg) by mouth daily. 90 tablet 2    SUMAtriptan (IMITREX) 50 MG tablet Take 1 tablet (50 mg) by mouth at onset of headache for migraine 6 tablet 1    tadalafil (CIALIS) 5 MG tablet Take 1 tablet (5 mg) by mouth daily. 90 tablet 3    traZODone (DESYREL) 50 MG tablet [TRAZODONE (DESYREL) 50 MG TABLET] TAKE 1 TABLET BY MOUTH EVERYDAY AT BEDTIME 90 tablet 2    melatonin ([employer] MELATONIN) 3 MG tablet TAKE 1 TABLET (3 MG) BY MOUTH NIGHTLY AS NEEDED FOR SLEEP Strength: 3 mg (Patient not taking: Reported on 10/21/2024) 90 tablet 11       Physical Exam:  /64   Pulse 54   Ht 1.626 m (5' 4\")   Wt 66.2 kg (146 lb)   SpO2 97%   BMI 25.06 kg/m    Gen: alert, oriented, no distress  HEENT: nasal mucosa is unremarkable, no oropharyngeal lesions, no cervical or supraclavicular lymphadenopathy  CV: dulce, regular, no M/G/R  Resp: CTAB, no focal crackles or wheezes  Skin: no apparent rashes  Ext: no cyanosis, clubbing or edema  Neuro: alert, nonfocal    Labs:  reviewed    Imaging studies:  PET-CT (April 2022):  - images directly reviewed, formal interpretation follows:  FINDINGS:      HEAD/NECK:  Asymmetric activity at the left palatine tonsil, SUV max 8.19. No  corresponding lesion on CT.     The paranasal sinuses are clear. " The mastoid air cells are clear.      The mucosal pharyngeal space, the , prevertebral and carotid  spaces are within normal limits.      No masses, mass effect or pathologically enlarged lymph nodes.   The  thyroid gland is within normal limits.      CHEST:  Hypermetabolic mediastinal and bilateral hilar partially calcified  lymphadenopathy, for example:  -subcarinal lymph node with SUV max 9.08, measuring 19x17 mm, image  185 series 3  -left hilar lymph node with SUV max 8.63, measuring 15 x 10 mm image  178 series 3  -right paratracheal lymph node with SUV max 6.36, image 170 series 3     Hiatal hernia and mild circumferential wall thickening of the distal  esophagus without associated hypermetabolism. Calcified paraesophageal  lymph node without hypermetabolism.      Multiple bilateral pulmonary nodules again demonstrated in all lobes  of the lungs, including:    -19 x 7 mm, along the left pulmonary fissure, image 50 series 8,  increased since 2019 (at which time measured 4 mm). SUV max 3.53.  -6 mm, right upper lobe, image 34 series 8, increased since 2019 (at  which time measured 3 mm)  These do not demonstrate hypermetabolism above liver and some only  slightly above mediastinal blood pool.      Scarring in the middle lobe inferiorly again seen, similar to 2019. No  pleural effusion.  No pericardial effusion. No large filling defect in  the central pulmonary arteries.     ABDOMEN AND PELVIS:  No suspicious FDG uptake in the abdomen or pelvis. Low attenuating  nonenhancing hypodensity not hypermetabolic in or adjacent to the  pancreatic head, a 19 x 15 mm image 252 series 4.  There is no mass  effect on adjacent structures. The hepatic artery and portal vein pass  through it.  No abdominal/pelvic lymphadenopathy.     The liver has a somewhat mottled appearance although this may be  secondary to bolus timing as this is an arterial phase.  No suspicious  hepatic lesions. No splenomegaly.  No suspicious  adrenal mass lesion.  No opaque gallbladder calculi.   There is symmetric nephrographic  renal phase without hydronephrosis.     No evidence for diverticulitis.  No bowel obstruction.  No free fluid.    Normal appendix. Scattered atherosclerotic calcifications of the  aorta.     LOWER EXTREMITIES:   No abnormal masses or hypermetabolic lesions.     BONES:   No suspicious lytic or blastic osseous lesions.   No suspicious FDG  uptake in the skeleton. Mild degenerative changes of the lumbar spine.                                                                         IMPRESSION:  In summary: constellation of findings (hypermetabolic  mediastinal lymphadenopathy, lung nodules, palatine tonsil) most  consistent with active sarcoidosis. Consider treating and rescanning  with PET/CT to see if hypermetabolism resolves.  Alternatively  biopsies could be obtained.     In detail:  1. Increased size of hypermetabolic mediastinal and hilar  lymphadenopathy since 2019.   2. Multiple enlarging hypermetabolic lung nodules. Most likely  secondary to sarcoidosis, upper lobe predominant and several in  perihepatic locations which would be typical of sarcoidosis. All but 2  in the upper lobes were present on CT 1/15/2019.  While this does not  exclude metastatic disease, it is very unlikely.     3. No active cardiac sarcoidosis (can be assessed because cardiac  uptake is suppressed).      4. Indeterminate uptake in the area of left palatine tonsil.  Differential sarcoidosis, inflammation, cancer. Consider direct  visualization.  Alternatively short-term follow-up after treatment  sarcoid.     5. Hypoenhancing structure in the pancreatic head region without  hypermetabolism.  Indeterminant but likely benign. Likely present on  1/15/2018 (noncontrast study limits evaluation).   If there is further  concern  abdominal MRI could be obtained.     6. Distal esophageal mild wall thickening does not demonstrate  hypermetabolism. Adjacent  lymph node with calcification likely prior sarcoid.     TT$E (October 2017):    No previous study for comparison.    Left ventricle ejection fraction is normal. The calculated left ventricular ejection fraction is 69%.    Mild mitral regurgitation      Pulmonary Function Testing  November 2016:  FEV1/FVC is 72% and is normal.  FEV1 is 2.49L or 113% predicted and is normal.  FVC is 3.47L or 124% predicted and normal.  There was no improvement in spirometry after a single inhaled dose of bronchodilator.  TLC is 5.13L or 107% predicted and is normal.  RV is 1.54L or 77% predicted and is reduced.  DLCO is 17.61 ml/min/mmHg or 88% predicted and is normal when it   is corrected for hemoglobin.     October 2017:  FEV1/FVC is 68 and is normal (less than 0.7 but greater than the demographically adjusted lower limit of normal).  FEV1 is 122% predicted and is normal.  FVC is 140% predicted and normal.     DLCO is 80% predicted and is normal when it   is corrected for hemoglobin.     December 2018:  FEV1/FVC is 74% and is normal.  FEV1 is 1.72 L or 76% predicted and is reduced.  FVC is 2.33 L or 80% predicted and is normal.  There was no improvement in spirometry after a single inhaled dose of bronchodilator.  DLCO is 9.48 mL/min/mm Hg or 40% predicted and is reduced when it   is corrected for hemoglobin.     February 2019:  FEV1/FVC is 68% and is reduced.  FEV1 is 2.39L (108%) predicted and is normal.  FVC is 3.52L (118%) predicted and normal.  There was no improvement in spirometry after a single inhaled dose of bronchodilator.  DLCO is 13.14ml/min/hg (55%) predicted and is reduced when it is corrected for hemoglobin.  Flow volume loops indicate severe scooping of the expiratory limb.     Impression:  Full Pulmonary Function Test is abnormal.  PFTs are consistent with mild obstructive disease.  Spirometry is not consistent with reversibility.  Diffusion capacity when corrected for hemoglobin is moderately reduced.      September 2020:  FEV1/FVC is 69 and is normal.  FEV1 is 106% predicted and is normal.  FVC is 119% predicted and is normal.  There was no improvement in spirometry after a single inhaled dose of bronchodilator.  DLCO is 78% predicted and is normal when it   is corrected for hemoglobin.  The flow volume loop is normal     October 2021:  FVC 3.28 L (106%)  FEV1 2.28 L (94%)  Ratio 0.69 (less than 0.7 but greater than the demographically adjusted lower limit of normal)  No significant bronchodilator response  DLCOc 70%  Loop with some expiratory concavity     April 2022:  FEV1 2.52 (106%)  FVC 3.95 (128%)  FEV1/FVC 0.64  DLCOc 58%  Flattened inspiratory limb    October 2024:  FVC 3.35 (111%)  FEV1 2.13 (92%)  FEV1/FVC 0.64  DLCOc 66%    Time spent on chart and image review, meeting with the patient to obtain history, perform physical exam, discuss test results, diagnostic possibilities, further testing options, treatment plan options, and care coordination: 32 minutes    The longitudinal plan of care for the diagnosis(es)/condition(s) as documented were addressed during this visit. Due to the added complexity in care, I will continue to support Denis in the subsequent management and with ongoing continuity of care.

## 2024-10-21 NOTE — PATIENT INSTRUCTIONS
It was good to see you in clinic today. This is what we discussed:    Your lung function looks stable overall.  Continue prednisone 2 mg daily.  Try using albuterol two puffs if you have cough and wheeze in the evening.  I will see you in 6 months with repeat lung function testing and an ECG to look at the heart.  Contact me with questions or concerns.    Vipin Duron MD  Pulmonary and Critical Care Medicine  [facility]  Office [phone]

## 2024-10-23 LAB
DLCOCOR-%PRED-PRE: 66 %
DLCOCOR-PRE: 13.98 ML/MIN/MMHG
DLCOUNC-%PRED-PRE: 59 %
DLCOUNC-PRE: 12.48 ML/MIN/MMHG
DLCOUNC-PRED: 20.87 ML/MIN/MMHG
ERV-%PRED-PRE: 57 %
ERV-PRE: 0.62 L
ERV-PRED: 1.07 L
EXPTIME-PRE: 9.05 SEC
FEF2575-%PRED-PRE: 61 %
FEF2575-PRE: 1.1 L/SEC
FEF2575-PRED: 1.79 L/SEC
FEFMAX-%PRED-PRE: 102 %
FEFMAX-PRE: 6.61 L/SEC
FEFMAX-PRED: 6.46 L/SEC
FEV1-%PRED-PRE: 92 %
FEV1-PRE: 2.13 L
FEV1FEV6-PRE: 65 %
FEV1FEV6-PRED: 77 %
FEV1FVC-PRE: 64 %
FEV1FVC-PRED: 77 %
FEV1SVC-PRE: 64 %
FEV1SVC-PRED: 68 %
FIFMAX-PRE: 4.83 L/SEC
FVC-%PRED-PRE: 111 %
FVC-PRE: 3.35 L
FVC-PRED: 3 L
IC-%PRED-PRE: 127 %
IC-PRE: 2.73 L
IC-PRED: 2.14 L
VA-%PRED-PRE: 95 %
VA-PRE: 4.89 L
VC-%PRED-PRE: 98 %
VC-PRE: 3.35 L
VC-PRED: 3.4 L

## 2024-10-28 DIAGNOSIS — I10 ESSENTIAL HYPERTENSION, BENIGN: ICD-10-CM

## 2024-10-28 RX ORDER — LISINOPRIL 20 MG/1
20 TABLET ORAL DAILY
Qty: 100 TABLET | Refills: 1 | Status: SHIPPED | OUTPATIENT
Start: 2024-10-28

## 2024-10-28 NOTE — TELEPHONE ENCOUNTER
Patient has [facility] coverage and is eligible to get certain prescriptions as a 100-day supply.      Prescriptions updated to 100-day supply: lisinopril      Helena Pickard PharmD, [facility]  Retail Pharmacy Specialist  [phone]

## 2024-10-30 ENCOUNTER — MYC MEDICAL ADVICE (OUTPATIENT)
Dept: FAMILY MEDICINE | Facility: CLINIC | Age: 74
End: 2024-10-30
Payer: COMMERCIAL

## 2024-11-11 DIAGNOSIS — N40.1 BENIGN PROSTATIC HYPERPLASIA WITH LOWER URINARY TRACT SYMPTOMS, SYMPTOM DETAILS UNSPECIFIED: ICD-10-CM

## 2024-11-11 DIAGNOSIS — E78.2 MIXED HYPERLIPIDEMIA: ICD-10-CM

## 2024-11-11 DIAGNOSIS — R06.6 HICCUP: ICD-10-CM

## 2024-11-11 DIAGNOSIS — I10 ESSENTIAL HYPERTENSION, BENIGN: ICD-10-CM

## 2024-11-11 DIAGNOSIS — F41.9 ANXIETY: ICD-10-CM

## 2024-11-11 RX ORDER — BACLOFEN 10 MG/1
10 TABLET ORAL 2 TIMES DAILY
Qty: 180 TABLET | Refills: 1 | Status: SHIPPED | OUTPATIENT
Start: 2024-11-11

## 2024-11-11 RX ORDER — PROPRANOLOL HCL 20 MG
20 TABLET ORAL 2 TIMES DAILY
Qty: 180 TABLET | Refills: 1 | Status: SHIPPED | OUTPATIENT
Start: 2024-11-11

## 2024-11-11 RX ORDER — ROSUVASTATIN CALCIUM 10 MG/1
10 TABLET, COATED ORAL DAILY
Qty: 90 TABLET | Refills: 2 | Status: SHIPPED | OUTPATIENT
Start: 2024-11-11

## 2024-11-11 RX ORDER — TADALAFIL 5 MG/1
5 TABLET ORAL DAILY
Qty: 90 TABLET | Refills: 3 | Status: SHIPPED | OUTPATIENT
Start: 2024-11-11

## 2024-11-11 RX ORDER — ALPRAZOLAM 1 MG/1
1 TABLET ORAL
Qty: 30 TABLET | Refills: 3 | Status: SHIPPED | OUTPATIENT
Start: 2024-11-11

## 2024-11-11 NOTE — TELEPHONE ENCOUNTER
Patient has 2 left of Xanax, patient is wanting medications reviewed- was unsure of the names,  per pharmacist may be able to use one for two purposes and get rid of one.     List includes controlled substance will route to provider.

## 2024-12-22 DIAGNOSIS — G43.809 OTHER MIGRAINE WITHOUT STATUS MIGRAINOSUS, NOT INTRACTABLE: ICD-10-CM

## 2024-12-23 RX ORDER — NAPROXEN 500 MG/1
500 TABLET ORAL 2 TIMES DAILY PRN
Qty: 90 TABLET | Refills: 0 | Status: SHIPPED | OUTPATIENT
Start: 2024-12-23

## 2025-01-21 DIAGNOSIS — G47.00 INSOMNIA, UNSPECIFIED TYPE: ICD-10-CM

## 2025-01-23 RX ORDER — TRAZODONE HYDROCHLORIDE 50 MG/1
TABLET, FILM COATED ORAL
Qty: 90 TABLET | Refills: 2 | Status: SHIPPED | OUTPATIENT
Start: 2025-01-23

## 2025-01-23 NOTE — TELEPHONE ENCOUNTER
Outgoing to patient, He did say that he has been taking the trazodone nightly and is in need of a refill. Routing to the pcp to refill.     Laura MOBLEY RN

## 2025-01-29 DIAGNOSIS — F33.0 MAJOR DEPRESSIVE DISORDER, RECURRENT EPISODE, MILD: ICD-10-CM

## 2025-01-29 RX ORDER — BUPROPION HYDROCHLORIDE 150 MG/1
150 TABLET ORAL EVERY MORNING
Qty: 90 TABLET | Refills: 0 | Status: SHIPPED | OUTPATIENT
Start: 2025-01-29

## 2025-03-06 DIAGNOSIS — G43.809 OTHER MIGRAINE WITHOUT STATUS MIGRAINOSUS, NOT INTRACTABLE: Primary | ICD-10-CM

## 2025-03-06 RX ORDER — RIZATRIPTAN BENZOATE 5 MG/1
5 TABLET ORAL
Qty: 12 TABLET | Refills: 1 | Status: SHIPPED | OUTPATIENT
Start: 2025-03-06

## 2025-03-06 NOTE — TELEPHONE ENCOUNTER
Medication Question or Refill    Contacts       Contact Date/Time Type Contact Phone/Fax    03/06/2025 09:03 AM CST Phone (Incoming) Denis Moreno (Self) [phone] (H)            What medication are you calling about (include dose and sig)?: rizatriptan 20mg    Preferred Pharmacy:   [facility]/pharmacy #[id number] - [place], [place] - [address]  [address] [place] [address]  Phone: [phone] Fax: [phone]      Controlled Substance Agreement on file:   CSA -- Patient Level:     [Media Unavailable] Controlled Substance Agreement - Non - Opioid - Scan on 1/2/2022  9:35 PM: NON-OPIOID CONTROLLED SUBSTANCE AGREEMENT   [Media Unavailable] Controlled Substance Agreement - Non - Opioid - Scan on 3/6/2020: NON-OPIOID CONTROLLED SUBSTANCE AGREEMENT       Who prescribed the medication?: previous    Do you need a refill? Yes    When did you use the medication last? With headaches    Patient offered an appointment? No    Do you have any questions or concerns?  Yes: close friend passed away and services are tomorrow-which is what he thinks is triggering this headache      Could we send this information to you in [place] or would you prefer to receive a phone call?:   Patient would prefer a phone call   Okay to leave a detailed message?: Yes at Cell number on file:    Telephone Information:   Mobile [phone]

## 2025-03-17 DIAGNOSIS — F41.9 ANXIETY: ICD-10-CM

## 2025-03-17 RX ORDER — ALPRAZOLAM 1 MG/1
1 TABLET ORAL
Qty: 30 TABLET | Refills: 3 | Status: SHIPPED | OUTPATIENT
Start: 2025-03-17

## 2025-05-04 DIAGNOSIS — F33.0 MAJOR DEPRESSIVE DISORDER, RECURRENT EPISODE, MILD: ICD-10-CM

## 2025-05-05 NOTE — TELEPHONE ENCOUNTER
Please asked patient to do e-visit for recheck of this.  Also help schedule him for his Medicare wellness visit

## 2025-05-06 NOTE — TELEPHONE ENCOUNTER
Outgoing call and spoke with patient. Notified that e-visit was needed prior to refills. Patient reports understanding and will complete e-visit.  Scheduled AWV on 7/14/2025 1:00 PM.  Will wait for patient to complete e-visit.

## 2025-05-07 DIAGNOSIS — G43.809 OTHER MIGRAINE WITHOUT STATUS MIGRAINOSUS, NOT INTRACTABLE: ICD-10-CM

## 2025-05-07 RX ORDER — RIZATRIPTAN BENZOATE 5 MG/1
5 TABLET ORAL
Qty: 12 TABLET | Refills: 1 | Status: SHIPPED | OUTPATIENT
Start: 2025-05-07

## 2025-05-10 DIAGNOSIS — K21.9 GASTROESOPHAGEAL REFLUX DISEASE WITHOUT ESOPHAGITIS: ICD-10-CM

## 2025-05-12 RX ORDER — PANTOPRAZOLE SODIUM 40 MG/1
40 TABLET, DELAYED RELEASE ORAL DAILY
Qty: 90 TABLET | Refills: 0 | Status: SHIPPED | OUTPATIENT
Start: 2025-05-12

## 2025-05-19 NOTE — TELEPHONE ENCOUNTER
Outgoing call and lmtcb. If patient calls back, please let patient know that patient needs to complete an e-visit on [facility] before provider can refill medication for patient.  There is a step by step on how to complete it that was sent to him on 05/09/2025 through [facility].

## 2025-05-22 ENCOUNTER — VIRTUAL VISIT (OUTPATIENT)
Dept: INTERNAL MEDICINE | Facility: CLINIC | Age: 75
End: 2025-05-22
Payer: COMMERCIAL

## 2025-05-22 DIAGNOSIS — Z12.5 SCREENING FOR PROSTATE CANCER: Primary | ICD-10-CM

## 2025-05-22 DIAGNOSIS — R61 NIGHT SWEATS: ICD-10-CM

## 2025-05-22 RX ORDER — BUPROPION HYDROCHLORIDE 150 MG/1
150 TABLET ORAL EVERY MORNING
Qty: 90 TABLET | Refills: 0 | Status: SHIPPED | OUTPATIENT
Start: 2025-05-22 | End: 2025-05-22

## 2025-05-22 RX ORDER — BUPROPION HYDROCHLORIDE 150 MG/1
150 TABLET ORAL EVERY MORNING
Qty: 90 TABLET | Refills: 0 | Status: SHIPPED | OUTPATIENT
Start: 2025-05-22

## 2025-05-22 NOTE — PROGRESS NOTES
Denis is a 75 year old who is being evaluated via a billable video visit.    How would you like to obtain your AVS? MyChart  If the video visit is dropped, the invitation should be resent by: Text to cell phone: [phone]  Will anyone else be joining your video visit? No  {If patient encounters technical issues they should call [phone] :280796}    {PROVIDER CHARTING PREFERENCE:135039}    Subjective   Denis is a 75 year old, presenting for the following health issues:  Night Sweats (Night sweats x 3-4 days, bed soaked, changed taking meds to the AM and that might be helping)  {(!) Visit Details have not yet been documented.  Please enter Visit Details and then use this list to pull in documentation. (Optional):201093}    Video Start Time: {video visit start/end time for provider to select:408279}    HPI      {SUPERLIST (Optional):516584}  {additonal problems for provider to add (Optional):042012}    {ROS Picklists (Optional):191026}      Objective           Vitals:  No vitals were obtained today due to virtual visit.    Physical Exam   {video visit exam brief selected:969002}    {Diagnostic Test Results (Optional):355252}      Video-Visit Details    Type of service:  Video Visit   Video End Time:{video visit start/end time for provider to select:685794}  Originating Location (pt. Location): Home  {PROVIDER LOCATION On-site should be selected for visits conducted from your clinic location or adjoining [facility] hospital, academic office, or other nearby [facility] building. Off-site should be selected for all other provider locations, including home:552846}  Distant Location (provider location):  On-site  Platform used for Video Visit: Doximity  Signed Electronically by: Fletcher Putnam CNP  {Email feedback regarding this note to [email or web address]   :115110}

## 2025-05-22 NOTE — ADDENDUM NOTE
Addended by: HAILEY POWELL on: 5/22/2025 11:07 AM     Modules accepted: Orders     exercises everyday dumbbell, yoga, everyday exercises everyday with dumbbells and yoga

## 2025-07-17 DIAGNOSIS — I10 ESSENTIAL HYPERTENSION, BENIGN: ICD-10-CM

## 2025-07-17 RX ORDER — PROPRANOLOL HCL 20 MG
20 TABLET ORAL 2 TIMES DAILY
Qty: 180 TABLET | Refills: 0 | Status: SHIPPED | OUTPATIENT
Start: 2025-07-17

## 2025-07-20 ENCOUNTER — HEALTH MAINTENANCE LETTER (OUTPATIENT)
Age: 75
End: 2025-07-20

## 2025-08-06 ENCOUNTER — PATIENT OUTREACH (OUTPATIENT)
Dept: GERIATRIC MEDICINE | Facility: CLINIC | Age: 75
End: 2025-08-06

## 2025-08-07 DIAGNOSIS — K21.9 GASTROESOPHAGEAL REFLUX DISEASE WITHOUT ESOPHAGITIS: ICD-10-CM

## 2025-08-07 RX ORDER — PANTOPRAZOLE SODIUM 40 MG/1
40 TABLET, DELAYED RELEASE ORAL DAILY
Qty: 90 TABLET | Refills: 11 | Status: SHIPPED | OUTPATIENT
Start: 2025-08-07

## 2025-08-14 ENCOUNTER — PATIENT OUTREACH (OUTPATIENT)
Dept: GERIATRIC MEDICINE | Facility: CLINIC | Age: 75
End: 2025-08-14

## 2025-08-19 DIAGNOSIS — R06.6 HICCUP: ICD-10-CM

## 2025-08-19 RX ORDER — BACLOFEN 10 MG/1
10 TABLET ORAL 2 TIMES DAILY
Qty: 180 TABLET | Refills: 1 | Status: SHIPPED | OUTPATIENT
Start: 2025-08-19

## 2025-08-20 DIAGNOSIS — F33.0 MAJOR DEPRESSIVE DISORDER, RECURRENT EPISODE, MILD: ICD-10-CM

## 2025-08-20 RX ORDER — BUPROPION HYDROCHLORIDE 150 MG/1
150 TABLET ORAL EVERY MORNING
Qty: 90 TABLET | Refills: 0 | Status: SHIPPED | OUTPATIENT
Start: 2025-08-20

## 2025-08-30 DIAGNOSIS — F33.0 MAJOR DEPRESSIVE DISORDER, RECURRENT EPISODE, MILD: ICD-10-CM

## 2025-09-02 RX ORDER — ESCITALOPRAM OXALATE 10 MG/1
10 TABLET ORAL DAILY
Qty: 90 TABLET | Refills: 11 | Status: SHIPPED | OUTPATIENT
Start: 2025-09-02